# Patient Record
Sex: FEMALE | Race: WHITE | NOT HISPANIC OR LATINO
[De-identification: names, ages, dates, MRNs, and addresses within clinical notes are randomized per-mention and may not be internally consistent; named-entity substitution may affect disease eponyms.]

---

## 2016-06-27 RX ORDER — ACETAMINOPHEN 500 MG
2 TABLET ORAL
Qty: 0 | Refills: 0 | COMMUNITY
Start: 2016-06-27

## 2017-01-03 ENCOUNTER — APPOINTMENT (OUTPATIENT)
Dept: ORTHOPEDIC SURGERY | Facility: CLINIC | Age: 82
End: 2017-01-03

## 2017-02-15 ENCOUNTER — APPOINTMENT (OUTPATIENT)
Dept: ORTHOPEDIC SURGERY | Facility: CLINIC | Age: 82
End: 2017-02-15

## 2017-02-15 DIAGNOSIS — Z96.652 PRESENCE OF LEFT ARTIFICIAL KNEE JOINT: ICD-10-CM

## 2017-04-12 ENCOUNTER — APPOINTMENT (OUTPATIENT)
Dept: UROGYNECOLOGY | Facility: CLINIC | Age: 82
End: 2017-04-12

## 2017-05-17 ENCOUNTER — APPOINTMENT (OUTPATIENT)
Dept: ORTHOPEDIC SURGERY | Facility: CLINIC | Age: 82
End: 2017-05-17

## 2017-05-17 VITALS — BODY MASS INDEX: 25.49 KG/M2 | WEIGHT: 135 LBS | HEIGHT: 61 IN

## 2017-08-22 ENCOUNTER — APPOINTMENT (OUTPATIENT)
Dept: ORTHOPEDIC SURGERY | Facility: CLINIC | Age: 82
End: 2017-08-22
Payer: MEDICARE

## 2017-08-22 DIAGNOSIS — Z47.1 AFTERCARE FOLLOWING JOINT REPLACEMENT SURGERY: ICD-10-CM

## 2017-08-22 DIAGNOSIS — Z96.641 PRESENCE OF RIGHT ARTIFICIAL HIP JOINT: ICD-10-CM

## 2017-08-22 DIAGNOSIS — M16.12 UNILATERAL PRIMARY OSTEOARTHRITIS, LEFT HIP: ICD-10-CM

## 2017-08-22 PROCEDURE — 72170 X-RAY EXAM OF PELVIS: CPT

## 2017-08-22 PROCEDURE — 72100 X-RAY EXAM L-S SPINE 2/3 VWS: CPT

## 2017-08-22 PROCEDURE — 99213 OFFICE O/P EST LOW 20 MIN: CPT

## 2017-09-08 ENCOUNTER — RX RENEWAL (OUTPATIENT)
Age: 82
End: 2017-09-08

## 2017-12-11 ENCOUNTER — OUTPATIENT (OUTPATIENT)
Dept: OUTPATIENT SERVICES | Facility: HOSPITAL | Age: 82
LOS: 1 days | End: 2017-12-11
Payer: MEDICARE

## 2017-12-11 VITALS
OXYGEN SATURATION: 96 % | HEIGHT: 61 IN | TEMPERATURE: 98 F | DIASTOLIC BLOOD PRESSURE: 85 MMHG | RESPIRATION RATE: 16 BRPM | WEIGHT: 141.76 LBS | HEART RATE: 80 BPM | SYSTOLIC BLOOD PRESSURE: 143 MMHG

## 2017-12-11 DIAGNOSIS — Z96.641 PRESENCE OF RIGHT ARTIFICIAL HIP JOINT: Chronic | ICD-10-CM

## 2017-12-11 DIAGNOSIS — Z96.652 PRESENCE OF LEFT ARTIFICIAL KNEE JOINT: Chronic | ICD-10-CM

## 2017-12-11 DIAGNOSIS — Z41.9 ENCOUNTER FOR PROCEDURE FOR PURPOSES OTHER THAN REMEDYING HEALTH STATE, UNSPECIFIED: Chronic | ICD-10-CM

## 2017-12-11 DIAGNOSIS — Z98.89 OTHER SPECIFIED POSTPROCEDURAL STATES: Chronic | ICD-10-CM

## 2017-12-11 DIAGNOSIS — M16.12 UNILATERAL PRIMARY OSTEOARTHRITIS, LEFT HIP: ICD-10-CM

## 2017-12-11 DIAGNOSIS — Z01.818 ENCOUNTER FOR OTHER PREPROCEDURAL EXAMINATION: ICD-10-CM

## 2017-12-11 DIAGNOSIS — Z98.42 CATARACT EXTRACTION STATUS, LEFT EYE: Chronic | ICD-10-CM

## 2017-12-11 LAB
ANION GAP SERPL CALC-SCNC: 13 MMOL/L — SIGNIFICANT CHANGE UP (ref 5–17)
BLD GP AB SCN SERPL QL: NEGATIVE — SIGNIFICANT CHANGE UP
BUN SERPL-MCNC: 44 MG/DL — HIGH (ref 7–23)
CALCIUM SERPL-MCNC: 10.5 MG/DL — SIGNIFICANT CHANGE UP (ref 8.4–10.5)
CHLORIDE SERPL-SCNC: 102 MMOL/L — SIGNIFICANT CHANGE UP (ref 96–108)
CO2 SERPL-SCNC: 26 MMOL/L — SIGNIFICANT CHANGE UP (ref 22–31)
CREAT SERPL-MCNC: 1.02 MG/DL — SIGNIFICANT CHANGE UP (ref 0.5–1.3)
GLUCOSE SERPL-MCNC: 91 MG/DL — SIGNIFICANT CHANGE UP (ref 70–99)
HCT VFR BLD CALC: 38.9 % — SIGNIFICANT CHANGE UP (ref 34.5–45)
HGB BLD-MCNC: 12.4 G/DL — SIGNIFICANT CHANGE UP (ref 11.5–15.5)
MCHC RBC-ENTMCNC: 31.9 GM/DL — LOW (ref 32–36)
MCHC RBC-ENTMCNC: 32 PG — SIGNIFICANT CHANGE UP (ref 27–34)
MCV RBC AUTO: 100.3 FL — HIGH (ref 80–100)
MRSA PCR RESULT.: SIGNIFICANT CHANGE UP
PLATELET # BLD AUTO: 202 K/UL — SIGNIFICANT CHANGE UP (ref 150–400)
POTASSIUM SERPL-MCNC: 4.8 MMOL/L — SIGNIFICANT CHANGE UP (ref 3.5–5.3)
POTASSIUM SERPL-SCNC: 4.8 MMOL/L — SIGNIFICANT CHANGE UP (ref 3.5–5.3)
RBC # BLD: 3.88 M/UL — SIGNIFICANT CHANGE UP (ref 3.8–5.2)
RBC # FLD: 13.9 % — SIGNIFICANT CHANGE UP (ref 10.3–14.5)
RH IG SCN BLD-IMP: NEGATIVE — SIGNIFICANT CHANGE UP
S AUREUS DNA NOSE QL NAA+PROBE: DETECTED
SODIUM SERPL-SCNC: 141 MMOL/L — SIGNIFICANT CHANGE UP (ref 135–145)
WBC # BLD: 5.85 K/UL — SIGNIFICANT CHANGE UP (ref 3.8–10.5)
WBC # FLD AUTO: 5.85 K/UL — SIGNIFICANT CHANGE UP (ref 3.8–10.5)

## 2017-12-11 PROCEDURE — 86900 BLOOD TYPING SEROLOGIC ABO: CPT

## 2017-12-11 PROCEDURE — 87640 STAPH A DNA AMP PROBE: CPT

## 2017-12-11 PROCEDURE — 86901 BLOOD TYPING SEROLOGIC RH(D): CPT

## 2017-12-11 PROCEDURE — G0463: CPT

## 2017-12-11 PROCEDURE — 85027 COMPLETE CBC AUTOMATED: CPT

## 2017-12-11 PROCEDURE — 86850 RBC ANTIBODY SCREEN: CPT

## 2017-12-11 PROCEDURE — 80048 BASIC METABOLIC PNL TOTAL CA: CPT

## 2017-12-11 RX ORDER — TRAMADOL HYDROCHLORIDE 50 MG/1
50 TABLET ORAL ONCE
Qty: 0 | Refills: 0 | Status: DISCONTINUED | OUTPATIENT
Start: 2017-12-28 | End: 2017-12-28

## 2017-12-11 RX ORDER — PANTOPRAZOLE SODIUM 20 MG/1
40 TABLET, DELAYED RELEASE ORAL ONCE
Qty: 0 | Refills: 0 | Status: COMPLETED | OUTPATIENT
Start: 2017-12-28 | End: 2017-12-28

## 2017-12-11 RX ORDER — CEFAZOLIN SODIUM 1 G
2000 VIAL (EA) INJECTION ONCE
Qty: 0 | Refills: 0 | Status: DISCONTINUED | OUTPATIENT
Start: 2017-12-28 | End: 2018-01-01

## 2017-12-11 RX ORDER — ACETAMINOPHEN 500 MG
975 TABLET ORAL ONCE
Qty: 0 | Refills: 0 | Status: COMPLETED | OUTPATIENT
Start: 2017-12-28 | End: 2017-12-28

## 2017-12-11 RX ORDER — GABAPENTIN 400 MG/1
100 CAPSULE ORAL ONCE
Qty: 0 | Refills: 0 | Status: COMPLETED | OUTPATIENT
Start: 2017-12-28 | End: 2017-12-28

## 2017-12-11 NOTE — H&P PST ADULT - ASSESSMENT
84 yr old female with primary osteoarthritis of the left hip, scheduled for left total hip replacement on 12/29/17.   PST instructions provided, soap given, patient verbalized understanding. Attended ortho class in the past.   CBC, BMP, T&S , MRSA collected and send. Will continue Aspirin.

## 2017-12-11 NOTE — H&P PST ADULT - NSANTHOSAYNRD_GEN_A_CORE
No. AMENA screening performed.  STOP BANG Legend: 0-2 = LOW Risk; 3-4 = INTERMEDIATE Risk; 5-8 = HIGH Risk/13.5 inches

## 2017-12-11 NOTE — H&P PST ADULT - PMH
Anxiety    Fistula of vagina  rectovaginal fistula  Glaucoma    History of breast cancer  2015, no chemo/radiation  s/p lumpectomy  Hyperlipidemia    Hypertension    Iron deficiency anemia    Osteoarthritis    Osteopenia    Ptosis  left eye - eye lid surgery 2002  RBBB    Retinal tear    Ureterovaginal prolapse Anxiety    Fistula of vagina  rectovaginal fistula  Glaucoma    History of breast cancer  2015, no chemo/radiation  s/p lumpectomy  Hyperlipidemia    Hypertension    Iron deficiency anemia    Osteoarthritis    Osteopenia    Primary osteoarthritis of left hip    Ptosis  left eye - eye lid surgery 2002  RBBB    Retinal tear    Ureterovaginal prolapse

## 2017-12-11 NOTE — H&P PST ADULT - HISTORY OF PRESENT ILLNESS
84   year old female PMH of HTN,  HLD,  s/p right breast lumpectomy due to CA ( 2015) no chemo/radiation, had rectovaginal fistula repair 2015 , right total hip replacement  6/2016, left TKR 12/2016, presents to PST for scheduled left total hip replacement on 12/29/17. Ambulating with cane.       NO BP /IV right arm 84  year old female PMH of HTN,  HLD,  s/p right breast lumpectomy due to CA ( 2015) no chemo/radiation, had rectovaginal fistula repair 2015 , right total hip replacement  6/2016, left TKR 12/2016, reports worsening pain in the left hip and presents to PST for scheduled left total hip replacement on 12/29/17. Ambulating with cane. Denies fever, chills, no acute complaints.       NO BP /IV right arm

## 2017-12-11 NOTE — H&P PST ADULT - ACTIVITY
housework, chair pilates, walking 1-2 blocks with cane, stairs 1 flight , independent, can take care of self

## 2017-12-11 NOTE — H&P PST ADULT - PSH
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

## 2017-12-27 ENCOUNTER — FORM ENCOUNTER (OUTPATIENT)
Age: 82
End: 2017-12-27

## 2017-12-28 ENCOUNTER — RESULT REVIEW (OUTPATIENT)
Age: 82
End: 2017-12-28

## 2017-12-28 ENCOUNTER — INPATIENT (INPATIENT)
Facility: HOSPITAL | Age: 82
LOS: 3 days | Discharge: HOME | DRG: 470 | End: 2018-01-01
Attending: ORTHOPAEDIC SURGERY | Admitting: ORTHOPAEDIC SURGERY
Payer: MEDICARE

## 2017-12-28 ENCOUNTER — APPOINTMENT (OUTPATIENT)
Dept: ORTHOPEDIC SURGERY | Facility: HOSPITAL | Age: 82
End: 2017-12-28

## 2017-12-28 ENCOUNTER — TRANSCRIPTION ENCOUNTER (OUTPATIENT)
Age: 82
End: 2017-12-28

## 2017-12-28 VITALS
HEIGHT: 61 IN | OXYGEN SATURATION: 96 % | SYSTOLIC BLOOD PRESSURE: 135 MMHG | RESPIRATION RATE: 18 BRPM | TEMPERATURE: 97 F | HEART RATE: 74 BPM | WEIGHT: 141.76 LBS | DIASTOLIC BLOOD PRESSURE: 77 MMHG

## 2017-12-28 DIAGNOSIS — Z98.89 OTHER SPECIFIED POSTPROCEDURAL STATES: Chronic | ICD-10-CM

## 2017-12-28 DIAGNOSIS — M16.12 UNILATERAL PRIMARY OSTEOARTHRITIS, LEFT HIP: ICD-10-CM

## 2017-12-28 DIAGNOSIS — Z96.652 PRESENCE OF LEFT ARTIFICIAL KNEE JOINT: Chronic | ICD-10-CM

## 2017-12-28 DIAGNOSIS — Z98.42 CATARACT EXTRACTION STATUS, LEFT EYE: Chronic | ICD-10-CM

## 2017-12-28 DIAGNOSIS — Z41.9 ENCOUNTER FOR PROCEDURE FOR PURPOSES OTHER THAN REMEDYING HEALTH STATE, UNSPECIFIED: Chronic | ICD-10-CM

## 2017-12-28 DIAGNOSIS — Z96.641 PRESENCE OF RIGHT ARTIFICIAL HIP JOINT: Chronic | ICD-10-CM

## 2017-12-28 LAB
ANION GAP SERPL CALC-SCNC: 18 MMOL/L — HIGH (ref 5–17)
BUN SERPL-MCNC: 32 MG/DL — HIGH (ref 7–23)
CALCIUM SERPL-MCNC: 9.1 MG/DL — SIGNIFICANT CHANGE UP (ref 8.4–10.5)
CHLORIDE SERPL-SCNC: 102 MMOL/L — SIGNIFICANT CHANGE UP (ref 96–108)
CO2 SERPL-SCNC: 18 MMOL/L — LOW (ref 22–31)
CREAT SERPL-MCNC: 0.94 MG/DL — SIGNIFICANT CHANGE UP (ref 0.5–1.3)
GLUCOSE SERPL-MCNC: 106 MG/DL — HIGH (ref 70–99)
HCT VFR BLD CALC: 35.7 % — SIGNIFICANT CHANGE UP (ref 34.5–45)
HGB BLD-MCNC: 12.2 G/DL — SIGNIFICANT CHANGE UP (ref 11.5–15.5)
MCHC RBC-ENTMCNC: 34 GM/DL — SIGNIFICANT CHANGE UP (ref 32–36)
MCHC RBC-ENTMCNC: 34.7 PG — HIGH (ref 27–34)
MCV RBC AUTO: 102 FL — HIGH (ref 80–100)
PLATELET # BLD AUTO: 164 K/UL — SIGNIFICANT CHANGE UP (ref 150–400)
POTASSIUM SERPL-MCNC: 5 MMOL/L — SIGNIFICANT CHANGE UP (ref 3.5–5.3)
POTASSIUM SERPL-SCNC: 5 MMOL/L — SIGNIFICANT CHANGE UP (ref 3.5–5.3)
RBC # BLD: 3.5 M/UL — LOW (ref 3.8–5.2)
RBC # FLD: 12 % — SIGNIFICANT CHANGE UP (ref 10.3–14.5)
SODIUM SERPL-SCNC: 138 MMOL/L — SIGNIFICANT CHANGE UP (ref 135–145)
WBC # BLD: 9.6 K/UL — SIGNIFICANT CHANGE UP (ref 3.8–10.5)
WBC # FLD AUTO: 9.6 K/UL — SIGNIFICANT CHANGE UP (ref 3.8–10.5)

## 2017-12-28 PROCEDURE — 88311 DECALCIFY TISSUE: CPT | Mod: 26

## 2017-12-28 PROCEDURE — 27130 TOTAL HIP ARTHROPLASTY: CPT | Mod: LT

## 2017-12-28 PROCEDURE — 73502 X-RAY EXAM HIP UNI 2-3 VIEWS: CPT | Mod: 26,LT

## 2017-12-28 PROCEDURE — 88305 TISSUE EXAM BY PATHOLOGIST: CPT | Mod: 26

## 2017-12-28 RX ORDER — FAMOTIDINE 10 MG/ML
20 INJECTION INTRAVENOUS EVERY 12 HOURS
Qty: 0 | Refills: 0 | Status: DISCONTINUED | OUTPATIENT
Start: 2017-12-28 | End: 2018-01-01

## 2017-12-28 RX ORDER — FERROUS SULFATE 325(65) MG
325 TABLET ORAL
Qty: 0 | Refills: 0 | Status: DISCONTINUED | OUTPATIENT
Start: 2017-12-28 | End: 2018-01-01

## 2017-12-28 RX ORDER — DEXAMETHASONE 0.5 MG/5ML
10 ELIXIR ORAL ONCE
Qty: 0 | Refills: 0 | Status: DISCONTINUED | OUTPATIENT
Start: 2017-12-28 | End: 2018-01-01

## 2017-12-28 RX ORDER — HYDROMORPHONE HYDROCHLORIDE 2 MG/ML
0.5 INJECTION INTRAMUSCULAR; INTRAVENOUS; SUBCUTANEOUS
Qty: 0 | Refills: 0 | Status: DISCONTINUED | OUTPATIENT
Start: 2017-12-28 | End: 2018-01-01

## 2017-12-28 RX ORDER — ACETAMINOPHEN 500 MG
1000 TABLET ORAL ONCE
Qty: 0 | Refills: 0 | Status: COMPLETED | OUTPATIENT
Start: 2017-12-28 | End: 2017-12-28

## 2017-12-28 RX ORDER — FENTANYL CITRATE 50 UG/ML
25 INJECTION INTRAVENOUS
Qty: 0 | Refills: 0 | Status: DISCONTINUED | OUTPATIENT
Start: 2017-12-28 | End: 2017-12-28

## 2017-12-28 RX ORDER — DOCUSATE SODIUM 100 MG
100 CAPSULE ORAL THREE TIMES A DAY
Qty: 0 | Refills: 0 | Status: DISCONTINUED | OUTPATIENT
Start: 2017-12-28 | End: 2018-01-01

## 2017-12-28 RX ORDER — DIPHENHYDRAMINE HCL 50 MG
50 CAPSULE ORAL EVERY 4 HOURS
Qty: 0 | Refills: 0 | Status: DISCONTINUED | OUTPATIENT
Start: 2017-12-28 | End: 2018-01-01

## 2017-12-28 RX ORDER — SODIUM CHLORIDE 9 MG/ML
3 INJECTION INTRAMUSCULAR; INTRAVENOUS; SUBCUTANEOUS EVERY 8 HOURS
Qty: 0 | Refills: 0 | Status: DISCONTINUED | OUTPATIENT
Start: 2017-12-28 | End: 2017-12-28

## 2017-12-28 RX ORDER — BENZOCAINE AND MENTHOL 5; 1 G/100ML; G/100ML
1 LIQUID ORAL
Qty: 0 | Refills: 0 | Status: DISCONTINUED | OUTPATIENT
Start: 2017-12-28 | End: 2018-01-01

## 2017-12-28 RX ORDER — SENNA PLUS 8.6 MG/1
2 TABLET ORAL AT BEDTIME
Qty: 0 | Refills: 0 | Status: DISCONTINUED | OUTPATIENT
Start: 2017-12-28 | End: 2018-01-01

## 2017-12-28 RX ORDER — LIDOCAINE HCL 20 MG/ML
0.2 VIAL (ML) INJECTION ONCE
Qty: 0 | Refills: 0 | Status: DISCONTINUED | OUTPATIENT
Start: 2017-12-28 | End: 2017-12-28

## 2017-12-28 RX ORDER — SODIUM CHLORIDE 9 MG/ML
1000 INJECTION, SOLUTION INTRAVENOUS
Qty: 0 | Refills: 0 | Status: DISCONTINUED | OUTPATIENT
Start: 2017-12-28 | End: 2018-01-01

## 2017-12-28 RX ORDER — METOPROLOL TARTRATE 50 MG
25 TABLET ORAL DAILY
Qty: 0 | Refills: 0 | Status: DISCONTINUED | OUTPATIENT
Start: 2017-12-28 | End: 2018-01-01

## 2017-12-28 RX ORDER — ACETAMINOPHEN 500 MG
650 TABLET ORAL EVERY 6 HOURS
Qty: 0 | Refills: 0 | Status: DISCONTINUED | OUTPATIENT
Start: 2017-12-28 | End: 2018-01-01

## 2017-12-28 RX ORDER — ASPIRIN/CALCIUM CARB/MAGNESIUM 324 MG
325 TABLET ORAL
Qty: 0 | Refills: 0 | Status: DISCONTINUED | OUTPATIENT
Start: 2017-12-29 | End: 2018-01-01

## 2017-12-28 RX ORDER — ANASTROZOLE 1 MG/1
1 TABLET ORAL DAILY
Qty: 0 | Refills: 0 | Status: DISCONTINUED | OUTPATIENT
Start: 2017-12-28 | End: 2018-01-01

## 2017-12-28 RX ORDER — OXYCODONE HYDROCHLORIDE 5 MG/1
5 TABLET ORAL EVERY 4 HOURS
Qty: 0 | Refills: 0 | Status: DISCONTINUED | OUTPATIENT
Start: 2017-12-28 | End: 2017-12-30

## 2017-12-28 RX ORDER — ASCORBIC ACID 60 MG
500 TABLET,CHEWABLE ORAL
Qty: 0 | Refills: 0 | Status: DISCONTINUED | OUTPATIENT
Start: 2017-12-28 | End: 2018-01-01

## 2017-12-28 RX ORDER — SIMVASTATIN 20 MG/1
20 TABLET, FILM COATED ORAL AT BEDTIME
Qty: 0 | Refills: 0 | Status: DISCONTINUED | OUTPATIENT
Start: 2017-12-28 | End: 2018-01-01

## 2017-12-28 RX ORDER — SODIUM CHLORIDE 9 MG/ML
1000 INJECTION, SOLUTION INTRAVENOUS ONCE
Qty: 0 | Refills: 0 | Status: COMPLETED | OUTPATIENT
Start: 2017-12-28 | End: 2017-12-28

## 2017-12-28 RX ORDER — FOLIC ACID 0.8 MG
1 TABLET ORAL DAILY
Qty: 0 | Refills: 0 | Status: DISCONTINUED | OUTPATIENT
Start: 2017-12-28 | End: 2018-01-01

## 2017-12-28 RX ORDER — OXYCODONE HYDROCHLORIDE 5 MG/1
10 TABLET ORAL EVERY 4 HOURS
Qty: 0 | Refills: 0 | Status: DISCONTINUED | OUTPATIENT
Start: 2017-12-28 | End: 2017-12-30

## 2017-12-28 RX ORDER — LOSARTAN POTASSIUM 100 MG/1
50 TABLET, FILM COATED ORAL DAILY
Qty: 0 | Refills: 0 | Status: DISCONTINUED | OUTPATIENT
Start: 2017-12-28 | End: 2018-01-01

## 2017-12-28 RX ORDER — ONDANSETRON 8 MG/1
4 TABLET, FILM COATED ORAL EVERY 6 HOURS
Qty: 0 | Refills: 0 | Status: DISCONTINUED | OUTPATIENT
Start: 2017-12-28 | End: 2018-01-01

## 2017-12-28 RX ORDER — CEFAZOLIN SODIUM 1 G
2000 VIAL (EA) INJECTION EVERY 8 HOURS
Qty: 0 | Refills: 0 | Status: COMPLETED | OUTPATIENT
Start: 2017-12-28 | End: 2017-12-29

## 2017-12-28 RX ORDER — DIPHENHYDRAMINE HCL 50 MG
25 CAPSULE ORAL AT BEDTIME
Qty: 0 | Refills: 0 | Status: DISCONTINUED | OUTPATIENT
Start: 2017-12-28 | End: 2018-01-01

## 2017-12-28 RX ORDER — POLYETHYLENE GLYCOL 3350 17 G/17G
17 POWDER, FOR SOLUTION ORAL DAILY
Qty: 0 | Refills: 0 | Status: DISCONTINUED | OUTPATIENT
Start: 2017-12-28 | End: 2018-01-01

## 2017-12-28 RX ADMIN — OXYCODONE HYDROCHLORIDE 5 MILLIGRAM(S): 5 TABLET ORAL at 21:53

## 2017-12-28 RX ADMIN — Medication 1000 MILLIGRAM(S): at 19:49

## 2017-12-28 RX ADMIN — FENTANYL CITRATE 25 MICROGRAM(S): 50 INJECTION INTRAVENOUS at 15:00

## 2017-12-28 RX ADMIN — Medication 500 MILLIGRAM(S): at 17:13

## 2017-12-28 RX ADMIN — FENTANYL CITRATE 25 MICROGRAM(S): 50 INJECTION INTRAVENOUS at 14:49

## 2017-12-28 RX ADMIN — Medication 20 MILLIGRAM(S): at 23:33

## 2017-12-28 RX ADMIN — OXYCODONE HYDROCHLORIDE 5 MILLIGRAM(S): 5 TABLET ORAL at 22:13

## 2017-12-28 RX ADMIN — PANTOPRAZOLE SODIUM 40 MILLIGRAM(S): 20 TABLET, DELAYED RELEASE ORAL at 09:17

## 2017-12-28 RX ADMIN — FAMOTIDINE 20 MILLIGRAM(S): 10 INJECTION INTRAVENOUS at 17:13

## 2017-12-28 RX ADMIN — TRAMADOL HYDROCHLORIDE 50 MILLIGRAM(S): 50 TABLET ORAL at 09:42

## 2017-12-28 RX ADMIN — SODIUM CHLORIDE 2000 MILLILITER(S): 9 INJECTION, SOLUTION INTRAVENOUS at 14:42

## 2017-12-28 RX ADMIN — Medication 975 MILLIGRAM(S): at 09:17

## 2017-12-28 RX ADMIN — Medication 100 MILLIGRAM(S): at 21:30

## 2017-12-28 RX ADMIN — GABAPENTIN 100 MILLIGRAM(S): 400 CAPSULE ORAL at 09:17

## 2017-12-28 RX ADMIN — Medication 325 MILLIGRAM(S): at 17:15

## 2017-12-28 RX ADMIN — Medication 100 MILLIGRAM(S): at 18:25

## 2017-12-28 RX ADMIN — Medication 400 MILLIGRAM(S): at 19:16

## 2017-12-28 RX ADMIN — SIMVASTATIN 20 MILLIGRAM(S): 20 TABLET, FILM COATED ORAL at 21:30

## 2017-12-28 RX ADMIN — SODIUM CHLORIDE 75 MILLILITER(S): 9 INJECTION, SOLUTION INTRAVENOUS at 15:28

## 2017-12-28 NOTE — CHART NOTE - NSCHARTNOTEFT_GEN_A_CORE
Patient resting without complaints.  Denies chest pain, SOB, N/V.    T(C): 36 (12-28-17 @ 13:17)  T(F): 96.8 (12-28-17 @ 13:17)  HR: 89 (12-28-17 @ 15:00)  BP: 118/56 (12-28-17 @ 15:00)  RR: 17 (12-28-17 @ 15:00)  SpO2: 94% (12-28-17 @ 15:00)      Exam:  Alert and Oriented, No Acute Distress    Cardio: RRR S1,S2    Pulm: CTA B/L    L lower Extremity:  Hip Dsg CDI  Calf Soft, Non-tender  +PF/DF  Sensation grossly intact  +DP Pulse                          12.2   9.6   )-----------( 164      ( 28 Dec 2017 14:28 )             35.7        138  |  102  |  32<H>  ----------------------------<  106<H>  5.0   |  18<L>  |  0.94      Post-op Pelvis XR done.     A/P: 84y Female S/P L REBECCA; Stable    -Pain Control  -DVT PPx; IS  -Am labs  -PT/OT Eval-WBAT LLE  -Continue Current Tx.    Bruna Roberts PA-C  Orthopedic Surgery  Pagers [phone]

## 2017-12-28 NOTE — BRIEF OPERATIVE NOTE - PROCEDURE
<<-----Click on this checkbox to enter Procedure Arthroplasty, hip, total  12/28/2017  LEFT REBECCA  Active  SSTEIN2

## 2017-12-28 NOTE — PRE-OP CHECKLIST - 1.
emotional support and preop teaching provided to pt and family.
emotional support and preop teaching provided to pt and family.

## 2017-12-28 NOTE — PHYSICAL THERAPY INITIAL EVALUATION ADULT - GENERAL OBSERVATIONS, REHAB EVAL
Pt received in bed +hip abd pillow, +BLE venodynes Pt received in bed +hip abd pillow, +BLE venodynes.

## 2017-12-28 NOTE — PATIENT PROFILE ADULT. - PMH
Anxiety    Fistula of vagina  rectovaginal fistula  Glaucoma    History of breast cancer  2015, no chemo/radiation  s/p lumpectomy  Hyperlipidemia    Hypertension    Iron deficiency anemia    Osteoarthritis    Osteopenia    Primary osteoarthritis of left hip    Ptosis  left eye - eye lid surgery 2002  RBBB    Retinal tear    Ureterovaginal prolapse

## 2017-12-29 LAB
ANION GAP SERPL CALC-SCNC: 14 MMOL/L — SIGNIFICANT CHANGE UP (ref 5–17)
BUN SERPL-MCNC: 27 MG/DL — HIGH (ref 7–23)
CALCIUM SERPL-MCNC: 9.1 MG/DL — SIGNIFICANT CHANGE UP (ref 8.4–10.5)
CHLORIDE SERPL-SCNC: 102 MMOL/L — SIGNIFICANT CHANGE UP (ref 96–108)
CO2 SERPL-SCNC: 22 MMOL/L — SIGNIFICANT CHANGE UP (ref 22–31)
CREAT SERPL-MCNC: 1.1 MG/DL — SIGNIFICANT CHANGE UP (ref 0.5–1.3)
GLUCOSE SERPL-MCNC: 121 MG/DL — HIGH (ref 70–99)
HCT VFR BLD CALC: 31.6 % — LOW (ref 34.5–45)
HGB BLD-MCNC: 10.2 G/DL — LOW (ref 11.5–15.5)
MCHC RBC-ENTMCNC: 31.9 PG — SIGNIFICANT CHANGE UP (ref 27–34)
MCHC RBC-ENTMCNC: 32.3 GM/DL — SIGNIFICANT CHANGE UP (ref 32–36)
MCV RBC AUTO: 98.8 FL — SIGNIFICANT CHANGE UP (ref 80–100)
PLATELET # BLD AUTO: 176 K/UL — SIGNIFICANT CHANGE UP (ref 150–400)
POTASSIUM SERPL-MCNC: 4.7 MMOL/L — SIGNIFICANT CHANGE UP (ref 3.5–5.3)
POTASSIUM SERPL-SCNC: 4.7 MMOL/L — SIGNIFICANT CHANGE UP (ref 3.5–5.3)
RBC # BLD: 3.2 M/UL — LOW (ref 3.8–5.2)
RBC # FLD: 13.8 % — SIGNIFICANT CHANGE UP (ref 10.3–14.5)
SODIUM SERPL-SCNC: 138 MMOL/L — SIGNIFICANT CHANGE UP (ref 135–145)
WBC # BLD: 8.51 K/UL — SIGNIFICANT CHANGE UP (ref 3.8–10.5)
WBC # FLD AUTO: 8.51 K/UL — SIGNIFICANT CHANGE UP (ref 3.8–10.5)

## 2017-12-29 RX ORDER — ACETAMINOPHEN 500 MG
1000 TABLET ORAL ONCE
Qty: 0 | Refills: 0 | Status: COMPLETED | OUTPATIENT
Start: 2017-12-29 | End: 2017-12-29

## 2017-12-29 RX ORDER — SODIUM CHLORIDE 9 MG/ML
500 INJECTION INTRAMUSCULAR; INTRAVENOUS; SUBCUTANEOUS ONCE
Qty: 0 | Refills: 0 | Status: COMPLETED | OUTPATIENT
Start: 2017-12-29 | End: 2017-12-29

## 2017-12-29 RX ADMIN — Medication 325 MILLIGRAM(S): at 17:21

## 2017-12-29 RX ADMIN — Medication 1000 MILLIGRAM(S): at 09:24

## 2017-12-29 RX ADMIN — Medication 500 MILLIGRAM(S): at 05:23

## 2017-12-29 RX ADMIN — Medication 100 MILLIGRAM(S): at 05:22

## 2017-12-29 RX ADMIN — SODIUM CHLORIDE 500 MILLILITER(S): 9 INJECTION INTRAMUSCULAR; INTRAVENOUS; SUBCUTANEOUS at 11:09

## 2017-12-29 RX ADMIN — OXYCODONE HYDROCHLORIDE 5 MILLIGRAM(S): 5 TABLET ORAL at 21:36

## 2017-12-29 RX ADMIN — Medication 325 MILLIGRAM(S): at 07:53

## 2017-12-29 RX ADMIN — Medication 100 MILLIGRAM(S): at 21:36

## 2017-12-29 RX ADMIN — FAMOTIDINE 20 MILLIGRAM(S): 10 INJECTION INTRAVENOUS at 05:22

## 2017-12-29 RX ADMIN — POLYETHYLENE GLYCOL 3350 17 GRAM(S): 17 POWDER, FOR SOLUTION ORAL at 11:32

## 2017-12-29 RX ADMIN — SIMVASTATIN 20 MILLIGRAM(S): 20 TABLET, FILM COATED ORAL at 21:36

## 2017-12-29 RX ADMIN — Medication 400 MILLIGRAM(S): at 16:56

## 2017-12-29 RX ADMIN — OXYCODONE HYDROCHLORIDE 5 MILLIGRAM(S): 5 TABLET ORAL at 22:06

## 2017-12-29 RX ADMIN — Medication 400 MILLIGRAM(S): at 09:09

## 2017-12-29 RX ADMIN — Medication 20 MILLIGRAM(S): at 21:36

## 2017-12-29 RX ADMIN — Medication 325 MILLIGRAM(S): at 11:31

## 2017-12-29 RX ADMIN — Medication 1000 MILLIGRAM(S): at 17:06

## 2017-12-29 RX ADMIN — Medication 100 MILLIGRAM(S): at 03:59

## 2017-12-29 RX ADMIN — Medication 325 MILLIGRAM(S): at 05:23

## 2017-12-29 RX ADMIN — Medication 1 MILLIGRAM(S): at 11:35

## 2017-12-29 RX ADMIN — Medication 500 MILLIGRAM(S): at 17:21

## 2017-12-29 RX ADMIN — FAMOTIDINE 20 MILLIGRAM(S): 10 INJECTION INTRAVENOUS at 17:21

## 2017-12-29 RX ADMIN — ANASTROZOLE 1 MILLIGRAM(S): 1 TABLET ORAL at 11:31

## 2017-12-29 RX ADMIN — OXYCODONE HYDROCHLORIDE 5 MILLIGRAM(S): 5 TABLET ORAL at 05:23

## 2017-12-29 RX ADMIN — OXYCODONE HYDROCHLORIDE 5 MILLIGRAM(S): 5 TABLET ORAL at 16:05

## 2017-12-29 RX ADMIN — Medication 1 TABLET(S): at 11:35

## 2017-12-29 RX ADMIN — OXYCODONE HYDROCHLORIDE 5 MILLIGRAM(S): 5 TABLET ORAL at 15:32

## 2017-12-29 NOTE — PROGRESS NOTE ADULT - ATTENDING COMMENTS
Patient is a 84y old  Female who presents with a chief complaint of left total hip replacement (28 Dec 2017 08:57)        Agree with last Resident or Physician's Assistant note:    Patient comfortable  No complaints    PHYSICAL EXAM:  NAD, Alert    [ ] Hip: Dressing C/D/I; sensation grossly intact to light touch; (+) DF/PF; (+) Distal Pulses; No Calf tenderness B/L, PAS     Plan for physical therapy to get out of bed, ambulate full weight bearing as tolerated. work on knee ROM          Plan for Disposition:  Home care      Ernesto Salas MD  [facility]  [phone]

## 2017-12-29 NOTE — OCCUPATIONAL THERAPY INITIAL EVALUATION ADULT - PERTINENT HX OF CURRENT PROBLEM, REHAB EVAL
84  year old female PMH of HTN,  HLD,  s/p right breast lumpectomy due to CA ( 2015) no chemo/radiation, had rectovaginal fistula repair 2015 , right total hip replacement  6/2016, left TKR 12/2016, reports worsening pain in the left hip and presents to PST for scheduled left total hip replacement on 12/29/17. Ambulating with cane. Denies fever, chills, no acute complaints.

## 2017-12-29 NOTE — PROGRESS NOTE ADULT - ASSESSMENT
85 y/o fm s/p left total hip arthroplasty POD#1, incentive spirometer, physical therapy, low dose narcs  Tierra Duque PA-C  Orthopaedic Surgery  Team pager [phone]  [facility] [phone]  [phone]

## 2017-12-29 NOTE — PROGRESS NOTE ADULT - SUBJECTIVE AND OBJECTIVE BOX
Patient is a 84y old  Female who presents with a chief complaint of left total hip replacement (28 Dec 2017 08:57)  POST OPERATIVE DAY #:  [1 ]   Patient comfortable  No complaints    T(C): 36.4 (12-29-17 @ 05:21), Max: 37.2 (12-28-17 @ 19:28)  HR: 96 (12-29-17 @ 05:21) (74 - 103)  BP: 102/63 (12-29-17 @ 05:21) (94/55 - 135/77)  RR: 18 (12-29-17 @ 05:21) (15 - 18)  SpO2: 100% (12-29-17 @ 05:21) (87% - 100%)    PHYSICAL EXAM:  NAD, Alert  [Left ] Hip: Aquacel dressing C/D/I; sensation grossly intact to light touch; (+) DF/PF; (+) Distal Pulses; No Calf tenderness B/L, PAS     LABS:                      12.2   9.6   )-----------( 164      ( 28 Dec 2017 14:28 )             35.7   12-28  138  |  102  |  32<H>  ----------------------------<  106<H>  5.0   |  18<L>  |  0.94  Ca    9.1      28 Dec 2017 14:28

## 2017-12-30 ENCOUNTER — TRANSCRIPTION ENCOUNTER (OUTPATIENT)
Age: 82
End: 2017-12-30

## 2017-12-30 LAB
ANION GAP SERPL CALC-SCNC: 15 MMOL/L — SIGNIFICANT CHANGE UP (ref 5–17)
BUN SERPL-MCNC: 27 MG/DL — HIGH (ref 7–23)
CALCIUM SERPL-MCNC: 8.4 MG/DL — SIGNIFICANT CHANGE UP (ref 8.4–10.5)
CHLORIDE SERPL-SCNC: 106 MMOL/L — SIGNIFICANT CHANGE UP (ref 96–108)
CO2 SERPL-SCNC: 19 MMOL/L — LOW (ref 22–31)
CREAT SERPL-MCNC: 1 MG/DL — SIGNIFICANT CHANGE UP (ref 0.5–1.3)
GLUCOSE SERPL-MCNC: 120 MG/DL — HIGH (ref 70–99)
HCT VFR BLD CALC: 28.3 % — LOW (ref 34.5–45)
HGB BLD-MCNC: 8.9 G/DL — LOW (ref 11.5–15.5)
MCHC RBC-ENTMCNC: 31.4 GM/DL — LOW (ref 32–36)
MCHC RBC-ENTMCNC: 31.7 PG — SIGNIFICANT CHANGE UP (ref 27–34)
MCV RBC AUTO: 100.7 FL — HIGH (ref 80–100)
PLATELET # BLD AUTO: 144 K/UL — LOW (ref 150–400)
POTASSIUM SERPL-MCNC: 4.6 MMOL/L — SIGNIFICANT CHANGE UP (ref 3.5–5.3)
POTASSIUM SERPL-SCNC: 4.6 MMOL/L — SIGNIFICANT CHANGE UP (ref 3.5–5.3)
RBC # BLD: 2.81 M/UL — LOW (ref 3.8–5.2)
RBC # FLD: 14.5 % — SIGNIFICANT CHANGE UP (ref 10.3–14.5)
SODIUM SERPL-SCNC: 140 MMOL/L — SIGNIFICANT CHANGE UP (ref 135–145)
WBC # BLD: 6.62 K/UL — SIGNIFICANT CHANGE UP (ref 3.8–10.5)
WBC # FLD AUTO: 6.62 K/UL — SIGNIFICANT CHANGE UP (ref 3.8–10.5)

## 2017-12-30 RX ORDER — TRAMADOL HYDROCHLORIDE 50 MG/1
50 TABLET ORAL EVERY 6 HOURS
Qty: 0 | Refills: 0 | Status: DISCONTINUED | OUTPATIENT
Start: 2017-12-30 | End: 2018-01-01

## 2017-12-30 RX ORDER — OXYCODONE HYDROCHLORIDE 5 MG/1
5 TABLET ORAL EVERY 4 HOURS
Qty: 0 | Refills: 0 | Status: DISCONTINUED | OUTPATIENT
Start: 2017-12-30 | End: 2018-01-01

## 2017-12-30 RX ORDER — SODIUM CHLORIDE 9 MG/ML
1000 INJECTION INTRAMUSCULAR; INTRAVENOUS; SUBCUTANEOUS ONCE
Qty: 0 | Refills: 0 | Status: COMPLETED | OUTPATIENT
Start: 2017-12-30 | End: 2017-12-30

## 2017-12-30 RX ORDER — ACETAMINOPHEN 500 MG
1000 TABLET ORAL ONCE
Qty: 0 | Refills: 0 | Status: COMPLETED | OUTPATIENT
Start: 2017-12-30 | End: 2017-12-30

## 2017-12-30 RX ADMIN — FAMOTIDINE 20 MILLIGRAM(S): 10 INJECTION INTRAVENOUS at 05:43

## 2017-12-30 RX ADMIN — Medication 1 TABLET(S): at 11:10

## 2017-12-30 RX ADMIN — TRAMADOL HYDROCHLORIDE 50 MILLIGRAM(S): 50 TABLET ORAL at 17:03

## 2017-12-30 RX ADMIN — Medication 500 MILLIGRAM(S): at 17:03

## 2017-12-30 RX ADMIN — Medication 500 MILLIGRAM(S): at 05:43

## 2017-12-30 RX ADMIN — Medication 325 MILLIGRAM(S): at 05:43

## 2017-12-30 RX ADMIN — SODIUM CHLORIDE 2000 MILLILITER(S): 9 INJECTION INTRAMUSCULAR; INTRAVENOUS; SUBCUTANEOUS at 05:43

## 2017-12-30 RX ADMIN — OXYCODONE HYDROCHLORIDE 5 MILLIGRAM(S): 5 TABLET ORAL at 10:00

## 2017-12-30 RX ADMIN — Medication 325 MILLIGRAM(S): at 11:30

## 2017-12-30 RX ADMIN — Medication 100 MILLIGRAM(S): at 21:42

## 2017-12-30 RX ADMIN — ANASTROZOLE 1 MILLIGRAM(S): 1 TABLET ORAL at 11:09

## 2017-12-30 RX ADMIN — POLYETHYLENE GLYCOL 3350 17 GRAM(S): 17 POWDER, FOR SOLUTION ORAL at 11:10

## 2017-12-30 RX ADMIN — FAMOTIDINE 20 MILLIGRAM(S): 10 INJECTION INTRAVENOUS at 17:03

## 2017-12-30 RX ADMIN — Medication 20 MILLIGRAM(S): at 21:43

## 2017-12-30 RX ADMIN — Medication 1 MILLIGRAM(S): at 11:10

## 2017-12-30 RX ADMIN — Medication 100 MILLIGRAM(S): at 13:12

## 2017-12-30 RX ADMIN — SIMVASTATIN 20 MILLIGRAM(S): 20 TABLET, FILM COATED ORAL at 21:43

## 2017-12-30 RX ADMIN — Medication 100 MILLIGRAM(S): at 05:43

## 2017-12-30 RX ADMIN — Medication 1000 MILLIGRAM(S): at 22:52

## 2017-12-30 RX ADMIN — Medication 325 MILLIGRAM(S): at 17:05

## 2017-12-30 RX ADMIN — OXYCODONE HYDROCHLORIDE 5 MILLIGRAM(S): 5 TABLET ORAL at 09:31

## 2017-12-30 RX ADMIN — Medication 325 MILLIGRAM(S): at 17:03

## 2017-12-30 RX ADMIN — Medication 400 MILLIGRAM(S): at 22:37

## 2017-12-30 RX ADMIN — Medication 325 MILLIGRAM(S): at 08:41

## 2017-12-30 RX ADMIN — TRAMADOL HYDROCHLORIDE 50 MILLIGRAM(S): 50 TABLET ORAL at 17:30

## 2017-12-30 NOTE — DISCHARGE NOTE ADULT - MEDICATION SUMMARY - MEDICATIONS TO TAKE
I will START or STAY ON the medications listed below when I get home from the hospital:    Alma Walker  -- Dx Left hip Arthritis  NONA;99M  -- Indication: For Supply    aspirin 325 mg oral delayed release tablet  -- 1 tab(s) by mouth 2 times a day x 6 weeks for DVT prophylaxis then resume aspirin 81mg daily  -- Indication: For DVT prophylaxis    acetaminophen 325 mg oral tablet  -- 2 tab(s) by mouth every 6 hours, As needed,   Pain  -- Indication: For Pain mgt    losartan 50 mg oral tablet  -- 1 tab(s) by mouth once a day  -- Indication: For HTN    PARoxetine 20 mg oral tablet  -- 1 tab(s) by mouth once a day HS  -- Indication: For HTN    Zocor 20 mg oral tablet  -- 1 tab(s) by mouth once a day (at bedtime)  -- Indication: For HLD    Arimidex 1 mg oral tablet  -- 1 tab(s) by mouth once a day  -- Indication: For Breast ca    Toprol-XL 25 mg oral tablet, extended release  -- 1 tab(s) by mouth once a day  -- Indication: For HTN    famotidine 20 mg oral tablet  -- 1 tab(s) by mouth every 12 hours  -- Indication: For GI    docusate sodium 100 mg oral capsule  -- 1 cap(s) by mouth 3 times a day  -- Indication: For laxative    polyethylene glycol 3350 oral powder for reconstitution  -- 17 gram(s) by mouth once a day  -- Indication: For laxative    senna oral tablet  -- 2 tab(s) by mouth once a day (at bedtime), As needed, Constipation  -- Indication: For laxative I will START or STAY ON the medications listed below when I get home from the hospital:    Alma Walker  -- Dx Left hip Arthritis  NONA;99M  -- Indication: For Supply    aspirin 325 mg oral delayed release tablet  -- 1 tab(s) by mouth 2 times a day x 6 weeks for DVT prophylaxis then resume aspirin 81mg daily  -- Indication: For DVT prophylaxis    acetaminophen 325 mg oral tablet  -- 2 tab(s) by mouth every 6 hours, As needed,   Pain  -- Indication: For Pain mgt    Percocet 5/325 oral tablet  -- 1 tab(s) by mouth every 4 hours prn pain MDD:6  -- Indication: For Pain mgt    losartan 50 mg oral tablet  -- 1 tab(s) by mouth once a day  -- Indication: For HTN    PARoxetine 20 mg oral tablet  -- 1 tab(s) by mouth once a day HS  -- Indication: For HTN    Zocor 20 mg oral tablet  -- 1 tab(s) by mouth once a day (at bedtime)  -- Indication: For HLD    Arimidex 1 mg oral tablet  -- 1 tab(s) by mouth once a day  -- Indication: For Breast ca    Toprol-XL 25 mg oral tablet, extended release  -- 1 tab(s) by mouth once a day  -- Indication: For HTN    famotidine 20 mg oral tablet  -- 1 tab(s) by mouth every 12 hours  -- Indication: For GI    docusate sodium 100 mg oral capsule  -- 1 cap(s) by mouth 3 times a day  -- Indication: For laxative    polyethylene glycol 3350 oral powder for reconstitution  -- 17 gram(s) by mouth once a day  -- Indication: For laxative    senna oral tablet  -- 2 tab(s) by mouth once a day (at bedtime), As needed, Constipation  -- Indication: For laxative

## 2017-12-30 NOTE — DISCHARGE NOTE ADULT - MEDICATION SUMMARY - MEDICATIONS TO CHANGE
I will SWITCH the dose or number of times a day I take the medications listed below when I get home from the hospital:    aspirin 81 mg oral delayed release tablet  -- 1 tab(s) by mouth once a day ( will continue ) preventative.

## 2017-12-30 NOTE — DISCHARGE NOTE ADULT - MEDICATION SUMMARY - MEDICATIONS TO STOP TAKING
I will STOP taking the medications listed below when I get home from the hospital:    tumeric  -- orally once a day I will STOP taking the medications listed below when I get home from the hospital:    CoQ10 300 mg oral capsule  -- 1 cap(s) by mouth once a day    tumeric  -- orally once a day

## 2017-12-30 NOTE — DISCHARGE NOTE ADULT - NSFTFSERV1RD_GEN_ALL_CORE
rehabilitation services observation and assessment/rehabilitation services/teaching and training/other:

## 2017-12-30 NOTE — DISCHARGE NOTE ADULT - CARE PLAN
Principal Discharge DX:	Primary osteoarthritis of left hip  Goal:	surgical intervention should result in improved function  Instructions for follow-up, activity and diet:	continue weight bearing as tolerated ambulation with rolling walker, DASH diet , and follow up with Dr Salas post operative day #12-14 (1/9-1/11/18) Principal Discharge DX:	Primary osteoarthritis of left hip  Goal:	surgical intervention should result in improved function  Instructions for follow-up, activity and diet:	continue weight bearing as tolerated ambulation with rolling walker, DASH diet , and follow up with Dr Salas post operative day #12-14 (1/9-1/11/18).  Follow up appt Dr Salas will remove dressing and d/c staples if applicable.

## 2017-12-30 NOTE — DISCHARGE NOTE ADULT - FINDINGS/TREATMENT
continue weight bearing as tolerated ambulation with rolling walker and posterior total hip replacement precautions

## 2017-12-30 NOTE — DISCHARGE NOTE ADULT - ADDITIONAL INSTRUCTIONS
Keep surgical incision/ Aquacel dressing clean and dry. Continue weight bearing as tolerated ambulation with rolling walker and posterior total hip precautions. Follow up with Dr Salas 12-14 days post op (1/9-1/11/18) for wound check and surgical staple/suture removal.  Follow up with your primary care provider 1-2 weeks post op.  Continue Aspirin 325mg twice daily for 6 weeks post op.

## 2017-12-30 NOTE — PROGRESS NOTE ADULT - ASSESSMENT
83 y/o fm s/p left total hip arthroplasty POD#2, physical therapy incentive marium, d/c home once cleared by physical therapist  Tierra AGUAYOC  Orthopaedic Surgery  Team pager [phone]  [facility] [phone]  [phone]

## 2017-12-30 NOTE — PROGRESS NOTE ADULT - SUBJECTIVE AND OBJECTIVE BOX
Patient is a 84y old  Female who presents with a chief complaint of left total hip replacement (28 Dec 2017 08:57)  POST OPERATIVE DAY #:  [2 ]   Patient comfortable  No complaints    T(C): 37.4 (12-30-17 @ 05:31), Max: 37.4 (12-30-17 @ 05:31)  HR: 75 (12-30-17 @ 05:31) (75 - 98)  BP: 98/61 (12-30-17 @ 05:31) (96/58 - 115/72)  RR: 18 (12-30-17 @ 05:31) (18 - 18)  SpO2: 98% (12-30-17 @ 05:31) (95% - 98%)    PHYSICAL EXAM:  NAD, Alert  [Left ] Hip: Aquacel dressing C/D/I; sensation grossly intact to light touch; (+) DF/PF; (+) Distal Pulses; No Calf tenderness B/L, PAS     LABS:                      10.2   8.51  )-----------( 176      ( 29 Dec 2017 09:06 )             31.6   12-29  138  |  102  |  27<H>  ----------------------------<  121<H>  4.7   |  22  |  1.10  Ca    9.1      29 Dec 2017 09:06

## 2017-12-30 NOTE — DISCHARGE NOTE ADULT - PATIENT PORTAL LINK FT
“You can access the Follow[employer] Patient Portal, offered by [facility], by registering with the following website: [email or web address]”

## 2017-12-30 NOTE — DISCHARGE NOTE ADULT - NS AS ACTIVITY OBS
Do not make important decisions/Do not drive or operate machinery/Walking-Indoors allowed/No Heavy lifting/straining/Stairs allowed/continue weight bearing as tolerated ambulation with rolling walker, and posterior total hip precautions/Walking-Outdoors allowed Do not make important decisions/Do not drive or operate machinery/Showering allowed/continue weight bearing as tolerated ambulation with rolling walker, and posterior total hip precautions/Stairs allowed/Walking-Indoors allowed/No Heavy lifting/straining/Walking-Outdoors allowed

## 2017-12-30 NOTE — DISCHARGE NOTE ADULT - CARE PROVIDER_API CALL
Ernesto Salas), Orthopaedic Surgery  [address] [place] [address]  Phone: [phone]  Fax: [phone]

## 2017-12-30 NOTE — DISCHARGE NOTE ADULT - HOSPITAL COURSE
Chief Complaint/Reason for Admission	left total hip replacement	     History of Present Illness:  History of Present Illness		  84  year old female PMH of HTN,  HLD,  s/p right breast lumpectomy due to CA ( 2015) no chemo/radiation, had rectovaginal fistula repair 2015 , right total hip replacement  6/2016, left TKR 12/2016, reports worsening pain in the left hip and presents to [facility] for scheduled left total hip replacement on 12/29/17. Ambulating with cane. Denies fever, chills, no acute complaints.       NO BP /IV right arm     Allergies/Medications:   Allergies:        Allergies:  	Levaquin: Drug, Patient, Diarrhea       Intolerances:  	Dilaudid: Drug, Other, hallucination    Home Medications:   * Patient Currently Takes Medications as of 11-Dec-2017 12:30 documented in Structured Notes  · 	acetaminophen 325 mg oral tablet: Last Dose Taken:  , 2 tab(s) orally every 6 hours, As needed, Mild Pain  · 	Arimidex 1 mg oral tablet: Last Dose Taken:  , 1 tab(s) orally once a day  · 	Toprol-XL 25 mg oral tablet, extended release: Last Dose Taken:  , 1 tab(s) orally once a day  · 	losartan 50 mg oral tablet: Last Dose Taken:  , 1 tab(s) orally once a day  · 	PARoxetine 20 mg oral tablet: Last Dose Taken:  , 1 tab(s) orally once a day HS  · 	Zocor 20 mg oral tablet: Last Dose Taken:  , 1 tab(s) orally once a day (at bedtime)  · 	Calcium 600+D oral tablet: Last Dose Taken:  , 1 tab(s) orally 2 times a day  · 	Probiotic: Last Dose Taken:  , 1 tab(s) orally once a day  · 	ferrous sulfate 200 mg (65 mg elemental iron) oral tablet: Last Dose Taken:  , 1 tab(s) orally once a day  · 	CoQ10 300 mg oral capsule: Last Dose Taken:  , 1 cap(s) orally once a day  · 	tumeric: Last Dose Taken:  , orally once a day  · 	multivitamin: Last Dose Taken:  , orally once a day  · 	aspirin 81 mg oral delayed release tablet: Last Dose Taken:  , 1 tab(s) orally once a day ( will continue ) preventative.   · 	Cranberry oral capsule: orally once a day    PMH/PSH/FH/SH:    Past Medical History:  Anxiety    Fistula of vagina  rectovaginal fistula  Glaucoma    History of breast cancer  2015, no chemo/radiation  s/p lumpectomy  Hyperlipidemia    Hypertension    Iron deficiency anemia    Osteoarthritis    Osteopenia    Primary osteoarthritis of left hip    Ptosis  left eye - eye lid surgery 2002  RBBB    Retinal tear    Ureterovaginal prolapse.     Past Surgical History:  Elective surgery  reversal of colostomy July 2015  H/O eye surgery  bilateral ptosis  History of carpal tunnel release  bilateral  History of cataract surgery, left  and right 2016  History of lumpectomy of right breast  July 2015  History of tonsillectomy    History of varicose vein stripping  recent vascular evaluation - all normal  Recto-vaginal fistula  s/p repair 5/2015 with colostomy placement  S/P hip replacement, right  6/2016  - right hip  Status post left knee replacement  12/2016.    Hospital Course:  83 y/o fm underwent left total hip arthroplasty on 12/28/17 with Dr.S. Salas.  Patient tolerated procedure well.  Patient was evaluated postoperatively by physical and occupational therapists for weight bearing as tolerated ambulation with rolling walker and cleared patient for discharge home with home physical therapy.  Patient advised to keep surgical incision/dressing clean and dry, and  follow up with Dr. Salas post operative day #12-14 (1/9-1/11/18) for wound check and surgical staple/suture removal.

## 2017-12-31 LAB
ANION GAP SERPL CALC-SCNC: 15 MMOL/L — SIGNIFICANT CHANGE UP (ref 5–17)
BUN SERPL-MCNC: 20 MG/DL — SIGNIFICANT CHANGE UP (ref 7–23)
CALCIUM SERPL-MCNC: 8.8 MG/DL — SIGNIFICANT CHANGE UP (ref 8.4–10.5)
CHLORIDE SERPL-SCNC: 104 MMOL/L — SIGNIFICANT CHANGE UP (ref 96–108)
CO2 SERPL-SCNC: 23 MMOL/L — SIGNIFICANT CHANGE UP (ref 22–31)
CREAT SERPL-MCNC: 0.84 MG/DL — SIGNIFICANT CHANGE UP (ref 0.5–1.3)
GLUCOSE SERPL-MCNC: 98 MG/DL — SIGNIFICANT CHANGE UP (ref 70–99)
HCT VFR BLD CALC: 30.8 % — LOW (ref 34.5–45)
HGB BLD-MCNC: 9.6 G/DL — LOW (ref 11.5–15.5)
MCHC RBC-ENTMCNC: 31.2 GM/DL — LOW (ref 32–36)
MCHC RBC-ENTMCNC: 31.7 PG — SIGNIFICANT CHANGE UP (ref 27–34)
MCV RBC AUTO: 101.7 FL — HIGH (ref 80–100)
PLATELET # BLD AUTO: 171 K/UL — SIGNIFICANT CHANGE UP (ref 150–400)
POTASSIUM SERPL-MCNC: 4.1 MMOL/L — SIGNIFICANT CHANGE UP (ref 3.5–5.3)
POTASSIUM SERPL-SCNC: 4.1 MMOL/L — SIGNIFICANT CHANGE UP (ref 3.5–5.3)
RBC # BLD: 3.03 M/UL — LOW (ref 3.8–5.2)
RBC # FLD: 14.2 % — SIGNIFICANT CHANGE UP (ref 10.3–14.5)
SODIUM SERPL-SCNC: 142 MMOL/L — SIGNIFICANT CHANGE UP (ref 135–145)
WBC # BLD: 7.09 K/UL — SIGNIFICANT CHANGE UP (ref 3.8–10.5)
WBC # FLD AUTO: 7.09 K/UL — SIGNIFICANT CHANGE UP (ref 3.8–10.5)

## 2017-12-31 RX ORDER — ACETAMINOPHEN 500 MG
1000 TABLET ORAL ONCE
Qty: 0 | Refills: 0 | Status: COMPLETED | OUTPATIENT
Start: 2017-12-31 | End: 2017-12-31

## 2017-12-31 RX ADMIN — Medication 25 MILLIGRAM(S): at 05:56

## 2017-12-31 RX ADMIN — Medication 500 MILLIGRAM(S): at 05:56

## 2017-12-31 RX ADMIN — Medication 500 MILLIGRAM(S): at 17:32

## 2017-12-31 RX ADMIN — Medication 650 MILLIGRAM(S): at 12:09

## 2017-12-31 RX ADMIN — FAMOTIDINE 20 MILLIGRAM(S): 10 INJECTION INTRAVENOUS at 17:34

## 2017-12-31 RX ADMIN — LOSARTAN POTASSIUM 50 MILLIGRAM(S): 100 TABLET, FILM COATED ORAL at 05:56

## 2017-12-31 RX ADMIN — Medication 1 MILLIGRAM(S): at 12:09

## 2017-12-31 RX ADMIN — Medication 1 TABLET(S): at 12:09

## 2017-12-31 RX ADMIN — Medication 325 MILLIGRAM(S): at 05:56

## 2017-12-31 RX ADMIN — Medication 650 MILLIGRAM(S): at 17:47

## 2017-12-31 RX ADMIN — FAMOTIDINE 20 MILLIGRAM(S): 10 INJECTION INTRAVENOUS at 05:56

## 2017-12-31 RX ADMIN — ANASTROZOLE 1 MILLIGRAM(S): 1 TABLET ORAL at 12:09

## 2017-12-31 RX ADMIN — Medication 20 MILLIGRAM(S): at 22:59

## 2017-12-31 RX ADMIN — Medication 100 MILLIGRAM(S): at 05:56

## 2017-12-31 RX ADMIN — Medication 325 MILLIGRAM(S): at 17:32

## 2017-12-31 RX ADMIN — Medication 400 MILLIGRAM(S): at 06:37

## 2017-12-31 RX ADMIN — SIMVASTATIN 20 MILLIGRAM(S): 20 TABLET, FILM COATED ORAL at 23:00

## 2017-12-31 RX ADMIN — Medication 1000 MILLIGRAM(S): at 06:52

## 2017-12-31 NOTE — PROGRESS NOTE ADULT - SUBJECTIVE AND OBJECTIVE BOX
Patient is a 84y old  Female who presents with a chief complaint of Left hip arthritis/left total hip replacement (30 Dec 2017 12:11)  Patient s/p left total hip arthroplasty  POST OPERATIVE DAY #:  [3 ]   Patient comfortable  No complaints    T(C): 36.6 (12-31-17 @ 05:08), Max: 37.6 (12-30-17 @ 21:11)  HR: 90 (12-31-17 @ 05:08) (80 - 108)  BP: 130/79 (12-31-17 @ 05:08) (110/66 - 134/73)  RR: 18 (12-31-17 @ 05:08) (18 - 18)  SpO2: 96% (12-31-17 @ 05:08) (94% - 100%)    PHYSICAL EXAM:  NAD, Alert  [Left ] Hip: Aquacel dressing C/D/I; sensation grossly intact to light touch; (+) DF/PF; (+) Distal Pulses; No Calf tenderness B/L, PAS     LABS:                      8.9    6.62  )-----------( 144      ( 30 Dec 2017 10:45 )             28.3   12-30  140  |  106  |  27<H>  ----------------------------<  120<H>  4.6   |  19<L>  |  1.00  Ca    8.4      30 Dec 2017 10:45

## 2017-12-31 NOTE — PROGRESS NOTE ADULT - ASSESSMENT
83 y/o fm s/p left total hip arthroplasty POD#3, physical therapy, d/c home once cleared by physical therapist  Tierra Duque PA-C  Orthopaedic Surgery  Team pager [phone]  [facility] [phone]  [phone]

## 2018-01-01 VITALS — SYSTOLIC BLOOD PRESSURE: 108 MMHG | DIASTOLIC BLOOD PRESSURE: 61 MMHG

## 2018-01-01 DIAGNOSIS — M16.12 UNILATERAL PRIMARY OSTEOARTHRITIS, LEFT HIP: ICD-10-CM

## 2018-01-01 LAB
ANION GAP SERPL CALC-SCNC: 12 MMOL/L — SIGNIFICANT CHANGE UP (ref 5–17)
BUN SERPL-MCNC: 21 MG/DL — SIGNIFICANT CHANGE UP (ref 7–23)
CALCIUM SERPL-MCNC: 8.9 MG/DL — SIGNIFICANT CHANGE UP (ref 8.4–10.5)
CHLORIDE SERPL-SCNC: 103 MMOL/L — SIGNIFICANT CHANGE UP (ref 96–108)
CO2 SERPL-SCNC: 25 MMOL/L — SIGNIFICANT CHANGE UP (ref 22–31)
CREAT SERPL-MCNC: 0.83 MG/DL — SIGNIFICANT CHANGE UP (ref 0.5–1.3)
GLUCOSE SERPL-MCNC: 106 MG/DL — HIGH (ref 70–99)
HCT VFR BLD CALC: 28.9 % — LOW (ref 34.5–45)
HGB BLD-MCNC: 9.2 G/DL — LOW (ref 11.5–15.5)
MCHC RBC-ENTMCNC: 31.8 GM/DL — LOW (ref 32–36)
MCHC RBC-ENTMCNC: 31.8 PG — SIGNIFICANT CHANGE UP (ref 27–34)
MCV RBC AUTO: 100 FL — SIGNIFICANT CHANGE UP (ref 80–100)
PLATELET # BLD AUTO: 161 K/UL — SIGNIFICANT CHANGE UP (ref 150–400)
POTASSIUM SERPL-MCNC: 4.2 MMOL/L — SIGNIFICANT CHANGE UP (ref 3.5–5.3)
POTASSIUM SERPL-SCNC: 4.2 MMOL/L — SIGNIFICANT CHANGE UP (ref 3.5–5.3)
RBC # BLD: 2.89 M/UL — LOW (ref 3.8–5.2)
RBC # FLD: 14.2 % — SIGNIFICANT CHANGE UP (ref 10.3–14.5)
SODIUM SERPL-SCNC: 140 MMOL/L — SIGNIFICANT CHANGE UP (ref 135–145)
WBC # BLD: 4.71 K/UL — SIGNIFICANT CHANGE UP (ref 3.8–10.5)
WBC # FLD AUTO: 4.71 K/UL — SIGNIFICANT CHANGE UP (ref 3.8–10.5)

## 2018-01-01 PROCEDURE — 73501 X-RAY EXAM HIP UNI 1 VIEW: CPT

## 2018-01-01 PROCEDURE — 72170 X-RAY EXAM OF PELVIS: CPT

## 2018-01-01 PROCEDURE — 97110 THERAPEUTIC EXERCISES: CPT

## 2018-01-01 PROCEDURE — C1889: CPT

## 2018-01-01 PROCEDURE — 80048 BASIC METABOLIC PNL TOTAL CA: CPT

## 2018-01-01 PROCEDURE — 97165 OT EVAL LOW COMPLEX 30 MIN: CPT

## 2018-01-01 PROCEDURE — 88311 DECALCIFY TISSUE: CPT

## 2018-01-01 PROCEDURE — 88305 TISSUE EXAM BY PATHOLOGIST: CPT

## 2018-01-01 PROCEDURE — C1713: CPT

## 2018-01-01 PROCEDURE — C1776: CPT

## 2018-01-01 PROCEDURE — 97116 GAIT TRAINING THERAPY: CPT

## 2018-01-01 PROCEDURE — 97161 PT EVAL LOW COMPLEX 20 MIN: CPT

## 2018-01-01 PROCEDURE — 85027 COMPLETE CBC AUTOMATED: CPT

## 2018-01-01 PROCEDURE — 97530 THERAPEUTIC ACTIVITIES: CPT

## 2018-01-01 RX ORDER — ASPIRIN/CALCIUM CARB/MAGNESIUM 324 MG
1 TABLET ORAL
Qty: 0 | Refills: 0 | COMMUNITY

## 2018-01-01 RX ORDER — SENNA PLUS 8.6 MG/1
2 TABLET ORAL
Qty: 0 | Refills: 0 | COMMUNITY
Start: 2018-01-01

## 2018-01-01 RX ORDER — POLYETHYLENE GLYCOL 3350 17 G/17G
17 POWDER, FOR SOLUTION ORAL
Qty: 0 | Refills: 0 | COMMUNITY
Start: 2018-01-01

## 2018-01-01 RX ORDER — UBIDECARENONE 100 MG
1 CAPSULE ORAL
Qty: 0 | Refills: 0 | COMMUNITY

## 2018-01-01 RX ORDER — DOCUSATE SODIUM 100 MG
1 CAPSULE ORAL
Qty: 0 | Refills: 0 | COMMUNITY
Start: 2018-01-01

## 2018-01-01 RX ORDER — FAMOTIDINE 10 MG/ML
1 INJECTION INTRAVENOUS
Qty: 0 | Refills: 0 | COMMUNITY
Start: 2018-01-01

## 2018-01-01 RX ORDER — ASPIRIN/CALCIUM CARB/MAGNESIUM 324 MG
1 TABLET ORAL
Qty: 0 | Refills: 0 | COMMUNITY
Start: 2018-01-01

## 2018-01-01 RX ADMIN — Medication 325 MILLIGRAM(S): at 06:01

## 2018-01-01 RX ADMIN — Medication 1 MILLIGRAM(S): at 11:58

## 2018-01-01 RX ADMIN — Medication 25 MILLIGRAM(S): at 06:02

## 2018-01-01 RX ADMIN — Medication 500 MILLIGRAM(S): at 06:02

## 2018-01-01 RX ADMIN — ANASTROZOLE 1 MILLIGRAM(S): 1 TABLET ORAL at 11:58

## 2018-01-01 RX ADMIN — Medication 650 MILLIGRAM(S): at 10:10

## 2018-01-01 RX ADMIN — FAMOTIDINE 20 MILLIGRAM(S): 10 INJECTION INTRAVENOUS at 06:02

## 2018-01-01 RX ADMIN — LOSARTAN POTASSIUM 50 MILLIGRAM(S): 100 TABLET, FILM COATED ORAL at 06:02

## 2018-01-01 RX ADMIN — Medication 1 TABLET(S): at 11:58

## 2018-01-01 NOTE — PROGRESS NOTE ADULT - ASSESSMENT
S/P LT THR    Plan      oob/pt/wbat    d/c planning       Tawanna Mcfarland PA-C   Beeper    [phone]

## 2018-01-01 NOTE — PROGRESS NOTE ADULT - SUBJECTIVE AND OBJECTIVE BOX
Patient is a 84y old  Female who presents with a chief complaint of Left hip arthritis/left total hip replacement (30 Dec 2017 12:11)      POST OPERATIVE DAY #:  4  Patient comfortable  No complaints    VS:  T(C): 36.6 (01-01-18 @ 06:22), Max: 36.8 (12-31-17 @ 20:45)  T(F): 97.8 (01-01-18 @ 06:22), Max: 98.2 (12-31-17 @ 20:45)  HR: 97 (01-01-18 @ 06:22) (87 - 102)  BP: 156/86 (01-01-18 @ 06:22) (90/64 - 156/86)  RR: 18 (01-01-18 @ 06:22) (18 - 18)  SpO2: 98% (01-01-18 @ 06:22) (92% - 98%)  Wt(kg): --      PHYSICAL EXAM:  NAD, Alert  EXT:      Lt Hip Aquacel Dressing intact  No Calf Tenderness  (+) DF/PF; (+) Distal Pulses;  Sensation: No gross deficits noted      B/L, PAS     LABS:                        9.6<L>  7.09  )-----------( 171      ( 31 Dec 2017 09:08 )             30.8<L>    12-31    142  |  104  |  20  ----------------------------<  98  4.1   |  23  |  0.84

## 2018-01-02 LAB — SURGICAL PATHOLOGY STUDY: SIGNIFICANT CHANGE UP

## 2018-01-10 ENCOUNTER — APPOINTMENT (OUTPATIENT)
Dept: ORTHOPEDIC SURGERY | Facility: CLINIC | Age: 83
End: 2018-01-10
Payer: MEDICARE

## 2018-01-10 DIAGNOSIS — Z96.649 PRESENCE OF UNSPECIFIED ARTIFICIAL HIP JOINT: ICD-10-CM

## 2018-01-10 PROCEDURE — 72170 X-RAY EXAM OF PELVIS: CPT

## 2018-01-10 PROCEDURE — 99024 POSTOP FOLLOW-UP VISIT: CPT

## 2018-02-27 ENCOUNTER — APPOINTMENT (OUTPATIENT)
Dept: ORTHOPEDIC SURGERY | Facility: CLINIC | Age: 83
End: 2018-02-27
Payer: MEDICARE

## 2018-02-27 DIAGNOSIS — Z96.642 PRESENCE OF LEFT ARTIFICIAL HIP JOINT: ICD-10-CM

## 2018-02-27 PROCEDURE — 99024 POSTOP FOLLOW-UP VISIT: CPT

## 2018-02-27 PROCEDURE — 73502 X-RAY EXAM HIP UNI 2-3 VIEWS: CPT | Mod: LT

## 2018-02-27 RX ORDER — DULOXETINE HYDROCHLORIDE 30 MG/1
30 CAPSULE, DELAYED RELEASE PELLETS ORAL
Refills: 0 | Status: ACTIVE | COMMUNITY

## 2018-03-06 ENCOUNTER — TRANSCRIPTION ENCOUNTER (OUTPATIENT)
Age: 83
End: 2018-03-06

## 2018-05-29 ENCOUNTER — APPOINTMENT (OUTPATIENT)
Dept: ORTHOPEDIC SURGERY | Facility: CLINIC | Age: 83
End: 2018-05-29
Payer: MEDICARE

## 2018-05-29 DIAGNOSIS — Z96.642 PRESENCE OF LEFT ARTIFICIAL HIP JOINT: ICD-10-CM

## 2018-05-29 DIAGNOSIS — M17.11 UNILATERAL PRIMARY OSTEOARTHRITIS, RIGHT KNEE: ICD-10-CM

## 2018-05-29 DIAGNOSIS — Z96.652 PRESENCE OF LEFT ARTIFICIAL KNEE JOINT: ICD-10-CM

## 2018-05-29 DIAGNOSIS — Z96.641 PRESENCE OF RIGHT ARTIFICIAL HIP JOINT: ICD-10-CM

## 2018-05-29 PROCEDURE — 99213 OFFICE O/P EST LOW 20 MIN: CPT

## 2018-05-29 PROCEDURE — 72170 X-RAY EXAM OF PELVIS: CPT

## 2018-07-25 PROBLEM — Z96.649 HISTORY OF TOTAL HIP REPLACEMENT: Status: ACTIVE | Noted: 2018-01-10

## 2018-08-08 ENCOUNTER — INPATIENT (INPATIENT)
Facility: HOSPITAL | Age: 83
LOS: 15 days | Discharge: HOME | DRG: 167 | End: 2018-08-24
Attending: INTERNAL MEDICINE | Admitting: INTERNAL MEDICINE
Payer: MEDICARE

## 2018-08-08 VITALS
HEIGHT: 65 IN | RESPIRATION RATE: 16 BRPM | OXYGEN SATURATION: 93 % | HEART RATE: 125 BPM | WEIGHT: 125 LBS | SYSTOLIC BLOOD PRESSURE: 136 MMHG | TEMPERATURE: 98 F | DIASTOLIC BLOOD PRESSURE: 81 MMHG

## 2018-08-08 DIAGNOSIS — R13.10 DYSPHAGIA, UNSPECIFIED: ICD-10-CM

## 2018-08-08 DIAGNOSIS — Z29.9 ENCOUNTER FOR PROPHYLACTIC MEASURES, UNSPECIFIED: ICD-10-CM

## 2018-08-08 DIAGNOSIS — C34.90 MALIGNANT NEOPLASM OF UNSPECIFIED PART OF UNSPECIFIED BRONCHUS OR LUNG: ICD-10-CM

## 2018-08-08 DIAGNOSIS — Z96.652 PRESENCE OF LEFT ARTIFICIAL KNEE JOINT: Chronic | ICD-10-CM

## 2018-08-08 DIAGNOSIS — Z96.641 PRESENCE OF RIGHT ARTIFICIAL HIP JOINT: Chronic | ICD-10-CM

## 2018-08-08 DIAGNOSIS — Z98.89 OTHER SPECIFIED POSTPROCEDURAL STATES: Chronic | ICD-10-CM

## 2018-08-08 DIAGNOSIS — J90 PLEURAL EFFUSION, NOT ELSEWHERE CLASSIFIED: ICD-10-CM

## 2018-08-08 DIAGNOSIS — Z41.9 ENCOUNTER FOR PROCEDURE FOR PURPOSES OTHER THAN REMEDYING HEALTH STATE, UNSPECIFIED: Chronic | ICD-10-CM

## 2018-08-08 DIAGNOSIS — Z98.42 CATARACT EXTRACTION STATUS, LEFT EYE: Chronic | ICD-10-CM

## 2018-08-08 PROBLEM — M16.12 UNILATERAL PRIMARY OSTEOARTHRITIS, LEFT HIP: Chronic | Status: ACTIVE | Noted: 2017-12-11

## 2018-08-08 LAB
ALBUMIN SERPL ELPH-MCNC: 3.3 G/DL — SIGNIFICANT CHANGE UP (ref 3.3–5)
ALP SERPL-CCNC: 104 U/L — SIGNIFICANT CHANGE UP (ref 40–120)
ALT FLD-CCNC: 16 U/L — SIGNIFICANT CHANGE UP (ref 10–45)
ANION GAP SERPL CALC-SCNC: 20 MMOL/L — HIGH (ref 5–17)
APTT BLD: 27.9 SEC — SIGNIFICANT CHANGE UP (ref 27.5–37.4)
AST SERPL-CCNC: 21 U/L — SIGNIFICANT CHANGE UP (ref 10–40)
BASE EXCESS BLDV CALC-SCNC: -0.5 MMOL/L — SIGNIFICANT CHANGE UP (ref -2–2)
BASOPHILS # BLD AUTO: 0 K/UL — SIGNIFICANT CHANGE UP (ref 0–0.2)
BASOPHILS NFR BLD AUTO: 0.4 % — SIGNIFICANT CHANGE UP (ref 0–2)
BILIRUB SERPL-MCNC: 0.3 MG/DL — SIGNIFICANT CHANGE UP (ref 0.2–1.2)
BUN SERPL-MCNC: 30 MG/DL — HIGH (ref 7–23)
CALCIUM SERPL-MCNC: 9.5 MG/DL — SIGNIFICANT CHANGE UP (ref 8.4–10.5)
CHLORIDE SERPL-SCNC: 96 MMOL/L — SIGNIFICANT CHANGE UP (ref 96–108)
CO2 BLDV-SCNC: 25 MMOL/L — SIGNIFICANT CHANGE UP (ref 22–30)
CO2 SERPL-SCNC: 19 MMOL/L — LOW (ref 22–31)
CREAT SERPL-MCNC: 1.13 MG/DL — SIGNIFICANT CHANGE UP (ref 0.5–1.3)
EOSINOPHIL # BLD AUTO: 0.4 K/UL — SIGNIFICANT CHANGE UP (ref 0–0.5)
EOSINOPHIL NFR BLD AUTO: 4.2 % — SIGNIFICANT CHANGE UP (ref 0–6)
GLUCOSE SERPL-MCNC: 98 MG/DL — SIGNIFICANT CHANGE UP (ref 70–99)
HCO3 BLDV-SCNC: 24 MMOL/L — SIGNIFICANT CHANGE UP (ref 21–29)
HCT VFR BLD CALC: 33.1 % — LOW (ref 34.5–45)
HGB BLD-MCNC: 11.2 G/DL — LOW (ref 11.5–15.5)
INR BLD: 1.22 RATIO — HIGH (ref 0.88–1.16)
LYMPHOCYTES # BLD AUTO: 1.1 K/UL — SIGNIFICANT CHANGE UP (ref 1–3.3)
LYMPHOCYTES # BLD AUTO: 11.5 % — LOW (ref 13–44)
MCHC RBC-ENTMCNC: 30.9 PG — SIGNIFICANT CHANGE UP (ref 27–34)
MCHC RBC-ENTMCNC: 33.8 GM/DL — SIGNIFICANT CHANGE UP (ref 32–36)
MCV RBC AUTO: 91.4 FL — SIGNIFICANT CHANGE UP (ref 80–100)
MONOCYTES # BLD AUTO: 1 K/UL — HIGH (ref 0–0.9)
MONOCYTES NFR BLD AUTO: 10.3 % — SIGNIFICANT CHANGE UP (ref 2–14)
NEUTROPHILS # BLD AUTO: 7 K/UL — SIGNIFICANT CHANGE UP (ref 1.8–7.4)
NEUTROPHILS NFR BLD AUTO: 73.6 % — SIGNIFICANT CHANGE UP (ref 43–77)
NT-PROBNP SERPL-SCNC: 575 PG/ML — HIGH (ref 0–300)
PCO2 BLDV: 39 MMHG — SIGNIFICANT CHANGE UP (ref 35–50)
PH BLDV: 7.4 — SIGNIFICANT CHANGE UP (ref 7.35–7.45)
PLATELET # BLD AUTO: 324 K/UL — SIGNIFICANT CHANGE UP (ref 150–400)
PO2 BLDV: 42 MMHG — SIGNIFICANT CHANGE UP (ref 25–45)
POTASSIUM SERPL-MCNC: 4.3 MMOL/L — SIGNIFICANT CHANGE UP (ref 3.5–5.3)
POTASSIUM SERPL-SCNC: 4.3 MMOL/L — SIGNIFICANT CHANGE UP (ref 3.5–5.3)
PROT SERPL-MCNC: 7.2 G/DL — SIGNIFICANT CHANGE UP (ref 6–8.3)
PROTHROM AB SERPL-ACNC: 13.3 SEC — HIGH (ref 9.8–12.7)
RBC # BLD: 3.62 M/UL — LOW (ref 3.8–5.2)
RBC # FLD: 13.8 % — SIGNIFICANT CHANGE UP (ref 10.3–14.5)
SAO2 % BLDV: 70 % — SIGNIFICANT CHANGE UP (ref 67–88)
SODIUM SERPL-SCNC: 135 MMOL/L — SIGNIFICANT CHANGE UP (ref 135–145)
TROPONIN T, HIGH SENSITIVITY RESULT: 32 NG/L — SIGNIFICANT CHANGE UP (ref 0–51)
WBC # BLD: 9.5 K/UL — SIGNIFICANT CHANGE UP (ref 3.8–10.5)
WBC # FLD AUTO: 9.5 K/UL — SIGNIFICANT CHANGE UP (ref 3.8–10.5)

## 2018-08-08 PROCEDURE — 93010 ELECTROCARDIOGRAM REPORT: CPT

## 2018-08-08 PROCEDURE — 71275 CT ANGIOGRAPHY CHEST: CPT | Mod: 26

## 2018-08-08 PROCEDURE — 99285 EMERGENCY DEPT VISIT HI MDM: CPT | Mod: 25

## 2018-08-08 PROCEDURE — 99223 1ST HOSP IP/OBS HIGH 75: CPT

## 2018-08-08 PROCEDURE — 71045 X-RAY EXAM CHEST 1 VIEW: CPT | Mod: 26

## 2018-08-08 RX ORDER — PANTOPRAZOLE SODIUM 20 MG/1
40 TABLET, DELAYED RELEASE ORAL
Qty: 0 | Refills: 0 | Status: DISCONTINUED | OUTPATIENT
Start: 2018-08-08 | End: 2018-08-15

## 2018-08-08 RX ORDER — ONDANSETRON 8 MG/1
4 TABLET, FILM COATED ORAL EVERY 12 HOURS
Qty: 0 | Refills: 0 | Status: DISCONTINUED | OUTPATIENT
Start: 2018-08-08 | End: 2018-08-09

## 2018-08-08 RX ORDER — DULOXETINE HYDROCHLORIDE 30 MG/1
30 CAPSULE, DELAYED RELEASE ORAL DAILY
Qty: 0 | Refills: 0 | Status: DISCONTINUED | OUTPATIENT
Start: 2018-08-08 | End: 2018-08-15

## 2018-08-08 RX ORDER — FERROUS SULFATE 325(65) MG
325 TABLET ORAL DAILY
Qty: 0 | Refills: 0 | Status: DISCONTINUED | OUTPATIENT
Start: 2018-08-08 | End: 2018-08-15

## 2018-08-08 RX ORDER — ASPIRIN/CALCIUM CARB/MAGNESIUM 324 MG
81 TABLET ORAL DAILY
Qty: 0 | Refills: 0 | Status: DISCONTINUED | OUTPATIENT
Start: 2018-08-08 | End: 2018-08-15

## 2018-08-08 RX ORDER — LACTOBACILLUS ACIDOPHILUS 100MM CELL
1 CAPSULE ORAL
Qty: 0 | Refills: 0 | Status: DISCONTINUED | OUTPATIENT
Start: 2018-08-08 | End: 2018-08-15

## 2018-08-08 RX ORDER — LOSARTAN POTASSIUM 100 MG/1
50 TABLET, FILM COATED ORAL DAILY
Qty: 0 | Refills: 0 | Status: DISCONTINUED | OUTPATIENT
Start: 2018-08-08 | End: 2018-08-15

## 2018-08-08 RX ORDER — SODIUM CHLORIDE 9 MG/ML
1000 INJECTION INTRAMUSCULAR; INTRAVENOUS; SUBCUTANEOUS
Qty: 0 | Refills: 0 | Status: DISCONTINUED | OUTPATIENT
Start: 2018-08-08 | End: 2018-08-10

## 2018-08-08 RX ORDER — ACETAMINOPHEN 500 MG
650 TABLET ORAL ONCE
Qty: 0 | Refills: 0 | Status: COMPLETED | OUTPATIENT
Start: 2018-08-08 | End: 2018-08-08

## 2018-08-08 RX ORDER — ACETAMINOPHEN 500 MG
650 TABLET ORAL EVERY 6 HOURS
Qty: 0 | Refills: 0 | Status: DISCONTINUED | OUTPATIENT
Start: 2018-08-08 | End: 2018-08-15

## 2018-08-08 RX ORDER — SIMVASTATIN 20 MG/1
20 TABLET, FILM COATED ORAL AT BEDTIME
Qty: 0 | Refills: 0 | Status: DISCONTINUED | OUTPATIENT
Start: 2018-08-08 | End: 2018-08-15

## 2018-08-08 RX ORDER — ANASTROZOLE 1 MG/1
1 TABLET ORAL DAILY
Qty: 0 | Refills: 0 | Status: DISCONTINUED | OUTPATIENT
Start: 2018-08-08 | End: 2018-08-15

## 2018-08-08 RX ADMIN — DULOXETINE HYDROCHLORIDE 30 MILLIGRAM(S): 30 CAPSULE, DELAYED RELEASE ORAL at 23:20

## 2018-08-08 RX ADMIN — SODIUM CHLORIDE 75 MILLILITER(S): 9 INJECTION INTRAMUSCULAR; INTRAVENOUS; SUBCUTANEOUS at 18:30

## 2018-08-08 RX ADMIN — Medication 650 MILLIGRAM(S): at 21:23

## 2018-08-08 NOTE — CHART NOTE - NSCHARTNOTEFT_GEN_A_CORE
Pt just came to ER. Has not gotten full initial workup. Full consult to follow in AM. As per notes, inpt PET to be done.

## 2018-08-08 NOTE — H&P ADULT - ATTENDING COMMENTS
NIGHT HOSPITALIST:   Patient/ daughter/ son aware of course and agree with plan/care as above.   Prognosis is poor.   Emotional support provided to patient/ family.   Care reviewed with covering NP>   Care assumed by Dr. Lovett.    Contreras Aceves MD  [phone]

## 2018-08-08 NOTE — CONSULT NOTE ADULT - SUBJECTIVE AND OBJECTIVE BOX
Chief Complaint:  SOB    HPI:  85 yo F with h/o early stage breast cancer and recent diagnosis of lung cancer followed in the office by Dr. Sanchez. She has been on arimidex for ER/CA+ Her2- breast cancer sine 2016. Recently developed cough, SOB and hoarseness. She had CT chest by her pulmonologist that showed a large left hilar mass, RUL mass and adrenal gland metastasis. 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 She then presented with worsening pleural effusion and had thoracentesis. The molecular studies are still pending. She also had liquid biopsy in the office on peripheral blood.    She came today with worsening SOB. Denies F/C, cough, CP. Has some tachycardia in ED. She cannot speak full sentences because of the SOB.    ROS: has some dysphagia, denies abd pain, N/V/D/C, denies bleeding, Denies HAs, parasthesias, denies dysuria, hematuria      PAST MEDICAL & SURGICAL HISTORY:  Primary osteoarthritis of left hip  History of breast cancer: 2015, no chemo/radiation  s/p lumpectomy  Iron deficiency anemia  Retinal tear  Osteoarthritis  Glaucoma  Fistula of vagina: rectovaginal fistula  RBBB  Ptosis: left eye - eye lid surgery 2002  Anxiety  Hyperlipidemia  Osteopenia  Hypertension  Ureterovaginal prolapse  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  S/P hip replacement, right: 6/2016  - right hip  History of lumpectomy of right breast: July 2015  History of cataract surgery, left: and right 2016  Elective surgery: reversal of colostomy July 2015  History of tonsillectomy  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 1000 milliLiter(s) (75 mL/Hr) IV Continuous <Continuous>    MEDICATIONS  (PRN):      Allergies    Levaquin (Diarrhea)    Intolerances    Dilaudid (Other)      Vital Signs Last 24 Hrs  T(C): 37.1 (08 Aug 2018 16:43), Max: 37.1 (08 Aug 2018 16:43)  T(F): 98.8 (08 Aug 2018 16:43), Max: 98.8 (08 Aug 2018 16:43)  HR: 120 (08 Aug 2018 16:43) (120 - 125)  BP: 108/68 (08 Aug 2018 16:43) (108/68 - 136/81)  BP(mean): --  RR: 22 (08 Aug 2018 16:43) (16 - 22)  SpO2: 95% (08 Aug 2018 16:43) (93% - 95%)    PHYSICAL EXAM  General: NAD, thin, frail  HEENT: clear oropharynx, anicteric sclera, pink conjunctiva, chronic ptosis of the left eye  Neck: supple  CV: normal S1/S2 with no murmur rubs or gallops, tachycardia  Lungs: clear to auscultation, on right, decreased BS on left, +dullness to percussion  Abdomen: soft non-tender non-distended, no hepatosplenomegaly, positive bowel sounds  Ext: no clubbing cyanosis or edema  Skin: no rashes and no petechiae  Lymph Nodes: No LAD in axillae, groin, neck  Neuro: alert and oriented X 3, no focal deficits    LABS:                          11.2   9.5   )-----------( 324      ( 08 Aug 2018 16:35 )             33.1         Mean Cell Volume : 91.4 fl  Mean Cell Hemoglobin : 30.9 pg  Mean Cell Hemoglobin Concentration : 33.8 gm/dL  Auto Neutrophil # : 7.0 K/uL  Auto Lymphocyte # : 1.1 K/uL  Auto Monocyte # : 1.0 K/uL  Auto Eosinophil # : 0.4 K/uL  Auto Basophil # : 0.0 K/uL  Auto Neutrophil % : 73.6 %  Auto Lymphocyte % : 11.5 %  Auto Monocyte % : 10.3 %  Auto Eosinophil % : 4.2 %  Auto Basophil % : 0.4 %      Serial CBC's  08-08 @ 16:35  Hct-33.1 / Hgb-11.2 / Plat-324 / RBC-3.62 / WBC-9.5      08-08    135  |  96  |  30<H>  ----------------------------<  98  4.3   |  19<L>  |  1.13    Ca    9.5      08 Aug 2018 16:35    TPro  7.2  /  Alb  3.3  /  TBili  0.3  /  DBili  x   /  AST  21  /  ALT  16  /  AlkPhos  104  08-08      PT/INR - ( 08 Aug 2018 16:35 )   PT: 13.3 sec;   INR: 1.22 ratio         PTT - ( 08 Aug 2018 16:35 )  PTT:27.9 sec      < from: Xray Chest 1 View- PORTABLE-Urgent (08.08.18 @ 17:08) >  INTERPRETATION:  complete whiteout of the left lung which represent   complete left lung ateletasis secondary to a mucus plug versus large   pleural effusion  chest ct is recommended for further evaluation    < end of copied text >

## 2018-08-08 NOTE — ED PROVIDER NOTE - PROGRESS NOTE DETAILS
Pt's oncologist Dr. Sanchez called ahead for patient to be coming in for admission to Dr. Lovett for worsening shortness of breath, obtaining PET scan as impatient. D/w Dr. Walls and called Dr. Lovett for admission to medicine. -Latoya Delgado PA-C

## 2018-08-08 NOTE — ED PROVIDER NOTE - ATTENDING CONTRIBUTION TO CARE
I have seen and evaluated this patient with the advance practice clinician.   I agree with the findings  unless other wise stated. I have amended notes where needed.  After my face to face bedside evaluation, I am notin83 y/o F  with recent dx adenocarcinoma of L lung sent in by oncologist for admission for worsening shortness of breath at rest and worse with minimal exertion and PET scan as inpatient. PE remarkable for decreased to no breath sounds on Left , No JVD abdomen soft minimal pedal edema will admit for definitive care thoracentesis onc treatment--Walls

## 2018-08-08 NOTE — ED PROVIDER NOTE - OBJECTIVE STATEMENT
83 y/o F pmhx breast CA 2015 s/p lumpectomy, GERD, recent diagnosis of L hilar and perihilar mass consistent with tumor, and recent thoracentesis draining 1L fluid from L, presenting sent in by her oncologist for admission for worsening shortness of breath. Pt reports that her tolerance of ambulating has significantly decreased secondary to shortness of breath. She reports now dysphagia, early satiety and epigastric pain. Pt denies any chest pain, fevers, chills, nausea, vomiting. Has cough that is at baseline and hoarseness of voice that is at baseline for her. Denies any other concerning symptoms.

## 2018-08-08 NOTE — H&P ADULT - NSHPOUTPATIENTPROVIDERS_GEN_ALL_CORE
Jose Ca MD (oncology)  [phone]  Cam Cooper MD (pcp) [phone]  Juan Mansfield MD (Pulm)  [phone]

## 2018-08-08 NOTE — H&P ADULT - HISTORY OF PRESENT ILLNESS
NIGHT HOSPITALIST:   Patient UNKNOWN to me previously, assigned to me at this point via the ER and by Dr. 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 Ca for dyspnea.  Patient also with progressive globus and early satiety.   No chest pain/pressure.  No dyspnea at present.   No HA, no focal weakness.   No abdominal pain, no red blood per rectum or melena.   No back pain, no tearing back pain.   No dysuria.   Patient with anorexia and unspecified weight loss.   No sick contacts.  Remaining review of systems not contributory.

## 2018-08-08 NOTE — ED PROVIDER NOTE - MEDICAL DECISION MAKING DETAILS
83 y/o F dx adenocarcinoma of L lung sent in by oncologist for admission for worsening shortness of breath and PET scan as inpatient. PE remarkable for decreased to no breath sounds on L and R decreased. Not hypoxic. Labs, CXR, EKG. Will admit to medicine and continue to reassess.

## 2018-08-08 NOTE — CHART NOTE - NSCHARTNOTEFT_GEN_A_CORE
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

## 2018-08-08 NOTE — ED PROVIDER NOTE - NS ED ROS FT
Constitutional: No fever or chills  Eyes: No visual changes, eye pain or redness  HEENT: No throat pain, ear pain, nasal pain. No nose bleeding.  CV: No chest pain or lower extremity edema  Resp: +shortness of breath and cough  GI: No abd pain. No nausea or vomiting. No diarrhea. No constipation.   : No dysuria, hematuria.   MSK: No musculoskeletal pain  Skin: No rash  Neuro: No headache. No numbness or tingling. No weakness.

## 2018-08-08 NOTE — CONSULT NOTE ADULT - ASSESSMENT
85 yo F with h/o OA, osteoporosis, RBBB, hyperlipidemia, early stage breast cancer and now with advanced lung cancer    1. Lung cancer / SOB  - CTA ordered to rule out PE and assess for pleural effusion vs mucus plugging  - outside molecular studies still pending from pleural fluid cytology at [facility] and peripheral blood  - was scheduled for staging PETCT and MRI brain as outpt. Will see about getting the MRI here when stable  - pulmonary follow up appreciated, possible pigtail catheter / pleurx catheter tomorrow    2. Breast cancer - early stage ER/SD+ Her2- s/p lumpectomy on arimidex  - cont arimidex as inpatient    3. Dysphagia -  - possibly related to compression or due to the SOB  - consider thickened liquids for diet  - consider speech and swallow eval

## 2018-08-08 NOTE — H&P ADULT - PMH
Anxiety    Fistula of vagina  rectovaginal fistula  Glaucoma    History of breast cancer  2015, no chemo/radiation  s/p lumpectomy  Hyperlipidemia    Hypertension    Iron deficiency anemia    Lung mass    Osteoarthritis    Osteopenia    Primary osteoarthritis of left hip    Ptosis  left eye - eye lid surgery 2002  RBBB    Retinal tear    Ureterovaginal prolapse

## 2018-08-08 NOTE — H&P ADULT - PROBLEM SELECTOR PLAN 1
See above.   Suspected malignant effusion.   Would contact Dr. Damon's office in the AM for planned Pleurx placement.  Would also contact Dr. Ca's office of patient's admission.

## 2018-08-08 NOTE — H&P ADULT - NSHPPHYSICALEXAM_GEN_ALL_CORE
Physical exam with an elderly, emaciated, nontoxic F.  Afebrile.  HR  100-110.  RR 16.  BP  143/86  97% on NC.   HEENT< PERRL< EOMI, bitemporal wasting, neck supple, chest with consolidation on percussion of the LEFT side with decreased breath sounds LEFT side.   cor tachycardia.   Patient agreed to breast exam with daughter in attendance with patient's permission with no dominant masses, nor axillary adenopathy.   Abdomen scaphoid, soft nontender, no rebound.   Ext with trace b/L oedema.   neurologic exam AxOx3.   Speech fluent.   Cognition intact.   No need for prompting from examiner.  UE/LE 5/5.  Gait observed with cane grossly normal.

## 2018-08-08 NOTE — H&P ADULT - NSHPLABSRESULTS_GEN_ALL_CORE
WBC 9.5.   normal differential.  hgb 11.2.  Platelets of 324K.  INR 1.2.  K+ 4.3.  Random glucose of 98.  Cr 1.1.   Alb 3.3.  .  Chest radiograph reviewed with opacification of the LEFT lung field with RIGHT upper lobe mass.   CTT angiogram with NO PE with RIGHT upper lobe spiculated mass, narrowing of the LEFT upper lobar pulmonary artery suggestive of mass encasement, large LEFT pleural effusion, and indeterminate 1.5 cm LEFT adrenal nodule.--Patient/ family aware of this latter result. WBC 9.5.   normal differential.  hgb 11.2.  Platelets of 324K.  INR 1.2.  K+ 4.3.  Random glucose of 98.  Cr 1.1.   Alb 3.3.  .  Chest radiograph reviewed with opacification of the LEFT lung field with RIGHT upper lobe mass.   EKG tracing reviewed with sinus tachy at 119 with supraventricular complexes, RBBB and LEFT anterior fascicular block.  CTT angiogram with NO PE with RIGHT upper lobe spiculated mass, narrowing of the LEFT upper lobar pulmonary artery suggestive of mass encasement, large LEFT pleural effusion, and indeterminate 1.5 cm LEFT adrenal nodule.--Patient/ family aware of this latter result.

## 2018-08-08 NOTE — ED ADULT TRIAGE NOTE - CHIEF COMPLAINT QUOTE
shortness of breathe for couple of days, reports collapsed left lung, had chest X ray last week at MD's office

## 2018-08-08 NOTE — H&P ADULT - ASSESSMENT
NIGHT HOSPITALIST:  Referral for apparent planned LEFT thoracentesis with Pleurx from his oncologist's office>>patient/ daughter refuse pharmacologic DVT prophylaxis at this point until they review with Dr. Ca in the AM--patient/ family aware of the attendant DVT/ PE risk--daughter is requesting a brain MRI to exclude CNS disease for the lung CA (patient with an outpatient CTT brain with results unavailable).   Will have the DAY PROVIDER review the MRI brain in the AM with patient with past hip repairs.   Patient/ family refuse sequential DAMION.    Will assume aspiration risk for now.   S/S evaluation.    Patient/ daughter and son in attendance are not yet ready to discuss advance directives.

## 2018-08-08 NOTE — ED ADULT NURSE NOTE - OBJECTIVE STATEMENT
84 y.o female presents ambulatory with son c.o shortness of breath. history of lung cancer with mass in her left lung, recent out patient work up, took 1L off of left lung, breast cancer 5 years ago, htn and elevated pulse rate (unknown exact diagnosis). Pt has decreased sounds on her left side and shortness of breath. States that the mass on her lung has now been pressing on her vocal cords and her voice is higher pitched than normal, this has been worsening over the past 5 days. pt states she has been experiencing worsening swallowing. c.o nausea, no active vomiting. denies chest pain/fever/chills/vomiting/diarrhea. smoking history, quit at age 60. patient has redness to b/l toes pt states this is her baseline. worsening breathing on excursion this past week, today patient is sob at rest, breathing at 18-25 respirations per minute 95% on room air. pt is tachycardic to 120s, pt states she is normally 108-110.   Patient undressed and placed into gown, call bell in hand and side rails up for safety. warm blanket provided, vital signs stable, pt in no acute distress.  Identified patient has a fall risk. Fall band in place, socks in place, patient and family educated on fall prevention. Side rails up for safety and bed in the lowest position.

## 2018-08-08 NOTE — ED ADULT NURSE NOTE - NSIMPLEMENTINTERV_GEN_ALL_ED
Implemented All Universal Safety Interventions:  [place] to call system. Call bell, personal items and telephone within reach. Instruct patient to call for assistance. Room bathroom lighting operational. Non-slip footwear when patient is off stretcher. Physically safe environment: no spills, clutter or unnecessary equipment. Stretcher in lowest position, wheels locked, appropriate side rails in place.

## 2018-08-08 NOTE — H&P ADULT - PROBLEM SELECTOR PLAN 2
Apparent stage 4 disease.   See above.   Would contact Dr. Ca of patient's admission.    Would consider MRI brain in the AM.

## 2018-08-09 ENCOUNTER — TRANSCRIPTION ENCOUNTER (OUTPATIENT)
Age: 83
End: 2018-08-09

## 2018-08-09 DIAGNOSIS — I31.3 PERICARDIAL EFFUSION (NONINFLAMMATORY): ICD-10-CM

## 2018-08-09 DIAGNOSIS — C34.90 MALIGNANT NEOPLASM OF UNSPECIFIED PART OF UNSPECIFIED BRONCHUS OR LUNG: ICD-10-CM

## 2018-08-09 LAB
ANION GAP SERPL CALC-SCNC: 16 MMOL/L — SIGNIFICANT CHANGE UP (ref 5–17)
APPEARANCE UR: CLEAR — SIGNIFICANT CHANGE UP
BASOPHILS # BLD AUTO: 0.03 K/UL — SIGNIFICANT CHANGE UP (ref 0–0.2)
BASOPHILS NFR BLD AUTO: 0.3 % — SIGNIFICANT CHANGE UP (ref 0–2)
BILIRUB UR-MCNC: NEGATIVE — SIGNIFICANT CHANGE UP
BUN SERPL-MCNC: 24 MG/DL — HIGH (ref 7–23)
CALCIUM SERPL-MCNC: 9.1 MG/DL — SIGNIFICANT CHANGE UP (ref 8.4–10.5)
CHLORIDE SERPL-SCNC: 97 MMOL/L — SIGNIFICANT CHANGE UP (ref 96–108)
CK MB CFR SERPL CALC: 3 NG/ML — SIGNIFICANT CHANGE UP (ref 0–3.8)
CK SERPL-CCNC: 36 U/L — SIGNIFICANT CHANGE UP (ref 25–170)
CO2 SERPL-SCNC: 21 MMOL/L — LOW (ref 22–31)
COLOR SPEC: YELLOW — SIGNIFICANT CHANGE UP
COMMENT - URINE: SIGNIFICANT CHANGE UP
CREAT SERPL-MCNC: 0.92 MG/DL — SIGNIFICANT CHANGE UP (ref 0.5–1.3)
DIFF PNL FLD: ABNORMAL
EOSINOPHIL # BLD AUTO: 0.58 K/UL — HIGH (ref 0–0.5)
EOSINOPHIL NFR BLD AUTO: 6.3 % — HIGH (ref 0–6)
EPI CELLS # UR: SIGNIFICANT CHANGE UP /HPF
GLUCOSE SERPL-MCNC: 91 MG/DL — SIGNIFICANT CHANGE UP (ref 70–99)
GLUCOSE UR QL: NEGATIVE — SIGNIFICANT CHANGE UP
HCT VFR BLD CALC: 32.3 % — LOW (ref 34.5–45)
HGB BLD-MCNC: 10.2 G/DL — LOW (ref 11.5–15.5)
IMM GRANULOCYTES NFR BLD AUTO: 0.2 % — SIGNIFICANT CHANGE UP (ref 0–1.5)
KETONES UR-MCNC: ABNORMAL
LEUKOCYTE ESTERASE UR-ACNC: ABNORMAL
LYMPHOCYTES # BLD AUTO: 0.79 K/UL — LOW (ref 1–3.3)
LYMPHOCYTES # BLD AUTO: 8.6 % — LOW (ref 13–44)
MCHC RBC-ENTMCNC: 28.9 PG — SIGNIFICANT CHANGE UP (ref 27–34)
MCHC RBC-ENTMCNC: 31.6 GM/DL — LOW (ref 32–36)
MCV RBC AUTO: 91.5 FL — SIGNIFICANT CHANGE UP (ref 80–100)
MONOCYTES # BLD AUTO: 1.04 K/UL — HIGH (ref 0–0.9)
MONOCYTES NFR BLD AUTO: 11.3 % — SIGNIFICANT CHANGE UP (ref 2–14)
NEUTROPHILS # BLD AUTO: 6.76 K/UL — SIGNIFICANT CHANGE UP (ref 1.8–7.4)
NEUTROPHILS NFR BLD AUTO: 73.3 % — SIGNIFICANT CHANGE UP (ref 43–77)
NITRITE UR-MCNC: NEGATIVE — SIGNIFICANT CHANGE UP
PH UR: 5.5 — SIGNIFICANT CHANGE UP (ref 5–8)
PLATELET # BLD AUTO: 306 K/UL — SIGNIFICANT CHANGE UP (ref 150–400)
POTASSIUM SERPL-MCNC: 4 MMOL/L — SIGNIFICANT CHANGE UP (ref 3.5–5.3)
POTASSIUM SERPL-SCNC: 4 MMOL/L — SIGNIFICANT CHANGE UP (ref 3.5–5.3)
PROT UR-MCNC: 30 MG/DL
RBC # BLD: 3.53 M/UL — LOW (ref 3.8–5.2)
RBC # FLD: 15.8 % — HIGH (ref 10.3–14.5)
RBC CASTS # UR COMP ASSIST: SIGNIFICANT CHANGE UP /HPF (ref 0–2)
SODIUM SERPL-SCNC: 134 MMOL/L — LOW (ref 135–145)
SP GR SPEC: >1.03 — HIGH (ref 1.01–1.02)
TROPONIN T, HIGH SENSITIVITY RESULT: 38 NG/L — SIGNIFICANT CHANGE UP (ref 0–51)
UROBILINOGEN FLD QL: NEGATIVE — SIGNIFICANT CHANGE UP
WBC # BLD: 9.22 K/UL — SIGNIFICANT CHANGE UP (ref 3.8–10.5)
WBC # FLD AUTO: 9.22 K/UL — SIGNIFICANT CHANGE UP (ref 3.8–10.5)
WBC UR QL: SIGNIFICANT CHANGE UP /HPF (ref 0–5)

## 2018-08-09 PROCEDURE — 93306 TTE W/DOPPLER COMPLETE: CPT | Mod: 26

## 2018-08-09 PROCEDURE — 99223 1ST HOSP IP/OBS HIGH 75: CPT | Mod: GC

## 2018-08-09 PROCEDURE — 99231 SBSQ HOSP IP/OBS SF/LOW 25: CPT

## 2018-08-09 RX ORDER — ENOXAPARIN SODIUM 100 MG/ML
40 INJECTION SUBCUTANEOUS EVERY 24 HOURS
Qty: 0 | Refills: 0 | Status: DISCONTINUED | OUTPATIENT
Start: 2018-08-09 | End: 2018-08-15

## 2018-08-09 RX ORDER — ONDANSETRON 8 MG/1
4 TABLET, FILM COATED ORAL EVERY 6 HOURS
Qty: 0 | Refills: 0 | Status: DISCONTINUED | OUTPATIENT
Start: 2018-08-09 | End: 2018-08-15

## 2018-08-09 RX ORDER — ACETAMINOPHEN 500 MG
1000 TABLET ORAL ONCE
Qty: 0 | Refills: 0 | Status: DISCONTINUED | OUTPATIENT
Start: 2018-08-09 | End: 2018-08-09

## 2018-08-09 RX ADMIN — Medication 325 MILLIGRAM(S): at 12:28

## 2018-08-09 RX ADMIN — ANASTROZOLE 1 MILLIGRAM(S): 1 TABLET ORAL at 12:28

## 2018-08-09 RX ADMIN — Medication 1 TABLET(S): at 16:56

## 2018-08-09 RX ADMIN — Medication 650 MILLIGRAM(S): at 16:56

## 2018-08-09 RX ADMIN — Medication 650 MILLIGRAM(S): at 07:51

## 2018-08-09 RX ADMIN — ONDANSETRON 4 MILLIGRAM(S): 8 TABLET, FILM COATED ORAL at 10:50

## 2018-08-09 NOTE — CONSULT NOTE ADULT - PROBLEM SELECTOR RECOMMENDATION 2
Stage IV disease with L hilar mass, RUL mass, L malignant effusion, indeterminate adrenal nodule   -f/u molecular studies with outpt heme-onc  -MRI brain for staging

## 2018-08-09 NOTE — PROGRESS NOTE ADULT - SUBJECTIVE AND OBJECTIVE BOX
CHIEF COMPLAINT:Patient is a 84y old  Female who presents with a chief complaint of Progressive exertional dyspnea, dry cough for 2 weeks (09 Aug 2018 12:48)      Allergies:  Dilaudid (Other)  Levaquin (Diarrhea)      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  S/P hip replacement, right: 6/2016  - right hip  History of lumpectomy of right breast: July 2015  History of cataract surgery, left: and right 2016  Elective surgery: reversal of colostomy July 2015  History of tonsillectomy  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 No nausea, vomiting, diarrhea or constipation, + low appetite  GENITOURINARY: No dysuria, urinary frequency or urgency, no hematuria  NEUROLOGICAL: No headaches, memory loss, loss of strength, numbness, or tremors  SKIN: No itching, burning, rashes, or lesions   MUSCULOSKELETAL: No joint pain or swelling; No muscle, back, or extremity pain    Medications:  MEDICATIONS  (STANDING):  anastrozole 1 milliGRAM(s) Oral daily  aspirin enteric coated 81 milliGRAM(s) Oral daily  DULoxetine 30 milliGRAM(s) Oral daily  ferrous    sulfate 325 milliGRAM(s) Oral daily  lactobacillus acidophilus 1 Tablet(s) Oral two times a day with meals  losartan 50 milliGRAM(s) Oral daily  pantoprazole    Tablet 40 milliGRAM(s) Oral before breakfast  simvastatin 20 milliGRAM(s) Oral at bedtime  sodium chloride 0.9%. 1000 milliLiter(s) (75 mL/Hr) IV Continuous <Continuous>    MEDICATIONS  (PRN):  acetaminophen   Tablet. 650 milliGRAM(s) Oral every 6 hours PRN Mild Pain (1 - 3)  ondansetron Injectable 4 milliGRAM(s) IV Push every 12 hours PRN Nausea    	    PHYSICAL EXAM:  T(C): 36.7 (08-09-18 @ 13:09), Max: 37.1 (08-08-18 @ 16:43)  HR: 118 (08-09-18 @ 13:09) (110 - 120)  BP: 110/69 (08-09-18 @ 13:09) (108/68 - 143/86)  RR: 22 (08-09-18 @ 13:09) (20 - 24)  SpO2: 95% (08-09-18 @ 13:09) (95% - 100%)  Wt(kg): --  I&O's Summary      Appearance: Normal	  HEENT:   NCAT, PERRL, EOMI	  Lymphatic: No lymphadenopathy  Cardiovascular: Normal S1 S2, RRR  Respiratory: dec BS L side  Psychiatry: A & O x 3, Mood & affect appropriate  Gastrointestinal:  Soft, Non-tender, + BS  Skin: No rashes, No ecchymoses, No cyanosis	  Neurologic: Non-focal  Extremities: Normal range of motion, No clubbing, cyanosis or edema    	  LABS:	 	    CARDIAC MARKERS:                                10.2   9.22  )-----------( 306      ( 09 Aug 2018 08:29 )             32.3     08-09    134<L>  |  97  |  24<H>  ----------------------------<  91  4.0   |  21<L>  |  0.92    Ca    9.1      09 Aug 2018 06:50    TPro  7.2  /  Alb  3.3  /  TBili  0.3  /  DBili  x   /  AST  21  /  ALT  16  /  AlkPhos  104  08-08    proBNP: Serum Pro-Brain Natriuretic Peptide: 575 pg/mL (08-08 @ 16:35)    Lipid Profile:   HgA1c:   TSH:

## 2018-08-09 NOTE — PROGRESS NOTE ADULT - ASSESSMENT
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 Found to have large L pleural effusion causing complete atelectasis of L lung. She had 1L thoracentesis 1 week ago as output. CT scan revealed mod pericardial effusion and large pleural effusion pt remained persistently tachycardic and tachypneic 2d echo revealed increased pericardial effusion read as large with  tamponade/RV  collapse. Thoracic surgery consulted.

## 2018-08-09 NOTE — DISCHARGE NOTE ADULT - DURABLE MEDICAL EQUIPMENT AGENCY
oxygen from [place] [phone] Oxygen delivered by [facility]/[place] [phone]

## 2018-08-09 NOTE — CONSULT NOTE ADULT - PROBLEM SELECTOR RECOMMENDATION 2
-L left malignant pleural effusion   -Plan for thoracentesis/ pleurex in am   -Pulmonology following -L left malignant pleural effusion   -stable dyspnea; patient states that she is comfortable at rest  -Plan for thoracentesis/ pleurex in am   -Pulmonology following

## 2018-08-09 NOTE — DISCHARGE NOTE ADULT - MEDICATION SUMMARY - MEDICATIONS TO STOP TAKING
I will STOP taking the medications listed below when I get home from the hospital:    losartan 50 mg oral tablet  -- 1 tab(s) by mouth once a day    Tylenol 500 mg oral tablet  -- 1 tab(s) by mouth , As Needed

## 2018-08-09 NOTE — CONSULT NOTE ADULT - ASSESSMENT
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 Found to have large L pleural effusion causing complete atelectasis of L lung. She had 1L thoracentesis 1 week ago as outpt.

## 2018-08-09 NOTE — PROGRESS NOTE ADULT - PROBLEM SELECTOR PLAN 1
to be drained by IR   cardiology aware  needs to be upgraded ti higher level of care preferable MICU consult in the setting of present  malignancy

## 2018-08-09 NOTE — CONSULT NOTE ADULT - SUBJECTIVE AND OBJECTIVE BOX
Mrs. Kumari is an 84 year old female who was admitted for progressive dyspnea 2/2 to a large malignant left pleural effusion and pericardial effusion. IR was consulted earlier this evening by the primary team for possible pericardial drain given that the most recent findings on echo demonstrated tamponade physiology. The patient was assessed and a discussion was held with the patient and her daughter. The risks and benefits were discussed at the bedside.     During our discussion, the patient was able to speak in full sentences and was resting comfortably. She did not report any shortness of breath while at rest. She has been tachycardic since yesterday, with pulses ranging from 110-120 bpm. Her blood pressure was within normal limits. She was on supplemental O2 and her SPO2 was within normal limits.     After our discussion, the patient stated that she would like the procedure to be performed but would like to be "asleep" during the procedure. The on-call anesthesiologist was reached but the anesthesiology team was involved in a difficult and emergent case at that time and was uncertain when the case would be completed. Given her stable condition, I explained to the patient and her daughter that the procedure can be scheduled for the morning and they were both agreeable with the plan. This was also communicated with MARITO Harris and the patient's nurse. Since the patient is on a cardiac monitor, she will be transferred tonight to the MICU.    Please keep NPO for pericardial drain placement tomorrow  If the patient acutely decompensates and becomes hemodynamically unstable, please page IR on call for an emergent pericardial drain placement

## 2018-08-09 NOTE — PROGRESS NOTE ADULT - SUBJECTIVE AND OBJECTIVE BOX
Chief Complaint:  fu - seen earlier today on rounds    History of Present Illness:  seen in bed on regular floor. Had CT angio last night. No events overnight. No chest pain, some SOB but feels a little better. No cough, +hoarseness, No N/V/D/C, no bleeding    Initial History: 83 yo F with h/o early stage breast cancer and recent diagnosis of lung cancer followed in the office by Dr. Sanchez. She has been on arimidex for ER/AL+ Her2- breast cancer sine 2016. Recently developed cough, SOB and hoarseness. She had CT chest by her pulmonologist that showed a large left hilar mass, RUL mass and adrenal gland metastasis. 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 She then presented with worsening pleural effusion and had thoracentesis. The molecular studies are still pending. She also had liquid biopsy in the office on peripheral blood. She came today with worsening SOB. Denies F/C, cough, CP. Has some tachycardia in ED. She cannot speak full sentences because of the SOB.    MEDICATIONS  (STANDING):  anastrozole 1 milliGRAM(s) Oral daily  aspirin enteric coated 81 milliGRAM(s) Oral daily  DULoxetine 30 milliGRAM(s) Oral daily  ferrous    sulfate 325 milliGRAM(s) Oral daily  lactobacillus acidophilus 1 Tablet(s) Oral two times a day with meals  losartan 50 milliGRAM(s) Oral daily  pantoprazole    Tablet 40 milliGRAM(s) Oral before breakfast  simvastatin 20 milliGRAM(s) Oral at bedtime  sodium chloride 0.9%. 1000 milliLiter(s) (75 mL/Hr) IV Continuous <Continuous>    MEDICATIONS  (PRN):  acetaminophen   Tablet. 650 milliGRAM(s) Oral every 6 hours PRN Mild Pain (1 - 3)  ondansetron Injectable 4 milliGRAM(s) IV Push every 12 hours PRN Nausea      Allergies    Levaquin (Diarrhea)    Intolerances    Dilaudid (Other)      Vital Signs Last 24 Hrs  T(C): 36.7 (09 Aug 2018 13:09), Max: 37.1 (08 Aug 2018 16:43)  T(F): 98 (09 Aug 2018 13:09), Max: 98.8 (08 Aug 2018 16:43)  HR: 118 (09 Aug 2018 13:09) (110 - 125)  BP: 110/69 (09 Aug 2018 13:09) (108/68 - 143/86)  BP(mean): --  RR: 22 (09 Aug 2018 13:09) (16 - 24)  SpO2: 95% (09 Aug 2018 13:09) (93% - 100%)    PHYSICAL EXAM  General: NAD, thin, frail  HEENT: clear oropharynx, anicteric sclera, pink conjunctiva, chronic ptosis of the left eye  Neck: supple  CV: normal S1/S2 with no murmur rubs or gallops, tachycardia  Lungs: clear to auscultation, on right, decreased BS on left, +dullness to percussion  Abdomen: soft non-tender non-distended, no hepatosplenomegaly, positive bowel sounds  Ext: no clubbing cyanosis or edema  Skin: no rashes and no petechiae  Lymph Nodes: No LAD in axillae, groin, neck  Neuro: alert and oriented X 3, no focal deficits    LABS:                          10.2   9.22  )-----------( 306      ( 09 Aug 2018 08:29 )             32.3         Mean Cell Volume : 91.5 fl  Mean Cell Hemoglobin : 28.9 pg  Mean Cell Hemoglobin Concentration : 31.6 gm/dL  Auto Neutrophil # : 6.76 K/uL  Auto Lymphocyte # : 0.79 K/uL  Auto Monocyte # : 1.04 K/uL  Auto Eosinophil # : 0.58 K/uL  Auto Basophil # : 0.03 K/uL  Auto Neutrophil % : 73.3 %  Auto Lymphocyte % : 8.6 %  Auto Monocyte % : 11.3 %  Auto Eosinophil % : 6.3 %  Auto Basophil % : 0.3 %      Serial CBC's  08-09 @ 08:29  Hct-32.3 / Hgb-10.2 / Plat-306 / RBC-3.53 / WBC-9.22  Serial CBC's  08-08 @ 16:35  Hct-33.1 / Hgb-11.2 / Plat-324 / RBC-3.62 / WBC-9.5      08-09    134<L>  |  97  |  24<H>  ----------------------------<  91  4.0   |  21<L>  |  0.92    Ca    9.1      09 Aug 2018 06:50    TPro  7.2  /  Alb  3.3  /  TBili  0.3  /  DBili  x   /  AST  21  /  ALT  16  /  AlkPhos  104  08-08      PT/INR - ( 08 Aug 2018 16:35 )   PT: 13.3 sec;   INR: 1.22 ratio         PTT - ( 08 Aug 2018 16:35 )  PTT:27.9 sec        < from: CT Angio Chest w/ IV Cont (08.08.18 @ 18:25) >  No evidence of pulmonary embolism.    There is a 2.2 x 2.6 x 3.5 cm spiculated mass in the right upper lobe,   concerning for malignancy.     There is narrowing of the left upper lobar pulmonary artery with   associated hypoperfusion of the left upper lobe, suggestive of mass   encasing the left upper lobe pulmonary artery. The exact size is   difficult to determine on this examination. Soft tissue  surrounds the   inferior trachea and bilateral main bronchi, which may represent   conglomerate lymphadenopathy. Right hilar and paratracheal   lymphadenopathy.    Moderate pericardial effusion. Large left pleural effusion.    Indeterminate 1.5 cm left adrenal nodule.    < end of copied text >

## 2018-08-09 NOTE — CONSULT NOTE ADULT - PROBLEM SELECTOR RECOMMENDATION 3
-Recent diagnosis via bronch a few weeks ago  -Molecular studies pending   -Heme/onc following
Moderate pericardial effusion on CTA chest  -Check cardiac enzymes  -Check TTE

## 2018-08-09 NOTE — DISCHARGE NOTE ADULT - ADDITIONAL INSTRUCTIONS
please f/u with PCP in  4 to 7 days  -F/U w/ Dr. Avila in 10 days post discharge [phone]  [phone] [facility]

## 2018-08-09 NOTE — DISCHARGE NOTE ADULT - PLAN OF CARE
stable s/p vats with pleurex placed Atrial fibrillation is the most common heart rhythm problem.  The condition puts you at risk for has stroke and heart attack  It helps if you control your blood pressure, not drink more than 1-2 alcohol drinks per day, cut down on caffeine, getting treatment for over active thyroid gland, and get regular exercise  Call your doctor if you feel your heart racing or beating unusually, chest tightness or pain, lightheaded, faint, shortness of breath especially with exercise  It is important to take your heart medication as prescribed  You may be on anticoagulation which is very important to take as directed - you may need blood work to monitor drug levels continue follow up with oncology doctor c/w meds as prescribed Follow up with your medical doctor to establish long term blood pressure treatment goals. s/p vats with pleurex placed   Pleurex cath capped today 8/18. To be drained every Mon/wed/Fri up to 1 L.

## 2018-08-09 NOTE — CONSULT NOTE ADULT - PROBLEM SELECTOR RECOMMENDATION 9
-Patient with worsening dyspnea, tachycardia; TTE with RV enlargement consistent with tamponade physiology  -Currently hemodynamically stable; saturating well on 3 L NC  -Pt has already discussed pericardiocentesis with IR; plan for procedure to take place in early am  -transferred to tele floor for closer monitoring  -Patient is not a MICU candidate at this time; please contact MICU again if patient develops hemodynamic instability overnight -Patient with worsening dyspnea, tachycardia; TTE with RV enlargement consistent with tamponade physiology  -Currently hemodynamically stable; saturating well on 3 L NC  -Pt has already discussed pericardiocentesis with IR; plan for procedure to take place in early am  -transferred to tele floor for closer monitoring  -If patient develops hemodynamic instability please contact cards for unit admission and IR emergently for urgent pericardial drainage; MICU cannot urgently drain this patient's effusion

## 2018-08-09 NOTE — DISCHARGE NOTE ADULT - CARE PLAN
Principal Discharge DX:	Pleural effusion  Goal:	stable  Assessment and plan of treatment:	s/p vats with pleurex placed  Secondary Diagnosis:	Afib  Goal:	stable  Assessment and plan of treatment:	Atrial fibrillation is the most common heart rhythm problem.  The condition puts you at risk for has stroke and heart attack  It helps if you control your blood pressure, not drink more than 1-2 alcohol drinks per day, cut down on caffeine, getting treatment for over active thyroid gland, and get regular exercise  Call your doctor if you feel your heart racing or beating unusually, chest tightness or pain, lightheaded, faint, shortness of breath especially with exercise  It is important to take your heart medication as prescribed  You may be on anticoagulation which is very important to take as directed - you may need blood work to monitor drug levels  Secondary Diagnosis:	Adenocarcinoma of lung  Goal:	stable  Assessment and plan of treatment:	continue follow up with oncology doctor  Secondary Diagnosis:	Hyperlipidemia  Goal:	stable  Assessment and plan of treatment:	c/w meds as prescribed  Secondary Diagnosis:	Hypertension  Goal:	stable  Assessment and plan of treatment:	Follow up with your medical doctor to establish long term blood pressure treatment goals. Principal Discharge DX:	Pleural effusion  Goal:	stable  Assessment and plan of treatment:	s/p vats with pleurex placed   Pleurex cath capped today 8/18. To be drained every Mon/wed/Fri up to 1 L.  Secondary Diagnosis:	Afib  Goal:	stable  Assessment and plan of treatment:	Atrial fibrillation is the most common heart rhythm problem.  The condition puts you at risk for has stroke and heart attack  It helps if you control your blood pressure, not drink more than 1-2 alcohol drinks per day, cut down on caffeine, getting treatment for over active thyroid gland, and get regular exercise  Call your doctor if you feel your heart racing or beating unusually, chest tightness or pain, lightheaded, faint, shortness of breath especially with exercise  It is important to take your heart medication as prescribed  You may be on anticoagulation which is very important to take as directed - you may need blood work to monitor drug levels  Secondary Diagnosis:	Adenocarcinoma of lung  Goal:	stable  Assessment and plan of treatment:	continue follow up with oncology doctor  Secondary Diagnosis:	Hyperlipidemia  Goal:	stable  Assessment and plan of treatment:	c/w meds as prescribed  Secondary Diagnosis:	Hypertension  Goal:	stable  Assessment and plan of treatment:	Follow up with your medical doctor to establish long term blood pressure treatment goals.

## 2018-08-09 NOTE — CONSULT NOTE ADULT - ATTENDING COMMENTS
pleurx unable to be done until monday  will give relief with thoracentesis tomorrow  f/u cards re: tamponade physiology on TTE  ? drainage

## 2018-08-09 NOTE — CONSULT NOTE ADULT - ATTENDING COMMENTS
Patient seen and examined.  Agree with resident note as above.  Patient with history as noted and found to have large L pleural effusion and pericardial effusion with tamponade physiology.  Respiratory status is stable and patient awaits pleural drainage in AM by Dr. Carrasquillo.  Discovered this evening to have tamponade physiology and cards/IR have elected to schedule procedure for the coming AM.  Please contact cards and IR if the patient worsens from a hemodynamic standpoint.  Discussed with primary team, patient, and patient's daughter at the bedside.

## 2018-08-09 NOTE — CONSULT NOTE ADULT - PROBLEM SELECTOR RECOMMENDATION 9
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 Sanchez's office for results from [place]  -Plan for IR guided pigtail placement today  -Thoracic consult for ?pleurex   -CXR in AM Malignant pleural effusion on thoracentesis last week per family, will f/u with Dr. Sanchez's office for results from [place]  -Plan for thoracentesis tomorrow  -Thoracic consult for ?pleurex   -CXR in AM

## 2018-08-09 NOTE — PROGRESS NOTE ADULT - ASSESSMENT
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 Found to have large L pleural effusion causing complete atelectasis of L lung. She had 1L thoracentesis 1 week ago as outpt.      Problem/Plan - 1:  ·  Problem: Pleural effusion.  Plan: IR guided pigtail placement today, CTS eval, Pulm appreciated     Problem/Plan - 2:  ·  Problem: Adenocarcinoma of lung, unspecified laterality.  Plan: Apparent stage 4 disease. Drainage of Pleural effusion, Onc appreciated  MRI brain pending     Problem/Plan - 3:  ·  Problem: Dysphagia, unspecified type.  Plan: See above.  Will assume aspiration risk.   S/S.   Nutrition evaluation.      Problem/Plan - 4:  ·  Problem: Need for prophylactic measure.  Plan: Patient/ daughter/ son refuse pharmacologic DVT prophylaxis nor DAMION at present.

## 2018-08-09 NOTE — CONSULT NOTE ADULT - SUBJECTIVE AND OBJECTIVE BOX
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 Found to have large L pleural effusion causing complete atelectasis of L lung. She had 1L thoracentesis 1 week ago as outpt. c/o mild shortness of breath, globus sensation, 97% on 3L NC.       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  S/P hip replacement, right: 6/2016  - right hip  History of lumpectomy of right breast: July 2015  History of cataract surgery, left: and right 2016  Elective surgery: reversal of colostomy July 2015  History of tonsillectomy  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  H/O eye surgery: bilateral ptosis  History of varicose vein stripping: recent vascular evaluation - all normal  History of carpal tunnel release: bilateral    Allergies    Levaquin (Diarrhea)    Intolerances    Dilaudid (Other)    FAMILY HISTORY:  Diabetes mellitus (Father)    Social history: Former Smoker, quit 30 years ago    Review of Systems:  CONSTITUTIONAL: No fever, chills, or fatigue  EYES: No eye pain, visual disturbances, or discharge  ENMT:  No difficulty hearing, tinnitus, vertigo; No sinus or throat pain  NECK: No pain or stiffness  RESPIRATORY: Per above  CARDIOVASCULAR: No chest pain, palpitations, dizziness, or leg swelling  GASTROINTESTINAL: No abdominal or epigastric pain. No nausea, vomiting, or hematemesis; No diarrhea or constipation. No melena or hematochezia.  GENITOURINARY: No dysuria, frequency, hematuria, or incontinence  NEUROLOGICAL: No headaches, memory loss, loss of strength, numbness, or tremors  SKIN: No itching, burning, rashes, or lesions   MUSCULOSKELETAL: No joint pain or swelling; No muscle, back, or extremity pain  PSYCHIATRIC: No depression, anxiety, mood swings, or difficulty sleeping      Medications:  MEDICATIONS  (STANDING):  anastrozole 1 milliGRAM(s) Oral daily  aspirin enteric coated 81 milliGRAM(s) Oral daily  DULoxetine 30 milliGRAM(s) Oral daily  ferrous    sulfate 325 milliGRAM(s) Oral daily  lactobacillus acidophilus 1 Tablet(s) Oral two times a day with meals  losartan 50 milliGRAM(s) Oral daily  pantoprazole    Tablet 40 milliGRAM(s) Oral before breakfast  simvastatin 20 milliGRAM(s) Oral at bedtime  sodium chloride 0.9%. 1000 milliLiter(s) (75 mL/Hr) IV Continuous <Continuous>    MEDICATIONS  (PRN):  acetaminophen   Tablet. 650 milliGRAM(s) Oral every 6 hours PRN Mild Pain (1 - 3)  ondansetron Injectable 4 milliGRAM(s) IV Push every 12 hours PRN Nausea            Vital Signs Last 24 Hrs  T(C): 36.6 (09 Aug 2018 04:21), Max: 37.1 (08 Aug 2018 16:43)  T(F): 97.9 (09 Aug 2018 04:21), Max: 98.8 (08 Aug 2018 16:43)  HR: 110 (09 Aug 2018 04:21) (110 - 125)  BP: 111/76 (09 Aug 2018 04:21) (108/68 - 143/86)  BP(mean): --  RR: 20 (09 Aug 2018 04:21) (16 - 24)  SpO2: 97% (09 Aug 2018 04:21) (93% - 100%) on 3L NC      VBG pH 7.40 08-08 @ 18:13  VBG pCO2 39 08-08 @ 18:13  VBG O2 sat 70 08-08 @ 18:13  VBG lactate -- 08-08 @ 18:13            LABS:                        10.2   9.22  )-----------( 306      ( 09 Aug 2018 08:29 )             32.3     08-09    134<L>  |  97  |  24<H>  ----------------------------<  91  4.0   |  21<L>  |  0.92    Ca    9.1      09 Aug 2018 06:50    TPro  7.2  /  Alb  3.3  /  TBili  0.3  /  DBili  x   /  AST  21  /  ALT  16  /  AlkPhos  104  08-08          CAPILLARY BLOOD GLUCOSE        PT/INR - ( 08 Aug 2018 16:35 )   PT: 13.3 sec;   INR: 1.22 ratio         PTT - ( 08 Aug 2018 16:35 )  PTT:27.9 sec  Urinalysis Basic - ( 09 Aug 2018 08:26 )    Color: Yellow / Appearance: Clear / SG: >1.030 / pH: x  Gluc: x / Ketone: Trace  / Bili: Negative / Urobili: Negative   Blood: x / Protein: 30 mg/dL / Nitrite: Negative   Leuk Esterase: Large / RBC: x / WBC x   Sq Epi: x / Non Sq Epi: x / Bacteria: x        Serum Pro-Brain Natriuretic Peptide: 575 pg/mL (08-08-18 @ 16:35)              CULTURES: (if applicable)        Physical Examination:    General: No acute distress.      HEENT: Pupils equal, reactive to light.  Symmetric.    PULM: Bronchial BS L     CVS: S1, S2    ABD: Soft, nondistended, nontender, normoactive bowel sounds, no masses    EXT: No edema, nontender    SKIN: Warm and well perfused, no rashes noted.    NEURO: Al< from: CT Angio Chest w/ IV Cont (08.08.18 @ 18:25) >  CHEST:     PULMONARY ARTERY: No evidence of pulmonary embolism. There is narrowing   of the left upper lobar pulmonary artery with associated hypoperfusion of   the left upper lobe, suggestive of encasing malignancy.    LUNGS AND LARGE AIRWAYS: Patent central airways. There is a 2.2 x 2.6 x   3.5 cm spiculated mass in the right upper lobe, concerning for   malignancy. Narrowing of the left upper and lower lobar bronchi, which   may be secondary to encasing malignancy. Hypoperfusion of the left upper   lobe.    PLEURA: Large left pleural effusion.    VESSELS: Left-sided bovine aortic arch and descending aorta. Moderate   atherosclerotic changes.    HEART: Heart size is normal. Moderate pericardial effusion.    MEDIASTINUM AND SUZI: Right hilar and paratracheal lymphadenopathy. Soft   tissue density surrounds the inferior trachea and bilateral main bronchi,   which may represent extending malignancy.    CHEST WALL AND LOWER NECK: Small hypodense nodule in the right thyroid   lobe.    VISUALIZED UPPER ABDOMEN: There is a 1.5 cm indeterminate left adrenal   nodule.    BONES: Old left-sided rib fractures.    IMPRESSION:  No evidence of pulmonary embolism.    There is a 2.2 x 2.6 x 3.5 cm spiculated mass in the right upper lobe,   concerning for malignancy.     There is narrowing of the left upper lobar pulmonary artery with   associated hypoperfusion of the left upper lobe, suggestive of mass   encasing the left upper lobe pulmonary artery. The exact size is   difficult to determine on this examination. Soft tissue  surrounds the   inferior trachea and bilateral main bronchi, which may represent   conglomerate lymphadenopathy. Right hilar and paratracheal   lymphadenopathy.    Moderate pericardial effusion. Large left pleural effusion.    Indeterminate 1.5 cm left adrenal nodule.    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 Found to have large L pleural effusion causing complete atelectasis of L lung. She had 1L thoracentesis 1 week ago as outpt. c/o mild shortness of breath, globus sensation, 97% on 3L NC.       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  S/P hip replacement, right: 6/2016  - right hip  History of lumpectomy of right breast: July 2015  History of cataract surgery, left: and right 2016  Elective surgery: reversal of colostomy July 2015  History of tonsillectomy  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  H/O eye surgery: bilateral ptosis  History of varicose vein stripping: recent vascular evaluation - all normal  History of carpal tunnel release: bilateral    Allergies    Levaquin (Diarrhea)    Intolerances    Dilaudid (Other)    FAMILY HISTORY:  Diabetes mellitus (Father)    Social history: Former Smoker, quit 30 years ago    Review of Systems:  CONSTITUTIONAL: No fever, chills, or fatigue  EYES: No eye pain, visual disturbances, or discharge  ENMT:  No difficulty hearing, tinnitus, vertigo; No sinus or throat pain  NECK: No pain or stiffness  RESPIRATORY: Per above  CARDIOVASCULAR: No chest pain, palpitations, dizziness, or leg swelling  GASTROINTESTINAL: No abdominal or epigastric pain. No nausea, vomiting, or hematemesis; No diarrhea or constipation. No melena or hematochezia.  GENITOURINARY: No dysuria, frequency, hematuria, or incontinence  NEUROLOGICAL: No headaches, memory loss, loss of strength, numbness, or tremors  SKIN: No itching, burning, rashes, or lesions   MUSCULOSKELETAL: No joint pain or swelling; No muscle, back, or extremity pain  PSYCHIATRIC: No depression, anxiety, mood swings, or difficulty sleeping      Medications:  MEDICATIONS  (STANDING):  anastrozole 1 milliGRAM(s) Oral daily  aspirin enteric coated 81 milliGRAM(s) Oral daily  DULoxetine 30 milliGRAM(s) Oral daily  ferrous    sulfate 325 milliGRAM(s) Oral daily  lactobacillus acidophilus 1 Tablet(s) Oral two times a day with meals  losartan 50 milliGRAM(s) Oral daily  pantoprazole    Tablet 40 milliGRAM(s) Oral before breakfast  simvastatin 20 milliGRAM(s) Oral at bedtime  sodium chloride 0.9%. 1000 milliLiter(s) (75 mL/Hr) IV Continuous <Continuous>    MEDICATIONS  (PRN):  acetaminophen   Tablet. 650 milliGRAM(s) Oral every 6 hours PRN Mild Pain (1 - 3)  ondansetron Injectable 4 milliGRAM(s) IV Push every 12 hours PRN Nausea    Vital Signs Last 24 Hrs  T(C): 36.6 (09 Aug 2018 04:21), Max: 37.1 (08 Aug 2018 16:43)  T(F): 97.9 (09 Aug 2018 04:21), Max: 98.8 (08 Aug 2018 16:43)  HR: 110 (09 Aug 2018 04:21) (110 - 125)  BP: 111/76 (09 Aug 2018 04:21) (108/68 - 143/86)  BP(mean): --  RR: 20 (09 Aug 2018 04:21) (16 - 24)  SpO2: 97% (09 Aug 2018 04:21) (93% - 100%) on 3L NC      VBG pH 7.40 08-08 @ 18:13  VBG pCO2 39 08-08 @ 18:13  VBG O2 sat 70 08-08 @ 18:13  VBG lactate -- 08-08 @ 18:13    LABS:                        10.2   9.22  )-----------( 306      ( 09 Aug 2018 08:29 )             32.3     08-09    134<L>  |  97  |  24<H>  ----------------------------<  91  4.0   |  21<L>  |  0.92    Ca    9.1      09 Aug 2018 06:50    TPro  7.2  /  Alb  3.3  /  TBili  0.3  /  DBili  x   /  AST  21  /  ALT  16  /  AlkPhos  104  08-08    CAPILLARY BLOOD GLUCOSE    PT/INR - ( 08 Aug 2018 16:35 )   PT: 13.3 sec;   INR: 1.22 ratio         PTT - ( 08 Aug 2018 16:35 )  PTT:27.9 sec  Urinalysis Basic - ( 09 Aug 2018 08:26 )    Color: Yellow / Appearance: Clear / SG: >1.030 / pH: x  Gluc: x / Ketone: Trace  / Bili: Negative / Urobili: Negative   Blood: x / Protein: 30 mg/dL / Nitrite: Negative   Leuk Esterase: Large / RBC: x / WBC x   Sq Epi: x / Non Sq Epi: x / Bacteria: x        Serum Pro-Brain Natriuretic Peptide: 575 pg/mL (08-08-18 @ 16:35)    CULTURES: (if applicable)    Physical Examination:    General: No acute distress.      HEENT: Pupils equal, reactive to light.  Symmetric.    PULM: Bronchial BS L     CVS: S1, S2    ABD: Soft, nondistended, nontender, normoactive bowel sounds, no masses    EXT: No edema, nontender    SKIN: Warm and well perfused, no rashes noted.    NEURO: Al< from: CT Angio Chest w/ IV Cont (08.08.18 @ 18:25) >  CHEST:     PULMONARY ARTERY: No evidence of pulmonary embolism. There is narrowing   of the left upper lobar pulmonary artery with associated hypoperfusion of   the left upper lobe, suggestive of encasing malignancy.    LUNGS AND LARGE AIRWAYS: Patent central airways. There is a 2.2 x 2.6 x   3.5 cm spiculated mass in the right upper lobe, concerning for   malignancy. Narrowing of the left upper and lower lobar bronchi, which   may be secondary to encasing malignancy. Hypoperfusion of the left upper   lobe.    PLEURA: Large left pleural effusion.    VESSELS: Left-sided bovine aortic arch and descending aorta. Moderate   atherosclerotic changes.    HEART: Heart size is normal. Moderate pericardial effusion.    MEDIASTINUM AND SUZI: Right hilar and paratracheal lymphadenopathy. Soft   tissue density surrounds the inferior trachea and bilateral main bronchi,   which may represent extending malignancy.    CHEST WALL AND LOWER NECK: Small hypodense nodule in the right thyroid   lobe.    VISUALIZED UPPER ABDOMEN: There is a 1.5 cm indeterminate left adrenal   nodule.    BONES: Old left-sided rib fractures.    IMPRESSION:  No evidence of pulmonary embolism.    There is a 2.2 x 2.6 x 3.5 cm spiculated mass in the right upper lobe,   concerning for malignancy.     There is narrowing of the left upper lobar pulmonary artery with   associated hypoperfusion of the left upper lobe, suggestive of mass   encasing the left upper lobe pulmonary artery. The exact size is   difficult to determine on this examination. Soft tissue  surrounds the   inferior trachea and bilateral main bronchi, which may represent   conglomerate lymphadenopathy. Right hilar and paratracheal   lymphadenopathy.    Moderate pericardial effusion. Large left pleural effusion.    Indeterminate 1.5 cm left adrenal nodule.    < end of copied text >  ert, oriented, interactive, nonfocal    RADIOLOGY REVIEWED  CTA chest:

## 2018-08-09 NOTE — CONSULT NOTE ADULT - ASSESSMENT
84 y.o. F with PMH of HTN, R breast CA (diagnosed in 2015) s/p lumpectomy, currently on treatment with Arimidex and recent diagnosis of lung adenocarcinoma who presented to ED with worsening dyspnea and SOB in the setting of large L pleural effusion with atelectasis of L lung. She was also found to have pericardial effusion with TTE consistent with tamponade physiology. MICU was consulted for worsening dyspnea.

## 2018-08-09 NOTE — CONSULT NOTE ADULT - SUBJECTIVE AND OBJECTIVE BOX
CHIEF COMPLAINT:    HPI:  84 y.o. 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  S/P hip replacement, right: 6/2016  - right hip  History of lumpectomy of right breast: July 2015  History of cataract surgery, left: and right 2016  Elective surgery: reversal of colostomy July 2015  History of tonsillectomy  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  H/O eye surgery: bilateral ptosis  History of varicose vein stripping: recent vascular evaluation - all normal  History of carpal tunnel release: bilateral      FAMILY HISTORY:  Diabetes mellitus (Father)      SOCIAL HISTORY:  Smoking: __ packs x ___ years  EtOH Use:  Marital Status:  Occupation:  Recent Travel:  Country of Birth:  Advance Directives:    Allergies    Levaquin (Diarrhea)    Intolerances    Dilaudid (Other)      HOME MEDICATIONS:    REVIEW OF SYSTEMS:  Constitutional:   Eyes:  ENT:  CV:  Resp:  GI:  :  MSK:  Integumentary:  Neurological:  Psychiatric:  Endocrine:  Hematologic/Lymphatic:  Allergic/Immunologic:  [ ] All other systems negative  [ ] Unable to assess ROS because ________    OBJECTIVE:  ICU Vital Signs Last 24 Hrs  T(C): 36.1 (09 Aug 2018 21:30), Max: 36.9 (08 Aug 2018 21:58)  T(F): 97 (09 Aug 2018 21:30), Max: 98.5 (08 Aug 2018 21:58)  HR: 120 (09 Aug 2018 21:30) (110 - 120)  BP: 115/73 (09 Aug 2018 21:30) (110/69 - 121/79)  BP(mean): --  ABP: --  ABP(mean): --  RR: 20 (09 Aug 2018 21:30) (20 - 22)  SpO2: 95% (09 Aug 2018 21:30) (95% - 98%)        CAPILLARY BLOOD GLUCOSE          PHYSICAL EXAM:  General:   HEENT:   Lymph Nodes:  Neck:   Respiratory:   Cardiovascular:   Abdomen:   Extremities:   Skin:   Neurological:  Psychiatry:    HOSPITAL MEDICATIONS:  MEDICATIONS  (STANDING):  anastrozole 1 milliGRAM(s) Oral daily  aspirin enteric coated 81 milliGRAM(s) Oral daily  DULoxetine 30 milliGRAM(s) Oral daily  enoxaparin Injectable 40 milliGRAM(s) SubCutaneous every 24 hours  ferrous    sulfate 325 milliGRAM(s) Oral daily  lactobacillus acidophilus 1 Tablet(s) Oral two times a day with meals  losartan 50 milliGRAM(s) Oral daily  pantoprazole    Tablet 40 milliGRAM(s) Oral before breakfast  simvastatin 20 milliGRAM(s) Oral at bedtime  sodium chloride 0.9%. 1000 milliLiter(s) (75 mL/Hr) IV Continuous <Continuous>    MEDICATIONS  (PRN):  acetaminophen   Tablet. 650 milliGRAM(s) Oral every 6 hours PRN Mild Pain (1 - 3)  ondansetron Injectable 4 milliGRAM(s) IV Push every 6 hours PRN Nausea      LABS:                        10.2   9.22  )-----------( 306      ( 09 Aug 2018 08:29 )             32.3     08-09    134<L>  |  97  |  24<H>  ----------------------------<  91  4.0   |  21<L>  |  0.92    Ca    9.1      09 Aug 2018 06:50    TPro  7.2  /  Alb  3.3  /  TBili  0.3  /  DBili  x   /  AST  21  /  ALT  16  /  AlkPhos  104  08-08    PT/INR - ( 08 Aug 2018 16:35 )   PT: 13.3 sec;   INR: 1.22 ratio         PTT - ( 08 Aug 2018 16:35 )  PTT:27.9 sec  Urinalysis Basic - ( 09 Aug 2018 17:36 )    Color: x / Appearance: x / SG: x / pH: x  Gluc: x / Ketone: x  / Bili: x / Urobili: x   Blood: x / Protein: x / Nitrite: x   Leuk Esterase: x / RBC: 0-2 /HPF / WBC 11-25 /HPF   Sq Epi: x / Non Sq Epi: OCC /HPF / Bacteria: x        Venous Blood Gas:  08-08 @ 18:13  7.40/39/42/24/70  VBG Lactate: --      MICROBIOLOGY:     RADIOLOGY:  [ ] Reviewed and interpreted by me    EKG: CHIEF COMPLAINT:    HPI:  84 y.o. F with PMH of HTN, R breast CA (diagnosed in 2015) s/p lumpectomy, currently on treatment with Arimidex who presented to ED with worsening dyspnea and SOB.     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  S/P hip replacement, right: 6/2016  - right hip  History of lumpectomy of right breast: July 2015  History of cataract surgery, left: and right 2016  Elective surgery: reversal of colostomy July 2015  History of tonsillectomy  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  H/O eye surgery: bilateral ptosis  History of varicose vein stripping: recent vascular evaluation - all normal  History of carpal tunnel release: bilateral      FAMILY HISTORY:  Diabetes mellitus (Father)      SOCIAL HISTORY:  Smoking: __ packs x ___ years  EtOH Use:  Marital Status:  Occupation:  Recent Travel:  Country of Birth:  Advance Directives:    Allergies    Levaquin (Diarrhea)    Intolerances    Dilaudid (Other)      HOME MEDICATIONS:    REVIEW OF SYSTEMS:  Constitutional:   Eyes:  ENT:  CV:  Resp:  GI:  :  MSK:  Integumentary:  Neurological:  Psychiatric:  Endocrine:  Hematologic/Lymphatic:  Allergic/Immunologic:  [ ] All other systems negative  [ ] Unable to assess ROS because ________    OBJECTIVE:  ICU Vital Signs Last 24 Hrs  T(C): 36.1 (09 Aug 2018 21:30), Max: 36.9 (08 Aug 2018 21:58)  T(F): 97 (09 Aug 2018 21:30), Max: 98.5 (08 Aug 2018 21:58)  HR: 120 (09 Aug 2018 21:30) (110 - 120)  BP: 115/73 (09 Aug 2018 21:30) (110/69 - 121/79)  BP(mean): --  ABP: --  ABP(mean): --  RR: 20 (09 Aug 2018 21:30) (20 - 22)  SpO2: 95% (09 Aug 2018 21:30) (95% - 98%)        CAPILLARY BLOOD GLUCOSE          PHYSICAL EXAM:  General:   HEENT:   Lymph Nodes:  Neck:   Respiratory:   Cardiovascular:   Abdomen:   Extremities:   Skin:   Neurological:  Psychiatry:    HOSPITAL MEDICATIONS:  MEDICATIONS  (STANDING):  anastrozole 1 milliGRAM(s) Oral daily  aspirin enteric coated 81 milliGRAM(s) Oral daily  DULoxetine 30 milliGRAM(s) Oral daily  enoxaparin Injectable 40 milliGRAM(s) SubCutaneous every 24 hours  ferrous    sulfate 325 milliGRAM(s) Oral daily  lactobacillus acidophilus 1 Tablet(s) Oral two times a day with meals  losartan 50 milliGRAM(s) Oral daily  pantoprazole    Tablet 40 milliGRAM(s) Oral before breakfast  simvastatin 20 milliGRAM(s) Oral at bedtime  sodium chloride 0.9%. 1000 milliLiter(s) (75 mL/Hr) IV Continuous <Continuous>    MEDICATIONS  (PRN):  acetaminophen   Tablet. 650 milliGRAM(s) Oral every 6 hours PRN Mild Pain (1 - 3)  ondansetron Injectable 4 milliGRAM(s) IV Push every 6 hours PRN Nausea      LABS:                        10.2   9.22  )-----------( 306      ( 09 Aug 2018 08:29 )             32.3     08-09    134<L>  |  97  |  24<H>  ----------------------------<  91  4.0   |  21<L>  |  0.92    Ca    9.1      09 Aug 2018 06:50    TPro  7.2  /  Alb  3.3  /  TBili  0.3  /  DBili  x   /  AST  21  /  ALT  16  /  AlkPhos  104  08-08    PT/INR - ( 08 Aug 2018 16:35 )   PT: 13.3 sec;   INR: 1.22 ratio         PTT - ( 08 Aug 2018 16:35 )  PTT:27.9 sec  Urinalysis Basic - ( 09 Aug 2018 17:36 )    Color: x / Appearance: x / SG: x / pH: x  Gluc: x / Ketone: x  / Bili: x / Urobili: x   Blood: x / Protein: x / Nitrite: x   Leuk Esterase: x / RBC: 0-2 /HPF / WBC 11-25 /HPF   Sq Epi: x / Non Sq Epi: OCC /HPF / Bacteria: x        Venous Blood Gas:  08-08 @ 18:13  7.40/39/42/24/70  VBG Lactate: --      MICROBIOLOGY:     RADIOLOGY:  [ ] Reviewed and interpreted by me    EKG: CHIEF COMPLAINT:    HPI:  84 y.o. F with PMH of HTN, R breast CA (diagnosed in 2015) s/p lumpectomy, currently on treatment with Arimidex and recent diagnosis of lung adenocarcinoma who presented to ED with worsening dyspnea and SOB. Patient recently developed cough, SOB and hoarseness. She had CT chest by her pulmonologist that showed a large left hilar mass, RUL mass and adrenal gland metastasis. She underwent bronch that confirmed adenocarcinoma of the lung. She then presented with worsening L pleural effusion with complete atelectasis of L lung and had thoracentesis that was consistent with malignant effusion. Pt presented to hospital due to worsening dyspnea  as well as generalized weakness and poor PO intake. Oncology and pulmonology have been following; pt was scheduled to undergo thoracentesis and possible pleurex tomorrow.     This afternoon, patient was noted to be dyspneic with even minimal exertion. 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  S/P hip replacement, right: 6/2016  - right hip  History of lumpectomy of right breast: July 2015  History of cataract surgery, left: and right 2016  Elective surgery: reversal of colostomy July 2015  History of tonsillectomy  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  H/O eye surgery: bilateral ptosis  History of varicose vein stripping: recent vascular evaluation - all normal  History of carpal tunnel release: bilateral      FAMILY HISTORY:  Diabetes mellitus (Father)      SOCIAL HISTORY:  Smoking: __ packs x ___ years  EtOH Use:  Marital Status:  Occupation:  Recent Travel:  Country of Birth:  Advance Directives:    Allergies    Levaquin (Diarrhea)    Intolerances    Dilaudid (Other)      HOME MEDICATIONS:    REVIEW OF SYSTEMS:  Constitutional:   Eyes:  ENT:  CV:  Resp:  GI:  :  MSK:  Integumentary:  Neurological:  Psychiatric:  Endocrine:  Hematologic/Lymphatic:  Allergic/Immunologic:  [ ] All other systems negative  [ ] Unable to assess ROS because ________    OBJECTIVE:  ICU Vital Signs Last 24 Hrs  T(C): 36.1 (09 Aug 2018 21:30), Max: 36.9 (08 Aug 2018 21:58)  T(F): 97 (09 Aug 2018 21:30), Max: 98.5 (08 Aug 2018 21:58)  HR: 120 (09 Aug 2018 21:30) (110 - 120)  BP: 115/73 (09 Aug 2018 21:30) (110/69 - 121/79)  BP(mean): --  ABP: --  ABP(mean): --  RR: 20 (09 Aug 2018 21:30) (20 - 22)  SpO2: 95% (09 Aug 2018 21:30) (95% - 98%)        CAPILLARY BLOOD GLUCOSE          PHYSICAL EXAM:  General:   HEENT:   Lymph Nodes:  Neck:   Respiratory:   Cardiovascular:   Abdomen:   Extremities:   Skin:   Neurological:  Psychiatry:    HOSPITAL MEDICATIONS:  MEDICATIONS  (STANDING):  anastrozole 1 milliGRAM(s) Oral daily  aspirin enteric coated 81 milliGRAM(s) Oral daily  DULoxetine 30 milliGRAM(s) Oral daily  enoxaparin Injectable 40 milliGRAM(s) SubCutaneous every 24 hours  ferrous    sulfate 325 milliGRAM(s) Oral daily  lactobacillus acidophilus 1 Tablet(s) Oral two times a day with meals  losartan 50 milliGRAM(s) Oral daily  pantoprazole    Tablet 40 milliGRAM(s) Oral before breakfast  simvastatin 20 milliGRAM(s) Oral at bedtime  sodium chloride 0.9%. 1000 milliLiter(s) (75 mL/Hr) IV Continuous <Continuous>    MEDICATIONS  (PRN):  acetaminophen   Tablet. 650 milliGRAM(s) Oral every 6 hours PRN Mild Pain (1 - 3)  ondansetron Injectable 4 milliGRAM(s) IV Push every 6 hours PRN Nausea      LABS:                        10.2   9.22  )-----------( 306      ( 09 Aug 2018 08:29 )             32.3     08-09    134<L>  |  97  |  24<H>  ----------------------------<  91  4.0   |  21<L>  |  0.92    Ca    9.1      09 Aug 2018 06:50    TPro  7.2  /  Alb  3.3  /  TBili  0.3  /  DBili  x   /  AST  21  /  ALT  16  /  AlkPhos  104  08-08    PT/INR - ( 08 Aug 2018 16:35 )   PT: 13.3 sec;   INR: 1.22 ratio         PTT - ( 08 Aug 2018 16:35 )  PTT:27.9 sec  Urinalysis Basic - ( 09 Aug 2018 17:36 )    Color: x / Appearance: x / SG: x / pH: x  Gluc: x / Ketone: x  / Bili: x / Urobili: x   Blood: x / Protein: x / Nitrite: x   Leuk Esterase: x / RBC: 0-2 /HPF / WBC 11-25 /HPF   Sq Epi: x / Non Sq Epi: OCC /HPF / Bacteria: x        Venous Blood Gas:  08-08 @ 18:13  7.40/39/42/24/70  VBG Lactate: --      MICROBIOLOGY:     RADIOLOGY:  [ ] Reviewed and interpreted by me    EKG: CHIEF COMPLAINT:    HPI:  84 y.o. F with PMH of HTN, R breast CA (diagnosed in 2015) s/p lumpectomy, currently on treatment with Arimidex and recent diagnosis of lung adenocarcinoma who presented to ED with worsening dyspnea and SOB. Patient recently developed cough, SOB and hoarseness. She had CT chest by her pulmonologist that showed a large left hilar mass, RUL mass and adrenal gland metastasis. She underwent bronch that confirmed adenocarcinoma of the lung. She then presented with worsening L pleural effusion with complete atelectasis of L lung and had thoracentesis that was consistent with malignant effusion. Pt presented to hospital due to worsening dyspnea  as well as generalized weakness and poor PO intake. Oncology and pulmonology have been following; pt was scheduled to undergo thoracentesis and possible pleurex tomorrow.     This afternoon, patient was noted to be dyspneic with even minimal exertion. TTE was performed earlier this afternoon; PA received a call from cardiology to notify her that the study showed RV collapse consistent with tamponade physiology. IR had already been consulted for pericardiocentesis. MICU was called for TTE consistent with tamponade.     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 She smoked 1PPD since age 17.     Allergies    Levaquin (Diarrhea)    Intolerances    Dilaudid (Other)      HOME MEDICATIONS:    REVIEW OF SYSTEMS:  REVIEW OF SYSTEMS:    CONSTITUTIONAL: Positive for generalized weakness   EYES/ENT: No visual changes;  No vertigo or throat pain   NECK: No pain or stiffness  RESPIRATORY: No cough, wheezing, hemoptysis; Positive for shortness of breath  CARDIOVASCULAR: No chest pain or palpitations  GASTROINTESTINAL: No abdominal or epigastric pain. No nausea, vomiting, or hematemesis; No diarrhea or constipation. No melena or hematochezia.  GENITOURINARY: No dysuria, frequency or hematuria  NEUROLOGICAL: No numbness or weakness  SKIN: No itching, rashes      OBJECTIVE:  ICU Vital Signs Last 24 Hrs  T(C): 36.1 (09 Aug 2018 21:30), Max: 36.9 (08 Aug 2018 21:58)  T(F): 97 (09 Aug 2018 21:30), Max: 98.5 (08 Aug 2018 21:58)  HR: 120 (09 Aug 2018 21:30) (110 - 120)  BP: 115/73 (09 Aug 2018 21:30) (110/69 - 121/79)  BP(mean): --  ABP: --  ABP(mean): --  RR: 20 (09 Aug 2018 21:30) (20 - 22)  SpO2: 95% (09 Aug 2018 21:30) (95% - 98%)        CAPILLARY BLOOD GLUCOSE          PHYSICAL EXAM:  General: WN/WD NAD  Neurology: A&Ox3, nonfocal, MARTINEZ x 4  Eyes: PERRLA/ EOMI, Gross vision intact  ENT/Neck: Neck supple, trachea midline, No JVD, Gross hearing intact  Respiratory: Diminished breath sounds on L lung  CV: rapid rate, regular rhythm, no m/r/g  Abdominal: Soft, NT, ND +BS,   Extremities: No edema, + peripheral pulses  Skin: No Rashes, Hematoma, Ecchymosis      HOSPITAL MEDICATIONS:  MEDICATIONS  (STANDING):  anastrozole 1 milliGRAM(s) Oral daily  aspirin enteric coated 81 milliGRAM(s) Oral daily  DULoxetine 30 milliGRAM(s) Oral daily  enoxaparin Injectable 40 milliGRAM(s) SubCutaneous every 24 hours  ferrous    sulfate 325 milliGRAM(s) Oral daily  lactobacillus acidophilus 1 Tablet(s) Oral two times a day with meals  losartan 50 milliGRAM(s) Oral daily  pantoprazole    Tablet 40 milliGRAM(s) Oral before breakfast  simvastatin 20 milliGRAM(s) Oral at bedtime  sodium chloride 0.9%. 1000 milliLiter(s) (75 mL/Hr) IV Continuous <Continuous>    MEDICATIONS  (PRN):  acetaminophen   Tablet. 650 milliGRAM(s) Oral every 6 hours PRN Mild Pain (1 - 3)  ondansetron Injectable 4 milliGRAM(s) IV Push every 6 hours PRN Nausea      LABS:                        10.2   9.22  )-----------( 306      ( 09 Aug 2018 08:29 )             32.3     08-09    134<L>  |  97  |  24<H>  ----------------------------<  91  4.0   |  21<L>  |  0.92    Ca    9.1      09 Aug 2018 06:50    TPro  7.2  /  Alb  3.3  /  TBili  0.3  /  DBili  x   /  AST  21  /  ALT  16  /  AlkPhos  104  08-08    PT/INR - ( 08 Aug 2018 16:35 )   PT: 13.3 sec;   INR: 1.22 ratio         PTT - ( 08 Aug 2018 16:35 )  PTT:27.9 sec  Urinalysis Basic - ( 09 Aug 2018 17:36 )    Color: x / Appearance: x / SG: x / pH: x  Gluc: x / Ketone: x  / Bili: x / Urobili: x   Blood: x / Protein: x / Nitrite: x   Leuk Esterase: x / RBC: 0-2 /HPF / WBC 11-25 /HPF   Sq Epi: x / Non Sq Epi: OCC /HPF / Bacteria: x        Venous Blood Gas:  08-08 @ 18:13  7.40/39/42/24/70  VBG Lactate: --      MICROBIOLOGY:     RADIOLOGY:  [ ] Reviewed and interpreted by me    EKG: CHIEF COMPLAINT:    HPI:  84 y.o. F with PMH of HTN, R breast CA (diagnosed in 2015) s/p lumpectomy, currently on treatment with Arimidex and recent diagnosis of lung adenocarcinoma who presented to ED with worsening dyspnea and SOB. Patient recently developed cough, SOB and hoarseness. She had CT chest by her pulmonologist that showed a large left hilar mass, RUL mass and adrenal gland metastasis. She underwent bronch that confirmed adenocarcinoma of the lung. She then presented with worsening L pleural effusion with complete atelectasis of L lung and had thoracentesis that was consistent with malignant effusion. Pt presented to hospital due to worsening dyspnea  as well as generalized weakness and poor PO intake. Oncology and pulmonology have been following; pt was scheduled to undergo thoracentesis and possible pleurex tomorrow.     This afternoon, patient was noted to be dyspneic with even minimal exertion. TTE was performed earlier this afternoon; PA received a call from cardiology to notify her that the study showed RV collapse consistent with tamponade physiology. IR had already been consulted for pericardiocentesis. MICU was called for TTE consistent with tamponade.     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 She smoked 1PPD since age 17.     Allergies  Levaquin (Diarrhea)    Intolerances  Dilaudid (Other)      REVIEW OF SYSTEMS:  CONSTITUTIONAL: Positive for generalized weakness   EYES/ENT: No visual changes;  No vertigo or throat pain   NECK: No pain or stiffness  RESPIRATORY: No cough, wheezing, hemoptysis; Positive for shortness of breath  CARDIOVASCULAR: No chest pain or palpitations  GASTROINTESTINAL: No abdominal or epigastric pain. No nausea, vomiting, or hematemesis; No diarrhea or constipation. No melena or hematochezia.  GENITOURINARY: No dysuria, frequency or hematuria  NEUROLOGICAL: No numbness or weakness  SKIN: No itching, rashes      OBJECTIVE:  ICU Vital Signs Last 24 Hrs  T(C): 36.1 (09 Aug 2018 21:30), Max: 36.9 (08 Aug 2018 21:58)  T(F): 97 (09 Aug 2018 21:30), Max: 98.5 (08 Aug 2018 21:58)  HR: 120 (09 Aug 2018 21:30) (110 - 120)  BP: 115/73 (09 Aug 2018 21:30) (110/69 - 121/79)  RR: 20 (09 Aug 2018 21:30) (20 - 22)  SpO2: 95% (09 Aug 2018 21:30) (95% - 98%)      PHYSICAL EXAM:  General: WN/WD NAD  Neurology: A&Ox3, nonfocal, MARTINEZ x 4  Eyes: PERRLA/ EOMI, Gross vision intact  ENT/Neck: Neck supple, trachea midline, No JVD, Gross hearing intact  Respiratory: Diminished breath sounds on L lung  CV: rapid rate, regular rhythm, no m/r/g  Abdominal: Soft, NT, ND +BS,   Extremities: No edema, + peripheral pulses  Skin: No Rashes, Hematoma, Ecchymosis      HOSPITAL MEDICATIONS:  MEDICATIONS  (STANDING):  anastrozole 1 milliGRAM(s) Oral daily  aspirin enteric coated 81 milliGRAM(s) Oral daily  DULoxetine 30 milliGRAM(s) Oral daily  enoxaparin Injectable 40 milliGRAM(s) SubCutaneous every 24 hours  ferrous    sulfate 325 milliGRAM(s) Oral daily  lactobacillus acidophilus 1 Tablet(s) Oral two times a day with meals  losartan 50 milliGRAM(s) Oral daily  pantoprazole    Tablet 40 milliGRAM(s) Oral before breakfast  simvastatin 20 milliGRAM(s) Oral at bedtime  sodium chloride 0.9%. 1000 milliLiter(s) (75 mL/Hr) IV Continuous <Continuous>    MEDICATIONS  (PRN):  acetaminophen   Tablet. 650 milliGRAM(s) Oral every 6 hours PRN Mild Pain (1 - 3)  ondansetron Injectable 4 milliGRAM(s) IV Push every 6 hours PRN Nausea      LABS:                        10.2   9.22  )-----------( 306      ( 09 Aug 2018 08:29 )             32.3     08-09    134<L>  |  97  |  24<H>  ----------------------------<  91  4.0   |  21<L>  |  0.92    Ca    9.1      09 Aug 2018 06:50    TPro  7.2  /  Alb  3.3  /  TBili  0.3  /  DBili  x   /  AST  21  /  ALT  16  /  AlkPhos  104  08-08    PT/INR - ( 08 Aug 2018 16:35 )   PT: 13.3 sec;   INR: 1.22 ratio         PTT - ( 08 Aug 2018 16:35 )  PTT:27.9 sec  Urinalysis Basic - ( 09 Aug 2018 17:36 )    Color: x / Appearance: x / SG: x / pH: x  Gluc: x / Ketone: x  / Bili: x / Urobili: x   Blood: x / Protein: x / Nitrite: x   Leuk Esterase: x / RBC: 0-2 /HPF / WBC 11-25 /HPF   Sq Epi: x / Non Sq Epi: OCC /HPF / Bacteria: x        Venous Blood Gas:  08-08 @ 18:13  7.40/39/42/24/70  VBG Lactate: --      RADIOLOGY:  [x ] Reviewed and interpreted by me  < from: CT Angio Chest w/ IV Cont (08.08.18 @ 18:25) >  IMPRESSION:  No evidence of pulmonary embolism.    There is a 2.2 x 2.6 x 3.5 cm spiculated mass in the right upper lobe,   concerning for malignancy.     There is narrowing of the left upper lobar pulmonary artery with   associated hypoperfusion of the left upper lobe, suggestive of mass   encasing the left upper lobe pulmonary artery. The exact size is   difficult to determine on this examination. Soft tissue  surrounds the   inferior trachea and bilateral main bronchi, which may represent   conglomerate lymphadenopathy. Right hilar and paratracheal   lymphadenopathy.    Moderate pericardial effusion. Large left pleural effusion.    Indeterminate 1.5 cm left adrenal nodule.    < end of copied text >

## 2018-08-09 NOTE — DISCHARGE NOTE ADULT - PATIENT PORTAL LINK FT
You can access the [facility] Portal, offered by [facility], by registering with the following website: [email or web address]

## 2018-08-09 NOTE — DISCHARGE NOTE ADULT - CARE PROVIDERS DIRECT ADDRESSES
[email or web address]

## 2018-08-09 NOTE — PROGRESS NOTE ADULT - SUBJECTIVE AND OBJECTIVE BOX
Seen and examined.  Received notification from primary team and cardiology about large pericardial effusion with tamponade   VITAL SIGNS      Vital Signs Last 24 Hrs  T(C): 36.1 (08-09-18 @ 21:30), Max: 36.9 (08-08-18 @ 21:58)  T(F): 97 (08-09-18 @ 21:30), Max: 98.5 (08-08-18 @ 21:58)  HR: 120 (08-09-18 @ 21:30) (110 - 120)  BP: 115/73 (08-09-18 @ 21:30) (110/69 - 121/79)  RR: 20 (08-09-18 @ 21:30) (20 - 22)  SpO2: 95% (08-09-18 @ 21:30) (95% - 98%)             Daily     Daily   Admit Wt: Drug Dosing Weight  Height (cm): 165.1 (08 Aug 2018 15:40)  Weight (kg): 56.7 (08 Aug 2018 15:40)  BMI (kg/m2): 20.8 (08 Aug 2018 15:40)  BSA (m2): 1.62 (08 Aug 2018 15:40)      CAPILLARY BLOOD GLUCOSE              MEDICATIONS  acetaminophen   Tablet. 650 milliGRAM(s) Oral every 6 hours PRN  anastrozole 1 milliGRAM(s) Oral daily  aspirin enteric coated 81 milliGRAM(s) Oral daily  DULoxetine 30 milliGRAM(s) Oral daily  enoxaparin Injectable 40 milliGRAM(s) SubCutaneous every 24 hours  ferrous    sulfate 325 milliGRAM(s) Oral daily  lactobacillus acidophilus 1 Tablet(s) Oral two times a day with meals  losartan 50 milliGRAM(s) Oral daily  ondansetron Injectable 4 milliGRAM(s) IV Push every 6 hours PRN  pantoprazole    Tablet 40 milliGRAM(s) Oral before breakfast  simvastatin 20 milliGRAM(s) Oral at bedtime  sodium chloride 0.9%. 1000 milliLiter(s) IV Continuous <Continuous>      PHYSICAL EXAM    Subjective: c/o SOB  Neurology: alert and oriented x 3, nonfocal, no gross deficits  CV : s1s1 reg no MRGS  Lungs:   labored breathing   Drains: none   Abdomen: soft, nontender, nondistended, positive bowel sounds,  :    voids  Extremities:    trace  blle edema,  +distal pulses present  b/l , no calf tenderness b/l , warm and perfused b/l        LABS  08-09    134<L>  |  97  |  24<H>  ----------------------------<  91  4.0   |  21<L>  |  0.92    Ca    9.1      09 Aug 2018 06:50    TPro  7.2  /  Alb  3.3  /  TBili  0.3  /  DBili  x   /  AST  21  /  ALT  16  /  AlkPhos  104  08-08                                 10.2   9.22  )-----------( 306      ( 09 Aug 2018 08:29 )             32.3          PT/INR - ( 08 Aug 2018 16:35 )   PT: 13.3 sec;   INR: 1.22 ratio         PTT - ( 08 Aug 2018 16:35 )  PTT:27.9 sec         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  S/P hip replacement, right: 6/2016  - right hip  History of lumpectomy of right breast: July 2015  History of cataract surgery, left: and right 2016  Elective surgery: reversal of colostomy July 2015  History of tonsillectomy  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    There is a 2.2 x 2.6 x 3.5 cm spiculated mass in the right upper lobe,   concerning for malignancy.     There is narrowing of the left upper lobar pulmonary artery with   associated hypoperfusion of the left upper lobe, suggestive of mass   encasing the left upper lobe pulmonary artery. The exact size is   difficult to determine on this examination. Soft tissue  surrounds the   inferior trachea and bilateral main bronchi, which may represent   conglomerate lymphadenopathy. Right hilar and paratracheal   lymphadenopathy.    Moderate pericardial effusion. Large left pleural effusion.    Indeterminate 1.5 cm left adrenal nodule.    < end of copied text >      < from: Transthoracic Echocardiogram (08.09.18 @ 17:44) >  Conclusions:  1. Calcified aortic valve with decreased opening.  2. Normal left ventricular systolic function. No segmental  wall motion abnormalities.  3. Normal right atrium. There is diastolic right atrial  collapse.  4. Normal right ventricular size and function. There is  diastolic RV collapse.  5. Moderate to large circumferential pericardial effusion.  There is RA and RV diastolic collapse with IVC dialation.  There is clear tamponade physiology.  6. Left pleural effusion.  ***Called and discussedresults with nurse Reynolds and MARITO Cruz regarding critical results of large pericardial  effusiopn with tamponade.    < end of copied text >

## 2018-08-09 NOTE — CHART NOTE - NSCHARTNOTEFT_GEN_A_CORE
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 Found to have large L pleural effusion causing complete atelectasis of L lung. She had 1L thoracentesis 1 week ago as outpt. Plan for thoracentesis tomorrow an Pleurx cath on Monday      Moderate pericardial effusion seen on CTA chest. Check cardiac enzymes x2  TTE pending (Called ECHO to expedite), I suggested tele monitoring d/w Dr. Crook and she will call cardiology for recommendations. Continue to closely monitor. Pt stable at this time and denies CP, SOB, palpitations or dizziness      T(C): 36.7 (09 Aug 2018 13:09), Max: 36.9 (08 Aug 2018 21:58)  T(F): 98 (09 Aug 2018 13:09), Max: 98.5 (08 Aug 2018 21:58)  HR: 118 (09 Aug 2018 13:09) (110 - 119)  BP: 110/69 (09 Aug 2018 13:09) (110/69 - 143/86)  RR: 22 (09 Aug 2018 13:09) (20 - 22)  SpO2: 95% (09 Aug 2018 13:09) (95% - 98%)      Estella Chaney NP  [id number]

## 2018-08-09 NOTE — DISCHARGE NOTE ADULT - HOSPITAL COURSE
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 Found to have large L pleural effusion causing complete atelectasis of L lung. She had 1L thoracentesis 1 week ago as output. CT scan revealed mod pericardial effusion and large pleural effusion pt remained persistently tachycardic and tachypneic 2d echo revealed increased pericardial effusion read as large with  tamponade/RV  collapse. Thoracic surgery consulted. S/P pericardial drain with resolution of effusion which was removed. s/p left Vats decortication and pleurex cath placement 8/15.Pleurex cath capped today 8/18. To be drained every Mon/wed/Fri up to 1 L. You are cleared for discharge with close follow up with oncologist and PCP.

## 2018-08-09 NOTE — PROGRESS NOTE ADULT - ASSESSMENT
85 yo F with h/o OA, osteoporosis, RBBB, hyperlipidemia, early stage breast cancer and now with advanced lung cancer    1. Lung cancer / SOB  - CTA negative for PE, shows combination of pleural effusion and large left hilar mass caasing compression of left upper lobe  - outside molecular studies still pending from pleural fluid cytology at [facility] and peripheral blood  - was scheduled for staging PETCT and MRI brain as outpt. Will order MRI here when stable  - pulmonary follow up appreciated, possible pigtail catheter / pleurx catheter today, but after review of imaging with radiology it does not appear to be the primary issue but rather the extrinsic compression. CT surgery to be consulted  - will d/w Dr. Sanchez, but would consider radiation oncology consult    2. Breast cancer - early stage ER/AK+ Her2- s/p lumpectomy on arimidex  - cont arimidex    3. Dysphagia -  - possibly related to compression or due to the SOB - better today  - consider thickened liquids for diet  - consider speech and swallow eval

## 2018-08-09 NOTE — DISCHARGE NOTE ADULT - CARE PROVIDER_API CALL
Brandon Avila), Thoracic Surgery  [id number] [place] [address]  [place] [address]  Phone: [phone]  Fax: [phone]    Jose Sanchez), Internal Medicine; Medical Oncology  [address]  [place] [address]  Phone: [phone]  Fax: [phone]

## 2018-08-09 NOTE — CHART NOTE - NSCHARTNOTEFT_GEN_A_CORE
Notified by Dr. Mccann about Echo results. Calcified aortic valve with decreased opening.  2. Normal left ventricular systolic function. No segmental  wall motion abnormalities.  3. Normal right atrium. There is diastolic right atrial  collapse.  4. Normal right ventricular size and function. There is  diastolic RV collapse.  5. Moderate to large circumferential pericardial effusion.  There is RA and RV diastolic collapse with IVC dialation.  There is clear tamponade physiology.  6. Left pleural effusion    D/w Dr. Aldridge; recommended cardiac surgeon and house cardiology to be called. Cardiac NP and house cardiology consulted. Awaiting recommendations.     Cyndee LOZANO  #[id number] Notified by Dr. Mccann about Echo results. Calcified aortic valve with decreased opening.  2. Normal left ventricular systolic function. No segmental  wall motion abnormalities.  3. Normal right atrium. There is diastolic right atrial  collapse.  4. Normal right ventricular size and function. There is  diastolic RV collapse.  5. Moderate to large circumferential pericardial effusion.  There is RA and RV diastolic collapse with IVC dialation.  There is clear tamponade physiology.  6. Left pleural effusion    D/w Dr. Aldridge; recommended cardiac surgeon and house cardiology to be called. Cardiac NP and house cardiology consulted. Awaiting recommendations. Pt, family and RN aware of POC.     Cyndee LOZANO  #[id number] Notified by Dr. Mccann about Echo results. Calcified aortic valve with decreased opening.  2. Normal left ventricular systolic function. No segmental  wall motion abnormalities.  3. Normal right atrium. There is diastolic right atrial  collapse.  4. Normal right ventricular size and function. There is  diastolic RV collapse.  5. Moderate to large circumferential pericardial effusion.  There is RA and RV diastolic collapse with IVC dialation.  There is clear tamponade physiology.  6. Left pleural effusion    D/w Dr. Aldridge; recommended cardiac surgeon and house cardiology to be called. Cardiac NP and house cardiology consulted. Awaiting recommendations. Pt, family and RN aware of POC.     Cyndee LOZANO  #[id number]      ADDENDUM  Pt seen by cardiac NP and cardiology fellow. IR procedure to be done; IR aware. MICU consulted as per cardiology recommendations; not a MICU candidate at this time. Pt to be transferred to telemetry. Will continue to monitor pt.     Cyndee LOZANO  #[id number] Notified by Dr. Mccann about Echo results. Calcified aortic valve with decreased opening.  2. Normal left ventricular systolic function. No segmental  wall motion abnormalities.  3. Normal right atrium. There is diastolic right atrial  collapse.  4. Normal right ventricular size and function. There is  diastolic RV collapse.  5. Moderate to large circumferential pericardial effusion.  There is RA and RV diastolic collapse with IVC dialation.  There is clear tamponade physiology.  6. Left pleural effusion    D/w Dr. Aldridge; recommended cardiac surgeon and house cardiology to be called. Cardiac NP and house cardiology consulted. Awaiting recommendations. Pt, family and RN aware of POC.     Cyndee LOZANO  #[id number]      ADDENDUM  Pt seen by cardiac NP and cardiology fellow. IR procedure to be done in AM; IR aware. MICU consulted as per cardiology recommendations; not a MICU candidate at this time. Pt is hemodynamically stable and is to be transferred to telemetry. Will continue to monitor pt. D/w Dr. Aldridge, RN, pt and family aware of the POC.    Cyndee LOZANO  #[id number]

## 2018-08-09 NOTE — DISCHARGE NOTE ADULT - HOME CARE AGENCY
visiting nurse [facility]  they will call to arrange visit Visiting Nurse [facility]   A visiting nurse will call to schedule an appointment to visit you in your home once insurance is verified.  Physical therapy and home health aide services are also requested.

## 2018-08-09 NOTE — DISCHARGE NOTE ADULT - MEDICATION SUMMARY - MEDICATIONS TO TAKE
I will START or STAY ON the medications listed below when I get home from the hospital:    aspirin 81 mg oral delayed release tablet  -- 1 tab(s) by mouth once a day  -- Indication: For Prophylaxis     acetaminophen 325 mg oral tablet  -- 2 tab(s) by mouth every 6 hours, As needed, Mild Pain (1 - 3)  -- Indication: For Pain     apixaban 2.5 mg oral tablet  -- 1 tab(s) by mouth every 12 hours  -- Indication: For Afib    DULoxetine 30 mg oral delayed release capsule  -- 1 cap(s) by mouth once a day  -- Indication: For Depression     simvastatin 20 mg oral tablet  -- 1 tab(s) by mouth once a day (at bedtime)  -- Indication: For Hyperlipidemia     anastrozole 1 mg oral tablet  -- 1 tab(s) by mouth once a day  -- Indication: For Lung cancer    metoprolol tartrate 25 mg oral tablet  -- 1 tab(s) by mouth 2 times a day  -- Indication: For Afib    ferrous sulfate 325 mg (65 mg elemental iron) oral tablet  -- 1 tab(s) by mouth 3 times a day  -- Indication: For iron     lactobacillus acidophilus oral capsule  -- 1 cap(s) by mouth once a day  -- Indication: For Prbiotic     omeprazole 40 mg oral delayed release capsule  -- 1 cap(s) by mouth once a day  -- Indication: For Gerd I will START or STAY ON the medications listed below when I get home from the hospital:    aspirin 81 mg oral delayed release tablet  -- 1 tab(s) by mouth once a day  -- Indication: For Prophylaxis     acetaminophen 325 mg oral tablet  -- 2 tab(s) by mouth every 6 hours, As needed, Mild Pain (1 - 3)  -- Indication: For Pain     apixaban 2.5 mg oral tablet  -- 1 tab(s) by mouth every 12 hours  -- Indication: For Afib    DULoxetine 30 mg oral delayed release capsule  -- 1 cap(s) by mouth once a day  -- Indication: For Depression     simvastatin 20 mg oral tablet  -- 1 tab(s) by mouth once a day (at bedtime)  -- Indication: For Hyperlipidemia     anastrozole 1 mg oral tablet  -- 1 tab(s) by mouth once a day  -- Indication: For Lung cancer    metoprolol tartrate 25 mg oral tablet  -- 1 tab(s) by mouth 2 times a day  -- Indication: For Afib    ferrous sulfate 325 mg (65 mg elemental iron) oral tablet  -- 1 tab(s) by mouth once a day  -- Indication: For Anemia     lactobacillus acidophilus oral capsule  -- 1 cap(s) by mouth once a day  -- Indication: For Prbiotic     omeprazole 40 mg oral delayed release capsule  -- 1 cap(s) by mouth once a day  -- Indication: For Gerd

## 2018-08-10 ENCOUNTER — RESULT REVIEW (OUTPATIENT)
Age: 83
End: 2018-08-10

## 2018-08-10 DIAGNOSIS — I31.4 CARDIAC TAMPONADE: ICD-10-CM

## 2018-08-10 LAB
ALBUMIN FLD-MCNC: <0.3 G/DL — SIGNIFICANT CHANGE UP
ANION GAP SERPL CALC-SCNC: 17 MMOL/L — SIGNIFICANT CHANGE UP (ref 5–17)
B PERT IGG+IGM PNL SER: ABNORMAL
BASOPHILS # BLD AUTO: 0.03 K/UL — SIGNIFICANT CHANGE UP (ref 0–0.2)
BASOPHILS NFR BLD AUTO: 0.3 % — SIGNIFICANT CHANGE UP (ref 0–2)
BUN SERPL-MCNC: 26 MG/DL — HIGH (ref 7–23)
CALCIUM SERPL-MCNC: 9.5 MG/DL — SIGNIFICANT CHANGE UP (ref 8.4–10.5)
CHLORIDE SERPL-SCNC: 101 MMOL/L — SIGNIFICANT CHANGE UP (ref 96–108)
CO2 SERPL-SCNC: 19 MMOL/L — LOW (ref 22–31)
COLOR FLD: SIGNIFICANT CHANGE UP
CREAT SERPL-MCNC: 1.04 MG/DL — SIGNIFICANT CHANGE UP (ref 0.5–1.3)
EOSINOPHIL # BLD AUTO: 0.46 K/UL — SIGNIFICANT CHANGE UP (ref 0–0.5)
EOSINOPHIL NFR BLD AUTO: 4.4 % — SIGNIFICANT CHANGE UP (ref 0–6)
FLUID INTAKE SUBSTANCE CLASS: SIGNIFICANT CHANGE UP
FLUID SEGMENTED GRANULOCYTES: 13 % — SIGNIFICANT CHANGE UP
GLUCOSE FLD-MCNC: <6 MG/DL — SIGNIFICANT CHANGE UP
GLUCOSE SERPL-MCNC: 105 MG/DL — HIGH (ref 70–99)
GRAM STN FLD: SIGNIFICANT CHANGE UP
HCT VFR BLD CALC: 35.3 % — SIGNIFICANT CHANGE UP (ref 34.5–45)
HGB BLD-MCNC: 11.4 G/DL — LOW (ref 11.5–15.5)
IMM GRANULOCYTES NFR BLD AUTO: 0.4 % — SIGNIFICANT CHANGE UP (ref 0–1.5)
LDH SERPL L TO P-CCNC: 893 U/L — SIGNIFICANT CHANGE UP
LYMPHOCYTES # BLD AUTO: 0.67 K/UL — LOW (ref 1–3.3)
LYMPHOCYTES # BLD AUTO: 6.4 % — LOW (ref 13–44)
LYMPHOCYTES # FLD: 10 % — SIGNIFICANT CHANGE UP
MCHC RBC-ENTMCNC: 29.8 PG — SIGNIFICANT CHANGE UP (ref 27–34)
MCHC RBC-ENTMCNC: 32.3 GM/DL — SIGNIFICANT CHANGE UP (ref 32–36)
MCV RBC AUTO: 92.4 FL — SIGNIFICANT CHANGE UP (ref 80–100)
MONOCYTES # BLD AUTO: 1.09 K/UL — HIGH (ref 0–0.9)
MONOCYTES NFR BLD AUTO: 10.5 % — SIGNIFICANT CHANGE UP (ref 2–14)
MONOS+MACROS # FLD: 6 % — SIGNIFICANT CHANGE UP
NEUTROPHILS # BLD AUTO: 8.13 K/UL — HIGH (ref 1.8–7.4)
NEUTROPHILS NFR BLD AUTO: 78 % — HIGH (ref 43–77)
NIGHT BLUE STAIN TISS: SIGNIFICANT CHANGE UP
OTHER CELLS FLD MANUAL: 71 % — SIGNIFICANT CHANGE UP
PH FLD: 7.34 — SIGNIFICANT CHANGE UP
PLATELET # BLD AUTO: 305 K/UL — SIGNIFICANT CHANGE UP (ref 150–400)
POTASSIUM SERPL-MCNC: 4.9 MMOL/L — SIGNIFICANT CHANGE UP (ref 3.5–5.3)
POTASSIUM SERPL-SCNC: 4.9 MMOL/L — SIGNIFICANT CHANGE UP (ref 3.5–5.3)
PROT FLD-MCNC: <0.6 G/DL — SIGNIFICANT CHANGE UP
RBC # BLD: 3.82 M/UL — SIGNIFICANT CHANGE UP (ref 3.8–5.2)
RBC # FLD: 16.1 % — HIGH (ref 10.3–14.5)
RCV VOL RI: HIGH /UL (ref 0–5)
SODIUM SERPL-SCNC: 137 MMOL/L — SIGNIFICANT CHANGE UP (ref 135–145)
SPECIMEN SOURCE: SIGNIFICANT CHANGE UP
SPECIMEN SOURCE: SIGNIFICANT CHANGE UP
TOTAL NUCLEATED CELL COUNT, BODY FLUID: 2000 /UL — HIGH (ref 0–5)
TUBE TYPE: SIGNIFICANT CHANGE UP
WBC # BLD: 10.42 K/UL — SIGNIFICANT CHANGE UP (ref 3.8–10.5)
WBC # FLD AUTO: 10.42 K/UL — SIGNIFICANT CHANGE UP (ref 3.8–10.5)

## 2018-08-10 PROCEDURE — 88341 IMHCHEM/IMCYTCHM EA ADD ANTB: CPT | Mod: 26

## 2018-08-10 PROCEDURE — 33015: CPT

## 2018-08-10 PROCEDURE — 88112 CYTOPATH CELL ENHANCE TECH: CPT | Mod: 26

## 2018-08-10 PROCEDURE — 71045 X-RAY EXAM CHEST 1 VIEW: CPT | Mod: 26,77

## 2018-08-10 PROCEDURE — 88305 TISSUE EXAM BY PATHOLOGIST: CPT | Mod: 26

## 2018-08-10 PROCEDURE — 75989 ABSCESS DRAINAGE UNDER X-RAY: CPT | Mod: 26

## 2018-08-10 PROCEDURE — 88342 IMHCHEM/IMCYTCHM 1ST ANTB: CPT | Mod: 26

## 2018-08-10 PROCEDURE — 71045 X-RAY EXAM CHEST 1 VIEW: CPT | Mod: 26

## 2018-08-10 PROCEDURE — 99223 1ST HOSP IP/OBS HIGH 75: CPT | Mod: GC

## 2018-08-10 RX ORDER — OXYCODONE AND ACETAMINOPHEN 5; 325 MG/1; MG/1
1 TABLET ORAL EVERY 4 HOURS
Qty: 0 | Refills: 0 | Status: DISCONTINUED | OUTPATIENT
Start: 2018-08-10 | End: 2018-08-15

## 2018-08-10 RX ADMIN — OXYCODONE AND ACETAMINOPHEN 1 TABLET(S): 5; 325 TABLET ORAL at 13:26

## 2018-08-10 RX ADMIN — Medication 325 MILLIGRAM(S): at 11:28

## 2018-08-10 RX ADMIN — Medication 650 MILLIGRAM(S): at 06:38

## 2018-08-10 RX ADMIN — Medication 81 MILLIGRAM(S): at 11:28

## 2018-08-10 RX ADMIN — Medication 650 MILLIGRAM(S): at 11:28

## 2018-08-10 RX ADMIN — PANTOPRAZOLE SODIUM 40 MILLIGRAM(S): 20 TABLET, DELAYED RELEASE ORAL at 06:08

## 2018-08-10 RX ADMIN — SIMVASTATIN 20 MILLIGRAM(S): 20 TABLET, FILM COATED ORAL at 21:10

## 2018-08-10 RX ADMIN — LOSARTAN POTASSIUM 50 MILLIGRAM(S): 100 TABLET, FILM COATED ORAL at 06:08

## 2018-08-10 RX ADMIN — DULOXETINE HYDROCHLORIDE 30 MILLIGRAM(S): 30 CAPSULE, DELAYED RELEASE ORAL at 11:28

## 2018-08-10 RX ADMIN — Medication 650 MILLIGRAM(S): at 11:58

## 2018-08-10 RX ADMIN — OXYCODONE AND ACETAMINOPHEN 1 TABLET(S): 5; 325 TABLET ORAL at 12:56

## 2018-08-10 RX ADMIN — ANASTROZOLE 1 MILLIGRAM(S): 1 TABLET ORAL at 11:29

## 2018-08-10 RX ADMIN — OXYCODONE AND ACETAMINOPHEN 1 TABLET(S): 5; 325 TABLET ORAL at 21:12

## 2018-08-10 RX ADMIN — OXYCODONE AND ACETAMINOPHEN 1 TABLET(S): 5; 325 TABLET ORAL at 21:42

## 2018-08-10 RX ADMIN — Medication 1 TABLET(S): at 17:38

## 2018-08-10 RX ADMIN — Medication 650 MILLIGRAM(S): at 06:08

## 2018-08-10 NOTE — SWALLOW BEDSIDE ASSESSMENT ADULT - SWALLOW EVAL: DIAGNOSIS
Consult order received and chart reviewed.  Pt is currently NPO for a procedure.  In addition, MD note reports that Pt accepts aspiration risk. Consult order received and chart reviewed.  Per MD note, Pt assumes aspiration risk.  Therefore per SLP d/w Pt and son at b/s, the Pt does not wish to pursue a swallowing evaluation at this time.  Pt and son were counselled re: aspiration s/s, risks and precautions.  Pt states she would not want to have thickened liquids or food cut up/pureed as she would prefer to choose her own foods and will cut them into small pieces as needed.  Pt also reports that she keeps her food moist and if it is too dry she will expel bolus from mouth as she prefers not to swallow it.  Pt denies s/s of aspiration when eating/drinking.  Of note, Pt was taking sips of tea during conversation with no s/s of aspiration.  Pt also states that pills are occasionally difficult to swallow therefore suggest pills are crushed as medically feasible.  Hypophonic speech noted; consider ENT consult if Pt consents. Consult order received and chart reviewed.  Per MD note, Pt assumes aspiration risk.  Therefore per SLP d/w Pt and son at b/s, the Pt does not wish to pursue a swallowing evaluation at this time.  Pt and son were counselled re: aspiration s/s, risks and precautions.  Pt states she would not want to have thickened liquids or food cut up/pureed as she would prefer to choose her own foods and will cut them into small pieces as needed.  Pt also reports that she keeps her food moist and if it is too dry she will expel bolus from mouth as she prefers not to swallow it.  Pt denies s/s of aspiration when eating/drinking.  Of note, Pt was taking sips of tea during conversation with no s/s of aspiration.  Pt also states that pills are occasionally difficult to swallow therefore suggest pills are crushed as medically feasible.  Hypophonic speech noted; consider ENT consult if Pt consents.  Request MD to reconsult this department should a change in status occur. room air

## 2018-08-10 NOTE — CONSULT NOTE ADULT - ATTENDING COMMENTS
Patient seen following pericardiocentesis by interventional radiology.  85 yo F with recently diagnosed adenocarcinoma admitted with progressive shortness of breath status-post left chest tube placement with tamponade status-post urgent pericardiocentesis by interventional radiology. Fluid to be sent for cytology. Pericardial drain in place. Follow pericardial fluid output with plan to discontinue drain when pericardial fluid removed. Will follow. Patient seen following pericardiocentesis by interventional radiology. 660 cc of bloody fluid drained by IR.  85 yo F with recently diagnosed adenocarcinoma admitted with progressive shortness of breath status-post left chest tube placement with tamponade status-post urgent pericardiocentesis by interventional radiology. Fluid to be sent for cytology and chemical analysis. Pericardial drain in place. Follow pericardial fluid output with plan to discontinue drain when pericardial fluid removed. Follow-up TTE. Will follow.

## 2018-08-10 NOTE — DIETITIAN INITIAL EVALUATION ADULT. - ORAL INTAKE PTA
fair/Pt admits to fair po intake 2/2 anorexia as recent. But is aware to try to optimize her intake so has been eating the following while home: Breakfast: hard boiled egg, raisin toast with cream cheese and 1/2 cup coffee. Lunch: Tuna or egg salad sandwich on a roxana with cheese. Dinner: Chicken with vegetables and pasta. Pt tries to snack on yogurt drinks during the day to optimize protein intake daily. Pt drinks juices and water. Was taking MVI supplement PTA but had to stop 2/2 difficulty swallowing pills. Denies food allergies or intolerance.

## 2018-08-10 NOTE — SWALLOW BEDSIDE ASSESSMENT ADULT - SWALLOW EVAL: RECOMMENDED DIET
Dietary decision deferred to MD; suggest MD f/u to further establish GOC (re: nutrition/hydration) in this Pt.

## 2018-08-10 NOTE — PROGRESS NOTE ADULT - SUBJECTIVE AND OBJECTIVE BOX
Interventional Radiology Brief- Operative Note    Procedure: CT guided pericardial drainage    Operators: Elmira Sofia    Anesthesia (type): MAC    Contrast: none    EBL: none    Findings/Follow up Plan of Care: Limited unenhanced CT of the thorax demonstrated left lung consolidation, small left pneumothorax s/p chest tube, and moderate pericardial effusion. CT guided pericardia drainage performed with insertion of an 8 Fr drain. Appx 660cc dark red fluid aspirated. Drain placed to Pleur Evac. Pt tolerated procedure without difficulty. Full report to follow.    Specimens Removed: sample of fluid sent for microbiology, cyto, chem    Implants: drain    Complications: none    Condition/Disposition: stable / pacu    Please call Interventional Radiology x [phone] with any questions, concerns, or issues.

## 2018-08-10 NOTE — PROGRESS NOTE ADULT - ASSESSMENT
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 Found to have large L pleural effusion causing complete atelectasis of L lung. She had 1L thoracentesis 1 week ago as outpt.      Problem/Plan - 1:  ·  Problem: Pleural effusion.  Plan: s/p L pigtail by CTS, monitor output     Problem/Plan - 2:  ·  Problem: Adenocarcinoma of lung, unspecified laterality.  Plan: Apparent stage 4 disease. Drainage of Pleural effusion, Onc appreciated  MRI brain pending  RadOnc appreciated, plan for palliative radiation     Problem/Plan - 3:  ·  Problem: Dysphagia, unspecified type.  Plan: See above.  Will assume aspiration risk.   S/S.   Nutrition evaluation.      Problem/Plan - 4:  ·  Problem: DVT prophylaxis    Pericardial effusion w/tamponade - likely malignant, Cardio, CTS, MICU, IR appreciated, now s/p drainage

## 2018-08-10 NOTE — PROGRESS NOTE ADULT - SUBJECTIVE AND OBJECTIVE BOX
Chief Complaint: "I am weak".    History of Present Illness:    The patient overall is weak.  She has no chest pain.  No pleuritic chest pain.  She does have SOB with minimal exertion.  No fevers.  No diarrhea.  No constipation.  She has some nausea.  No vomiting.  No leg swelling.  No calf pain.  No obvious active bleeding.  Remainder ROS is stable.     MEDICATIONS  (STANDING):  anastrozole 1 milliGRAM(s) Oral daily  aspirin enteric coated 81 milliGRAM(s) Oral daily  DULoxetine 30 milliGRAM(s) Oral daily  enoxaparin Injectable 40 milliGRAM(s) SubCutaneous every 24 hours  ferrous    sulfate 325 milliGRAM(s) Oral daily  lactobacillus acidophilus 1 Tablet(s) Oral two times a day with meals  losartan 50 milliGRAM(s) Oral daily  pantoprazole    Tablet 40 milliGRAM(s) Oral before breakfast  simvastatin 20 milliGRAM(s) Oral at bedtime  sodium chloride 0.9%. 1000 milliLiter(s) (75 mL/Hr) IV Continuous <Continuous>    MEDICATIONS  (PRN):  acetaminophen   Tablet. 650 milliGRAM(s) Oral every 6 hours PRN Mild Pain (1 - 3)  ondansetron Injectable 4 milliGRAM(s) IV Push every 6 hours PRN Nausea      Allergies    Levaquin (Diarrhea)    Intolerances    Dilaudid (Other)      Vital Signs Last 24 Hrs  T(C): 36.4 (10 Aug 2018 04:44), Max: 36.7 (09 Aug 2018 13:09)  T(F): 97.6 (10 Aug 2018 04:44), Max: 98.1 (09 Aug 2018 19:52)  HR: 118 (10 Aug 2018 06:15) (103 - 120)  BP: 122/83 (10 Aug 2018 06:15) (105/73 - 122/83)  BP(mean): --  RR: 20 (10 Aug 2018 05:55) (20 - 22)  SpO2: 97% (10 Aug 2018 05:55) (95% - 98%)    PHYSICAL EXAM  General: weak  HEENT: clear oropharynx, anicteric sclera, pink conjunctiva  Neck: supple  CV: normal S1/S2   Lungs: decreases left base  Abdomen: soft non-tender non-distended, positive bowel sounds  Ext: no edema  Skin: no rashes and no petechiae  Lymph Nodes: No LAD in neck  Neuro: alert and oriented X 3, no focal deficits    LABS:                          10.2   9.22  )-----------( 306      ( 09 Aug 2018 08:29 )             32.3         Mean Cell Volume : 91.5 fl  Mean Cell Hemoglobin : 28.9 pg  Mean Cell Hemoglobin Concentration : 31.6 gm/dL  Auto Neutrophil # : 6.76 K/uL  Auto Lymphocyte # : 0.79 K/uL  Auto Monocyte # : 1.04 K/uL  Auto Eosinophil # : 0.58 K/uL  Auto Basophil # : 0.03 K/uL  Auto Neutrophil % : 73.3 %  Auto Lymphocyte % : 8.6 %  Auto Monocyte % : 11.3 %  Auto Eosinophil % : 6.3 %  Auto Basophil % : 0.3 %      Serial CBC's  08-09 @ 08:29  Hct-32.3 / Hgb-10.2 / Plat-306 / RBC-3.53 / WBC-9.22  Serial CBC's  08-08 @ 16:35  Hct-33.1 / Hgb-11.2 / Plat-324 / RBC-3.62 / WBC-9.5      08-10    137  |  101  |  26<H>  ----------------------------<  105<H>  4.9   |  19<L>  |  1.04    Ca    9.5      10 Aug 2018 06:16    TPro  7.2  /  Alb  3.3  /  TBili  0.3  /  DBili  x   /  AST  21  /  ALT  16  /  AlkPhos  104  08-08      PT/INR - ( 08 Aug 2018 16:35 )   PT: 13.3 sec;   INR: 1.22 ratio         PTT - ( 08 Aug 2018 16:35 )  PTT:27.9 sec

## 2018-08-10 NOTE — SWALLOW BEDSIDE ASSESSMENT ADULT - COMMENTS
8/10/18 per MD: Adenocarcinoma of lung; Drainage of Pleural effusion, MRI brain pending; RadOnc appreciated, plan for palliative radiation. 8/8/10 CT Chest: IMPRESSION:  No evidence of pulmonary embolism.  There is a 2.2 x 2.6 x 3.5 cm spiculated mass in the right upper lobe,   concerning for malignancy.  There is narrowing of the left upper lobar pulmonary artery with associated hypoperfusion of the left upper lobe, suggestive of mass encasing the left upper lobe pulmonary artery. The exact size is difficult to determine on this examination. Soft tissue  surrounds the inferior trachea and bilateral main bronchi, which may represent conglomerate lymphadenopathy. Right hilar and paratracheal lymphadenopathy.  Moderate pericardial effusion. Large left pleural effusion.  Indeterminate 1.5 cm left adrenal nodule.  8/10/18  IR note:  Limited unenhanced CT of the thorax demonstrated left lung consolidation, small left pneumothorax s/p chest tube, and moderate pericardial effusion. CT guided pericardia drainage performed with insertion of an 8 Fr drain. Appx 660cc dark red fluid aspirated. Drain placed to Pleur Evac. Pt tolerated procedure without difficulty. Full report to follow.  8/10/18 Per MD: Adenocarcinoma of lung; Drainage of Pleural effusion, MRI brain pending; RadOnc appreciated, plan for palliative radiation.

## 2018-08-10 NOTE — PHYSICAL THERAPY INITIAL EVALUATION ADULT - RANGE OF MOTION EXAMINATION, REHAB EVAL
bilateral upper extremity ROM was WFL (within functional limits)/bilateral lower extremity ROM was WFL (within functional limits) "family, Jewish"

## 2018-08-10 NOTE — DIETITIAN INITIAL EVALUATION ADULT. - OTHER INFO
Pt seen for consult: Pt seen for consult: nutrition assessment. Pt currently reports "no appetite" but fair po intake 2/2 attempting to eat as much as she can to optimize her nutrition status. Admits to difficulty swallowing both solids and liquids, states she needs to moisten her foods and sip liquids very slowly in order not to cough. Otherwise denies N+V, other GI distress at this time. Pt admtis also to weight loss ~15 pounds over the past year from decreased appetite and decreased ability to cook for herself. 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 Pt states she was consuming protein-rich foods at each meals (likes hard boiled eggs at breakfast/tuna or egg salad for lunch, chicken with dinner) and has been having a yogurt drink between meals to help further optimize intake. Pt does not use Ensure 2/2 high sugar content, but is amenable to try Prosource 2 x daily while in-house.

## 2018-08-10 NOTE — PHYSICAL THERAPY INITIAL EVALUATION ADULT - PERTINENT HX OF CURRENT PROBLEM, REHAB EVAL
84 y/oF admitted 8/8 with progressive exertional dyspnea, dry cough x2 wks. PMH R breast ca, GERD, iron deficiency anaemia, past THE, essential HTN with the patient S/P bronchoscopy and thoracentesis 2 weeks and one week ago respectively with apparent LEFT malignant effusion. As per heme-onc note 8/10, pt with advanced lung cancer- as per rad/onc note with RUL mass, L hilar mass, L malignant pleural effusion. Hosp course- tamponade overnight 8/9. (cont below)

## 2018-08-10 NOTE — PHYSICAL THERAPY INITIAL EVALUATION ADULT - ADDITIONAL COMMENTS
On 8/10 had L pigtail and pericardial drain placement in IR On 8/10 had L pigtail and pericardial drain placement in IR. Home situation/prior level of function- Pt lives alone in private house, 2 steps to enter, (+)rail. 1 flight to bedroom, (+)stair lift. Pt was independent ambulator with straight cane pta, drives. Has rolling walker at home

## 2018-08-10 NOTE — DIETITIAN INITIAL EVALUATION ADULT. - PHYSICAL APPEARANCE
Nutrition-focused physical exam conducted. Reveals moderate temporal muscle wasting, moderate-severe orbital fat pad loss.

## 2018-08-10 NOTE — PROGRESS NOTE ADULT - ASSESSMENT
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 Found to have large L pleural effusion causing complete atelectasis of L lung. She had 1L thoracentesis 1 week ago as output. CT scan revealed mod pericardial effusion and large pleural effusion pt remained persistently tachycardic and tachypneic 2d echo revealed increased pericardial effusion read as large with  tamponade/RV  collapse. Thoracic surgery consulted.   8/10 left pigtail by surgical resident 1L fluid out. Small PTX on CXR, pt asymptomatic, will continue to monitor. Percardiocentesis in  cc of dark red fluid removed.

## 2018-08-10 NOTE — CONSULT NOTE ADULT - SUBJECTIVE AND OBJECTIVE BOX
Patient seen and evaluated at bedside    Chief Complaint:    HPI:  Ms Kumari is an 85 yo F with a PMH significant for recent diagnosis of lung adenocarcinoma, R breast CA s/p lumpectomy, and HTN who presented to the ED on 8/8 with progressive SOB and was found to have left lung white out. There were plans made for thoracentesis today vs tomorrow. She felt slightly better with respect to her SOB today however remained tachycardic to 110s. TTE was ordered and revealed RA/RV diastolic collapse and IVC dilation consistent with tamponade physiology.    Upon my evaluation, patient was resting in bed with slight tachypnea. She could otherwise answer questions appropriately. Vitals with /79, P 118, O2 sat 96 on 4L nc. Family at bedside.    PMH:   Lung mass  Primary osteoarthritis of left hip  Glaucoma  History of breast cancer  Iron deficiency anemia  Retinal tear  Osteoarthritis  Glaucoma  Fistula of vagina  RBBB  Ptosis  Anxiety  Hyperlipidemia  Osteopenia  Hypertension  Ureterovaginal prolapse      PSH:   Status post left knee replacement  S/P hip replacement, right  History of lumpectomy of right breast  History of cataract surgery, left  Elective surgery  History of tonsillectomy  Recto-vaginal fistula  H/O eye surgery  History of varicose vein stripping  History of carpal tunnel release      Medications:   acetaminophen   Tablet. 650 milliGRAM(s) Oral every 6 hours PRN  anastrozole 1 milliGRAM(s) Oral daily  aspirin enteric coated 81 milliGRAM(s) Oral daily  DULoxetine 30 milliGRAM(s) Oral daily  enoxaparin Injectable 40 milliGRAM(s) SubCutaneous every 24 hours  ferrous    sulfate 325 milliGRAM(s) Oral daily  lactobacillus acidophilus 1 Tablet(s) Oral two times a day with meals  losartan 50 milliGRAM(s) Oral daily  ondansetron Injectable 4 milliGRAM(s) IV Push every 6 hours PRN  pantoprazole    Tablet 40 milliGRAM(s) Oral before breakfast  simvastatin 20 milliGRAM(s) Oral at bedtime  sodium chloride 0.9%. 1000 milliLiter(s) IV Continuous <Continuous>      Allergies:  Dilaudid (Other)  Levaquin (Diarrhea)      FAMILY HISTORY:  Diabetes mellitus (Father)      Social History:  Smoking History: ppd ~25 years, quit 30 years ago  Alcohol Use: denies  Drug Use: denies    Review of Systems:  CONSTITUTIONAL: No weakness, fevers or chills  EYES/ENT: No visual changes;  No dysphagia  NECK: No pain or stiffness  RESPIRATORY: + SOB, No cough, wheezing, hemoptysis  CARDIOVASCULAR: No chest pain or palpitations; No lower extremity edema  GASTROINTESTINAL: No abdominal or epigastric pain. No nausea, vomiting, or hematemesis; No diarrhea or constipation. No melena or hematochezia.  BACK: No back pain  GENITOURINARY: No dysuria, frequency or hematuria  NEUROLOGICAL: No numbness or weakness  SKIN: No itching, burning, rashes, or lesions   All other review of systems is negative unless indicated above.    Physical Exam:  T(F): 97.9 (08-10), Max: 98.1 (08-09)  HR: 110 (08-10) (110 - 120)  BP: 120/81 (08-10) (110/69 - 121/79)  RR: 20 (08-10)  SpO2: 98% (08-10)  GENERAL: mild resp distress, nasal cannula in place  HEAD:  Atraumatic, Normocephalic  ENT: EOMI, PERRLA, conjunctiva and sclera clear, Neck supple, No JVD, no HJR  CHEST/LUNG: decreased left sided breath sounds  HEART: tachycardic, regular, No murmurs, rubs, or gallops  ABDOMEN: Soft, Nontender, Nondistended; Bowel sounds present  EXTREMITIES:  No clubbing, cyanosis, or edema  PSYCH: Nl behavior, nl affect  NEUROLOGY: AAOx3, non-focal, cranial nerves intact  SKIN: Normal color, No rashes or lesions    Cardiovascular Diagnostic Testing:      Echo:  Conclusions:  1. Calcified aortic valve with decreased opening.  2. Normal left ventricular systolic function. No segmental  wall motion abnormalities.  3. Normal right atrium. There is diastolic right atrial  collapse.  4. Normal right ventricular size and function. There is  diastolic RV collapse.  5. Moderate to large circumferential pericardial effusion.  There is RA and RV diastolic collapse with IVC dilation.  There is clear tamponade physiology.  6. Left pleural effusion.    Imaging: CXR: left lung white out    Labs: Personally reviewed                        10.2   9.22  )-----------( 306      ( 09 Aug 2018 08:29 )             32.3     08-09    134<L>  |  97  |  24<H>  ----------------------------<  91  4.0   |  21<L>  |  0.92    Ca    9.1      09 Aug 2018 06:50    TPro  7.2  /  Alb  3.3  /  TBili  0.3  /  DBili  x   /  AST  21  /  ALT  16  /  AlkPhos  104  08-08    PT/INR - ( 08 Aug 2018 16:35 )   PT: 13.3 sec;   INR: 1.22 ratio         PTT - ( 08 Aug 2018 16:35 )  PTT:27.9 sec  CARDIAC MARKERS ( 09 Aug 2018 18:26 )  x     / x     / 36 U/L / x     / 3.0 ng/mL      Serum Pro-Brain Natriuretic Peptide: 575 pg/mL (08-08 @ 16:35)

## 2018-08-10 NOTE — SWALLOW BEDSIDE ASSESSMENT ADULT - SLP PERTINENT HISTORY OF CURRENT PROBLEM
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 Found to have large L pleural effusion causing complete atelectasis of L lung. She had 1L thoracentesis 1 week ago as outpt.

## 2018-08-10 NOTE — PROGRESS NOTE ADULT - SUBJECTIVE AND OBJECTIVE BOX
Follow-up Pulm Progress Note    Found to have TTE with cardiac tamponade. Had chest tube in pleural space and then pericardial drain placed.     Medications:  MEDICATIONS  (STANDING):  anastrozole 1 milliGRAM(s) Oral daily  aspirin enteric coated 81 milliGRAM(s) Oral daily  DULoxetine 30 milliGRAM(s) Oral daily  enoxaparin Injectable 40 milliGRAM(s) SubCutaneous every 24 hours  ferrous    sulfate 325 milliGRAM(s) Oral daily  lactobacillus acidophilus 1 Tablet(s) Oral two times a day with meals  losartan 50 milliGRAM(s) Oral daily  pantoprazole    Tablet 40 milliGRAM(s) Oral before breakfast  simvastatin 20 milliGRAM(s) Oral at bedtime    MEDICATIONS  (PRN):  acetaminophen   Tablet. 650 milliGRAM(s) Oral every 6 hours PRN Mild Pain (1 - 3)  ondansetron Injectable 4 milliGRAM(s) IV Push every 6 hours PRN Nausea  oxyCODONE    5 mG/acetaminophen 325 mG 1 Tablet(s) Oral every 4 hours PRN Moderate Pain (4 - 6)    Vital Signs Last 24 Hrs  T(C): 36.5 (10 Aug 2018 12:30), Max: 36.8 (10 Aug 2018 10:15)  T(F): 97.7 (10 Aug 2018 12:30), Max: 98.2 (10 Aug 2018 10:15)  HR: 112 (10 Aug 2018 12:30) (99 - 120)  BP: 130/67 (10 Aug 2018 12:30) (105/73 - 151/80)  BP(mean): --  RR: 16 (10 Aug 2018 12:30) (16 - 22)  SpO2: 96% (10 Aug 2018 12:30) (95% - 98%)      VBG pH 7.40 08-08 @ 18:13    VBG pCO2 39 08-08 @ 18:13    VBG O2 sat 70 08-08 @ 18:13    VBG lactate -- 08-08 @ 18:13      08-09 @ 07:01  -  08-10 @ 07:00  --------------------------------------------------------  IN: 0 mL / OUT: 1230 mL / NET: -1230 mL    LABS:                        11.4   10.42 )-----------( 305      ( 10 Aug 2018 07:12 )             35.3     08-10    137  |  101  |  26<H>  ----------------------------<  105<H>  4.9   |  19<L>  |  1.04    Ca    9.5      10 Aug 2018 06:16    TPro  7.2  /  Alb  3.3  /  TBili  0.3  /  DBili  x   /  AST  21  /  ALT  16  /  AlkPhos  104  08-08      CARDIAC MARKERS ( 09 Aug 2018 18:26 )  x     / x     / 36 U/L / x     / 3.0 ng/mL    CAPILLARY BLOOD GLUCOSE    PT/INR - ( 08 Aug 2018 16:35 )   PT: 13.3 sec;   INR: 1.22 ratio         PTT - ( 08 Aug 2018 16:35 )  PTT:27.9 sec  Urinalysis Basic - ( 09 Aug 2018 17:36 )    Color: x / Appearance: x / SG: x / pH: x  Gluc: x / Ketone: x  / Bili: x / Urobili: x   Blood: x / Protein: x / Nitrite: x   Leuk Esterase: x / RBC: 0-2 /HPF / WBC 11-25 /HPF   Sq Epi: x / Non Sq Epi: OCC /HPF / Bacteria: x    Serum Pro-Brain Natriuretic Peptide: 575 pg/mL (08-08-18 @ 16:35)    Fluid characteristics  -- 08-10 @ 10:01  pH 7.34  LDH --  tprot --    Cell count  Appearance Bloody  Fluid type --  BF lymph 10  color Red  eosinophil --  PMN 13  Mesothelial --  Monocyte 6  Other body cells 71      CULTURES: (if applicable)    Physical Examination:  PULM: decreased BS on left  CVS: S1, S2 heard    RADIOLOGY REVIEWED  CXR:     CT chest:    TTE:

## 2018-08-10 NOTE — PROGRESS NOTE ADULT - PROBLEM SELECTOR PLAN 2
s/p left pigtail by surgical resident this AM  Maintain to low wall suction  Strict I & Os, check daily CXR  Follow up pleural fluid cultures and pathocytology  OOB to chair, ambulate as tolerated  Continue management per primary team, will follow

## 2018-08-10 NOTE — DIETITIAN INITIAL EVALUATION ADULT. - NS AS NUTRI INTERV MEALS SNACK
Pt being seen by SLP at this time for swallow eval; defer diet and liquid consistency recommendations to SLP. Otherwise can keep diet liberalized to promote optimal po intake. Commended pt's efforts to eat despite low appetite, encouraged continued intake of small frequent meals as tolerated, nutrient and energy dense snacks, adequate protein. Food preferences obtained and honored on menu.

## 2018-08-10 NOTE — PROGRESS NOTE ADULT - SUBJECTIVE AND OBJECTIVE BOX
Subjective: Pt states "I feel ok" denies any CP, SOB, N/V and palpitations. s/p left pigtail this AM and now pericardial drain in IR    Vital Signs Last 24 Hrs  T(C): 36.8 (08-10-18 @ 10:15), Max: 36.8 (08-10-18 @ 10:15)  T(F): 98.2 (08-10-18 @ 10:15), Max: 98.2 (08-10-18 @ 10:15)  HR: 99 (08-10-18 @ 11:00) (99 - 120)  BP: 151/80 (08-10-18 @ 11:00) (105/73 - 151/80)  RR: 16 (08-10-18 @ 11:00) (16 - 22)  SpO2: 96% (08-10-18 @ 11:00) (95% - 98%)            08-10-18 @ 07:01  -  08-10-18 @ 11:55  --------------------------------------------------------  IN: 0 mL / OUT: 370 mL / NET: -370 mL      Daily Weight in k.6 (10 Aug 2018 10:15)                      11.4   10.42 )-----------( 305      ( 10 Aug 2018 07:12 )             35.3     137  |  101  |  26<H>  ----------------------------<  105<H>  4.9   |  19<L>  |  1.04          PHYSICAL EXAM  Neurology: A&Ox3, NAD, no gross deficits  CV : RRR+S1S2, +pericardial drain to water seal with bloody drainage  Lungs: Respirations non-labored, B/L BS clear, diminished at bases  + left pigtail to LWS with serosanguinous drainage, no air leak  Abdomen: Soft, NT/ND, +BSx4Q, -N/V/D  : Voiding without difficulty  Extremities: B/L LE no edema, negative calf tenderness, +PP           MEDICATIONS  acetaminophen   Tablet. 650 milliGRAM(s) Oral every 6 hours PRN  anastrozole 1 milliGRAM(s) Oral daily  aspirin enteric coated 81 milliGRAM(s) Oral daily  DULoxetine 30 milliGRAM(s) Oral daily  enoxaparin Injectable 40 milliGRAM(s) SubCutaneous every 24 hours  ferrous    sulfate 325 milliGRAM(s) Oral daily  lactobacillus acidophilus 1 Tablet(s) Oral two times a day with meals  losartan 50 milliGRAM(s) Oral daily  ondansetron Injectable 4 milliGRAM(s) IV Push every 6 hours PRN  pantoprazole    Tablet 40 milliGRAM(s) Oral before breakfast  simvastatin 20 milliGRAM(s) Oral at bedtime    Discussed with thoracic surgery attending, Dr. Denis.

## 2018-08-10 NOTE — PROGRESS NOTE ADULT - PROBLEM SELECTOR PLAN 1
s/p pericardiocentesis and pericardial drain placement in IR  Maintain to water seal. Strict I & Os  Follow up pericardial fluid cultures and pathocytology  cardiology following

## 2018-08-10 NOTE — PROGRESS NOTE ADULT - SUBJECTIVE AND OBJECTIVE BOX
Interventional Radiology  Pre-Procedure Note    This is a 84y  Female  with Hx of right breast cancer and lung ca with recurrent malignant effusion. The patient had a CT scan that showed a pleural effusion and a pericardial effusion. The following echo showed tamponade physiology.      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  S/P hip replacement, right: 6/2016  - right hip  History of lumpectomy of right breast: July 2015  History of cataract surgery, left: and right 2016  Elective surgery: reversal of colostomy July 2015  History of tonsillectomy  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 650 milliGRAM(s) Oral every 6 hours PRN  anastrozole 1 milliGRAM(s) Oral daily  aspirin enteric coated 81 milliGRAM(s) Oral daily  DULoxetine 30 milliGRAM(s) Oral daily  enoxaparin Injectable 40 milliGRAM(s) SubCutaneous every 24 hours  ferrous    sulfate 325 milliGRAM(s) Oral daily  lactobacillus acidophilus 1 Tablet(s) Oral two times a day with meals  losartan 50 milliGRAM(s) Oral daily  ondansetron Injectable 4 milliGRAM(s) IV Push every 6 hours PRN  pantoprazole    Tablet 40 milliGRAM(s) Oral before breakfast  simvastatin 20 milliGRAM(s) Oral at bedtime  sodium chloride 0.9%. 1000 milliLiter(s) IV Continuous <Continuous>      Labs:                         10.2   9.22  )-----------( 306      ( 09 Aug 2018 08:29 )             32.3       08-10    137  |  101  |  26<H>  ----------------------------<  105<H>  4.9   |  19<L>  |  1.04    Ca    9.5      10 Aug 2018 06:16    TPro  7.2  /  Alb  3.3  /  TBili  0.3  /  DBili  x   /  AST  21  /  ALT  16  /  AlkPhos  104  08-08      Assessment/Plan:   This is a 85yo Female  presents with pericardial effusion.  Patient referred to IR for pericardial drainage.    Procedure/ risks/ benefits/ goals/ alternatives were explained. All questions answered. Informed content obtained from patient. Consent placed in chart.

## 2018-08-10 NOTE — PHYSICAL THERAPY INITIAL EVALUATION ADULT - GENERAL OBSERVATIONS, REHAB EVAL
Received pt bedside. +pigtail and Received pt bedside. + L pigtail and pericardial drain. O2 NC 2L. /66, , O2 sats 95%.

## 2018-08-10 NOTE — PHYSICAL THERAPY INITIAL EVALUATION ADULT - PLANNED THERAPY INTERVENTIONS, PT EVAL
bed mobility training/gait training/stair training- GOAL:- 2 stairs with and rail independent, 2 weeks/transfer training

## 2018-08-10 NOTE — CONSULT NOTE ADULT - ASSESSMENT
ASSESSMENT: Patient is a 84F with a recurrent, malignant left pleural effusion.    PLAN:   - Pigtail catheter successfully placed in left chest  - Catheter set to water seal  - IR consulted for pericardiocentesis    Discussed with Dr. Margarita Flores, PGY-2   Thoracic Surgery

## 2018-08-10 NOTE — CONSULT NOTE ADULT - SUBJECTIVE AND OBJECTIVE BOX
THORACIC SURGERY CONSULT NOTE  --------------------------------------------------------------------------------------------    HPI:  84 y.o. F with PMH of HTN, R breast CA (diagnosed in 2015) s/p lumpectomy, currently on treatment with Arimidex and recent diagnosis of lung adenocarcinoma who presented to ED with worsening dyspnea and SOB. Patient recently developed cough, SOB and hoarseness. She had CT chest by her pulmonologist that showed a large left hilar mass, RUL mass and adrenal gland metastasis. She underwent bronch that confirmed adenocarcinoma of the lung. She then presented with worsening L pleural effusion with complete atelectasis of L lung and had thoracentesis that was consistent with malignant effusion. Pt presented to hospital due to worsening dyspnea  as well as generalized weakness and poor PO intake.     IR was originally consulted to place pigtail catheter, but unable. Thoracic surgery was then called for placement. Soon after seeing the patient, a TTE was performed; PA received a call from cardiology to notify her that the study showed RV collapse consistent with tamponade physiology. IR was consulted for urgent pericardiocentesis.     ROS: 10-system review is otherwise negative except HPI above.        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  S/P hip replacement, right: 6/2016  - right hip  History of lumpectomy of right breast: July 2015  History of cataract surgery, left: and right 2016  Elective surgery: reversal of colostomy July 2015  History of tonsillectomy  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  H/O eye surgery: bilateral ptosis  History of varicose vein stripping: recent vascular evaluation - all normal  History of carpal tunnel release: bilateral    FAMILY HISTORY:  Diabetes mellitus (Father)  [] Family history not pertinent as reviewed with the patient and family      ALLERGIES: Dilaudid (Other)  Levaquin (Diarrhea)      CURRENT MEDICATIONS  MEDICATIONS (STANDING): anastrozole 1 milliGRAM(s) Oral daily  aspirin enteric coated 81 milliGRAM(s) Oral daily  DULoxetine 30 milliGRAM(s) Oral daily  enoxaparin Injectable 40 milliGRAM(s) SubCutaneous every 24 hours  ferrous    sulfate 325 milliGRAM(s) Oral daily  lactobacillus acidophilus 1 Tablet(s) Oral two times a day with meals  losartan 50 milliGRAM(s) Oral daily  pantoprazole    Tablet 40 milliGRAM(s) Oral before breakfast  simvastatin 20 milliGRAM(s) Oral at bedtime  sodium chloride 0.9%. 1000 milliLiter(s) IV Continuous <Continuous>    MEDICATIONS (PRN):acetaminophen   Tablet. 650 milliGRAM(s) Oral every 6 hours PRN Mild Pain (1 - 3)  ondansetron Injectable 4 milliGRAM(s) IV Push every 6 hours PRN Nausea    --------------------------------------------------------------------------------------------    Vitals:   T(C): 36.4 (08-10-18 @ 04:44), Max: 36.7 (08-09-18 @ 13:09)  HR: 118 (08-10-18 @ 06:15) (103 - 120)  BP: 122/83 (08-10-18 @ 06:15) (105/73 - 122/83)  RR: 20 (08-10-18 @ 05:55) (20 - 22)  SpO2: 97% (08-10-18 @ 05:55) (95% - 98%)  CAPILLARY BLOOD GLUCOSE      08-09 @ 07:01  -  08-10 @ 06:30  --------------------------------------------------------  IN:  Total IN: 0 mL    OUT:    Voided: 300 mL  Total OUT: 300 mL    Total NET: -300 mL        Height (cm): 165.1 (08-08 @ 15:40)  Weight (kg): 52.2 (08-09 @ 22:40)  BMI (kg/m2): 19.2 (08-09 @ 22:40)  BSA (m2): 1.56 (08-09 @ 22:40)    PHYSICAL EXAM:   General: Lying in bed, able to speak 2-3 words between breaths, no use of accessory muscles  Neuro: A+Ox3  HEENT: NC/AT, EOMI  Cardio: Tachycardic, nml S1/S2  Resp: Good effort, Distant breath sounds on the left side  GI/Abd: Soft, NT/ND, no rebound/guarding, no masses palpated  Vascular: All 4 extremities warm.  Musculoskeletal: All 4 extremities moving spontaneously, no limitations  --------------------------------------------------------------------------------------------    LABS  CBC (08-09 @ 08:29)                              10.2<L>                         9.22    )----------------(  306        73.3  % Neutrophils, 8.6<L>% Lymphocytes, ANC: 6.76                                32.3<L>    BMP (08-09 @ 06:50)             134<L>  |  97      |  24<H> 		Ca++ --      Ca 9.1                ---------------------------------( 91    		Mg --                 4.0     |  21<L>   |  0.92  			Ph --            Cardiac Markers (08-09 @ 18:26)     HSTrop: -- / CKMB: -- / CK: 36        --------------------------------------------------------------------------------------------    MICROBIOLOGY  Urinalysis (08-09 @ 17:36):     Color:  / Appearance:  / SG:  / pH:  / Gluc:  / Ketones:  / Bili:  / Urobili:  / Protein : / Nitrites:  / Leuk.Est:  / RBC: 0-2 / WBC: 11-25 / Sq Epi:  / Non Sq Epi: OCC / Bacteria    Few Mucus Strands      --------------------------------------------------------------------------------------------    IMAGING  < from: CT Angio Chest w/ IV Cont (08.08.18 @ 18:25) >  IMPRESSION:  No evidence of pulmonary embolism.    There is a 2.2 x 2.6 x 3.5 cm spiculated mass in the right upper lobe,   concerning for malignancy.     There is narrowing of the left upper lobar pulmonary artery with   associated hypoperfusion of the left upper lobe, suggestive of mass   encasing the left upper lobe pulmonary artery. The exact size is   difficult to determine on this examination. Soft tissue  surrounds the   inferior trachea and bilateral main bronchi, which may represent   conglomerate lymphadenopathy. Right hilar and paratracheal   lymphadenopathy.    Moderate pericardial effusion. Large left pleural effusion.    Indeterminate 1.5 cm left adrenal nodule.    < end of copied text >

## 2018-08-10 NOTE — DIETITIAN INITIAL EVALUATION ADULT. - ENERGY NEEDS
ht: 65 inches. wt: 115 pounds (current as of 8/9, bedscale, no edema noted). BMI: 19.2 kG/m2. UBW:  IBW: 125 pounds +/- 10%. %IBW: 92%  Other pertinent objective information: 84 year old female pt with PMH breast CA 2015 s/p lumpectomy, GERD, recent diagnosis of L hilar and perihilar mass consistent with tumor, and recent thoracentesis draining 1L fluid from L, presenting sent in by her oncologist for admission for worsening shortness of breath. 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 Per radiation oncology, plan for palliative radiation. No pressure ulcers noted. ht: 65 inches. wt: 115 pounds (current as of 8/9, bedscale, no edema noted). BMI: 19.2 kG/m2. UBW: 130 pounds x 1 year ago per pt. IBW: 125 pounds +/- 10%. %IBW: 92%  Other pertinent objective information: 84 year old female pt with PMH breast CA 2015 s/p lumpectomy, GERD, recent diagnosis of L hilar and perihilar mass consistent with tumor, and recent thoracentesis draining 1L fluid from L, presenting sent in by her oncologist for admission for worsening shortness of breath. 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 Per radiation oncology, plan for palliative radiation. No pressure ulcers noted.

## 2018-08-10 NOTE — CONSULT NOTE ADULT - SUBJECTIVE AND OBJECTIVE BOX
HPI:  NIGHT HOSPITALIST:   Patient UNKNOWN to me previously, assigned to me at this point via the ER and by Dr. 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 Ca for dyspnea.  Patient also with progressive globus and early satiety.   No chest pain/pressure.  No dyspnea at present.   No HA, no focal weakness.   No abdominal pain, no red blood per rectum or melena.   No back pain, no tearing back pain.   No dysuria.   Patient with anorexia and unspecified weight loss.   No sick contacts.  Remaining review of systems not contributory. (08 Aug 2018 22:01)      Allergies    Levaquin (Diarrhea)    Intolerances    Dilaudid (Other)      ROS: [  ] Fever  [  ] Chills  [  ]Chest Pain [  ] SOB  [  ]Cough [  ] N/V  [  ] Diarrhea [  ]Constipation [  ]Other ROS:  [  ] ROS otherwise negative    PAST MEDICAL & SURGICAL HISTORY:  Lung mass  Primary osteoarthritis of left hip  Glaucoma  History of breast cancer: 2015, no chemo/radiation  s/p lumpectomy  Iron deficiency anemia  Retinal tear  Osteoarthritis  Glaucoma  Fistula of vagina: rectovaginal fistula  RBBB  Ptosis: left eye - eye lid surgery 2002  Anxiety  Hyperlipidemia  Osteopenia  Hypertension  Ureterovaginal prolapse  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  S/P hip replacement, right: 6/2016  - right hip  History of lumpectomy of right breast: July 2015  History of cataract surgery, left: and right 2016  Elective surgery: reversal of colostomy July 2015  History of tonsillectomy  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  H/O eye surgery: bilateral ptosis  History of varicose vein stripping: recent vascular evaluation - all normal  History of carpal tunnel release: bilateral      FAMILY HISTORY:  Diabetes mellitus (Father)      MEDICATIONS  (STANDING):  anastrozole 1 milliGRAM(s) Oral daily  aspirin enteric coated 81 milliGRAM(s) Oral daily  DULoxetine 30 milliGRAM(s) Oral daily  enoxaparin Injectable 40 milliGRAM(s) SubCutaneous every 24 hours  ferrous    sulfate 325 milliGRAM(s) Oral daily  lactobacillus acidophilus 1 Tablet(s) Oral two times a day with meals  losartan 50 milliGRAM(s) Oral daily  pantoprazole    Tablet 40 milliGRAM(s) Oral before breakfast  simvastatin 20 milliGRAM(s) Oral at bedtime  sodium chloride 0.9%. 1000 milliLiter(s) (75 mL/Hr) IV Continuous <Continuous>    MEDICATIONS  (PRN):  acetaminophen   Tablet. 650 milliGRAM(s) Oral every 6 hours PRN Mild Pain (1 - 3)  ondansetron Injectable 4 milliGRAM(s) IV Push every 6 hours PRN Nausea      PHYSICAL EXAM  Vital Signs Last 24 Hrs  T(C): 36.4 (10 Aug 2018 04:44), Max: 36.7 (09 Aug 2018 13:09)  T(F): 97.6 (10 Aug 2018 04:44), Max: 98.1 (09 Aug 2018 19:52)  HR: 118 (10 Aug 2018 06:15) (103 - 120)  BP: 122/83 (10 Aug 2018 06:15) (105/73 - 122/83)  BP(mean): --  RR: 20 (10 Aug 2018 05:55) (20 - 22)  SpO2: 97% (10 Aug 2018 05:55) (95% - 98%)    General: Well nourished, well developed, no acute distress  HEENT: NC/AT; EOMI, PERRL, sclera nonicteric; external ears normal; no rhinorrhea or epistaxis; mucous membranes moist; oropharynx clear and without erythema  CV: NR, RR; no appreciable r/m/g  Lungs: CTAB, no increased work of breathing  Abdomen: Bowel sounds present; soft, NTND  MSK: Vertebral spine non-tender to palpation  Neuro: AAOx3; cranial nerves II-XII intact; strength 5/5 in upper and lower extremities; sensation to light touch in tact bilaterally.  Psych: Full affect; mood congruent  Skin: no visible rashes on limited examination    IMAGING/LABS/PATHOLOGY: I have personally reviewed the relevant labs, pathology, and imaging as noted in the HPI.  In addition,                          11.4   10.42 )-----------( 305      ( 10 Aug 2018 07:12 )             35.3     08-10    137  |  101  |  26<H>  ----------------------------<  105<H>  4.9   |  19<L>  |  1.04    Ca    9.5      10 Aug 2018 06:16    TPro  7.2  /  Alb  3.3  /  TBili  0.3  /  DBili  x   /  AST  21  /  ALT  16  /  AlkPhos  104  08-08    PT/INR - ( 08 Aug 2018 16:35 )   PT: 13.3 sec;   INR: 1.22 ratio         PTT - ( 08 Aug 2018 16:35 )  PTT:27.9 sec HPI:    85yo female with history of recently diagnosed NSCLC lung caner with RUL mass, L hilar mass, and L malignant pleural effusion. She is also on arimidex for her receptor positive HER2- breast cancer DCIS since 2016. She recently developed shortness of breath, dry cough, and hoarseness. She was evaluated by her pulmonologist and had CT scan which showed a large left hilar mass, RUL mass, and adrenal gland mets (imaging unavailable). She underwent bronch which showed adenocarcinoma histology, with molecular study pending. She was scheduled for staging with PET and MRI. One week ago she presented with worsening pleural effusion and had 1L of fluid removed via thoracentesis. She presented to the ED for worsening shortness of breath and weakness. In the ED she also had tachycardia and difficulty speaking in full sentences, and required 4L NC.  CXR in the ER showed complete white out of the left lung with L pleural effusion and atelectasis. CTA was obtained to r/o PE and showed 2.2 x 2.6 x3.5 RUL spiculated mass, L pleural effusion, soft tissue surrounding inferior trachea and bilateral main bronchi, R hilar and paratracheal lymphadenopathy as well as moderate pericardial effusion  No PE was seen.    During the workup for her tachycardia, she was found to have RV collapse on TTE consistent with cardiac tamponade. She underwent emergent pericardiocentesis 8/10 AM and had L chest tube placed  Post procedure her oxygen requirement decreased to 2L    Allergies    Levaquin (Diarrhea)    Intolerances    Dilaudid (Other)      ROS: [  ] Fever  [  ] Chills  [  ]Chest Pain [  ] SOB  [  ]Cough [  ] N/V  [  ] Diarrhea [  ]Constipation [  ]Other ROS:  [  ] ROS otherwise negative    PAST MEDICAL & SURGICAL HISTORY:  Lung mass  Primary osteoarthritis of left hip  Glaucoma  History of breast cancer: 2015, no chemo/radiation  s/p lumpectomy  Iron deficiency anemia  Retinal tear  Osteoarthritis  Glaucoma  Fistula of vagina: rectovaginal fistula  RBBB  Ptosis: left eye - eye lid surgery 2002  Anxiety  Hyperlipidemia  Osteopenia  Hypertension  Ureterovaginal prolapse  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  S/P hip replacement, right: 6/2016  - right hip  History of lumpectomy of right breast: July 2015  History of cataract surgery, left: and right 2016  Elective surgery: reversal of colostomy July 2015  History of tonsillectomy  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  H/O eye surgery: bilateral ptosis  History of varicose vein stripping: recent vascular evaluation - all normal  History of carpal tunnel release: bilateral      FAMILY HISTORY:  Diabetes mellitus (Father)      MEDICATIONS  (STANDING):  anastrozole 1 milliGRAM(s) Oral daily  aspirin enteric coated 81 milliGRAM(s) Oral daily  DULoxetine 30 milliGRAM(s) Oral daily  enoxaparin Injectable 40 milliGRAM(s) SubCutaneous every 24 hours  ferrous    sulfate 325 milliGRAM(s) Oral daily  lactobacillus acidophilus 1 Tablet(s) Oral two times a day with meals  losartan 50 milliGRAM(s) Oral daily  pantoprazole    Tablet 40 milliGRAM(s) Oral before breakfast  simvastatin 20 milliGRAM(s) Oral at bedtime  sodium chloride 0.9%. 1000 milliLiter(s) (75 mL/Hr) IV Continuous <Continuous>    MEDICATIONS  (PRN):  acetaminophen   Tablet. 650 milliGRAM(s) Oral every 6 hours PRN Mild Pain (1 - 3)  ondansetron Injectable 4 milliGRAM(s) IV Push every 6 hours PRN Nausea      PHYSICAL EXAM  Vital Signs Last 24 Hrs  T(C): 36.4 (10 Aug 2018 04:44), Max: 36.7 (09 Aug 2018 13:09)  T(F): 97.6 (10 Aug 2018 04:44), Max: 98.1 (09 Aug 2018 19:52)  HR: 118 (10 Aug 2018 06:15) (103 - 120)  BP: 122/83 (10 Aug 2018 06:15) (105/73 - 122/83)  BP(mean): --  RR: 20 (10 Aug 2018 05:55) (20 - 22)  SpO2: 97% (10 Aug 2018 05:55) (95% - 98%)    General: Well nourished, well developed, no acute distress  HEENT: NC/AT; EOMI, PERRL, sclera nonicteric; external ears normal; no rhinorrhea or epistaxis; mucous membranes moist; oropharynx clear and without erythema  CV: NR, RR; no appreciable r/m/g  Lungs: CTAB, no increased work of breathing  Abdomen: Bowel sounds present; soft, NTND  MSK: Vertebral spine non-tender to palpation  Neuro: AAOx3; cranial nerves II-XII intact; strength 5/5 in upper and lower extremities; sensation to light touch in tact bilaterally.  Psych: Full affect; mood congruent  Skin: no visible rashes on limited examination    IMAGING/LABS/PATHOLOGY: I have personally reviewed the relevant labs, pathology, and imaging as noted in the HPI.  In addition,    < from: CT Angio Chest w/ IV Cont (08.08.18 @ 18:25) >  IMPRESSION:  No evidence of pulmonary embolism.    There is a 2.2 x 2.6 x 3.5 cm spiculated mass in the right upper lobe,   concerning for malignancy.     There is narrowing of the left upper lobar pulmonary artery with   associated hypoperfusion of the left upper lobe, suggestive of mass   encasing the left upper lobe pulmonary artery. The exact size is   difficult to determine on this examination. Soft tissue  surrounds the   inferior trachea and bilateral main bronchi, which may represent   conglomerate lymphadenopathy. Right hilar and paratracheal   lymphadenopathy.    Moderate pericardial effusion. Large left pleural effusion.    Indeterminate 1.5 cm left adrenal nodule.    INTERPRETATION:  No evidence of pulmonary embolism. There is a 2.2 x 2.6   x 3.5 cm spiculated mass in the right upper lobe, concerning for   malignancy. Moderate pericardial effusion. Large left pleural effusion.      < end of copied text >                            11.4   10.42 )-----------( 305      ( 10 Aug 2018 07:12 )             35.3     08-10    137  |  101  |  26<H>  ----------------------------<  105<H>  4.9   |  19<L>  |  1.04    Ca    9.5      10 Aug 2018 06:16    TPro  7.2  /  Alb  3.3  /  TBili  0.3  /  DBili  x   /  AST  21  /  ALT  16  /  AlkPhos  104  08-08    PT/INR - ( 08 Aug 2018 16:35 )   PT: 13.3 sec;   INR: 1.22 ratio         PTT - ( 08 Aug 2018 16:35 )  PTT:27.9 sec HPI:  85yo female with history of recently diagnosed NSCLC lung caner with RUL mass, L hilar mass, and L malignant pleural effusion. She is also on arimidex for her receptor positive HER2- breast cancer DCIS since 2016. She recently developed shortness of breath, dry cough, and hoarseness. She was evaluated by her pulmonologist and had CT scan which showed a large left hilar mass, RUL mass, and adrenal gland mets (imaging unavailable). She underwent bronchoscopy which showed lung adenocarcinoma, but unfortunately there weren't enough sample taken for molecular testing. Subsequently she got liquid molecular studies with results pending. Approximately one week ago she had worsening pleural effusion and had 1L of fluid removed via thoracentesis at her pulmonologist On 8/8 she presented to the ED for worsening shortness of breath and weakness. In the ED she was found to have tachycardia, with mild respiratory distress and difficulty speaking in full sentences. CXR in the ER showed complete white out of the left lung with L pleural effusion and atelectasis. PE was ruled out with CTA but showed showed 2.2 x 2.6 x3.5 RUL spiculated mass, L pleural effusion, soft tissue surrounding inferior trachea and bilateral main bronchi, R hilar and paratracheal lymphadenopathy as well as moderate pericardial effusion. Heme onc was consulted and awaiting molecular studies. Radiation medicine was consulted for possible palliative RT for her shortness of breath.    During workup for her tachycardia, she was found to have RV collapse on TTE consistent with cardiac tamponade. She underwent emergent pericardiocentesis this AM and had pericardial drain placed. CT surgery removed 1L of fluid and placed a pig tail in her left lung. Pleural fluid studies are pending.     Patient was seen at bedside this evening.  She reports that she is feeling much better and she no longer has chest pressure/pain when she breaths. She is able to speak in full sentences and is breathing comfortably. Her weakness is also much improved. She was able to wean oxygen down to 1L after the drains were placed. She is scheduled to receive MRI of the brain tonight to stage her cancer.    Allergies    Levaquin (Diarrhea)    Intolerances    Dilaudid (Other)      ROS: [  ] Fever  [  ] Chills  [  ]Chest Pain [  ] SOB  [  ]Cough [  ] N/V  [  ] Diarrhea [  ]Constipation [  ]Other ROS:  [ x ] ROS otherwise negative    PAST MEDICAL & SURGICAL HISTORY:  Lung mass  Primary osteoarthritis of left hip  Glaucoma  History of breast cancer: 2015, no chemo/radiation  s/p lumpectomy  Iron deficiency anemia  Retinal tear  Osteoarthritis  Glaucoma  Fistula of vagina: rectovaginal fistula  RBBB  Ptosis: left eye - eye lid surgery 2002  Anxiety  Hyperlipidemia  Osteopenia  Hypertension  Ureterovaginal prolapse  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  S/P hip replacement, right: 6/2016  - right hip  History of lumpectomy of right breast: July 2015  History of cataract surgery, left: and right 2016  Elective surgery: reversal of colostomy July 2015  History of tonsillectomy  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  H/O eye surgery: bilateral ptosis  History of varicose vein stripping: recent vascular evaluation - all normal  History of carpal tunnel release: bilateral      FAMILY HISTORY:  Diabetes mellitus (Father)      MEDICATIONS  (STANDING):  anastrozole 1 milliGRAM(s) Oral daily  aspirin enteric coated 81 milliGRAM(s) Oral daily  DULoxetine 30 milliGRAM(s) Oral daily  enoxaparin Injectable 40 milliGRAM(s) SubCutaneous every 24 hours  ferrous    sulfate 325 milliGRAM(s) Oral daily  lactobacillus acidophilus 1 Tablet(s) Oral two times a day with meals  losartan 50 milliGRAM(s) Oral daily  pantoprazole    Tablet 40 milliGRAM(s) Oral before breakfast  simvastatin 20 milliGRAM(s) Oral at bedtime  sodium chloride 0.9%. 1000 milliLiter(s) (75 mL/Hr) IV Continuous <Continuous>    MEDICATIONS  (PRN):  acetaminophen   Tablet. 650 milliGRAM(s) Oral every 6 hours PRN Mild Pain (1 - 3)  ondansetron Injectable 4 milliGRAM(s) IV Push every 6 hours PRN Nausea      PHYSICAL EXAM  Vital Signs Last 24 Hrs  T(C): 36.4 (10 Aug 2018 04:44), Max: 36.7 (09 Aug 2018 13:09)  T(F): 97.6 (10 Aug 2018 04:44), Max: 98.1 (09 Aug 2018 19:52)  HR: 118 (10 Aug 2018 06:15) (103 - 120)  BP: 122/83 (10 Aug 2018 06:15) (105/73 - 122/83)  BP(mean): --  RR: 20 (10 Aug 2018 05:55) (20 - 22)  SpO2: 97% (10 Aug 2018 05:55) (95% - 98%)    General: Thin female with bitemporal wasting, in no acute distress  HEENT: NC/AT; EOMI, PERRL, sclera nonicteric; external ears normal; no rhinorrhea or epistaxis  CV: NR, RR; no appreciable r/m/g  Lungs: On 1L NC; non labored breathing; speaks in full sentences. R lung CTABL Decreased breath sounds with wheezes on the left lung, left pigtail in place   Abdomen:; soft, NTND  Neuro: AAOx3; moving all 4 extremities  Psych: Full affect; mood congruent    IMAGING/LABS/PATHOLOGY: I have personally reviewed the relevant labs, pathology, and imaging as noted in the HPI.  In addition,    < from: CT Angio Chest w/ IV Cont (08.08.18 @ 18:25) >  IMPRESSION:  No evidence of pulmonary embolism.    There is a 2.2 x 2.6 x 3.5 cm spiculated mass in the right upper lobe,   concerning for malignancy.     There is narrowing of the left upper lobar pulmonary artery with   associated hypoperfusion of the left upper lobe, suggestive of mass   encasing the left upper lobe pulmonary artery. The exact size is   difficult to determine on this examination. Soft tissue  surrounds the   inferior trachea and bilateral main bronchi, which may represent   conglomerate lymphadenopathy. Right hilar and paratracheal   lymphadenopathy.    Moderate pericardial effusion. Large left pleural effusion.    Indeterminate 1.5 cm left adrenal nodule.    INTERPRETATION:  No evidence of pulmonary embolism. There is a 2.2 x 2.6   x 3.5 cm spiculated mass in the right upper lobe, concerning for   malignancy. Moderate pericardial effusion. Large left pleural effusion.      < end of copied text >                            11.4   10.42 )-----------( 305      ( 10 Aug 2018 07:12 )             35.3     08-10    137  |  101  |  26<H>  ----------------------------<  105<H>  4.9   |  19<L>  |  1.04    Ca    9.5      10 Aug 2018 06:16    TPro  7.2  /  Alb  3.3  /  TBili  0.3  /  DBili  x   /  AST  21  /  ALT  16  /  AlkPhos  104  08-08    PT/INR - ( 08 Aug 2018 16:35 )   PT: 13.3 sec;   INR: 1.22 ratio         PTT - ( 08 Aug 2018 16:35 )  PTT:27.9 sec HPI:  83yo female with history of recently diagnosed NSCLC lung caner with RUL mass, L hilar mass, and L malignant pleural effusion. She is also on arimidex for her receptor positive HER2- breast cancer since 2016 treated with surgery and hormone therapy. She recently developed shortness of breath, dry cough, and hoarseness. She was evaluated by her pulmonologist and had CT scan which showed a large left hilar mass, RUL mass, and adrenal gland mets (imaging unavailable). She underwent bronchoscopy which showed lung adenocarcinoma, but unfortunately there weren't enough sample taken for molecular testing. Subsequently she got liquid molecular studies with results pending. Approximately one week ago she had worsening pleural effusion and had 1L of fluid removed via thoracentesis at her pulmonologist On 8/8 she presented to the ED for worsening shortness of breath and weakness. In the ED she was found to have tachycardia, with mild respiratory distress and difficulty speaking in full sentences. CXR in the ER showed complete white out of the left lung with L pleural effusion and atelectasis. PE was ruled out with CTA but showed showed 2.2 x 2.6 x3.5 RUL spiculated mass, L pleural effusion, soft tissue surrounding inferior trachea and bilateral main bronchi, R hilar and paratracheal lymphadenopathy as well as moderate pericardial effusion. Heme onc was consulted and awaiting molecular studies. Radiation medicine was consulted for possible palliative RT for her shortness of breath.    During workup for her tachycardia, she was found to have RV collapse on TTE consistent with cardiac tamponade. She underwent emergent pericardiocentesis this AM and had pericardial drain placed. CT surgery removed 1L of fluid and placed a pig tail in her left lung. Pleural fluid studies are pending.     Patient was seen at bedside this evening.  She reports that she is feeling much better and she no longer has chest pressure/pain when she breaths. She is able to speak in full sentences and is breathing comfortably. Her weakness is also much improved. She was able to wean oxygen down to 1L after the drains were placed. She is scheduled to receive MRI of the brain tonight to stage her cancer.    Allergies    Levaquin (Diarrhea)    Intolerances    Dilaudid (Other)      ROS: [  ] Fever  [  ] Chills  [  ]Chest Pain [ X ] SOB  [  ]Cough [  ] N/V  [  ] Diarrhea [  ]Constipation [  ]Other ROS:  [ x ] ROS otherwise negative    PAST MEDICAL & SURGICAL HISTORY:  Lung mass  Primary osteoarthritis of left hip  Glaucoma  History of breast cancer: 2015, no chemo/radiation  s/p lumpectomy  Iron deficiency anemia  Retinal tear  Osteoarthritis  Glaucoma  Fistula of vagina: rectovaginal fistula  RBBB  Ptosis: left eye - eye lid surgery 2002  Anxiety  Hyperlipidemia  Osteopenia  Hypertension  Ureterovaginal prolapse  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  S/P hip replacement, right: 6/2016  - right hip  History of lumpectomy of right breast: July 2015  History of cataract surgery, left: and right 2016  Elective surgery: reversal of colostomy July 2015  History of tonsillectomy  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  H/O eye surgery: bilateral ptosis  History of varicose vein stripping: recent vascular evaluation - all normal  History of carpal tunnel release: bilateral      FAMILY HISTORY:  Diabetes mellitus (Father)      MEDICATIONS  (STANDING):  anastrozole 1 milliGRAM(s) Oral daily  aspirin enteric coated 81 milliGRAM(s) Oral daily  DULoxetine 30 milliGRAM(s) Oral daily  enoxaparin Injectable 40 milliGRAM(s) SubCutaneous every 24 hours  ferrous    sulfate 325 milliGRAM(s) Oral daily  lactobacillus acidophilus 1 Tablet(s) Oral two times a day with meals  losartan 50 milliGRAM(s) Oral daily  pantoprazole    Tablet 40 milliGRAM(s) Oral before breakfast  simvastatin 20 milliGRAM(s) Oral at bedtime  sodium chloride 0.9%. 1000 milliLiter(s) (75 mL/Hr) IV Continuous <Continuous>    MEDICATIONS  (PRN):  acetaminophen   Tablet. 650 milliGRAM(s) Oral every 6 hours PRN Mild Pain (1 - 3)  ondansetron Injectable 4 milliGRAM(s) IV Push every 6 hours PRN Nausea      PHYSICAL EXAM  Vital Signs Last 24 Hrs  T(C): 36.4 (10 Aug 2018 04:44), Max: 36.7 (09 Aug 2018 13:09)  T(F): 97.6 (10 Aug 2018 04:44), Max: 98.1 (09 Aug 2018 19:52)  HR: 118 (10 Aug 2018 06:15) (103 - 120)  BP: 122/83 (10 Aug 2018 06:15) (105/73 - 122/83)  BP(mean): --  RR: 20 (10 Aug 2018 05:55) (20 - 22)  SpO2: 97% (10 Aug 2018 05:55) (95% - 98%)    General: Thin female with bitemporal wasting, in no acute distress  HEENT: NC/AT; EOMI, PERRL, sclera nonicteric; external ears normal; no rhinorrhea or epistaxis  CV: NR, RR; no appreciable r/m/g  Lungs: On 1L NC; non labored breathing; speaks in full sentences. R lung CTABL; decreased breath sounds with wheezes on the left lung, left pigtail in place   Abdomen: soft, NTND  Neuro: AAOx3; moving all 4 extremities  Psych: Full affect; mood congruent  Skin: No visible rashes    IMAGING/LABS/PATHOLOGY: I have personally reviewed the relevant labs, pathology, and imaging as noted in the HPI.  In addition,    < from: CT Angio Chest w/ IV Cont (08.08.18 @ 18:25) >  IMPRESSION:  No evidence of pulmonary embolism.    There is a 2.2 x 2.6 x 3.5 cm spiculated mass in the right upper lobe,   concerning for malignancy.     There is narrowing of the left upper lobar pulmonary artery with   associated hypoperfusion of the left upper lobe, suggestive of mass   encasing the left upper lobe pulmonary artery. The exact size is   difficult to determine on this examination. Soft tissue  surrounds the   inferior trachea and bilateral main bronchi, which may represent   conglomerate lymphadenopathy. Right hilar and paratracheal   lymphadenopathy.    Moderate pericardial effusion. Large left pleural effusion.    Indeterminate 1.5 cm left adrenal nodule.    INTERPRETATION:  No evidence of pulmonary embolism. There is a 2.2 x 2.6   x 3.5 cm spiculated mass in the right upper lobe, concerning for   malignancy. Moderate pericardial effusion. Large left pleural effusion.      < end of copied text >                            11.4   10.42 )-----------( 305      ( 10 Aug 2018 07:12 )             35.3     08-10    137  |  101  |  26<H>  ----------------------------<  105<H>  4.9   |  19<L>  |  1.04    Ca    9.5      10 Aug 2018 06:16    TPro  7.2  /  Alb  3.3  /  TBili  0.3  /  DBili  x   /  AST  21  /  ALT  16  /  AlkPhos  104  08-08    PT/INR - ( 08 Aug 2018 16:35 )   PT: 13.3 sec;   INR: 1.22 ratio         PTT - ( 08 Aug 2018 16:35 )  PTT:27.9 sec

## 2018-08-10 NOTE — DIETITIAN INITIAL EVALUATION ADULT. - NS AS NUTRI INTERV MEDICAL AND FOOD SUPPLEMENTS
If cleared for po diet, recommend Prosource x 2 daily for additional 120 kcal, 30 grams protein, and Danactive x 2 daily.

## 2018-08-10 NOTE — CHART NOTE - NSCHARTNOTEFT_GEN_A_CORE
Upon Nutritional Assessment by the Registered Dietitian your patient was determined to meet criteria / has evidence of the following diagnosis/diagnoses:          [ ]  Mild Protein Calorie Malnutrition        [x]  Moderate Protein Calorie Malnutrition        [ ] Severe Protein Calorie Malnutrition        [ ] Unspecified Protein Calorie Malnutrition        [ ] Underweight / BMI <19        [ ] Morbid Obesity / BMI > 40      Findings as based on:  [x] Comprehensive nutrition assessment   [ ] Nutrition Focused Physical Exam  [ ] Other:       Nutrition Plan/Recommendations:      Please refer to RD initial assessment for recommendations and interventions    PROVIDER Section:     By signing this assessment you are acknowledging and agree with the diagnosis/diagnoses assigned by the Registered Dietitian    Comments:

## 2018-08-10 NOTE — PROGRESS NOTE ADULT - ASSESSMENT
85 y/o female with a PMH of OA, osteoporosis, RBBB, hyperlipidemia, early stage breast cancer and now with advanced lung cancer admitted with SOB secondary to pleural/pericardial effusion.     1. Adenocarcinoma Lung  / SOB  - CTA negative for PE, shows combination of pleural effusion and large left hilar mass causing compression of left upper lobe  - outside molecular studies still pending from pleural fluid cytology at [facility] and peripheral blood  - was scheduled for staging PETCT and MRI brain as outpt. MRI head pending.   - S/P left pigtail placement on 8-9-18  - IR to drain moderate pericardial effusion for today on 8-10-18  - will d/w Dr. Sanchez wants Rad Onc evaluation and possible chemo initiated when stable    2. Breast cancer - early stage ER/VA+ Her2- s/p lumpectomy on arimidex  - continue arimidex    3. Dysphagia -  - possibly related to compression or due to the SOB - better  - consider speech and swallow eval    4.  Percardial Effusion / Left Pleural Effusion  -   S/P pigtail on 8-9-18 follow up cytology  -   To have pericardial effusion drain today on 8-10-18 via IR

## 2018-08-10 NOTE — CONSULT NOTE ADULT - ASSESSMENT
Ms Kumari is an 85 yo F with a PMH significant for recent diagnosis of lung adenocarcinoma, R breast CA s/p lumpectomy, and HTN who presented to the ED on 8/8 with progressive SOB and was found to have left lung white out, cardiology consulted for new echo finding of cardiac tamponade. Patient tachycardic but otherwise comfortable and relatively stable.    - MICU eval for transfer to more monitored bed given co-morbidities/malignancy  - CT surgery has already evaluated patient and contacted IR for possible drainage  - IR to drain in am under general anesthesia

## 2018-08-10 NOTE — PROGRESS NOTE ADULT - SUBJECTIVE AND OBJECTIVE BOX
CHIEF COMPLAINT:Patient is a 84y old  Female who presents with a chief complaint of Progressive exertional dyspnea, dry cough for 2 weeks (09 Aug 2018 12:48)  found to have pericardial effusion with tamponade last PM, currently s/p IR drainage    Allergies:  Dilaudid (Other)  Levaquin (Diarrhea)      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  S/P hip replacement, right: 6/2016  - right hip  History of lumpectomy of right breast: July 2015  History of cataract surgery, left: and right 2016  Elective surgery: reversal of colostomy July 2015  History of tonsillectomy  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 No nausea, vomiting, diarrhea or constipation, + low appetite  GENITOURINARY: No dysuria, urinary frequency or urgency, no hematuria  NEUROLOGICAL: No headaches, memory loss, loss of strength, numbness, or tremors  SKIN: No itching, burning, rashes, or lesions   MUSCULOSKELETAL: No joint pain or swelling; No muscle, back, or extremity pain      Medications:  MEDICATIONS  (STANDING):  anastrozole 1 milliGRAM(s) Oral daily  aspirin enteric coated 81 milliGRAM(s) Oral daily  DULoxetine 30 milliGRAM(s) Oral daily  enoxaparin Injectable 40 milliGRAM(s) SubCutaneous every 24 hours  ferrous    sulfate 325 milliGRAM(s) Oral daily  lactobacillus acidophilus 1 Tablet(s) Oral two times a day with meals  losartan 50 milliGRAM(s) Oral daily  pantoprazole    Tablet 40 milliGRAM(s) Oral before breakfast  simvastatin 20 milliGRAM(s) Oral at bedtime  sodium chloride 0.9%. 1000 milliLiter(s) (75 mL/Hr) IV Continuous <Continuous>    MEDICATIONS  (PRN):  acetaminophen   Tablet. 650 milliGRAM(s) Oral every 6 hours PRN Mild Pain (1 - 3)  ondansetron Injectable 4 milliGRAM(s) IV Push every 6 hours PRN Nausea    	    PHYSICAL EXAM:  T(C): 36.4 (08-10-18 @ 04:44), Max: 36.7 (08-09-18 @ 13:09)  HR: 118 (08-10-18 @ 06:15) (103 - 120)  BP: 122/83 (08-10-18 @ 06:15) (105/73 - 122/83)  RR: 20 (08-10-18 @ 05:55) (20 - 22)  SpO2: 97% (08-10-18 @ 05:55) (95% - 98%)  Wt(kg): --  I&O's Summary    09 Aug 2018 07:01  -  10 Aug 2018 07:00  --------------------------------------------------------  IN: 0 mL / OUT: 1230 mL / NET: -1230 mL    10 Aug 2018 07:01  -  10 Aug 2018 09:45  --------------------------------------------------------  IN: 0 mL / OUT: 100 mL / NET: -100 mL      Appearance: Normal	  HEENT:   NCAT, PERRL, EOMI	  Lymphatic: No lymphadenopathy  Cardiovascular: Normal S1 S2, RRR  Respiratory: dec BS L side  Psychiatry: A & O x 3, Mood & affect appropriate  Gastrointestinal:  Soft, Non-tender, + BS  Skin: No rashes, No ecchymoses, No cyanosis	  Neurologic: Non-focal  Extremities: Normal range of motion, No clubbing, cyanosis or edema    LABS:	 	    CARDIAC MARKERS:  CARDIAC MARKERS ( 09 Aug 2018 18:26 )  x     / x     / 36 U/L / x     / 3.0 ng/mL                                11.4   10.42 )-----------( 305      ( 10 Aug 2018 07:12 )             35.3     08-10    137  |  101  |  26<H>  ----------------------------<  105<H>  4.9   |  19<L>  |  1.04    Ca    9.5      10 Aug 2018 06:16    TPro  7.2  /  Alb  3.3  /  TBili  0.3  /  DBili  x   /  AST  21  /  ALT  16  /  AlkPhos  104  08-08    proBNP:   Lipid Profile:   HgA1c:   TSH:

## 2018-08-10 NOTE — CHART NOTE - NSCHARTNOTEFT_GEN_A_CORE
Pt transferred to telemetry floor for noted tamponade on routine Echo. Pt seen and examined at beside, alert and appropriate, noted with slight dyspnea with conversation. Pt denies any lightheadedness, CP, palpitations, SOB at rest. Pt informs she's exhausted, especially walking a few feet drains her out.   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 Found to have large L pleural effusion causing complete atelectasis of L lung. She had 1L thoracentesis 1 week ago as output. CT scan revealed mod pericardial effusion and large pleural effusion pt remained persistently tachycardic and tachypneic. Today 2d echo revealed increased pericardial effusion read as large with  tamponade/RV  collapse.     Vital Signs Last 24 Hrs  T(C): 36.6 (10 Aug 2018 00:18), Max: 36.7 (09 Aug 2018 13:09)  T(F): 97.9 (10 Aug 2018 00:18), Max: 98.1 (09 Aug 2018 19:52)  HR: 110 (10 Aug 2018 00:18) (110 - 120)  BP: 120/81 (10 Aug 2018 00:18) (110/69 - 121/79)  BP(mean): --  RR: 20 (10 Aug 2018 00:18) (20 - 22)  SpO2: 98% (10 Aug 2018 00:18) (95% - 98%)    Thoracic surgery consulted, MICU consulted pt not accepted pt MICU  House Cardiology following pt.   now planned for pericardiocentesis with IR early this AM, unless patient decompensates  Will continue to closely monitor patient.    Simeon Fields, GIN  [id number]

## 2018-08-10 NOTE — PROVIDER CONTACT NOTE (OTHER) - SITUATION
Pt with L pigtail placed at bedside placed to suction by surgery team   drained about 700 cc in less than 1 hour  pt now c/o midsternal chest pain upon inspiration

## 2018-08-11 LAB
ANION GAP SERPL CALC-SCNC: 10 MMOL/L — SIGNIFICANT CHANGE UP (ref 5–17)
BUN SERPL-MCNC: 22 MG/DL — SIGNIFICANT CHANGE UP (ref 7–23)
CALCIUM SERPL-MCNC: 9.3 MG/DL — SIGNIFICANT CHANGE UP (ref 8.4–10.5)
CHLORIDE SERPL-SCNC: 101 MMOL/L — SIGNIFICANT CHANGE UP (ref 96–108)
CO2 SERPL-SCNC: 27 MMOL/L — SIGNIFICANT CHANGE UP (ref 22–31)
CREAT SERPL-MCNC: 0.91 MG/DL — SIGNIFICANT CHANGE UP (ref 0.5–1.3)
GLUCOSE SERPL-MCNC: 104 MG/DL — HIGH (ref 70–99)
HCT VFR BLD CALC: 40.4 % — SIGNIFICANT CHANGE UP (ref 34.5–45)
HGB BLD-MCNC: 12.2 G/DL — SIGNIFICANT CHANGE UP (ref 11.5–15.5)
MCHC RBC-ENTMCNC: 28.4 PG — SIGNIFICANT CHANGE UP (ref 27–34)
MCHC RBC-ENTMCNC: 30.2 GM/DL — LOW (ref 32–36)
MCV RBC AUTO: 94.2 FL — SIGNIFICANT CHANGE UP (ref 80–100)
PLATELET # BLD AUTO: 313 K/UL — SIGNIFICANT CHANGE UP (ref 150–400)
POTASSIUM SERPL-MCNC: 4.6 MMOL/L — SIGNIFICANT CHANGE UP (ref 3.5–5.3)
POTASSIUM SERPL-SCNC: 4.6 MMOL/L — SIGNIFICANT CHANGE UP (ref 3.5–5.3)
RBC # BLD: 4.29 M/UL — SIGNIFICANT CHANGE UP (ref 3.8–5.2)
RBC # FLD: 15.8 % — HIGH (ref 10.3–14.5)
SODIUM SERPL-SCNC: 138 MMOL/L — SIGNIFICANT CHANGE UP (ref 135–145)
WBC # BLD: 12.15 K/UL — HIGH (ref 3.8–10.5)
WBC # FLD AUTO: 12.15 K/UL — HIGH (ref 3.8–10.5)

## 2018-08-11 PROCEDURE — 71250 CT THORAX DX C-: CPT | Mod: 26

## 2018-08-11 PROCEDURE — 70553 MRI BRAIN STEM W/O & W/DYE: CPT | Mod: 26

## 2018-08-11 PROCEDURE — 99233 SBSQ HOSP IP/OBS HIGH 50: CPT

## 2018-08-11 PROCEDURE — 71045 X-RAY EXAM CHEST 1 VIEW: CPT | Mod: 26

## 2018-08-11 PROCEDURE — 93010 ELECTROCARDIOGRAM REPORT: CPT

## 2018-08-11 RX ORDER — METOPROLOL TARTRATE 50 MG
12.5 TABLET ORAL
Qty: 0 | Refills: 0 | Status: DISCONTINUED | OUTPATIENT
Start: 2018-08-11 | End: 2018-08-11

## 2018-08-11 RX ORDER — SODIUM CHLORIDE 9 MG/ML
1000 INJECTION INTRAMUSCULAR; INTRAVENOUS; SUBCUTANEOUS
Qty: 0 | Refills: 0 | Status: DISCONTINUED | OUTPATIENT
Start: 2018-08-11 | End: 2018-08-13

## 2018-08-11 RX ORDER — SENNA PLUS 8.6 MG/1
2 TABLET ORAL AT BEDTIME
Qty: 0 | Refills: 0 | Status: DISCONTINUED | OUTPATIENT
Start: 2018-08-11 | End: 2018-08-15

## 2018-08-11 RX ORDER — DOCUSATE SODIUM 100 MG
100 CAPSULE ORAL THREE TIMES A DAY
Qty: 0 | Refills: 0 | Status: DISCONTINUED | OUTPATIENT
Start: 2018-08-11 | End: 2018-08-15

## 2018-08-11 RX ORDER — METOPROLOL TARTRATE 50 MG
12.5 TABLET ORAL
Qty: 0 | Refills: 0 | Status: DISCONTINUED | OUTPATIENT
Start: 2018-08-11 | End: 2018-08-15

## 2018-08-11 RX ADMIN — Medication 1 TABLET(S): at 17:53

## 2018-08-11 RX ADMIN — OXYCODONE AND ACETAMINOPHEN 1 TABLET(S): 5; 325 TABLET ORAL at 06:11

## 2018-08-11 RX ADMIN — ENOXAPARIN SODIUM 40 MILLIGRAM(S): 100 INJECTION SUBCUTANEOUS at 17:53

## 2018-08-11 RX ADMIN — ANASTROZOLE 1 MILLIGRAM(S): 1 TABLET ORAL at 11:33

## 2018-08-11 RX ADMIN — Medication 12.5 MILLIGRAM(S): at 18:46

## 2018-08-11 RX ADMIN — OXYCODONE AND ACETAMINOPHEN 1 TABLET(S): 5; 325 TABLET ORAL at 12:03

## 2018-08-11 RX ADMIN — Medication 81 MILLIGRAM(S): at 11:33

## 2018-08-11 RX ADMIN — OXYCODONE AND ACETAMINOPHEN 1 TABLET(S): 5; 325 TABLET ORAL at 06:41

## 2018-08-11 RX ADMIN — OXYCODONE AND ACETAMINOPHEN 1 TABLET(S): 5; 325 TABLET ORAL at 11:31

## 2018-08-11 RX ADMIN — Medication 325 MILLIGRAM(S): at 11:34

## 2018-08-11 RX ADMIN — LOSARTAN POTASSIUM 50 MILLIGRAM(S): 100 TABLET, FILM COATED ORAL at 06:11

## 2018-08-11 RX ADMIN — DULOXETINE HYDROCHLORIDE 30 MILLIGRAM(S): 30 CAPSULE, DELAYED RELEASE ORAL at 11:33

## 2018-08-11 RX ADMIN — Medication 1 TABLET(S): at 07:59

## 2018-08-11 RX ADMIN — SODIUM CHLORIDE 75 MILLILITER(S): 9 INJECTION INTRAMUSCULAR; INTRAVENOUS; SUBCUTANEOUS at 18:01

## 2018-08-11 RX ADMIN — PANTOPRAZOLE SODIUM 40 MILLIGRAM(S): 20 TABLET, DELAYED RELEASE ORAL at 06:11

## 2018-08-11 RX ADMIN — OXYCODONE AND ACETAMINOPHEN 1 TABLET(S): 5; 325 TABLET ORAL at 20:30

## 2018-08-11 RX ADMIN — OXYCODONE AND ACETAMINOPHEN 1 TABLET(S): 5; 325 TABLET ORAL at 19:51

## 2018-08-11 NOTE — PROGRESS NOTE ADULT - ASSESSMENT
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 Found to have large L pleural effusion causing complete atelectasis of L lung. She had 1L thoracentesis 1 week ago as outpt. Found to have cardiac tamponade s/p pericardial drain placement 8/10, s/p L pleural drain placed in IR 8/10.

## 2018-08-11 NOTE — PROGRESS NOTE ADULT - SUBJECTIVE AND OBJECTIVE BOX
24H hour events:   No acute events overnight. Pericardial drain continues to put out sanguinous fluid. No chest pain, shortness of breath,     MEDICATIONS:  aspirin enteric coated 81 milliGRAM(s) Oral daily  enoxaparin Injectable 40 milliGRAM(s) SubCutaneous every 24 hours  losartan 50 milliGRAM(s) Oral daily  acetaminophen   Tablet. 650 milliGRAM(s) Oral every 6 hours PRN  DULoxetine 30 milliGRAM(s) Oral daily  ondansetron Injectable 4 milliGRAM(s) IV Push every 6 hours PRN  oxyCODONE    5 mG/acetaminophen 325 mG 1 Tablet(s) Oral every 4 hours PRN  pantoprazole    Tablet 40 milliGRAM(s) Oral before breakfast  simvastatin 20 milliGRAM(s) Oral at bedtime  anastrozole 1 milliGRAM(s) Oral daily  ferrous    sulfate 325 milliGRAM(s) Oral daily        PHYSICAL EXAM:  T(C): 36.3 (08-11-18 @ 05:07), Max: 36.8 (08-10-18 @ 10:15)  HR: 110 (08-11-18 @ 05:07) (99 - 120)  BP: 102/66 (08-11-18 @ 05:07) (93/59 - 151/80)  RR: 16 (08-11-18 @ 05:07) (16 - 18)  SpO2: 93% (08-11-18 @ 05:07) (92% - 98%)  Wt(kg): --    I&O's Summary    09 Aug 2018 07:01  -  10 Aug 2018 07:00  --------------------------------------------------------  IN: 0 mL / OUT: 1230 mL / NET: -1230 mL    10 Aug 2018 07:01  -  11 Aug 2018 06:38  --------------------------------------------------------  IN: 180 mL / OUT: 1346 mL / NET: -1166 mL            Appearance: Normal	  HEENT:   Normal oral mucosa  Lymphatic: No lymphadenopathy  Cardiovascular: Normal S1 S2, No JVD, No murmurs, No edema  Respiratory: Lungs clear to auscultation	  Psychiatry: A & O x 3, Mood & affect appropriate  Gastrointestinal:  Soft, Non-tender, + BS		  Neurologic: Non-focal  Extremities: Warm and well perfused. 2+ pulses bilaterally    Pericardial drain in place, drained 25cc overnight. 150cc yesterday  Chest tube in place-drained 30cc overnight, 670cc yesterday        LABS:	 	    CBC Full  -  ( 10 Aug 2018 07:12 )  WBC Count : 10.42 K/uL  Hemoglobin : 11.4 g/dL  Hematocrit : 35.3 %  Platelet Count - Automated : 305 K/uL  Mean Cell Volume : 92.4 fl  Mean Cell Hemoglobin : 29.8 pg  Mean Cell Hemoglobin Concentration : 32.3 gm/dL  Auto Neutrophil # : 8.13 K/uL  Auto Lymphocyte # : 0.67 K/uL  Auto Monocyte # : 1.09 K/uL  Auto Eosinophil # : 0.46 K/uL  Auto Basophil # : 0.03 K/uL  Auto Neutrophil % : 78.0 %  Auto Lymphocyte % : 6.4 %  Auto Monocyte % : 10.5 %  Auto Eosinophil % : 4.4 %  Auto Basophil % : 0.3 %    08-10    137  |  101  |  26<H>  ----------------------------<  105<H>  4.9   |  19<L>  |  1.04  08-09    134<L>  |  97  |  24<H>  ----------------------------<  91  4.0   |  21<L>  |  0.92    Ca    9.5      10 Aug 2018 06:16  Ca    9.1      09 Aug 2018 06:50        proBNP: Serum Pro-Brain Natriuretic Peptide: 575 pg/mL (08-08 @ 16:35)    TELEMETRY: 	sinus tachycardia 100-110        PREVIOUS DIAGNOSTIC TESTING:    [ ] Echocardiogram:  < from: Transthoracic Echocardiogram (08.09.18 @ 17:44) >  Conclusions:  1. Calcified aortic valve with decreased opening.  2. Normal left ventricular systolic function. No segmental  wall motion abnormalities.  3. Normal right atrium. There is diastolic right atrial  collapse.  4. Normal right ventricular size and function. There is  diastolic RV collapse.  5. Moderate to large circumferential pericardial effusion.  There is RA and RV diastolic collapse with IVC dialation.  There is clear tamponade physiology.  6. Left pleural effusion.  ***Called and discussedresults with nurse Reynolds and MARITO Cruz regarding critical results of large pericardial  effusiopn with tamponade.    < end of copied text >

## 2018-08-11 NOTE — CHART NOTE - NSCHARTNOTEFT_GEN_A_CORE
On Call Cardiology Fellow Event Note    Called by 2 DSU Floor NP because the patient's HR was elevated to the 160s    I asked the NP to obtain a 12 lead ECG and saw the patient at bedside. The ECG obtained revealed sinus tachy @ 116. I reviewed Tele - AFl to the 160s. On exam the pt was breathing comfortably on NC, HR was regular in the 100s by palpation, heart sounds not distant, decreased breath sounds on LLL. 176cc of output from pericardial drain today. Would rec starting low dose Lopressor 12.5 bid - monitor BP closely, d/w floor NP, not a candidate for systemic a/c at this time. Updated family at bedside. d/w Dr. Chavez.    ABDOULAYE Nguyễn  Cardiology Fellow  (pager): [phone]

## 2018-08-11 NOTE — CHART NOTE - NSCHARTNOTEFT_GEN_A_CORE
Called by RN for patient with narrow complex tachycardia on telemetry, sustaining rates 160's to 170's.  Patient seen and examined.  Asymptomatic.  /68, 106's, RR 18, pulse ox 96%, and 98.3.      Patient subsequently converted back to  to 120 after approx. 10 minutes, but reverted back to rapid narrow-complex tachycardia - likely aflutter for brief periods.  Assistance requested from Cardiology fellow.  Will start metoprolol 12.5mG BID as recommended by Cardiology.    Separately, rec'd call from Radiology that patient has new Lt sided pneumothorax (preliminary).  Patient had placement of PleurX catheter placed on 8/9.  This was discussed with Pulmonologist, Dr. Mansfield - advises that Lt pneumothorax is not-new, seen on prior xray - finding represents non-expansion of Lt. Lung s/p fluid removal.  Will await official read.    Hailey Anderson NP  [phone] Called by RN for patient with narrow complex tachycardia on telemetry, sustaining rates 160's to 170's.  Patient seen and examined.  Asymptomatic.  /68, 106's, RR 18, pulse ox 96%, and 98.3.      Patient subsequently converted back to  to 120 after approx. 10 minutes, but reverted back to rapid narrow-complex tachycardia - (likely aflutter) for brief periods.  Assistance requested from Cardiology fellow.  Will start metoprolol 12.5mG PO BID as advised.    Separately, rec'd call from Radiology that patient has new Lt sided pneumothorax (preliminary).  Patient had placement of PleurX catheter placed on 8/9.  This was discussed with Pulmonologist, Dr. Mansfield - advises that Lt pneumothorax is not-new, seen on prior xray - finding represents non-expansion of Lt. Lung s/p fluid removal.  Will await official read.    Hailey Anderson NP  [phone] Called by RN for patient with narrow complex tachycardia on telemetry, sustaining rates 160's to 170's.  Patient seen and examined.  Asymptomatic.  /68, 106's, RR 18, pulse ox 96%, and 98.3.      Patient subsequently converted back to  to 120 after approx. 10 minutes, but reverted back to rapid narrow-complex tachycardia - (likely aflutter) for brief periods.  Assistance requested from Cardiology fellow.  Will start metoprolol 12.5mG PO BID as advised.  Patient is not a candidate for AC 2/2 risk of bleeding.    Separately, rec'd call from Radiology that patient has new Lt sided pneumothorax (preliminary).  Patient had placement of PleurX catheter placed on 8/9.  This was discussed with Pulmonologist, Dr. Mansfield - advises that Lt pneumothorax is not-new, seen on prior xray - finding represents non-expansion of Lt. Lung s/p fluid removal.  Will await official read.    Preceding d/w Medical attending Dr. Aldridge.    Hailey Anderson NP  [phone]

## 2018-08-11 NOTE — PROGRESS NOTE ADULT - SUBJECTIVE AND OBJECTIVE BOX
CHIEF COMPLAINT:Patient is a 84y old  Female who presents with a chief complaint of Progressive exertional dyspnea, dry cough for 2 weeks (09 Aug 2018 12:48)  no acute events    Allergies:  Dilaudid (Other)  Levaquin (Diarrhea)      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  S/P hip replacement, right: 6/2016  - right hip  History of lumpectomy of right breast: July 2015  History of cataract surgery, left: and right 2016  Elective surgery: reversal of colostomy July 2015  History of tonsillectomy  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 No nausea, vomiting, diarrhea or constipation, + low appetite  GENITOURINARY: No dysuria, urinary frequency or urgency, no hematuria  NEUROLOGICAL: No headaches, memory loss, loss of strength, numbness, or tremors  SKIN: No itching, burning, rashes, or lesions   MUSCULOSKELETAL: No joint pain or swelling; No muscle, back, or extremity pain      Medications:  MEDICATIONS  (STANDING):  anastrozole 1 milliGRAM(s) Oral daily  aspirin enteric coated 81 milliGRAM(s) Oral daily  DULoxetine 30 milliGRAM(s) Oral daily  enoxaparin Injectable 40 milliGRAM(s) SubCutaneous every 24 hours  ferrous    sulfate 325 milliGRAM(s) Oral daily  lactobacillus acidophilus 1 Tablet(s) Oral two times a day with meals  losartan 50 milliGRAM(s) Oral daily  pantoprazole    Tablet 40 milliGRAM(s) Oral before breakfast  simvastatin 20 milliGRAM(s) Oral at bedtime    MEDICATIONS  (PRN):  acetaminophen   Tablet. 650 milliGRAM(s) Oral every 6 hours PRN Mild Pain (1 - 3)  ondansetron Injectable 4 milliGRAM(s) IV Push every 6 hours PRN Nausea  oxyCODONE    5 mG/acetaminophen 325 mG 1 Tablet(s) Oral every 4 hours PRN Moderate Pain (4 - 6)    	    PHYSICAL EXAM:  T(C): 36.3 (08-11-18 @ 05:07), Max: 36.8 (08-10-18 @ 10:15)  HR: 110 (08-11-18 @ 05:07) (99 - 120)  BP: 102/66 (08-11-18 @ 05:07) (93/59 - 151/80)  RR: 16 (08-11-18 @ 05:07) (16 - 18)  SpO2: 93% (08-11-18 @ 05:07) (92% - 98%)  Wt(kg): --  I&O's Summary    10 Aug 2018 07:01  -  11 Aug 2018 07:00  --------------------------------------------------------  IN: 180 mL / OUT: 1346 mL / NET: -1166 mL      Appearance: Normal	  HEENT:   NCAT, PERRL, EOMI	  Lymphatic: No lymphadenopathy  Cardiovascular: Normal S1 S2, RRR  Respiratory: dec BS L side  Psychiatry: A & O x 3, Mood & affect appropriate  Gastrointestinal:  Soft, Non-tender, + BS  Skin: No rashes, No ecchymoses, No cyanosis	  Neurologic: Non-focal  Extremities: Normal range of motion, No clubbing, cyanosis or edema    LABS:	 	    CARDIAC MARKERS:  CARDIAC MARKERS ( 09 Aug 2018 18:26 )  x     / x     / 36 U/L / x     / 3.0 ng/mL                                11.4   10.42 )-----------( 305      ( 10 Aug 2018 07:12 )             35.3     08-11    138  |  101  |  22  ----------------------------<  104<H>  4.6   |  27  |  0.91    Ca    9.3      11 Aug 2018 06:52      proBNP:   Lipid Profile:   HgA1c:   TSH:

## 2018-08-11 NOTE — PROGRESS NOTE ADULT - ASSESSMENT
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 Found to have large L pleural effusion causing complete atelectasis of L lung. She had 1L thoracentesis 1 week ago as outpt.      Problem/Plan - 1:  ·  Problem: Pleural effusion.  Plan: s/p L pigtail by CTS, monitor output, plan for pleurex on Monday     Problem/Plan - 2:  ·  Problem: Adenocarcinoma of lung, unspecified laterality.  Plan: Apparent stage 4 disease. Drainage of Pleural effusion, Onc appreciated  MRI brain pending  RadOnc appreciated, plan for palliative radiation     Problem/Plan - 3:  ·  Problem: Dysphagia, unspecified type.  Plan: See above.  Will assume aspiration risk.   S/S.   Nutrition evaluation.      Problem/Plan - 4:  ·  Problem: DVT prophylaxis    Pericardial effusion w/tamponade - likely malignant, Cardio, CTS, MICU, IR appreciated, now s/p drainage, repeat TTE Monday

## 2018-08-11 NOTE — PROGRESS NOTE ADULT - ASSESSMENT
85 yo F with a PMH significant for recent diagnosis of lung adenocarcinoma, R breast CA s/p lumpectomy, and HTN who presented to the ED on 8/8 with progressive SOB and was found to have left lung white out, found to have large pericardial effusion complicated by pericardial tamponade now s/p pericardial drain.     1. Tamponade  - likely malignant given active malignancy and bloody effusion. Pericardial drain in place and continues to drain significantly. Hemodynamically stable but continues to be tachycardic. Would recommend IV hydration. LV function appeared normal on echocardiogram.  - Will continue to monitor output from pericardial drain. When there is no more output, will D/C. Should repeat echo following drain removal to evaluate for reaccumulation and status of the effusion    Call [phone] with any questions

## 2018-08-11 NOTE — PROGRESS NOTE ADULT - PROBLEM SELECTOR PLAN 2
s/p L pigtail without improvement in aeration of L lung  -Will d/w thoracic if she would benefit from Pleurex. L lung atelectasis likely mostly 2nd proximal hilar mass causing extrinsic compression on proximal airway

## 2018-08-11 NOTE — PROGRESS NOTE ADULT - ASSESSMENT
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 Found to have large L pleural effusion causing complete atelectasis of L lung. She had 1L thoracentesis 1 week ago as output. CT scan revealed mod pericardial effusion and large pleural effusion pt remained persistently tachycardic and tachypneic 2d echo revealed increased pericardial effusion read as large with  tamponade/RV  collapse. Thoracic surgery consulted.   8/10 left pigtail by surgical resident 1L fluid out. Small PTX on CXR, pt asymptomatic, will continue to monitor. Percardiocentesis in  cc of dark red fluid removed.   8/11: Drains functioning well. CT scan Noted, No pleurix needed. D/w Dr. Mancini

## 2018-08-11 NOTE — PROGRESS NOTE ADULT - SUBJECTIVE AND OBJECTIVE BOX
Follow-up Pulm Progress Note    Resting comfortably, states dyspnea improved   95% on 2L NC  -175cc serosang drainage from pericardial drain  -670cc from L pleural drain, serosang     Medications:  MEDICATIONS  (STANDING):  anastrozole 1 milliGRAM(s) Oral daily  aspirin enteric coated 81 milliGRAM(s) Oral daily  DULoxetine 30 milliGRAM(s) Oral daily  enoxaparin Injectable 40 milliGRAM(s) SubCutaneous every 24 hours  ferrous    sulfate 325 milliGRAM(s) Oral daily  lactobacillus acidophilus 1 Tablet(s) Oral two times a day with meals  losartan 50 milliGRAM(s) Oral daily  pantoprazole    Tablet 40 milliGRAM(s) Oral before breakfast  simvastatin 20 milliGRAM(s) Oral at bedtime    MEDICATIONS  (PRN):  acetaminophen   Tablet. 650 milliGRAM(s) Oral every 6 hours PRN Mild Pain (1 - 3)  ondansetron Injectable 4 milliGRAM(s) IV Push every 6 hours PRN Nausea  oxyCODONE    5 mG/acetaminophen 325 mG 1 Tablet(s) Oral every 4 hours PRN Moderate Pain (4 - 6)          Vital Signs Last 24 Hrs  T(C): 36.3 (11 Aug 2018 05:07), Max: 36.8 (11 Aug 2018 00:44)  T(F): 97.4 (11 Aug 2018 05:07), Max: 98.2 (11 Aug 2018 00:44)  HR: 110 (11 Aug 2018 05:07) (102 - 120)  BP: 102/66 (11 Aug 2018 05:07) (93/59 - 114/72)  BP(mean): --  RR: 16 (11 Aug 2018 05:07) (16 - 16)  SpO2: 93% (11 Aug 2018 05:07) (92% - 98%) on 2L NC          08-10 @ 07:01  -  08-11 @ 07:00  --------------------------------------------------------  IN: 180 mL / OUT: 1346 mL / NET: -1166 mL          LABS:                        12.2   12.15 )-----------( 313      ( 11 Aug 2018 08:42 )             40.4     08-11    138  |  101  |  22  ----------------------------<  104<H>  4.6   |  27  |  0.91    Ca    9.3      11 Aug 2018 06:52        CARDIAC MARKERS ( 09 Aug 2018 18:26 )  x     / x     / 36 U/L / x     / 3.0 ng/mL      CAPILLARY BLOOD GLUCOSE          Urinalysis Basic - ( 09 Aug 2018 17:36 )    Color: x / Appearance: x / SG: x / pH: x  Gluc: x / Ketone: x  / Bili: x / Urobili: x   Blood: x / Protein: x / Nitrite: x   Leuk Esterase: x / RBC: 0-2 /HPF / WBC 11-25 /HPF   Sq Epi: x / Non Sq Epi: OCC /HPF / Bacteria: x        Serum Pro-Brain Natriuretic Peptide: 575 pg/mL (08-08-18 @ 16:35)            Fluid characteristics  -- 08-10 @ 10:01  pH 7.34    tprot <0.6    Cell count  Appearance Bloody  Fluid type --  BF lymph 10  color Red  eosinophil --  PMN 13  Mesothelial --  Monocyte 6  Other body cells 71      CULTURES: (if applicable)  Culture Results:   No growth (08-10 @ 12:16)    Most recent blood culture -- 08-10 @ 12:16   -- -- .Body Fluid Pericardial Fluid 08-10 @ 12:16    Blood culture 08-10 @ 12:16  --    Few polymorphonuclear leukocytes seen per low power field  No organisms seen per oil power field  --  --  --    Urine culture    -->      Physical Examination:  PULM: Decreased BS L  CVS: S1, S2 heard    RADIOLOGY REVIEWED  CXR: Complete atelectasis of L lung with L apical PTX  Pericardial drain and L pleural drain in place

## 2018-08-11 NOTE — PROGRESS NOTE ADULT - PROBLEM SELECTOR PLAN 1
Exudative bloody pericardial effusion  -Likely malignant   -c/w pericardial drain  -Repeat TTE on Monday

## 2018-08-11 NOTE — PROGRESS NOTE ADULT - PROBLEM SELECTOR PLAN 2
s/p left pigtail by surgical resident   Maintain to low wall suction  Strict I & Os, check daily CXR  Follow up pleural fluid cultures and pathocytology  OOB to chair, ambulate as tolerated  Continue management per primary team, will follow  CT scan repeated No indication for pleurix at this time.

## 2018-08-12 LAB
ANION GAP SERPL CALC-SCNC: 10 MMOL/L — SIGNIFICANT CHANGE UP (ref 5–17)
ANION GAP SERPL CALC-SCNC: 11 MMOL/L — SIGNIFICANT CHANGE UP (ref 5–17)
BUN SERPL-MCNC: 23 MG/DL — SIGNIFICANT CHANGE UP (ref 7–23)
BUN SERPL-MCNC: 23 MG/DL — SIGNIFICANT CHANGE UP (ref 7–23)
CALCIUM SERPL-MCNC: 8.7 MG/DL — SIGNIFICANT CHANGE UP (ref 8.4–10.5)
CALCIUM SERPL-MCNC: 8.9 MG/DL — SIGNIFICANT CHANGE UP (ref 8.4–10.5)
CHLORIDE SERPL-SCNC: 102 MMOL/L — SIGNIFICANT CHANGE UP (ref 96–108)
CHLORIDE SERPL-SCNC: 103 MMOL/L — SIGNIFICANT CHANGE UP (ref 96–108)
CO2 SERPL-SCNC: 23 MMOL/L — SIGNIFICANT CHANGE UP (ref 22–31)
CO2 SERPL-SCNC: 25 MMOL/L — SIGNIFICANT CHANGE UP (ref 22–31)
CREAT SERPL-MCNC: 0.95 MG/DL — SIGNIFICANT CHANGE UP (ref 0.5–1.3)
CREAT SERPL-MCNC: 0.98 MG/DL — SIGNIFICANT CHANGE UP (ref 0.5–1.3)
GLUCOSE SERPL-MCNC: 111 MG/DL — HIGH (ref 70–99)
GLUCOSE SERPL-MCNC: 112 MG/DL — HIGH (ref 70–99)
HCT VFR BLD CALC: 37.9 % — SIGNIFICANT CHANGE UP (ref 34.5–45)
HGB BLD-MCNC: 11.8 G/DL — SIGNIFICANT CHANGE UP (ref 11.5–15.5)
MAGNESIUM SERPL-MCNC: 1.7 MG/DL — SIGNIFICANT CHANGE UP (ref 1.6–2.6)
MCHC RBC-ENTMCNC: 29.1 PG — SIGNIFICANT CHANGE UP (ref 27–34)
MCHC RBC-ENTMCNC: 31.1 GM/DL — LOW (ref 32–36)
MCV RBC AUTO: 93.3 FL — SIGNIFICANT CHANGE UP (ref 80–100)
PHOSPHATE SERPL-MCNC: 2.9 MG/DL — SIGNIFICANT CHANGE UP (ref 2.5–4.5)
PLATELET # BLD AUTO: 265 K/UL — SIGNIFICANT CHANGE UP (ref 150–400)
POTASSIUM SERPL-MCNC: 4.1 MMOL/L — SIGNIFICANT CHANGE UP (ref 3.5–5.3)
POTASSIUM SERPL-MCNC: 4.2 MMOL/L — SIGNIFICANT CHANGE UP (ref 3.5–5.3)
POTASSIUM SERPL-SCNC: 4.1 MMOL/L — SIGNIFICANT CHANGE UP (ref 3.5–5.3)
POTASSIUM SERPL-SCNC: 4.2 MMOL/L — SIGNIFICANT CHANGE UP (ref 3.5–5.3)
RBC # BLD: 4.06 M/UL — SIGNIFICANT CHANGE UP (ref 3.8–5.2)
RBC # FLD: 15.8 % — HIGH (ref 10.3–14.5)
SODIUM SERPL-SCNC: 137 MMOL/L — SIGNIFICANT CHANGE UP (ref 135–145)
SODIUM SERPL-SCNC: 137 MMOL/L — SIGNIFICANT CHANGE UP (ref 135–145)
WBC # BLD: 9.21 K/UL — SIGNIFICANT CHANGE UP (ref 3.8–10.5)
WBC # FLD AUTO: 9.21 K/UL — SIGNIFICANT CHANGE UP (ref 3.8–10.5)

## 2018-08-12 PROCEDURE — 99233 SBSQ HOSP IP/OBS HIGH 50: CPT

## 2018-08-12 PROCEDURE — 93010 ELECTROCARDIOGRAM REPORT: CPT

## 2018-08-12 RX ADMIN — Medication 1 TABLET(S): at 08:42

## 2018-08-12 RX ADMIN — Medication 650 MILLIGRAM(S): at 17:51

## 2018-08-12 RX ADMIN — Medication 325 MILLIGRAM(S): at 11:33

## 2018-08-12 RX ADMIN — SENNA PLUS 2 TABLET(S): 8.6 TABLET ORAL at 00:06

## 2018-08-12 RX ADMIN — OXYCODONE AND ACETAMINOPHEN 1 TABLET(S): 5; 325 TABLET ORAL at 00:42

## 2018-08-12 RX ADMIN — DULOXETINE HYDROCHLORIDE 30 MILLIGRAM(S): 30 CAPSULE, DELAYED RELEASE ORAL at 11:33

## 2018-08-12 RX ADMIN — ENOXAPARIN SODIUM 40 MILLIGRAM(S): 100 INJECTION SUBCUTANEOUS at 17:11

## 2018-08-12 RX ADMIN — ANASTROZOLE 1 MILLIGRAM(S): 1 TABLET ORAL at 11:33

## 2018-08-12 RX ADMIN — SIMVASTATIN 20 MILLIGRAM(S): 20 TABLET, FILM COATED ORAL at 21:27

## 2018-08-12 RX ADMIN — Medication 100 MILLIGRAM(S): at 05:45

## 2018-08-12 RX ADMIN — SIMVASTATIN 20 MILLIGRAM(S): 20 TABLET, FILM COATED ORAL at 00:06

## 2018-08-12 RX ADMIN — Medication 100 MILLIGRAM(S): at 17:11

## 2018-08-12 RX ADMIN — Medication 12.5 MILLIGRAM(S): at 17:11

## 2018-08-12 RX ADMIN — Medication 100 MILLIGRAM(S): at 00:06

## 2018-08-12 RX ADMIN — Medication 81 MILLIGRAM(S): at 11:33

## 2018-08-12 RX ADMIN — SODIUM CHLORIDE 75 MILLILITER(S): 9 INJECTION INTRAMUSCULAR; INTRAVENOUS; SUBCUTANEOUS at 00:06

## 2018-08-12 RX ADMIN — Medication 100 MILLIGRAM(S): at 21:27

## 2018-08-12 RX ADMIN — Medication 12.5 MILLIGRAM(S): at 05:45

## 2018-08-12 RX ADMIN — PANTOPRAZOLE SODIUM 40 MILLIGRAM(S): 20 TABLET, DELAYED RELEASE ORAL at 05:45

## 2018-08-12 RX ADMIN — Medication 650 MILLIGRAM(S): at 17:11

## 2018-08-12 RX ADMIN — SENNA PLUS 2 TABLET(S): 8.6 TABLET ORAL at 21:27

## 2018-08-12 RX ADMIN — Medication 1 TABLET(S): at 17:12

## 2018-08-12 RX ADMIN — OXYCODONE AND ACETAMINOPHEN 1 TABLET(S): 5; 325 TABLET ORAL at 00:16

## 2018-08-12 NOTE — PROGRESS NOTE ADULT - PROBLEM SELECTOR PLAN 2
s/p L pigtail with slight aeration of LUANN   -Will d/w thoracic if she would benefit from Pleurex. L lung atelectasis likely mostly 2nd proximal hilar mass causing extrinsic compression on proximal airway

## 2018-08-12 NOTE — PROGRESS NOTE ADULT - SUBJECTIVE AND OBJECTIVE BOX
Chief Complaint:  fu    History of Present Illness:  The patient overall is weak, but reports breathing better.  She has no chest pain.  No pleuritic chest pain.  pain from tubes relieved with percocet prn.  No fevers.  No diarrhea.  No constipation, but had not had recent BM.  She has some nausea.  No vomiting.  No leg swelling.  No calf pain.  No obvious active bleeding.  Remainder ROS is stable.       MEDICATIONS  (STANDING):  anastrozole 1 milliGRAM(s) Oral daily  aspirin enteric coated 81 milliGRAM(s) Oral daily  docusate sodium 100 milliGRAM(s) Oral three times a day  DULoxetine 30 milliGRAM(s) Oral daily  enoxaparin Injectable 40 milliGRAM(s) SubCutaneous every 24 hours  ferrous    sulfate 325 milliGRAM(s) Oral daily  lactobacillus acidophilus 1 Tablet(s) Oral two times a day with meals  losartan 50 milliGRAM(s) Oral daily  metoprolol tartrate 12.5 milliGRAM(s) Oral two times a day  pantoprazole    Tablet 40 milliGRAM(s) Oral before breakfast  senna 2 Tablet(s) Oral at bedtime  simvastatin 20 milliGRAM(s) Oral at bedtime  sodium chloride 0.9%. 1000 milliLiter(s) (75 mL/Hr) IV Continuous <Continuous>    MEDICATIONS  (PRN):  acetaminophen   Tablet. 650 milliGRAM(s) Oral every 6 hours PRN Mild Pain (1 - 3)  ondansetron Injectable 4 milliGRAM(s) IV Push every 6 hours PRN Nausea  oxyCODONE    5 mG/acetaminophen 325 mG 1 Tablet(s) Oral every 4 hours PRN Moderate Pain (4 - 6)      Allergies    Levaquin (Diarrhea)    Intolerances    Dilaudid (Other)      Vital Signs Last 24 Hrs  T(C): 36.3 (12 Aug 2018 08:33), Max: 37.1 (12 Aug 2018 06:36)  T(F): 97.3 (12 Aug 2018 08:33), Max: 98.7 (12 Aug 2018 06:36)  HR: 85 (12 Aug 2018 08:33) (72 - 150)  BP: 93/57 (12 Aug 2018 08:33) (93/57 - 107/68)  BP(mean): --  RR: 16 (12 Aug 2018 08:33) (16 - 18)  SpO2: 97% (12 Aug 2018 08:33) (96% - 98%)    PHYSICAL EXAM  General: weak, NAD, sitting up in chair  HEENT: clear oropharynx, anicteric sclera, pink conjunctiva  Neck: supple  CV: normal S1/S2   Lungs: decreases left base, but improved  Abdomen: soft non-tender non-distended, positive bowel sounds  Ext: no edema  Skin: no rashes and no petechiae  Lymph Nodes: No LAD in neck  Neuro: alert and oriented X 3, no focal deficits    LABS:                          11.8   9.21  )-----------( 265      ( 12 Aug 2018 08:11 )             37.9         Mean Cell Volume : 93.3 fl  Mean Cell Hemoglobin : 29.1 pg  Mean Cell Hemoglobin Concentration : 31.1 gm/dL  Auto Neutrophil # : x  Auto Lymphocyte # : x  Auto Monocyte # : x  Auto Eosinophil # : x  Auto Basophil # : x  Auto Neutrophil % : x  Auto Lymphocyte % : x  Auto Monocyte % : x  Auto Eosinophil % : x  Auto Basophil % : x      Serial CBC's  08-12 @ 08:11  Hct-37.9 / Hgb-11.8 / Plat-265 / RBC-4.06 / WBC-9.21  Serial CBC's  08-11 @ 08:42  Hct-40.4 / Hgb-12.2 / Plat-313 / RBC-4.29 / WBC-12.15  Serial CBC's  08-10 @ 07:12  Hct-35.3 / Hgb-11.4 / Plat-305 / RBC-3.82 / WBC-10.42  Serial CBC's  08-09 @ 08:29  Hct-32.3 / Hgb-10.2 / Plat-306 / RBC-3.53 / WBC-9.22  Serial CBC's  08-08 @ 16:35  Hct-33.1 / Hgb-11.2 / Plat-324 / RBC-3.62 / WBC-9.5      08-12    137  |  102  |  23  ----------------------------<  111<H>  4.2   |  25  |  0.98    Ca    8.9      12 Aug 2018 06:27  Phos  2.9     08-12  Mg     1.7     08-12

## 2018-08-12 NOTE — PROGRESS NOTE ADULT - ASSESSMENT
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 Found to have large L pleural effusion causing complete atelectasis of L lung. She had 1L thoracentesis 1 week ago as outpt.      Problem/Plan - 1:  ·  Problem: Pleural effusion.  Plan: s/p L pigtail by CTS, monitor output, plan for pleurex on Monday     Problem/Plan - 2:  ·  Problem: Adenocarcinoma of lung, unspecified laterality.  Plan: Apparent stage 4 disease. Drainage of Pleural effusion, Onc appreciated  MRI brain pending  RadOnc appreciated, plan for palliative radiation     Problem/Plan - 3:  ·  Problem: Dysphagia, unspecified type.  Plan: See above.  Will assume aspiration risk.   S/S.   Nutrition evaluation.      Problem/Plan - 4:  ·  Problem: DVT prophylaxis    Pericardial effusion w/tamponade - likely malignant, Cardio, CTS, MICU, IR appreciated, now s/p drainage, repeat TTE Monday    Tachycardia - sinus tach, Cardio appreciated, improved w/hydration and BB    Small L pneumothorax - likely post-procedure, CTS and Pulm to comment

## 2018-08-12 NOTE — CHART NOTE - NSCHARTNOTEFT_GEN_A_CORE
MEDICINE PA     Event Summary: Notified by RN that patient with tachycardia up to 160s. Pt examined at bedside by me. Sleeping comfortably in bed. States that she feels much better. Denies CP, palpitations, dizziness, SOB, pleuritic pain, hemoptysis, back pain, pain at drain/pigtail catheter site, chills, rigors.    Vital Signs Last 24 Hrs  T(F): 97.6  HR: Palpated at bedside 100-108, irregular  BP: 101/63  RR: 18   SpO2: 97%     Physical Assessment:  General: Awake, No acute distress.   Neurology: non focal  CV: +S1S2 +tachycardia Manual HR at bedside 100-108  Respiratory: Even, unlabored.      A/P:  HPI:                Norma Coffman PA-C   [facility]     Event Summary: Notified by RN that patient with tachycardia up to 160s. Pt examined at bedside by me. Sleeping comfortably in bed. States that she feels much better. Denies CP, palpitations, dizziness, SOB, pleuritic pain, hemoptysis, back pain, pain at drain/pigtail catheter site, chills, rigors.    Vital Signs  T(F): 97.6  HR: Palpated at bedside 100-108, irregular  BP: 101/63  RR: 18   SpO2: 97%     Physical Assessment:  General: Awake, No acute distress.   Neurology: non focal  CV: +S1S2 +tachycardia Manual HR at bedside 100-108  Respiratory: Even, unlabored.  No tachypnea    A/P:  HPI: 83 y/o F with PMH of recently diagnosed advanced Lung CA with L hilar mass, L malignant pleural effusion (per family) still awaiting molecular stains and not yet started on therapy, RIGHT breast CA, HTN presents with worsened shortness of breath and weakness x 1 day. Found to have large L pleural effusion causing complete atelectasis of L lung. She had 1L thoracentesis 1 week ago as output. CT scan revealed mod pericardial effusion and large pleural effusion pt remained persistently tachycardic and tachypneic 2d echo revealed increased pericardial effusion read as large with  tamponade/RV  collapse.   S/p  left pigtail 8/10 1L fluid out.  Pericardiocentesis in  cc of dark red fluid removed. Small PTX on CXR, pt asymptomatic, being monitored. Acutely presenting with recurrent tachycardia     1. Tachycardia   - Tele reading at 130s with occasional increase to 160s (unsustained)   - EKG showing sinus tachycardia with premature atrial complexes   - BMP, Mag, phosph stat ordered   - CXR performed, please follow up official read   - No output from pericardial drain overnight, with 50 cc output during the day.  - HD stable, +strong pulses in b/l UE, heart sounds not distant. Continue to monitor for signs/sx reaccumulation of pericardial fluid   - Consider increase in beta blocker if recurrent episodes of a. flutter   - Discussed with CCU fellow Delma overnight, discussed with thoracic surgery NP Radha Grier   - Continue close monitoring of clinical status, vital signs. Endorsed to primary team in AM. Attending to follow.     Norma Coffman PA-C    Department of Medicine   [facility] [phone]

## 2018-08-12 NOTE — PROGRESS NOTE ADULT - SUBJECTIVE AND OBJECTIVE BOX
Cardiology Progress Note    Chief Complaint:  pericardial effusion with tamponade s/p pericardial drain; asymptomatic atrial tachyarrhythmia    Interval Events:  Episodes of PAT/ST with PAC's overnight. Asymptomatic. BP's stable with tachyarrhythmia.  chest tube and pericardial drain still with output.  No dizziness with metoprolol.     MEDICATIONS:  aspirin enteric coated 81 milliGRAM(s) Oral daily  enoxaparin Injectable 40 milliGRAM(s) SubCutaneous every 24 hours  losartan 50 milliGRAM(s) Oral daily  metoprolol tartrate 12.5 milliGRAM(s) Oral two times a day  acetaminophen   Tablet. 650 milliGRAM(s) Oral every 6 hours PRN  DULoxetine 30 milliGRAM(s) Oral daily  ondansetron Injectable 4 milliGRAM(s) IV Push every 6 hours PRN  oxyCODONE    5 mG/acetaminophen 325 mG 1 Tablet(s) Oral every 4 hours PRN  docusate sodium 100 milliGRAM(s) Oral three times a day  pantoprazole    Tablet 40 milliGRAM(s) Oral before breakfast  senna 2 Tablet(s) Oral at bedtime  simvastatin 20 milliGRAM(s) Oral at bedtime  anastrozole 1 milliGRAM(s) Oral daily  ferrous    sulfate 325 milliGRAM(s) Oral daily  sodium chloride 0.9%. 1000 milliLiter(s) IV Continuous <Continuous>    PHYSICAL EXAM:  T(C): 36.3 (08-12-18 @ 08:33), Max: 37.1 (08-12-18 @ 06:36)  HR: 85 (08-12-18 @ 08:33) (72 - 150)  BP: 93/57 (08-12-18 @ 08:33) (93/57 - 107/68)  RR: 16 (08-12-18 @ 08:33) (16 - 18)  SpO2: 97% (08-12-18 @ 08:33) (96% - 98%)  Wt(kg): --  I&O's Summary    11 Aug 2018 07:01  -  12 Aug 2018 07:00  --------------------------------------------------------  IN: 1305 mL / OUT: 897 mL / NET: 408 mL    Daily     Daily     Appearance: No distress  HEENT:  mmm  Cardiovascular: Normal S1 S2, rrr. pericardial drain present  Respiratory: Lungs decreased at the bases. chest tube present.  Psychiatry: A & O x 3, Mood & affect appropriate  Gastrointestinal:  soft nt  Skin: No rashes  Neurologic: grossly nonfocal  Extremities: No cyanosis    LABS:	 	  CBC Full  -  ( 12 Aug 2018 08:11 )  WBC Count : 9.21 K/uL  Hemoglobin : 11.8 g/dL  Hematocrit : 37.9 %  Platelet Count - Automated : 265 K/uL    08-12  137  |  102  |  23  ----------------------------<  111<H>  4.2   |  25  |  0.98    08-11  138  |  101  |  22  ----------------------------<  104<H>  4.6   |  27  |  0.91    Ca    8.9      12 Aug 2018 06:27  Ca    9.3      11 Aug 2018 06:52  Phos  2.9     08-12  Mg     1.7     08-12    TELEMETRY: 	  ST, episodes of PAT, ST with PAC's

## 2018-08-12 NOTE — PROGRESS NOTE ADULT - ASSESSMENT
85 y/o female with a PMH of OA, osteoporosis, RBBB, hyperlipidemia, early stage breast cancer and now with advanced lung cancer admitted with SOB secondary to pleural/pericardial effusion.     1. Adenocarcinoma Lung  / SOB  - CTA negative for PE, shows combination of pleural effusion and large left hilar mass causing compression of left upper lobe  - outside molecular studies still pending from pleural fluid cytology at [facility] and peripheral blood - will hopefully be back tomorrow  - was scheduled for staging PETCT and MRI brain as outpt. MRI head pending.   - S/P left pigtail placement on 8-9-18 as well as pericardiocentesis for moderate pericardial effusion on 8-10-18  - Rad Onc evaluation appreciated - will hold off on palliative radiation for now as she feels better after drainage of pleural and pericardial fluid   - will d/w Dr. Sanchez and decide on systemic treatment plan once molecualr studies come back and when patient more stable    2. Breast cancer - early stage ER/HI+ Her2- s/p lumpectomy on arimidex  - continue arimidex    3. Dysphagia -  - possibly related to compression or due to the SOB - better  - consider speech and swallow eval    4. pain / constipation   - cont percocet for pain   - add stool softeners if constipation persists

## 2018-08-12 NOTE — PROGRESS NOTE ADULT - PROBLEM SELECTOR PLAN 1
Exudative bloody pericardial effusion  -Likely malignant   -f/u cyto  -c/w pericardial drain  -Repeat TTE on Monday

## 2018-08-12 NOTE — PROGRESS NOTE ADULT - ASSESSMENT
83 yo F with a PMH significant for recent diagnosis of lung adenocarcinoma, R breast CA s/p lumpectomy, and HTN who presented to the ED on 8/8 with progressive SOB and was found to have left lung white out, found to have large pericardial effusion complicated by pericardial tamponade now s/p pericardial drain.     1. Tamponade  - likely malignant given active malignancy and bloody effusion. Pericardial drain in place and continues to drain significantly. Hemodynamically stable but continues to be tachycardic. Would recommend IV hydration. LV function appeared normal on echocardiogram.  - Will continue to monitor output from pericardial drain. When there is no more output, will D/C. Should repeat echo following drain removal to evaluate for reaccumulation and status of the effusion    2. Atrial tachyarrhythmia - likely irritation from pericardial drain/underlying pathology of pericardial effusion  -cont metoprolol 12.5mg BID for now. if continues to have significant tachycardia, consider discontinuing losartan in favor of uptitration of betablocker  -not candidate for systemic AC at this time; may need to consider when invasive procedures are no longer planned.

## 2018-08-12 NOTE — PROGRESS NOTE ADULT - ASSESSMENT
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 Found to have large L pleural effusion causing complete atelectasis of L lung. She had 1L thoracentesis 1 week ago as outpt. Found to have cardiac tamponade s/p pericardial drain placement 8/10, s/p L pleural drain placed in IR 8/10.

## 2018-08-12 NOTE — PROGRESS NOTE ADULT - SUBJECTIVE AND OBJECTIVE BOX
CHIEF COMPLAINT:Patient is a 84y old  Female who presents with a chief complaint of Progressive exertional dyspnea, dry cough for 2 weeks (09 Aug 2018 12:48)  tachy overnight, new small L pneumothorax on CT    Allergies:  Dilaudid (Other)  Levaquin (Diarrhea)      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  S/P hip replacement, right: 6/2016  - right hip  History of lumpectomy of right breast: July 2015  History of cataract surgery, left: and right 2016  Elective surgery: reversal of colostomy July 2015  History of tonsillectomy  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 No nausea, vomiting, diarrhea or constipation, + low appetite  GENITOURINARY: No dysuria, urinary frequency or urgency, no hematuria  NEUROLOGICAL: No headaches, memory loss, loss of strength, numbness, or tremors  SKIN: No itching, burning, rashes, or lesions   MUSCULOSKELETAL: No joint pain or swelling; No muscle, back, or extremity pain      Medications:  MEDICATIONS  (STANDING):  anastrozole 1 milliGRAM(s) Oral daily  aspirin enteric coated 81 milliGRAM(s) Oral daily  docusate sodium 100 milliGRAM(s) Oral three times a day  DULoxetine 30 milliGRAM(s) Oral daily  enoxaparin Injectable 40 milliGRAM(s) SubCutaneous every 24 hours  ferrous    sulfate 325 milliGRAM(s) Oral daily  lactobacillus acidophilus 1 Tablet(s) Oral two times a day with meals  losartan 50 milliGRAM(s) Oral daily  metoprolol tartrate 12.5 milliGRAM(s) Oral two times a day  pantoprazole    Tablet 40 milliGRAM(s) Oral before breakfast  senna 2 Tablet(s) Oral at bedtime  simvastatin 20 milliGRAM(s) Oral at bedtime  sodium chloride 0.9%. 1000 milliLiter(s) (75 mL/Hr) IV Continuous <Continuous>    MEDICATIONS  (PRN):  acetaminophen   Tablet. 650 milliGRAM(s) Oral every 6 hours PRN Mild Pain (1 - 3)  ondansetron Injectable 4 milliGRAM(s) IV Push every 6 hours PRN Nausea  oxyCODONE    5 mG/acetaminophen 325 mG 1 Tablet(s) Oral every 4 hours PRN Moderate Pain (4 - 6)    	    PHYSICAL EXAM:  T(C): 36.3 (08-12-18 @ 08:33), Max: 37.1 (08-12-18 @ 06:36)  HR: 85 (08-12-18 @ 08:33) (72 - 150)  BP: 93/57 (08-12-18 @ 08:33) (93/57 - 107/68)  RR: 16 (08-12-18 @ 08:33) (16 - 18)  SpO2: 97% (08-12-18 @ 08:33) (96% - 98%)  Wt(kg): --  I&O's Summary    11 Aug 2018 07:01  -  12 Aug 2018 07:00  --------------------------------------------------------  IN: 1305 mL / OUT: 897 mL / NET: 408 mL      Appearance: Normal	  HEENT:   NCAT, PERRL, EOMI	  Lymphatic: No lymphadenopathy  Cardiovascular: Normal S1 S2, RRR  Respiratory: dec BS L side  Psychiatry: A & O x 3, Mood & affect appropriate  Gastrointestinal:  Soft, Non-tender, + BS  Skin: No rashes, No ecchymoses, No cyanosis	  Neurologic: Non-focal  Extremities: Normal range of motion, No clubbing, cyanosis or edema    LABS:	 	    CARDIAC MARKERS:                                11.8   9.21  )-----------( 265      ( 12 Aug 2018 08:11 )             37.9     08-12    137  |  102  |  23  ----------------------------<  111<H>  4.2   |  25  |  0.98    Ca    8.9      12 Aug 2018 06:27  Phos  2.9     08-12  Mg     1.7     08-12      proBNP:   Lipid Profile:   HgA1c:   TSH:

## 2018-08-12 NOTE — PROGRESS NOTE ADULT - SUBJECTIVE AND OBJECTIVE BOX
Subjective: Pt states" " Denies any CP, SOB, palpitations. No acute events overnight.    Vital Signs:  Vital Signs Last 24 Hrs  T(C): 36.6 (08-12-18 @ 13:17), Max: 37.1 (08-12-18 @ 06:36)  T(F): 97.8 (08-12-18 @ 13:17), Max: 98.7 (08-12-18 @ 06:36)  HR: 107 (08-12-18 @ 13:17) (72 - 150)  BP: 93/60 (08-12-18 @ 13:17) (93/57 - 102/96)  RR: 18 (08-12-18 @ 13:17) (16 - 18)  SpO2: 97% (08-12-18 @ 13:17) (97% - 98%) on (O2)        Relevant labs, radiology and Medications reviewed                        11.8   9.21  )-----------( 265      ( 12 Aug 2018 08:11 )             37.9     08-12    137  |  102  |  23  ----------------------------<  111<H>  4.2   |  25  |  0.98    Ca    8.9      12 Aug 2018 06:27  Phos  2.9     08-12  Mg     1.7     08-12        MEDICATIONS  (STANDING):  anastrozole 1 milliGRAM(s) Oral daily  aspirin enteric coated 81 milliGRAM(s) Oral daily  docusate sodium 100 milliGRAM(s) Oral three times a day  DULoxetine 30 milliGRAM(s) Oral daily  enoxaparin Injectable 40 milliGRAM(s) SubCutaneous every 24 hours  ferrous    sulfate 325 milliGRAM(s) Oral daily  lactobacillus acidophilus 1 Tablet(s) Oral two times a day with meals  losartan 50 milliGRAM(s) Oral daily  metoprolol tartrate 12.5 milliGRAM(s) Oral two times a day  pantoprazole    Tablet 40 milliGRAM(s) Oral before breakfast  senna 2 Tablet(s) Oral at bedtime  simvastatin 20 milliGRAM(s) Oral at bedtime  sodium chloride 0.9%. 1000 milliLiter(s) (75 mL/Hr) IV Continuous <Continuous>    MEDICATIONS  (PRN):  acetaminophen   Tablet. 650 milliGRAM(s) Oral every 6 hours PRN Mild Pain (1 - 3)  ondansetron Injectable 4 milliGRAM(s) IV Push every 6 hours PRN Nausea  oxyCODONE    5 mG/acetaminophen 325 mG 1 Tablet(s) Oral every 4 hours PRN Moderate Pain (4 - 6)      I&O's Summary    11 Aug 2018 07:01  -  12 Aug 2018 07:00  --------------------------------------------------------  IN: 1305 mL / OUT: 897 mL / NET: 408 mL    12 Aug 2018 07:01  -  12 Aug 2018 13:41  --------------------------------------------------------  IN: 600 mL / OUT: 0 mL / NET: 600 mL        IMAGING    CXR:    CT Chest:    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  S/P hip replacement, right: 6/2016  - right hip  History of lumpectomy of right breast: July 2015  History of cataract surgery, left: and right 2016  Elective surgery: reversal of colostomy July 2015  History of tonsillectomy  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

## 2018-08-12 NOTE — PROGRESS NOTE ADULT - SUBJECTIVE AND OBJECTIVE BOX
Follow-up Pulm Progress Note    No new respiratory events overnight.  Denies SOB/CP.   98% on 3L NC    Medications:  MEDICATIONS  (STANDING):  anastrozole 1 milliGRAM(s) Oral daily  aspirin enteric coated 81 milliGRAM(s) Oral daily  docusate sodium 100 milliGRAM(s) Oral three times a day  DULoxetine 30 milliGRAM(s) Oral daily  enoxaparin Injectable 40 milliGRAM(s) SubCutaneous every 24 hours  ferrous    sulfate 325 milliGRAM(s) Oral daily  lactobacillus acidophilus 1 Tablet(s) Oral two times a day with meals  losartan 50 milliGRAM(s) Oral daily  metoprolol tartrate 12.5 milliGRAM(s) Oral two times a day  pantoprazole    Tablet 40 milliGRAM(s) Oral before breakfast  senna 2 Tablet(s) Oral at bedtime  simvastatin 20 milliGRAM(s) Oral at bedtime  sodium chloride 0.9%. 1000 milliLiter(s) (75 mL/Hr) IV Continuous <Continuous>    MEDICATIONS  (PRN):  acetaminophen   Tablet. 650 milliGRAM(s) Oral every 6 hours PRN Mild Pain (1 - 3)  ondansetron Injectable 4 milliGRAM(s) IV Push every 6 hours PRN Nausea  oxyCODONE    5 mG/acetaminophen 325 mG 1 Tablet(s) Oral every 4 hours PRN Moderate Pain (4 - 6)          Vital Signs Last 24 Hrs  T(C): 36.3 (12 Aug 2018 08:33), Max: 37.1 (12 Aug 2018 06:36)  T(F): 97.3 (12 Aug 2018 08:33), Max: 98.7 (12 Aug 2018 06:36)  HR: 85 (12 Aug 2018 08:33) (72 - 150)  BP: 93/57 (12 Aug 2018 08:33) (93/57 - 107/68)  BP(mean): --  RR: 16 (12 Aug 2018 08:33) (16 - 18)  SpO2: 97% (12 Aug 2018 08:33) (96% - 98%) on 3L NC          08-11 @ 07:01  -  08-12 @ 07:00  --------------------------------------------------------  IN: 1305 mL / OUT: 897 mL / NET: 408 mL          LABS:                        11.8   9.21  )-----------( 265      ( 12 Aug 2018 08:11 )             37.9     08-12    137  |  102  |  23  ----------------------------<  111<H>  4.2   |  25  |  0.98    Ca    8.9      12 Aug 2018 06:27  Phos  2.9     08-12  Mg     1.7     08-12            CAPILLARY BLOOD GLUCOSE                        Fluid characteristics  -- 08-10 @ 10:01  pH 7.34    tprot <0.6    Cell count  Appearance Bloody  Fluid type --  BF lymph 10  color Red  eosinophil --  PMN 13  Mesothelial --  Monocyte 6  Other body cells 71      CULTURES: (if applicable)  Culture Results:   No growth (08-10 @ 12:16)    Most recent blood culture -- 08-10 @ 12:16   -- -- .Body Fluid Pericardial Fluid 08-10 @ 12:16    Blood culture 08-10 @ 12:16  --    Few polymorphonuclear leukocytes seen per low power field  No organisms seen per oil power field  --  --  --    Urine culture    -->      Physical Examination:  PULM: Bronchial BS L  CVS: S1, S2 heard    RADIOLOGY REVIEWED  CT chest: < from: CT Chest No Cont (08.11.18 @ 16:05) >  CHEST:     LUNGS AND LARGE AIRWAYS: Occlusion of the left upper lobe and left lower   lobe bronchi. Suspicious for left upper lobe mass. Mild improved aeration   in the left upper lobe and left lower lobe. Spiculated lesion in the   right upper lobe is unchanged.  PLEURA: Small pleural effusions. Small left pneumothorax.  VESSELS: Suspect encasement of left upper lobe pulmonary artery.  HEART: Heart size is normal. Pericardial drain. No residual pericardial   effusion.  MEDIASTINUM AND SUZI: Mediastinal lymphadenopathy again seen.  CHEST WALL AND LOWER NECK: Within normal limits.  VISUALIZED UPPER ABDOMEN: Within normal limits.  BONES: Within normal limits.    IMPRESSION:     Small left pneumothorax. Discussed with GIN Anderson at 6:04PM by Dr. Fitzgerald    Occlusion of left upper lobe and left lower lobe bronchi.    Suspicion of left upper lobe mass.    Spiculated lesion in the right upper lobe unchanged.    Mildly improved aeration in the left upper and left lower lobes.    Interval placement of pericardial drain with resolution of pericardial   effusion.    < end of copied text >

## 2018-08-13 LAB
ANION GAP SERPL CALC-SCNC: 10 MMOL/L — SIGNIFICANT CHANGE UP (ref 5–17)
BUN SERPL-MCNC: 22 MG/DL — SIGNIFICANT CHANGE UP (ref 7–23)
CALCIUM SERPL-MCNC: 8.8 MG/DL — SIGNIFICANT CHANGE UP (ref 8.4–10.5)
CHLORIDE SERPL-SCNC: 101 MMOL/L — SIGNIFICANT CHANGE UP (ref 96–108)
CO2 SERPL-SCNC: 25 MMOL/L — SIGNIFICANT CHANGE UP (ref 22–31)
CREAT SERPL-MCNC: 0.9 MG/DL — SIGNIFICANT CHANGE UP (ref 0.5–1.3)
GLUCOSE SERPL-MCNC: 121 MG/DL — HIGH (ref 70–99)
HCT VFR BLD CALC: 37.1 % — SIGNIFICANT CHANGE UP (ref 34.5–45)
HGB BLD-MCNC: 11.8 G/DL — SIGNIFICANT CHANGE UP (ref 11.5–15.5)
MAGNESIUM SERPL-MCNC: 1.6 MG/DL — SIGNIFICANT CHANGE UP (ref 1.6–2.6)
MCHC RBC-ENTMCNC: 30.1 PG — SIGNIFICANT CHANGE UP (ref 27–34)
MCHC RBC-ENTMCNC: 31.9 GM/DL — LOW (ref 32–36)
MCV RBC AUTO: 94.3 FL — SIGNIFICANT CHANGE UP (ref 80–100)
PHOSPHATE SERPL-MCNC: 2.8 MG/DL — SIGNIFICANT CHANGE UP (ref 2.5–4.5)
PLATELET # BLD AUTO: 296 K/UL — SIGNIFICANT CHANGE UP (ref 150–400)
POTASSIUM SERPL-MCNC: 4 MMOL/L — SIGNIFICANT CHANGE UP (ref 3.5–5.3)
POTASSIUM SERPL-SCNC: 4 MMOL/L — SIGNIFICANT CHANGE UP (ref 3.5–5.3)
RBC # BLD: 3.93 M/UL — SIGNIFICANT CHANGE UP (ref 3.8–5.2)
RBC # FLD: 14.4 % — SIGNIFICANT CHANGE UP (ref 10.3–14.5)
SODIUM SERPL-SCNC: 136 MMOL/L — SIGNIFICANT CHANGE UP (ref 135–145)
WBC # BLD: 8.3 K/UL — SIGNIFICANT CHANGE UP (ref 3.8–10.5)
WBC # FLD AUTO: 8.3 K/UL — SIGNIFICANT CHANGE UP (ref 3.8–10.5)

## 2018-08-13 PROCEDURE — 99233 SBSQ HOSP IP/OBS HIGH 50: CPT | Mod: GC

## 2018-08-13 PROCEDURE — 71045 X-RAY EXAM CHEST 1 VIEW: CPT | Mod: 26

## 2018-08-13 RX ORDER — ACETAMINOPHEN 500 MG
650 TABLET ORAL ONCE
Qty: 0 | Refills: 0 | Status: COMPLETED | OUTPATIENT
Start: 2018-08-13 | End: 2018-08-13

## 2018-08-13 RX ADMIN — DULOXETINE HYDROCHLORIDE 30 MILLIGRAM(S): 30 CAPSULE, DELAYED RELEASE ORAL at 11:55

## 2018-08-13 RX ADMIN — ANASTROZOLE 1 MILLIGRAM(S): 1 TABLET ORAL at 11:55

## 2018-08-13 RX ADMIN — ENOXAPARIN SODIUM 40 MILLIGRAM(S): 100 INJECTION SUBCUTANEOUS at 17:38

## 2018-08-13 RX ADMIN — Medication 325 MILLIGRAM(S): at 11:54

## 2018-08-13 RX ADMIN — Medication 81 MILLIGRAM(S): at 11:54

## 2018-08-13 RX ADMIN — LOSARTAN POTASSIUM 50 MILLIGRAM(S): 100 TABLET, FILM COATED ORAL at 05:56

## 2018-08-13 RX ADMIN — Medication 100 MILLIGRAM(S): at 05:56

## 2018-08-13 RX ADMIN — SENNA PLUS 2 TABLET(S): 8.6 TABLET ORAL at 21:08

## 2018-08-13 RX ADMIN — Medication 650 MILLIGRAM(S): at 16:31

## 2018-08-13 RX ADMIN — Medication 1 TABLET(S): at 17:38

## 2018-08-13 RX ADMIN — Medication 12.5 MILLIGRAM(S): at 05:56

## 2018-08-13 RX ADMIN — PANTOPRAZOLE SODIUM 40 MILLIGRAM(S): 20 TABLET, DELAYED RELEASE ORAL at 05:56

## 2018-08-13 RX ADMIN — Medication 650 MILLIGRAM(S): at 05:58

## 2018-08-13 RX ADMIN — Medication 1 TABLET(S): at 07:52

## 2018-08-13 RX ADMIN — Medication 30 MILLILITER(S): at 19:53

## 2018-08-13 RX ADMIN — Medication 650 MILLIGRAM(S): at 06:28

## 2018-08-13 RX ADMIN — SIMVASTATIN 20 MILLIGRAM(S): 20 TABLET, FILM COATED ORAL at 21:08

## 2018-08-13 RX ADMIN — Medication 650 MILLIGRAM(S): at 17:05

## 2018-08-13 RX ADMIN — Medication 650 MILLIGRAM(S): at 12:32

## 2018-08-13 RX ADMIN — Medication 650 MILLIGRAM(S): at 11:58

## 2018-08-13 RX ADMIN — Medication 100 MILLIGRAM(S): at 21:08

## 2018-08-13 RX ADMIN — Medication 100 MILLIGRAM(S): at 17:38

## 2018-08-13 RX ADMIN — Medication 12.5 MILLIGRAM(S): at 17:38

## 2018-08-13 NOTE — PROGRESS NOTE ADULT - PROBLEM SELECTOR PLAN 1
s/p pericardiocentesis and pericardial drain placement in IR  Maintain to water seal. Strict I & Os  cardiology following  No growth in pericardial fluid cultures  Repeat TTE, plan to d/c pericardial drain in IR tomorrow if drainage remains minimal.

## 2018-08-13 NOTE — PROGRESS NOTE ADULT - SUBJECTIVE AND OBJECTIVE BOX
Follow-up Pulm Progress Note    No new respiratory events overnight.  Denies SOB/CP. o2 sat 96% on 3lnc  pericardal- 24 cc/L ct drain 194 cc    Medications:  MEDICATIONS  (STANDING):  anastrozole 1 milliGRAM(s) Oral daily  aspirin enteric coated 81 milliGRAM(s) Oral daily  docusate sodium 100 milliGRAM(s) Oral three times a day  DULoxetine 30 milliGRAM(s) Oral daily  enoxaparin Injectable 40 milliGRAM(s) SubCutaneous every 24 hours  ferrous    sulfate 325 milliGRAM(s) Oral daily  lactobacillus acidophilus 1 Tablet(s) Oral two times a day with meals  losartan 50 milliGRAM(s) Oral daily  metoprolol tartrate 12.5 milliGRAM(s) Oral two times a day  pantoprazole    Tablet 40 milliGRAM(s) Oral before breakfast  senna 2 Tablet(s) Oral at bedtime  simvastatin 20 milliGRAM(s) Oral at bedtime  sodium chloride 0.9%. 1000 milliLiter(s) (75 mL/Hr) IV Continuous <Continuous>    MEDICATIONS  (PRN):  acetaminophen   Tablet. 650 milliGRAM(s) Oral every 6 hours PRN Mild Pain (1 - 3)  ondansetron Injectable 4 milliGRAM(s) IV Push every 6 hours PRN Nausea  oxyCODONE    5 mG/acetaminophen 325 mG 1 Tablet(s) Oral every 4 hours PRN Moderate Pain (4 - 6)          Vital Signs Last 24 Hrs  T(C): 36.5 (13 Aug 2018 12:49), Max: 36.7 (12 Aug 2018 20:49)  T(F): 97.7 (13 Aug 2018 12:49), Max: 98 (12 Aug 2018 20:49)  HR: 100 (13 Aug 2018 12:49) (93 - 109)  BP: 98/62 (13 Aug 2018 12:49) (94/62 - 127/84)  BP(mean): --  RR: 19 (13 Aug 2018 12:49) (16 - 19)  SpO2: 96% (13 Aug 2018 12:49) (96% - 99%)          08-12 @ 07:01  -  08-13 @ 07:00  --------------------------------------------------------  IN: 1074 mL / OUT: 817 mL / NET: 257 mL          LABS:                        11.8   8.3   )-----------( 296      ( 13 Aug 2018 10:06 )             37.1     08-13    136  |  101  |  22  ----------------------------<  121<H>  4.0   |  25  |  0.90    Ca    8.8      13 Aug 2018 09:58  Phos  2.8     08-13  Mg     1.6     08-13            CAPILLARY BLOOD GLUCOSE      cyto- pending                  Fluid characteristics  -- 08-10 @ 10:01  pH 7.34    tprot <0.6    Cell count  Appearance Bloody  Fluid type --  BF lymph 10  color Red  eosinophil --  PMN 13  Mesothelial --  Monocyte 6  Other body cells 71      CULTURES: (if applicable)  Culture - Fungal, Body Fluid (08.10.18 @ 12:16)    Specimen Source: .Body Fluid Pericardial Fluid    Culture Results:   Testing in progress    Culture - Body Fluid with Gram Stain (08.10.18 @ 12:16)    Gram Stain:   Few polymorphonuclear leukocytes seen per low power field  No organisms seen per oil power field    Specimen Source: .Body Fluid Pericardial Fluid    Culture Results:   No growth    Culture - Acid Fast - Body Fluid w/Smear (08.10.18 @ 12:16)    Specimen Source: .Body Fluid Pericardial Fluid    Acid Fast Bacilli Smear:   No acid fast bacilli seen by fluorochrome stain        -->      Physical Examination:  PULM: decreased bs bilaterally, no significant sputum production  CVS: S1, S2 heard    RADIOLOGY REVIEWED  CXR: < from: CT Chest No Cont (08.11.18 @ 16:05) >  CHEST:     LUNGS AND LARGE AIRWAYS: Occlusion of the left upper lobe and left lower   lobe bronchi. Suspicious for left upper lobe mass. Mild improved aeration   in the left upper lobe and left lower lobe. Spiculated lesion in the   right upper lobe is unchanged.  PLEURA: Small pleural effusions. Small left pneumothorax.  VESSELS: Suspect encasement of left upper lobe pulmonary artery.  HEART: Heart size is normal. Pericardial drain. No residual pericardial   effusion.  MEDIASTINUM AND SUZI: Mediastinal lymphadenopathy again seen.  CHEST WALL AND LOWER NECK: Within normal limits.  VISUALIZED UPPER ABDOMEN: Within normal limits.  BONES: Within normal limits.    IMPRESSION:     Small left pneumothorax. Discussed with NP Justin at 6:04PM by Dr. Fitzgerald    Occlusion of left upper lobe and left lower lobe bronchi.    Suspicion of left upper lobe mass.    Spiculated lesion in the right upper lobe unchanged.    Mildly improved aeration in the left upper and left lower lobes.    Interval placement of pericardial drain with resolution of pericardial   effusion.    < end of copied text >          TTE:< from: MR Head w/wo IV Cont (08.11.18 @ 23:54) >      IMPRESSION:  No abnormal intracranial enhancement to suggest intracranial metastatic   disease.  Mild microvascular ischemic change.    < end of copied text >

## 2018-08-13 NOTE — PROGRESS NOTE ADULT - ASSESSMENT
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 Found to have large L pleural effusion causing complete atelectasis of L lung. She had 1L thoracentesis 1 week ago as output. CT scan revealed mod pericardial effusion and large pleural effusion pt remained persistently tachycardic and tachypneic 2d echo revealed increased pericardial effusion read as large with  tamponade/RV  collapse. Thoracic surgery consulted.   8/10 left pigtail by surgical resident 1L fluid out. Small PTX on CXR, pt asymptomatic, will continue to monitor. Percardiocentesis in  cc of dark red fluid removed.   8/11: Drains functioning well. CT scan Noted, No pleurix needed. D/w Dr. Mancini  8/12: Continue pleural and pericardial drain for now  8/13 Pericardial drain with minimal drainage 24ml/24h, continue water seal today, discussed with IR to d/c drain tomorrow, will repeat TTE. Continue left pigtail to LWS 193ml/24h.

## 2018-08-13 NOTE — PROGRESS NOTE ADULT - SUBJECTIVE AND OBJECTIVE BOX
CHIEF COMPLAINT:Patient is a 84y old  Female who presents with a chief complaint of Progressive exertional dyspnea, dry cough for 2 weeks (09 Aug 2018 12:48)  no acute evnts    Allergies:  Dilaudid (Other)  Levaquin (Diarrhea)      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  S/P hip replacement, right: 6/2016  - right hip  History of lumpectomy of right breast: July 2015  History of cataract surgery, left: and right 2016  Elective surgery: reversal of colostomy July 2015  History of tonsillectomy  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 No nausea, vomiting, diarrhea or constipation, + low appetite  GENITOURINARY: No dysuria, urinary frequency or urgency, no hematuria  NEUROLOGICAL: No headaches, memory loss, loss of strength, numbness, or tremors  SKIN: No itching, burning, rashes, or lesions   MUSCULOSKELETAL: No joint pain or swelling; No muscle, back, or extremity pain      Medications:  MEDICATIONS  (STANDING):  anastrozole 1 milliGRAM(s) Oral daily  aspirin enteric coated 81 milliGRAM(s) Oral daily  docusate sodium 100 milliGRAM(s) Oral three times a day  DULoxetine 30 milliGRAM(s) Oral daily  enoxaparin Injectable 40 milliGRAM(s) SubCutaneous every 24 hours  ferrous    sulfate 325 milliGRAM(s) Oral daily  lactobacillus acidophilus 1 Tablet(s) Oral two times a day with meals  losartan 50 milliGRAM(s) Oral daily  metoprolol tartrate 12.5 milliGRAM(s) Oral two times a day  pantoprazole    Tablet 40 milliGRAM(s) Oral before breakfast  senna 2 Tablet(s) Oral at bedtime  simvastatin 20 milliGRAM(s) Oral at bedtime  sodium chloride 0.9%. 1000 milliLiter(s) (75 mL/Hr) IV Continuous <Continuous>    MEDICATIONS  (PRN):  acetaminophen   Tablet. 650 milliGRAM(s) Oral every 6 hours PRN Mild Pain (1 - 3)  ondansetron Injectable 4 milliGRAM(s) IV Push every 6 hours PRN Nausea  oxyCODONE    5 mG/acetaminophen 325 mG 1 Tablet(s) Oral every 4 hours PRN Moderate Pain (4 - 6)    	    PHYSICAL EXAM:  T(C): 36.4 (08-13-18 @ 05:15), Max: 36.7 (08-12-18 @ 20:49)  HR: 100 (08-13-18 @ 05:15) (93 - 109)  BP: 127/84 (08-13-18 @ 05:15) (93/60 - 127/84)  RR: 18 (08-13-18 @ 05:15) (16 - 18)  SpO2: 98% (08-13-18 @ 05:15) (97% - 99%)  Wt(kg): --  I&O's Summary    12 Aug 2018 07:01  -  13 Aug 2018 07:00  --------------------------------------------------------  IN: 1074 mL / OUT: 817 mL / NET: 257 mL      Appearance: Normal	  HEENT:   NCAT, PERRL, EOMI	  Lymphatic: No lymphadenopathy  Cardiovascular: Normal S1 S2, RRR  Respiratory: dec BS L side  Psychiatry: A & O x 3, Mood & affect appropriate  Gastrointestinal:  Soft, Non-tender, + BS  Skin: No rashes, No ecchymoses, No cyanosis	  Neurologic: Non-focal  Extremities: Normal range of motion, No clubbing, cyanosis or edema    LABS:	 	    CARDIAC MARKERS:                                11.8   8.3   )-----------( 296      ( 13 Aug 2018 10:06 )             37.1     08-13    136  |  101  |  22  ----------------------------<  121<H>  4.0   |  25  |  0.90    Ca    8.8      13 Aug 2018 09:58  Phos  2.8     08-13  Mg     1.6     08-13      proBNP:   Lipid Profile:   HgA1c:   TSH:

## 2018-08-13 NOTE — PROGRESS NOTE ADULT - PROBLEM SELECTOR PLAN 1
Exudative bloody pericardial effusion  -Likely malignant   -f/u cyto  -c/w pericardial drain  -await repeat TTE

## 2018-08-13 NOTE — PROGRESS NOTE ADULT - ASSESSMENT
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 Found to have large L pleural effusion causing complete atelectasis of L lung. She had 1L thoracentesis 1 week ago as outpt.      Problem/Plan - 1:  ·  Problem: Pleural effusion.  Plan: s/p L pigtail by CTS, monitor output, no plan for Pleurex by CTS at this time, monitor     Problem/Plan - 2:  ·  Problem: Adenocarcinoma of lung, unspecified laterality.  Plan: Apparent stage 4 disease. Drainage of Pleural effusion, Onc appreciated  MRI brain pending  RadOnc appreciated, hold off palliative radiation as improved w/o     Problem/Plan - 3:  ·  Problem: Dysphagia, unspecified type.  Plan: See above.  Will assume aspiration risk.   S/S.   Nutrition evaluation.      Problem/Plan - 4:  ·  Problem: DVT prophylaxis    Pericardial effusion w/tamponade - likely malignant, Cardio, CTS, MICU, IR appreciated, now s/p drainage, repeat TTE, possibly remove drain tomorrow    Tachycardia - sinus tach, Cardio appreciated, improved w/hydration and BB

## 2018-08-13 NOTE — CHART NOTE - NSCHARTNOTEFT_GEN_A_CORE
84 year old female pt with PMH breast CA 2015 s/p lumpectomy, GERD, recent diagnosis of L hilar and perihilar mass consistent with tumor, and recent thoracentesis draining 1L fluid from L, presenting sent in by her oncologist for admission for worsening shortness of breath. 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 Per CTS NP plan for repeat TTE, plan to d/c pericardial drain in IR tomorrow if drainage remains minimal.     Pt seen for malnutrition follow-up.     Source: Patient [x]    Family [ ]     other [x] electronic medical records    Diet : Regular + Supplement: Prosource x 2 daily (120 kcal, 30 grams protein daily)    Patient reports [ ] nausea  [ ] vomiting [ ] diarrhea [ ] constipation  [ ]chewing problems [ ] swallowing issues  [x] other: Denies GI distress at this time, had BM today. Endorses ongoing difficulty with swallowing food/liquids at times, but declined SLP swallow eval and rather a regular diet and she will choose foods as tolerated.    PO intake:  < 50% [x] 50-75% [x]   % [ ]  other :    Source for PO intake [ ] Patient [ ] family [ ] chart [ ] staff [ ] other    Enteral /Parenteral Nutrition: n/a    Current Weight: 121.6 pounds (current, standing, no edema noted). Admit wt 115 pounds.    Pertinent Medications: MEDICATIONS  (STANDING):  anastrozole 1 milliGRAM(s) Oral daily  aspirin enteric coated 81 milliGRAM(s) Oral daily  docusate sodium 100 milliGRAM(s) Oral three times a day  DULoxetine 30 milliGRAM(s) Oral daily  enoxaparin Injectable 40 milliGRAM(s) SubCutaneous every 24 hours  ferrous    sulfate 325 milliGRAM(s) Oral daily  lactobacillus acidophilus 1 Tablet(s) Oral two times a day with meals  losartan 50 milliGRAM(s) Oral daily  metoprolol tartrate 12.5 milliGRAM(s) Oral two times a day  pantoprazole    Tablet 40 milliGRAM(s) Oral before breakfast  senna 2 Tablet(s) Oral at bedtime  simvastatin 20 milliGRAM(s) Oral at bedtime  sodium chloride 0.9%. 1000 milliLiter(s) (75 mL/Hr) IV Continuous <Continuous>    MEDICATIONS  (PRN):  acetaminophen   Tablet. 650 milliGRAM(s) Oral every 6 hours PRN Mild Pain (1 - 3)  ondansetron Injectable 4 milliGRAM(s) IV Push every 6 hours PRN Nausea  oxyCODONE    5 mG/acetaminophen 325 mG 1 Tablet(s) Oral every 4 hours PRN Moderate Pain (4 - 6)    Pertinent Labs:  08-13 Na136 mmol/L Glu 121 mg/dL<H> K+ 4.0 mmol/L Cr  0.90 mg/dL BUN 22 mg/dL 08-13 Phos 2.8 mg/dL 08-08 Alb 3.3 g/dL      Skin: No pressure ulcers noted      Estimated Needs:   [x] no change since previous assessment  [ ] recalculated:       Previous Nutrition Diagnosis: Moderate Malnutrition         Nutrition Diagnosis is [ ] ongoing  [ ] resolved [ ] not applicable          New Nutrition Diagnosis: [ ] not applicable         Interventions:     1) Recommend continue regular diet order to promote optimal po intake. Pt denies new diet preferences.   2) Continue Prosource x 2 daily to optimize protein intake. Pt does not want Ensure supplements.       Monitoring and Evaluation:     [x] PO intake [x] Tolerance to diet prescription [x] weights [x] follow up per protocol    [x] other: RD remains available: Elizabet Hewitt MS, RDN, CDN, CDE. #[phone] 84 year old female pt with PMH breast CA 2015 s/p lumpectomy, GERD, recent diagnosis of L hilar and perihilar mass consistent with tumor, and recent thoracentesis draining 1L fluid from L, presenting sent in by her oncologist for admission for worsening shortness of breath. 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 Per CTS NP plan for repeat TTE, plan to d/c pericardial drain in IR tomorrow if drainage remains minimal.     Pt seen for malnutrition follow-up. Pt resting, reports fair appetite. Has tried the Prosource and Danactive with variable intake.    Source: Patient [x]    Family [ ]     other [x] electronic medical records    Diet : Regular + Supplement: Prosource x 2 daily (120 kcal, 30 grams protein daily)    Patient reports [ ] nausea  [ ] vomiting [ ] diarrhea [ ] constipation  [ ]chewing problems [ ] swallowing issues  [x] other: Denies GI distress at this time, had BM today. Endorses ongoing difficulty with swallowing food/liquids at times, but declined SLP swallow eval and rather a regular diet and she will choose foods as tolerated.    PO intake (meals):  < 50% [x] 50-75% [x]   % [ ]  other :  PO intake (supplements): variable ~50% of prosource daily    Source for PO intake [x] Patient [ ] family [ ] chart [ ] staff [ ] other    Enteral /Parenteral Nutrition: n/a    Current Weight: 121.6 pounds (current, standing, no edema noted). Admit wt 115 pounds.    Pertinent Medications: MEDICATIONS  (STANDING):  anastrozole 1 milliGRAM(s) Oral daily  aspirin enteric coated 81 milliGRAM(s) Oral daily  docusate sodium 100 milliGRAM(s) Oral three times a day  DULoxetine 30 milliGRAM(s) Oral daily  enoxaparin Injectable 40 milliGRAM(s) SubCutaneous every 24 hours  ferrous    sulfate 325 milliGRAM(s) Oral daily  lactobacillus acidophilus 1 Tablet(s) Oral two times a day with meals  losartan 50 milliGRAM(s) Oral daily  metoprolol tartrate 12.5 milliGRAM(s) Oral two times a day  pantoprazole    Tablet 40 milliGRAM(s) Oral before breakfast  senna 2 Tablet(s) Oral at bedtime  simvastatin 20 milliGRAM(s) Oral at bedtime  sodium chloride 0.9%. 1000 milliLiter(s) (75 mL/Hr) IV Continuous <Continuous>    MEDICATIONS  (PRN):  acetaminophen   Tablet. 650 milliGRAM(s) Oral every 6 hours PRN Mild Pain (1 - 3)  ondansetron Injectable 4 milliGRAM(s) IV Push every 6 hours PRN Nausea  oxyCODONE    5 mG/acetaminophen 325 mG 1 Tablet(s) Oral every 4 hours PRN Moderate Pain (4 - 6)    Pertinent Labs:  08-13 Na136 mmol/L Glu 121 mg/dL<H> K+ 4.0 mmol/L Cr  0.90 mg/dL BUN 22 mg/dL 08-13 Phos 2.8 mg/dL 08-08 Alb 3.3 g/dL      Skin: No pressure ulcers noted      Estimated Needs:   [x] no change since previous assessment  [ ] recalculated:       Previous Nutrition Diagnosis: Moderate Malnutrition         Nutrition Diagnosis is [x] ongoing, addressed with liberalized diet and supplements  [ ] resolved [ ] not applicable          New Nutrition Diagnosis: [x] not applicable         Interventions:     1) Recommend continue regular diet order to promote optimal po intake. Pt denies new diet preferences.   2) Continue Prosource x 2 daily to optimize protein intake. Pt does not want Ensure supplements. Enjoys the Danactive.       Monitoring and Evaluation:     [x] PO intake [x] Tolerance to diet prescription [x] weights [x] follow up per protocol    [x] other: RD remains available: Elizabet Hewitt MS, RDN, CDN, CDE. #[phone]

## 2018-08-13 NOTE — PROGRESS NOTE ADULT - SUBJECTIVE AND OBJECTIVE BOX
Cardiology Progress Note    Interval events: No acute events overnight. Pericardial drain with 22 cc output. Mild discomfort at drain site.    Tele: SR 80-90s    Medications:  acetaminophen   Tablet. 650 milliGRAM(s) Oral every 6 hours PRN  anastrozole 1 milliGRAM(s) Oral daily  aspirin enteric coated 81 milliGRAM(s) Oral daily  docusate sodium 100 milliGRAM(s) Oral three times a day  DULoxetine 30 milliGRAM(s) Oral daily  enoxaparin Injectable 40 milliGRAM(s) SubCutaneous every 24 hours  ferrous    sulfate 325 milliGRAM(s) Oral daily  lactobacillus acidophilus 1 Tablet(s) Oral two times a day with meals  losartan 50 milliGRAM(s) Oral daily  metoprolol tartrate 12.5 milliGRAM(s) Oral two times a day  ondansetron Injectable 4 milliGRAM(s) IV Push every 6 hours PRN  oxyCODONE    5 mG/acetaminophen 325 mG 1 Tablet(s) Oral every 4 hours PRN  pantoprazole    Tablet 40 milliGRAM(s) Oral before breakfast  senna 2 Tablet(s) Oral at bedtime  simvastatin 20 milliGRAM(s) Oral at bedtime  sodium chloride 0.9%. 1000 milliLiter(s) IV Continuous <Continuous>      Review of Systems:  Constitutional: [ ] Fever [ ] Chills [ ] Fatigue [ ] Weight change   HEENT: [ ] Blurred vision [ ] Eye Pain [ ] Headache [ ] Runny nose [ ] Sore Throat   Respiratory: [ ] Cough [ ] Wheezing [ ] Shortness of breath  Cardiovascular: [ ] Chest Pain [ ] Palpitations [ ] LUI [ ] PND [ ] Orthopnea  Gastrointestinal: [ ] Abdominal Pain [ ] Diarrhea [ ] Constipation [ ] Hemorrhoids [ ] Nausea [ ] Vomiting  Genitourinary: [ ] Nocturia [ ] Dysuria [ ] Incontinence  Extremities: [ ] Swelling [ ] Joint Pain  Neurologic: [ ] Focal deficit [ ] Paresthesias [ ] Syncope  Lymphatic: [ ] Swelling [ ] Lymphadenopathy   Skin: [ ] Rash [ ] Ecchymoses [ ] Wounds [ ] Lesions  Psychiatry: [ ] Depression [ ] Suicidal/Homicidal Ideation [ ] Anxiety [ ] Sleep Disturbances  [x] 10 point review of systems is otherwise negative except as mentioned above            [ ]Unable to obtain    Vitals:  T(C): 36.4 (18 @ 05:15), Max: 37.1 (18 @ 06:36)  HR: 100 (18 @ 05:15) (85 - 109)  BP: 127/84 (18 @ 05:15) (93/57 - 127/84)  BP(mean): --  RR: 18 (18 @ 05:15) (16 - 18)  SpO2: 98% (18 @ 05:15) (97% - 99%)  Wt(kg): --  Daily     Daily Weight in k.1 (12 Aug 2018 09:00)  I&O's Summary    11 Aug 2018 07:01  -  12 Aug 2018 07:00  --------------------------------------------------------  IN: 1305 mL / OUT: 897 mL / NET: 408 mL    12 Aug 2018 07:01  -  13 Aug 2018 05:30  --------------------------------------------------------  IN: 1074 mL / OUT: 395 mL / NET: 679 mL        Physical Exam:  NAD  PERRL, EOMI  Normal oral [place] NC/AT  S1, physiologic split S2, RRR, No m/r/g appreciated, No edema, no elevation in JVP  Clear to auscultation bilaterally  Soft, Non-tender, Non-distended, BS+  No clubbing, No joint deformity   Non-focal  No lymphadenopathy  AAOx3, Mood & affect appropriate  No rashes, No ecchymoses, No cyanosis  Procedural Access Site: pericardial drain site cdi    Labs:                        11.8   9.21  )-----------( 265      ( 12 Aug 2018 08:11 )             37.9     08-12    137  |  102  |  23  ----------------------------<  111<H>  4.2   |  25  |  0.98    Ca    8.9      12 Aug 2018 06:27  Phos  2.9     08-12  Mg     1.7     08-12            Serum Pro-Brain Natriuretic Peptide: 575 pg/mL ( @ 16:35)          New results/imaging: Cardiology Progress Note    Interval events: No acute events overnight. Pericardial drain with 22 cc output. Mild discomfort at drain site.        Medications:  acetaminophen   Tablet. 650 milliGRAM(s) Oral every 6 hours PRN  anastrozole 1 milliGRAM(s) Oral daily  aspirin enteric coated 81 milliGRAM(s) Oral daily  docusate sodium 100 milliGRAM(s) Oral three times a day  DULoxetine 30 milliGRAM(s) Oral daily  enoxaparin Injectable 40 milliGRAM(s) SubCutaneous every 24 hours  ferrous    sulfate 325 milliGRAM(s) Oral daily  lactobacillus acidophilus 1 Tablet(s) Oral two times a day with meals  losartan 50 milliGRAM(s) Oral daily  metoprolol tartrate 12.5 milliGRAM(s) Oral two times a day  ondansetron Injectable 4 milliGRAM(s) IV Push every 6 hours PRN  oxyCODONE    5 mG/acetaminophen 325 mG 1 Tablet(s) Oral every 4 hours PRN  pantoprazole    Tablet 40 milliGRAM(s) Oral before breakfast  senna 2 Tablet(s) Oral at bedtime  simvastatin 20 milliGRAM(s) Oral at bedtime  sodium chloride 0.9%. 1000 milliLiter(s) IV Continuous <Continuous>      Review of Systems:  Constitutional: [ ] Fever [ ] Chills [ ] Fatigue [ ] Weight change   HEENT: [ ] Blurred vision [ ] Eye Pain [ ] Headache [ ] Runny nose [ ] Sore Throat   Respiratory: [ ] Cough [ ] Wheezing [ ] Shortness of breath  Cardiovascular: [ ] Chest Pain [ ] Palpitations [ ] LUI [ ] PND [ ] Orthopnea  Gastrointestinal: [ ] Abdominal Pain [ ] Diarrhea [ ] Constipation [ ] Hemorrhoids [ ] Nausea [ ] Vomiting  Genitourinary: [ ] Nocturia [ ] Dysuria [ ] Incontinence  Extremities: [ ] Swelling [ ] Joint Pain  Neurologic: [ ] Focal deficit [ ] Paresthesias [ ] Syncope  Lymphatic: [ ] Swelling [ ] Lymphadenopathy   Skin: [ ] Rash [ ] Ecchymoses [ ] Wounds [ ] Lesions  Psychiatry: [ ] Depression [ ] Suicidal/Homicidal Ideation [ ] Anxiety [ ] Sleep Disturbances  [x] 10 point review of systems is otherwise negative except as mentioned above                Vitals:  T(C): 36.4 (18 @ 05:15), Max: 37.1 (18 @ 06:36)  HR: 100 (18 @ 05:15) (85 - 109)  BP: 127/84 (18 @ 05:15) (93/57 - 127/84)  RR: 18 (18 @ 05:15) (16 - 18)  SpO2: 98% (18 @ 05:15) (97% - 99%)  Daily Weight in k.1 (12 Aug 2018 09:00)      Physical Exam:  GEN:  NAD  HEENT:  PERRL, EOMI.  Normal oral mucosa NC/AT  HEART:  Normal S1, physiologic split S2, RRR, No m/r/g appreciated, No edema, no elevation in JVP  CHEST:  Clear to auscultation bilaterally  ABD:  Soft, Non-tender, Non-distended, BS+  EXT:  No clubbing, No joint deformity   NEURO:  Non-focal  No lymphadenopathy  PSYCH:  AAOx3, Mood & affect appropriate  SKIN:  No rashes, No ecchymoses, No cyanosis  Procedural Access Site: pericardial drain site cdi      Tele:  80-90s      I&O's Summary    11 Aug 2018 07:01  -  12 Aug 2018 07:00  --------------------------------------------------------  IN: 1305 mL / OUT: 897 mL / NET: 408 mL    12 Aug 2018 07:01  -  13 Aug 2018 05:30  --------------------------------------------------------  IN: 1074 mL / OUT: 395 mL / NET: 679 mL        Labs:                        11.8   9.21  )-----------( 265      ( 12 Aug 2018 08:11 )             37.9     08-12    137  |  102  |  23  ----------------------------<  111<H>  4.2   |  25  |  0.98    Ca    8.9      12 Aug 2018 06:27  Phos  2.9     08-12  Mg     1.7     08-12      Serum Pro-Brain Natriuretic Peptide: 575 pg/mL ( @ 16:35)

## 2018-08-13 NOTE — PROGRESS NOTE ADULT - ASSESSMENT
83 y/o female with a PMH of OA, osteoporosis, RBBB, hyperlipidemia, early stage breast cancer and now with advanced lung cancer admitted with SOB secondary to pleural/pericardial effusion.     1. Adenocarcinoma Lung  / SOB  - CTA negative for PE, shows combination of pleural effusion and large left hilar mass causing compression of left upper lobe  - outside molecular studies still pending from pleural fluid cytology at [facility] and peripheral blood - will likely be back today on 8-13-18  - was scheduled for staging PETCT as outpatient.  MRI head on 8-11-18 was negative for mets.   - S/P left pigtail placement on 8-9-18 as well as pericardiocentesis for moderate pericardial effusion on 8-10-18.  Follow up cytology.  - Rad Onc evaluation appreciated - will hold off on palliative radiation for now as she feels better after drainage of pleural and pericardial fluid   - will d/w Dr. Sanchez and decide on systemic treatment plan once molecular studies come back and when patient more stable    2. Breast cancer - early stage ER/OH+ Her2- s/p lumpectomy on arimidex  - continue arimidex    3. Dysphagia -  - possibly related to compression or due to the SOB - better    4. pain / constipation   - cont percocet for pain   - colace and senna    Follow up cytology from pleural effusion and pericardial effusion  molecular studies in progress.  will d/w Dr Sanchez

## 2018-08-13 NOTE — PROGRESS NOTE ADULT - PROBLEM SELECTOR PLAN 2
Pulmonary following  s/p left pigtail - Maintain to low wall suction  Strict I & Os, check daily CXR  OOB to chair, ambulate as tolerated  Continue management per primary team, will follow  Repeat CT Chest 8/11 reviewed by Dr. Avila - No indication for pleurx catheter at this time.

## 2018-08-13 NOTE — PROGRESS NOTE ADULT - SUBJECTIVE AND OBJECTIVE BOX
Subjective: Pt states "I'm alright" denies any CP, SOB, N/V and palpitations. No acute events overnight.     Vital Signs Last 24 Hrs  T(C): 36.5 (18 @ 12:49), Max: 36.7 (18 @ 20:49)  T(F): 97.7 (18 @ 12:49), Max: 98 (18 @ 20:49)  HR: 100 (18 @ 12:49) (93 - 109)  BP: 98/62 (18 @ 12:49) (94/62 - 127/84)  RR: 19 (18 @ 12:49) (16 - 19)  SpO2: 96% (18 @ 12:49) (96% - 99%)         18 @ 07:01  -  18 @ 15:55  --------------------------------------------------------  IN: 120 mL / OUT: 0 mL / NET: 120 mL      Daily Weight in k.2 (13 Aug 2018 08:19)                 11.8   8.3   )-----------( 296      ( 13 Aug 2018 10:06 )             37.1     136  |  101  |  22  ----------------------------<  121<H>  4.0   |  25  |  0.90      PHYSICAL EXAM  Neurology: A&Ox3, NAD, no gross deficits  CV : RRR+S1S2, +pericardial drain to water seal with serosanguinouse drainage  Lungs: Respirations non-labored, B/L BS clear, diminished at bases  + left pigtail to LWS with serosanguinous drainage, no air leak  Abdomen: Soft, NT/ND, +BSx4Q, -N/V/D  : Voiding without difficulty  Extremities: B/L LE no edema, negative calf tenderness, +PP              MEDICATIONS  acetaminophen   Tablet. 650 milliGRAM(s) Oral every 6 hours PRN  anastrozole 1 milliGRAM(s) Oral daily  aspirin enteric coated 81 milliGRAM(s) Oral daily  docusate sodium 100 milliGRAM(s) Oral three times a day  DULoxetine 30 milliGRAM(s) Oral daily  enoxaparin Injectable 40 milliGRAM(s) SubCutaneous every 24 hours  ferrous    sulfate 325 milliGRAM(s) Oral daily  lactobacillus acidophilus 1 Tablet(s) Oral two times a day with meals  losartan 50 milliGRAM(s) Oral daily  metoprolol tartrate 12.5 milliGRAM(s) Oral two times a day  ondansetron Injectable 4 milliGRAM(s) IV Push every 6 hours PRN  oxyCODONE    5 mG/acetaminophen 325 mG 1 Tablet(s) Oral every 4 hours PRN  pantoprazole    Tablet 40 milliGRAM(s) Oral before breakfast  senna 2 Tablet(s) Oral at bedtime  simvastatin 20 milliGRAM(s) Oral at bedtime  sodium chloride 0.9%. 1000 milliLiter(s) IV Continuous <Continuous>    Discussed with thoracic surgery attending, Dr. Avila.

## 2018-08-13 NOTE — PROGRESS NOTE ADULT - ASSESSMENT
85 yo F with a PMH significant for recent diagnosis of lung adenocarcinoma, R breast CA s/p lumpectomy, and HTN who presented to the ED on 8/8 with progressive SOB and was found to have left lung white out, found to have large pericardial effusion complicated by pericardial tamponade now s/p pericardial drain.     #Pericardial effusions  - likely malignant given active malignancy and bloody effusion. Pericardial drain in place and continues to drain significantly. Hemodynamically stable but continues to be tachycardic. Would recommend IV hydration. LV function appeared normal on echocardiogram.  - Will continue to monitor output from pericardial drain. When there is no more output, will D/C. Should repeat echo following drain removal to evaluate for reaccumulation and status of the effusion    #Atrial tachyarrhythmia - likely irritation from pericardial drain/underlying pathology of pericardial effusion  -cont metoprolol 12.5mg BID for now. if continues to have significant tachycardia, consider discontinuing losartan in favor of uptitration of betablocker  -not candidate for systemic AC at this time; may need to consider when invasive procedures are no longer planned.    Fabricio Stauffer MD  [id number] 83 yo F with a PMH significant for recent diagnosis of lung adenocarcinoma, R breast CA s/p lumpectomy, and HTN.  Presented to the ED on 8/8 with progressive SOB and was found to have left lung white out.  Also found to have large pericardial effusion complicated by pericardial tamponade, now s/p pericardial drain.     #Pericardial effusion  - likely malignant given hx. of active malignancy and bloody effusion. Pericardial drain in place and continues to drain significantly. Hemodynamically stable but continues to be tachycardic. Would recommend IV hydration. LV function appeared normal on echocardiogram.  - Will continue to monitor output from pericardial drain. When there is no more output, will D/C. Should repeat echo following drain removal, to evaluate for reaccumulation and status of the effusion    #Atrial tachyarrhythmia - likely irritation from pericardial drain/underlying pathology of pericardial effusion  -cont metoprolol 12.5mg BID for now. if continues to have significant tachycardia, consider discontinuing losartan in favor of uptitration of betablocker  -not candidate for systemic AC at this time; may need to consider when invasive procedures are no longer planned.    #HTN  -BP controlled at present      Fabricio Stauffer MD  [id number]    Antwan Campos M.D.  Cardiology Consult Service  [phone] or [phone]

## 2018-08-13 NOTE — PROGRESS NOTE ADULT - SUBJECTIVE AND OBJECTIVE BOX
Chief Complaint: "I am constipated".     History of Present Illness:    The patient has some constipation.  No nausea.  No vomiting.  No fevers.  She overall is weak.  She has SOB with exertion.  The drains are uncomfortable for her.  She has mild leg swelling.  No calf pain.  No abdominal pain.  Remainder ROS is stable.       MEDICATIONS  (STANDING):  anastrozole 1 milliGRAM(s) Oral daily  aspirin enteric coated 81 milliGRAM(s) Oral daily  docusate sodium 100 milliGRAM(s) Oral three times a day  DULoxetine 30 milliGRAM(s) Oral daily  enoxaparin Injectable 40 milliGRAM(s) SubCutaneous every 24 hours  ferrous    sulfate 325 milliGRAM(s) Oral daily  lactobacillus acidophilus 1 Tablet(s) Oral two times a day with meals  losartan 50 milliGRAM(s) Oral daily  metoprolol tartrate 12.5 milliGRAM(s) Oral two times a day  pantoprazole    Tablet 40 milliGRAM(s) Oral before breakfast  senna 2 Tablet(s) Oral at bedtime  simvastatin 20 milliGRAM(s) Oral at bedtime  sodium chloride 0.9%. 1000 milliLiter(s) (75 mL/Hr) IV Continuous <Continuous>    MEDICATIONS  (PRN):  acetaminophen   Tablet. 650 milliGRAM(s) Oral every 6 hours PRN Mild Pain (1 - 3)  ondansetron Injectable 4 milliGRAM(s) IV Push every 6 hours PRN Nausea  oxyCODONE    5 mG/acetaminophen 325 mG 1 Tablet(s) Oral every 4 hours PRN Moderate Pain (4 - 6)      Allergies    Levaquin (Diarrhea)    Intolerances    Dilaudid (Other)      Vital Signs Last 24 Hrs  T(C): 36.4 (13 Aug 2018 05:15), Max: 36.7 (12 Aug 2018 20:49)  T(F): 97.5 (13 Aug 2018 05:15), Max: 98 (12 Aug 2018 20:49)  HR: 100 (13 Aug 2018 05:15) (93 - 109)  BP: 127/84 (13 Aug 2018 05:15) (93/60 - 127/84)  BP(mean): --  RR: 18 (13 Aug 2018 05:15) (16 - 18)  SpO2: 98% (13 Aug 2018 05:15) (97% - 99%)    PHYSICAL EXAM  General: weak  HEENT: clear oropharynx, anicteric sclera, pink conjunctiva  Neck: supple  CV: normal S1/S2, positive pericardial drain  Lungs: decreased at bases left greater then right, left lung drain  Abdomen: soft non-tender non-distended,  positive bowel sounds  Ext: trace LE edema  Skin: no rashes   Lymph Nodes: No LAD in neck  Neuro: alert and oriented X 3    LABS:                          11.8   9.21  )-----------( 265      ( 12 Aug 2018 08:11 )             37.9         Mean Cell Volume : 93.3 fl  Mean Cell Hemoglobin : 29.1 pg  Mean Cell Hemoglobin Concentration : 31.1 gm/dL  Auto Neutrophil # : x  Auto Lymphocyte # : x  Auto Monocyte # : x  Auto Eosinophil # : x  Auto Basophil # : x  Auto Neutrophil % : x  Auto Lymphocyte % : x  Auto Monocyte % : x  Auto Eosinophil % : x  Auto Basophil % : x      Serial CBC's  08-12 @ 08:11  Hct-37.9 / Hgb-11.8 / Plat-265 / RBC-4.06 / WBC-9.21  Serial CBC's  08-11 @ 08:42  Hct-40.4 / Hgb-12.2 / Plat-313 / RBC-4.29 / WBC-12.15  Serial CBC's  08-10 @ 07:12  Hct-35.3 / Hgb-11.4 / Plat-305 / RBC-3.82 / WBC-10.42      08-12    137  |  102  |  23  ----------------------------<  111<H>  4.2   |  25  |  0.98    Ca    8.9      12 Aug 2018 06:27  Phos  2.9     08-12  Mg     1.7     08-12

## 2018-08-13 NOTE — PROGRESS NOTE ADULT - SUBJECTIVE AND OBJECTIVE BOX
84y Female s/p pericardial drain on 8/10/2018 in Interventional Radiology with Dr. Sofia.     Patient seen and examined @ bedside. Site is clean and dry. The catheter is to water seal.     T(F): 97.5 (08-13-18 @ 05:15), Max: 98 (08-12-18 @ 20:49)  HR: 100 (08-13-18 @ 05:15) (93 - 109)  BP: 127/84 (08-13-18 @ 05:15) (93/60 - 127/84)  RR: 18 (08-13-18 @ 05:15) (16 - 18)  SpO2: 98% (08-13-18 @ 05:15) (97% - 99%)    LABS:                        11.8   8.3   )-----------( 296      ( 13 Aug 2018 10:06 )             37.1     08-13    136  |  101  |  22  ----------------------------<  121<H>  4.0   |  25  |  0.90    Ca    8.8      13 Aug 2018 09:58  Phos  2.8     08-13  Mg     1.6     08-13        I&O's Detail    Pericardial drain: 24cc over 24 hours.       PHYSICAL EXAM:  General: Nontoxic, in NAD  Thorax: Drain puncture site is clean and dry. Tube appears in unchanged position.       Assessment:  84y Female with Hx of Breast CA and advanced stage lung CA found to have a left pleural effusion s/p CT surg drain, and pericardial effusion s/p IR drain placement on 8/10/2018.    Plan:  - Monitor drain output, keep to water seal  - Will re-evaluate tomorrow for possible drain removal.      Please call IR at extension [phone] with any questions, concerns, or issues regarding above.

## 2018-08-13 NOTE — PROGRESS NOTE ADULT - PROBLEM SELECTOR PLAN 2
s/p L pigtail with slight aeration of LUANN   - L lung atelectasis likely mostly 2nd proximal hilar mass causing extrinsic compression on proximal airway  - pleurax cath as per thoracic  -L ct as per thoracic s/p 8/10 L pigtail cath   - L lung atelectasis likely mostly 2nd proximal hilar mass causing extrinsic compression on proximal airway  - pleurax cath as per thoracic  -L ct as per thoracic

## 2018-08-14 ENCOUNTER — TRANSCRIPTION ENCOUNTER (OUTPATIENT)
Age: 83
End: 2018-08-14

## 2018-08-14 LAB
ANION GAP SERPL CALC-SCNC: 11 MMOL/L — SIGNIFICANT CHANGE UP (ref 5–17)
BLD GP AB SCN SERPL QL: NEGATIVE — SIGNIFICANT CHANGE UP
BUN SERPL-MCNC: 21 MG/DL — SIGNIFICANT CHANGE UP (ref 7–23)
CALCIUM SERPL-MCNC: 8.9 MG/DL — SIGNIFICANT CHANGE UP (ref 8.4–10.5)
CHLORIDE SERPL-SCNC: 101 MMOL/L — SIGNIFICANT CHANGE UP (ref 96–108)
CO2 SERPL-SCNC: 26 MMOL/L — SIGNIFICANT CHANGE UP (ref 22–31)
CREAT SERPL-MCNC: 0.87 MG/DL — SIGNIFICANT CHANGE UP (ref 0.5–1.3)
GLUCOSE SERPL-MCNC: 124 MG/DL — HIGH (ref 70–99)
HCT VFR BLD CALC: 39.5 % — SIGNIFICANT CHANGE UP (ref 34.5–45)
HGB BLD-MCNC: 12.6 G/DL — SIGNIFICANT CHANGE UP (ref 11.5–15.5)
MAGNESIUM SERPL-MCNC: 1.7 MG/DL — SIGNIFICANT CHANGE UP (ref 1.6–2.6)
MCHC RBC-ENTMCNC: 30.1 PG — SIGNIFICANT CHANGE UP (ref 27–34)
MCHC RBC-ENTMCNC: 32 GM/DL — SIGNIFICANT CHANGE UP (ref 32–36)
MCV RBC AUTO: 94.1 FL — SIGNIFICANT CHANGE UP (ref 80–100)
NON-GYNECOLOGICAL CYTOLOGY STUDY: SIGNIFICANT CHANGE UP
PLATELET # BLD AUTO: 344 K/UL — SIGNIFICANT CHANGE UP (ref 150–400)
POTASSIUM SERPL-MCNC: 4.5 MMOL/L — SIGNIFICANT CHANGE UP (ref 3.5–5.3)
POTASSIUM SERPL-SCNC: 4.5 MMOL/L — SIGNIFICANT CHANGE UP (ref 3.5–5.3)
RBC # BLD: 4.2 M/UL — SIGNIFICANT CHANGE UP (ref 3.8–5.2)
RBC # FLD: 14.7 % — HIGH (ref 10.3–14.5)
RH IG SCN BLD-IMP: NEGATIVE — SIGNIFICANT CHANGE UP
SODIUM SERPL-SCNC: 138 MMOL/L — SIGNIFICANT CHANGE UP (ref 135–145)
WBC # BLD: 8.9 K/UL — SIGNIFICANT CHANGE UP (ref 3.8–10.5)
WBC # FLD AUTO: 8.9 K/UL — SIGNIFICANT CHANGE UP (ref 3.8–10.5)

## 2018-08-14 PROCEDURE — 99231 SBSQ HOSP IP/OBS SF/LOW 25: CPT

## 2018-08-14 PROCEDURE — 99232 SBSQ HOSP IP/OBS MODERATE 35: CPT | Mod: GC

## 2018-08-14 PROCEDURE — 71045 X-RAY EXAM CHEST 1 VIEW: CPT | Mod: 26

## 2018-08-14 PROCEDURE — 93321 DOPPLER ECHO F-UP/LMTD STD: CPT | Mod: 26

## 2018-08-14 PROCEDURE — 93308 TTE F-UP OR LMTD: CPT | Mod: 26

## 2018-08-14 RX ORDER — MAGNESIUM SULFATE 500 MG/ML
1 VIAL (ML) INJECTION ONCE
Qty: 0 | Refills: 0 | Status: COMPLETED | OUTPATIENT
Start: 2018-08-14 | End: 2018-08-14

## 2018-08-14 RX ADMIN — Medication 81 MILLIGRAM(S): at 12:49

## 2018-08-14 RX ADMIN — Medication 100 MILLIGRAM(S): at 21:27

## 2018-08-14 RX ADMIN — ANASTROZOLE 1 MILLIGRAM(S): 1 TABLET ORAL at 12:50

## 2018-08-14 RX ADMIN — Medication 12.5 MILLIGRAM(S): at 05:40

## 2018-08-14 RX ADMIN — ENOXAPARIN SODIUM 40 MILLIGRAM(S): 100 INJECTION SUBCUTANEOUS at 18:24

## 2018-08-14 RX ADMIN — SIMVASTATIN 20 MILLIGRAM(S): 20 TABLET, FILM COATED ORAL at 21:27

## 2018-08-14 RX ADMIN — Medication 100 MILLIGRAM(S): at 12:52

## 2018-08-14 RX ADMIN — SENNA PLUS 2 TABLET(S): 8.6 TABLET ORAL at 21:27

## 2018-08-14 RX ADMIN — Medication 1 TABLET(S): at 18:24

## 2018-08-14 RX ADMIN — Medication 325 MILLIGRAM(S): at 12:50

## 2018-08-14 RX ADMIN — Medication 12.5 MILLIGRAM(S): at 18:24

## 2018-08-14 RX ADMIN — Medication 100 GRAM(S): at 12:50

## 2018-08-14 RX ADMIN — Medication 100 MILLIGRAM(S): at 05:40

## 2018-08-14 RX ADMIN — Medication 650 MILLIGRAM(S): at 06:50

## 2018-08-14 RX ADMIN — Medication 650 MILLIGRAM(S): at 18:34

## 2018-08-14 RX ADMIN — Medication 650 MILLIGRAM(S): at 23:47

## 2018-08-14 RX ADMIN — PANTOPRAZOLE SODIUM 40 MILLIGRAM(S): 20 TABLET, DELAYED RELEASE ORAL at 05:40

## 2018-08-14 RX ADMIN — LOSARTAN POTASSIUM 50 MILLIGRAM(S): 100 TABLET, FILM COATED ORAL at 05:40

## 2018-08-14 RX ADMIN — Medication 30 MILLILITER(S): at 22:41

## 2018-08-14 RX ADMIN — DULOXETINE HYDROCHLORIDE 30 MILLIGRAM(S): 30 CAPSULE, DELAYED RELEASE ORAL at 12:50

## 2018-08-14 RX ADMIN — Medication 1 TABLET(S): at 08:59

## 2018-08-14 NOTE — PROGRESS NOTE ADULT - SUBJECTIVE AND OBJECTIVE BOX
Follow-up Pulm Progress Note    No new respiratory events overnight.  Denies SOB/CP.   100% on 3L NC    Medications:  MEDICATIONS  (STANDING):  anastrozole 1 milliGRAM(s) Oral daily  aspirin enteric coated 81 milliGRAM(s) Oral daily  docusate sodium 100 milliGRAM(s) Oral three times a day  DULoxetine 30 milliGRAM(s) Oral daily  enoxaparin Injectable 40 milliGRAM(s) SubCutaneous every 24 hours  ferrous    sulfate 325 milliGRAM(s) Oral daily  lactobacillus acidophilus 1 Tablet(s) Oral two times a day with meals  losartan 50 milliGRAM(s) Oral daily  metoprolol tartrate 12.5 milliGRAM(s) Oral two times a day  pantoprazole    Tablet 40 milliGRAM(s) Oral before breakfast  senna 2 Tablet(s) Oral at bedtime  simvastatin 20 milliGRAM(s) Oral at bedtime    MEDICATIONS  (PRN):  acetaminophen   Tablet. 650 milliGRAM(s) Oral every 6 hours PRN Mild Pain (1 - 3)  ondansetron Injectable 4 milliGRAM(s) IV Push every 6 hours PRN Nausea  oxyCODONE    5 mG/acetaminophen 325 mG 1 Tablet(s) Oral every 4 hours PRN Moderate Pain (4 - 6)          Vital Signs Last 24 Hrs  T(C): 36.4 (14 Aug 2018 15:03), Max: 36.7 (14 Aug 2018 00:29)  T(F): 97.5 (14 Aug 2018 15:03), Max: 98 (14 Aug 2018 00:29)  HR: 108 (14 Aug 2018 15:03) (83 - 111)  BP: 98/50 (14 Aug 2018 15:13) (80/51 - 126/82)  BP(mean): --  RR: 17 (14 Aug 2018 15:03) (17 - 18)  SpO2: 98% (14 Aug 2018 15:03) (92% - 100%) on 3L NC          08-13 @ 07:01  -  08-14 @ 07:00  --------------------------------------------------------  IN: 300 mL / OUT: 449 mL / NET: -149 mL          LABS:                        12.6   8.9   )-----------( 344      ( 14 Aug 2018 10:17 )             39.5     08-14    138  |  101  |  21  ----------------------------<  124<H>  4.5   |  26  |  0.87    Ca    8.9      14 Aug 2018 10:13  Phos  2.8     08-13  Mg     1.7     08-14            CAPILLARY BLOOD GLUCOSE                        Fluid characteristics  -- 08-10 @ 10:01  pH 7.34    tprot <0.6    Cell count  Appearance Bloody  Fluid type --  BF lymph 10  color Red  eosinophil --  PMN 13  Mesothelial --  Monocyte 6  Other body cells 71      CULTURES: (if applicable)  Culture Results:   No growth (08-10 @ 12:16)  Culture Results:   Testing in progress (08-10 @ 12:16)    Most recent blood culture -- 08-10 @ 12:16   -- -- .Body Fluid Pericardial Fluid 08-10 @ 12:16    Blood culture 08-10 @ 12:16  --    Few polymorphonuclear leukocytes seen per low power field  No organisms seen per oil power field  --  --  --    Urine culture    -->      Physical Examination:  PULM: Bronchial BS L  CVS: S1, S2 heard    RADIOLOGY REVIEWED  CXR: L pigtail in place    TTE: < from: Limited Transthoracic Echo (08.14.18 @ 09:56) >  CONCLUSIONS:  1. Small left ventricle.  2. Normal left ventricular systolic function. No segmental  wall motion abnormalities.  3.Trace pericardial effusion.    < end of copied text >

## 2018-08-14 NOTE — PROGRESS NOTE ADULT - SUBJECTIVE AND OBJECTIVE BOX
Cardiology Progress Note    Interval events: No acute events overnight. Pericardia drain with 6 cc output. Minimal discomfort at drain site.    Tele: SR , PVCs    Medications:  acetaminophen   Tablet. 650 milliGRAM(s) Oral every 6 hours PRN  anastrozole 1 milliGRAM(s) Oral daily  aspirin enteric coated 81 milliGRAM(s) Oral daily  docusate sodium 100 milliGRAM(s) Oral three times a day  DULoxetine 30 milliGRAM(s) Oral daily  enoxaparin Injectable 40 milliGRAM(s) SubCutaneous every 24 hours  ferrous    sulfate 325 milliGRAM(s) Oral daily  lactobacillus acidophilus 1 Tablet(s) Oral two times a day with meals  losartan 50 milliGRAM(s) Oral daily  metoprolol tartrate 12.5 milliGRAM(s) Oral two times a day  ondansetron Injectable 4 milliGRAM(s) IV Push every 6 hours PRN  oxyCODONE    5 mG/acetaminophen 325 mG 1 Tablet(s) Oral every 4 hours PRN  pantoprazole    Tablet 40 milliGRAM(s) Oral before breakfast  senna 2 Tablet(s) Oral at bedtime  simvastatin 20 milliGRAM(s) Oral at bedtime      Review of Systems:  Constitutional: [ ] Fever [ ] Chills [ ] Fatigue [ ] Weight change   HEENT: [ ] Blurred vision [ ] Eye Pain [ ] Headache [ ] Runny nose [ ] Sore Throat   Respiratory: [ ] Cough [ ] Wheezing [ ] Shortness of breath  Cardiovascular: [ ] Chest Pain [ ] Palpitations [ ] LUI [ ] PND [ ] Orthopnea  Gastrointestinal: [ ] Abdominal Pain [ ] Diarrhea [ ] Constipation [ ] Hemorrhoids [ ] Nausea [ ] Vomiting  Genitourinary: [ ] Nocturia [ ] Dysuria [ ] Incontinence  Extremities: [ ] Swelling [ ] Joint Pain  Neurologic: [ ] Focal deficit [ ] Paresthesias [ ] Syncope  Lymphatic: [ ] Swelling [ ] Lymphadenopathy   Skin: [ ] Rash [ ] Ecchymoses [ ] Wounds [ ] Lesions  Psychiatry: [ ] Depression [ ] Suicidal/Homicidal Ideation [ ] Anxiety [ ] Sleep Disturbances  [x] 10 point review of systems is otherwise negative except as mentioned above            [ ]Unable to obtain    Vitals:  T(C): 36.3 (18 @ 04:53), Max: 36.7 (18 @ 00:29)  HR: 96 (18 @ 04:53) (83 - 111)  BP: 126/82 (18 @ 04:53) (98/62 - 126/82)  BP(mean): --  RR: 18 (18 @ 04:53) (18 - 19)  SpO2: 100% (18 @ 04:53) (96% - 100%)  Wt(kg): --  Daily     Daily Weight in k.2 (13 Aug 2018 08:19)  I&O's Summary    12 Aug 2018 07:01  -  13 Aug 2018 07:00  --------------------------------------------------------  IN: 1074 mL / OUT: 817 mL / NET: 257 mL    13 Aug 2018 07:01  -  14 Aug 2018 05:20  --------------------------------------------------------  IN: 300 mL / OUT: 440 mL / NET: -140 mL        Physical Exam:  GEN:  NAD  HEENT:  PERRL, EOMI.  Normal oral mucosa NC/AT  HEART:  Normal S1, physiologic split S2, RRR, No m/r/g appreciated, No edema, no elevation in JVP  CHEST:  Clear to auscultation bilaterally  ABD:  Soft, Non-tender, Non-distended, BS+  EXT:  No clubbing, No joint deformity   NEURO:  Non-focal  No lymphadenopathy  PSYCH:  AAOx3, Mood & affect appropriate  SKIN:  No rashes, No ecchymoses, No cyanosis  Procedural Access Site: pericardial drain site cdi    Labs:                        11.8   8.3   )-----------( 296      ( 13 Aug 2018 10:06 )             37.1         136  |  101  |  22  ----------------------------<  121<H>  4.0   |  25  |  0.90    Ca    8.8      13 Aug 2018 09:58  Phos  2.8     08-13  Mg     1.6     08-13    Serum Pro-Brain Natriuretic Peptide: 575 pg/mL ( @ 16:35)    New results/imaging: Cardiology Progress Note    Interval events: No acute events overnight. Pericardial drain with 6 cc output. Minimal discomfort at drain site.    Tele: SR , PVCs    Medications:  acetaminophen   Tablet. 650 milliGRAM(s) Oral every 6 hours PRN  anastrozole 1 milliGRAM(s) Oral daily  aspirin enteric coated 81 milliGRAM(s) Oral daily  docusate sodium 100 milliGRAM(s) Oral three times a day  DULoxetine 30 milliGRAM(s) Oral daily  enoxaparin Injectable 40 milliGRAM(s) SubCutaneous every 24 hours  ferrous    sulfate 325 milliGRAM(s) Oral daily  lactobacillus acidophilus 1 Tablet(s) Oral two times a day with meals  losartan 50 milliGRAM(s) Oral daily  metoprolol tartrate 12.5 milliGRAM(s) Oral two times a day  ondansetron Injectable 4 milliGRAM(s) IV Push every 6 hours PRN  oxyCODONE    5 mG/acetaminophen 325 mG 1 Tablet(s) Oral every 4 hours PRN  pantoprazole    Tablet 40 milliGRAM(s) Oral before breakfast  senna 2 Tablet(s) Oral at bedtime  simvastatin 20 milliGRAM(s) Oral at bedtime      Review of Systems:  Constitutional: [ ] Fever [ ] Chills [ ] Fatigue [ ] Weight change   HEENT: [ ] Blurred vision [ ] Eye Pain [ ] Headache [ ] Runny nose [ ] Sore Throat   Respiratory: [ ] Cough [ ] Wheezing [ ] Shortness of breath  Cardiovascular: [ ] Chest Pain [ ] Palpitations [ ] LUI [ ] PND [ ] Orthopnea  Gastrointestinal: [ ] Abdominal Pain [ ] Diarrhea [ ] Constipation [ ] Hemorrhoids [ ] Nausea [ ] Vomiting  Genitourinary: [ ] Nocturia [ ] Dysuria [ ] Incontinence  Extremities: [ ] Swelling [ ] Joint Pain  Neurologic: [ ] Focal deficit [ ] Paresthesias [ ] Syncope  Lymphatic: [ ] Swelling [ ] Lymphadenopathy   Skin: [ ] Rash [ ] Ecchymoses [ ] Wounds [ ] Lesions  Psychiatry: [ ] Depression [ ] Suicidal/Homicidal Ideation [ ] Anxiety [ ] Sleep Disturbances  [x] 10 point review of systems is otherwise negative except as mentioned above            Vitals:  T(C): 36.3 (18 @ 04:53), Max: 36.7 (18 @ 00:29)  HR: 96 (18 @ 04:53) (83 - 111)  BP: 126/82 (18 @ 04:53) (98/62 - 126/82)  RR: 18 (18 @ 04:53) (18 - 19)  SpO2: 100% (18 @ 04:53) (96% - 100%)  Daily Weight in k.2 (13 Aug 2018 08:19)      I&O's Summary    12 Aug 2018 07:  -  13 Aug 2018 07:00  --------------------------------------------------------  IN: 1074 mL / OUT: 817 mL / NET: 257 mL    13 Aug 2018 07:01  -  14 Aug 2018 05:20  --------------------------------------------------------  IN: 300 mL / OUT: 440 mL / NET: -140 mL        Physical Exam:  GEN:  NAD  HEENT:  PERRL, EOMI.  Normal oral mucosa NC/AT  HEART:  Normal S1, physiologic split S2, RRR, No m/r/g appreciated, No edema, no elevation in JVP  CHEST:  Clear to auscultation bilaterally  ABD:  Soft, Non-tender, Non-distended, BS+  EXT:  No clubbing, No joint deformity   NEURO:  Non-focal  No lymphadenopathy  PSYCH:  AAOx3, Mood & affect appropriate  SKIN:  No rashes, No ecchymoses, No cyanosis  Procedural Access Site: pericardial drain site cdi      Tele: SR , PVCs      Labs:                        11.8   8.3   )-----------( 296      ( 13 Aug 2018 10:06 )             37.1     08-13    136  |  101  |  22  ----------------------------<  121<H>  4.0   |  25  |  0.90    Ca    8.8      13 Aug 2018 09:58  Phos  2.8     08-13  Mg     1.6     08-13    Serum Pro-Brain Natriuretic Peptide: 575 pg/mL ( @ 16:35)

## 2018-08-14 NOTE — PROGRESS NOTE ADULT - ASSESSMENT
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 Found to have large L pleural effusion causing complete atelectasis of L lung. She had 1L thoracentesis 1 week ago as output. CT scan revealed mod pericardial effusion and large pleural effusion pt remained persistently tachycardic and tachypneic 2d echo revealed increased pericardial effusion read as large with  tamponade/RV  collapse. Thoracic surgery consulted.   8/10 left pigtail by surgical resident 1L fluid out. Small PTX on CXR, pt asymptomatic, will continue to monitor. Percardiocentesis in  cc of dark red fluid removed.   8/11: Drains functioning well. CT scan Noted, No pleurix needed. D/w Dr. Mancini  8/12: Continue pleural and pericardial drain for now  8/13 Pericardial drain with minimal drainage 24ml/24h, continue water seal today, discussed with IR to d/c drain tomorrow, will repeat TTE. Continue left pigtail to LWS 193ml/24h.   8/14 echo revealed no effusion

## 2018-08-14 NOTE — PROGRESS NOTE ADULT - ASSESSMENT
83 y/o female with a PMH of OA, osteoporosis, RBBB, hyperlipidemia, early stage breast cancer and now with advanced lung cancer admitted with SOB secondary to pleural/pericardial effusion.     1. Adenocarcinoma Lung  / SOB  - CTA negative for PE, shows combination of pleural effusion and large left hilar mass causing compression of left upper lobe  - outside molecular studies still pending from pleural fluid cytology at [facility] and peripheral blood - will likely be back today on 8-13-18  - was scheduled for staging PETCT as outpatient.  MRI head on 8-11-18 was negative for mets.   - S/P left pigtail placement on 8-9-18 as well as pericardiocentesis for moderate pericardial effusion on 8-10-18.  Follow up cytology is pending.  - Rad Onc evaluation appreciated - will hold off on palliative radiation for now as she feels better after drainage of pleural and pericardial fluid   - will d/w Dr. Sanchez and decide on systemic treatment plan once molecular studies come back and when patient more stable.    2. Breast cancer - early stage ER/NE+ Her2- s/p lumpectomy on arimidex  - continue arimidex    3. Dysphagia -  - possibly related to compression or due to the SOB - better    4. pain / constipation   - cont percocet for pain   - colace and senna    Follow up cytology from pleural effusion and pericardial effusion  molecular studies in progress.  will d/w Dr Sanhcez about further plans.  Emotional support offered this morning.

## 2018-08-14 NOTE — PROGRESS NOTE ADULT - PROBLEM SELECTOR PLAN 2
s/p 8/10 L pigtail cath   - L lung atelectasis likely mostly 2nd proximal hilar mass causing extrinsic compression on proximal airway with effusion  -Plan for Pleurex with thoracic tomorrow   -L pigtail clamped

## 2018-08-14 NOTE — PROGRESS NOTE ADULT - PROBLEM SELECTOR PLAN 1
Exudative bloody pericardial effusion  -Likely malignant   -Repeat TTE today with minimal pericardial effusion  -Plan to pull drain in OR tomorrow during Pleurex

## 2018-08-14 NOTE — PROGRESS NOTE ADULT - ASSESSMENT
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 Found to have large L pleural effusion causing complete atelectasis of L lung. She had 1L thoracentesis 1 week ago as outpt.      Problem/Plan - 1:  ·  Problem: Pleural effusion.  Plan: s/p L pigtail by CTS, monitor output, plan for VATS tomorrow by CTS     Problem/Plan - 2:  ·  Problem: Adenocarcinoma of lung, unspecified laterality.  Plan: Apparent stage 4 disease. Drainage of Pleural effusion, Onc appreciated  MRI brain pending  RadOnc appreciated, hold off palliative radiation as improved w/o     Problem/Plan - 3:  ·  Problem: Dysphagia, unspecified type.  Plan: See above.  Will assume aspiration risk.   S/S.   Nutrition evaluation.      Problem/Plan - 4:  ·  Problem: DVT prophylaxis    Pericardial effusion w/tamponade - likely malignant, repeat TTE done, await IR to review and decide on drain removal    Tachycardia - sinus tach, Cardio appreciated, improved w/hydration and BB

## 2018-08-14 NOTE — PROGRESS NOTE ADULT - SUBJECTIVE AND OBJECTIVE BOX
CHIEF COMPLAINT:Patient is a 84y old  Female who presents with a chief complaint of Progressive exertional dyspnea, dry cough for 2 weeks (09 Aug 2018 12:48)  no acute evnts    Allergies:  Dilaudid (Other)  Levaquin (Diarrhea)      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  S/P hip replacement, right: 6/2016  - right hip  History of lumpectomy of right breast: July 2015  History of cataract surgery, left: and right 2016  Elective surgery: reversal of colostomy July 2015  History of tonsillectomy  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 No nausea, vomiting, diarrhea or constipation, + low appetite  GENITOURINARY: No dysuria, urinary frequency or urgency, no hematuria  NEUROLOGICAL: No headaches, memory loss, loss of strength, numbness, or tremors  SKIN: No itching, burning, rashes, or lesions   MUSCULOSKELETAL: No joint pain or swelling; No muscle, back, or extremity pain      Medications:  MEDICATIONS  (STANDING):  anastrozole 1 milliGRAM(s) Oral daily  aspirin enteric coated 81 milliGRAM(s) Oral daily  docusate sodium 100 milliGRAM(s) Oral three times a day  DULoxetine 30 milliGRAM(s) Oral daily  enoxaparin Injectable 40 milliGRAM(s) SubCutaneous every 24 hours  ferrous    sulfate 325 milliGRAM(s) Oral daily  lactobacillus acidophilus 1 Tablet(s) Oral two times a day with meals  losartan 50 milliGRAM(s) Oral daily  metoprolol tartrate 12.5 milliGRAM(s) Oral two times a day  pantoprazole    Tablet 40 milliGRAM(s) Oral before breakfast  senna 2 Tablet(s) Oral at bedtime  simvastatin 20 milliGRAM(s) Oral at bedtime    MEDICATIONS  (PRN):  acetaminophen   Tablet. 650 milliGRAM(s) Oral every 6 hours PRN Mild Pain (1 - 3)  ondansetron Injectable 4 milliGRAM(s) IV Push every 6 hours PRN Nausea  oxyCODONE    5 mG/acetaminophen 325 mG 1 Tablet(s) Oral every 4 hours PRN Moderate Pain (4 - 6)    	    PHYSICAL EXAM:  T(C): 36.3 (08-14-18 @ 04:53), Max: 36.7 (08-14-18 @ 00:29)  HR: 96 (08-14-18 @ 04:53) (83 - 111)  BP: 126/82 (08-14-18 @ 04:53) (100/68 - 126/82)  RR: 18 (08-14-18 @ 04:53) (18 - 18)  SpO2: 100% (08-14-18 @ 04:53) (96% - 100%)  Wt(kg): --  I&O's Summary    13 Aug 2018 07:01  -  14 Aug 2018 07:00  --------------------------------------------------------  IN: 300 mL / OUT: 449 mL / NET: -149 mL    14 Aug 2018 07:01  -  14 Aug 2018 13:03  --------------------------------------------------------  IN: 240 mL / OUT: 0 mL / NET: 240 mL      Appearance: Normal	  HEENT:   NCAT, PERRL, EOMI	  Lymphatic: No lymphadenopathy  Cardiovascular: Normal S1 S2, RRR  Respiratory: dec BS L side  Psychiatry: A & O x 3, Mood & affect appropriate  Gastrointestinal:  Soft, Non-tender, + BS  Skin: No rashes, No ecchymoses, No cyanosis	  Neurologic: Non-focal  Extremities: Normal range of motion, No clubbing, cyanosis or edema    LABS:	 	    CARDIAC MARKERS:                                12.6   8.9   )-----------( 344      ( 14 Aug 2018 10:17 )             39.5     08-14    138  |  101  |  21  ----------------------------<  124<H>  4.5   |  26  |  0.87    Ca    8.9      14 Aug 2018 10:13  Phos  2.8     08-13  Mg     1.7     08-14      proBNP:   Lipid Profile:   HgA1c:   TSH:

## 2018-08-14 NOTE — PROGRESS NOTE ADULT - SUBJECTIVE AND OBJECTIVE BOX
84y Female s/p pericardial drain on 8/10/2018 in Interventional Radiology with Dr. Sofia.     Patient seen and examined @ bedside. Site is clean and dry. The catheter is to water seal.     T(F): 97.4 (08-14-18 @ 04:53), Max: 98 (08-14-18 @ 00:29)  HR: 96 (08-14-18 @ 04:53) (83 - 111)  BP: 126/82 (08-14-18 @ 04:53) (98/62 - 126/82)  RR: 18 (08-14-18 @ 04:53) (18 - 19)  SpO2: 100% (08-14-18 @ 04:53) (96% - 100%)    LABS:                        11.8   8.3   )-----------( 296      ( 13 Aug 2018 10:06 )             37.1     08-13    136  |  101  |  22  ----------------------------<  121<H>  4.0   |  25  |  0.90    Ca    8.8      13 Aug 2018 09:58  Phos  2.8     08-13  Mg     1.6     08-13    Pericardial drain: 15ml over 24hours    PHYSICAL EXAM:  General: Nontoxic, in NAD  Neuro:  Alert & oriented x 3  Thorax: Dressing is clean and dry.     Assessment:  84y Female with Hx of Breast CA and advanced stage lung CA found to have a left pleural effusion s/p CT surg drain, and pericardial effusion s/p IR drain placement on 8/10/2018.    Plan:  - Monitor pericardial drain output, keep to water seal  - awaiting echo prior to drain removal.      Please call IR at extension [phone] with any questions, concerns, or issues regarding above.

## 2018-08-14 NOTE — PROGRESS NOTE ADULT - ASSESSMENT
83 yo F with a PMH significant for recent diagnosis of lung adenocarcinoma, R breast CA s/p lumpectomy, and HTN.  Presented to the ED on 8/8 with progressive SOB and was found to have left lung white out.  Also found to have large pericardial effusion complicated by pericardial tamponade, now s/p pericardial drain.     #Pericardial effusion  - likely malignant given hx. of active malignancy and bloody effusion. Pericardial drain in place and continues to drain significantly. Hemodynamically stable but continues to be tachycardic. Would recommend IV hydration. LV function appeared normal on echocardiogram.  - Will continue to monitor output from pericardial drain.   - repeat TTE  - IR to evaluate for drain removal    #Atrial tachyarrhythmia - likely irritation from pericardial drain/underlying pathology of pericardial effusion  -cont metoprolol 12.5mg BID for now. if continues to have significant tachycardia, consider discontinuing losartan in favor of uptitration of betablocker  -not candidate for systemic AC at this time; may need to consider when invasive procedures are no longer planned.    #HTN  -BP controlled at present    Fabricio Stauffer MD  [id number] 83 yo F with a PMH significant for recent diagnosis of lung adenocarcinoma, R breast CA s/p lumpectomy, and HTN.  Presented to the ED on 8/8 with progressive SOB and was found to have left lung white out.  Also found to have large pericardial effusion complicated by pericardial tamponade, now s/p pericardial drain.     #Pericardial effusion  - likely malignant given hx. of active malignancy and bloody effusion. Pericardial drain in place and continues to drain significantly. Hemodynamically stable but continues to be tachycardic. Would recommend IV hydration. LV function appeared normal on echocardiogram.  - Will continue to monitor output from pericardial drain.   - repeat TTE  - IR to evaluate for drain removal    #Atrial tachyarrhythmia - likely irritation from pericardial drain/underlying pathology of pericardial effusion  -cont metoprolol 12.5mg BID for now. If continues to have significant tachycardia, consider discontinuing losartan in favor of uptitration of beta blocker  -not candidate for systemic AC at this time; may need to consider when invasive procedures are no longer planned.    #HTN  -BP controlled at present    Fabricio Stauffer MD  [id number]    Antwan Campos M.D.  Cardiology Consult Service  [phone] or [phone]

## 2018-08-14 NOTE — PROGRESS NOTE ADULT - SUBJECTIVE AND OBJECTIVE BOX
Seen and examined. Resting comfortable. No acute events overnight. Denies short of breath.   VITAL SIGNS      Vital Signs Last 24 Hrs  T(C): 36.4 (18 @ 15:03), Max: 36.7 (18 @ 00:29)  T(F): 97.5 (18 @ 15:03), Max: 98 (18 @ 00:29)  HR: 109 (18 @ 18:25) (83 - 111)  BP: 116/73 (18 @ 18:25) (80/51 - 126/82)  RR: 17 (18 @ 15:03) (17 - 18)  SpO2: 98% (18 @ 15:03) (92% - 100%)             @ 07:01  -   @ 07:00  --------------------------------------------------------  IN: 300 mL / OUT: 449 mL / NET: -149 mL     @ 07:01  -   @ 19:18  --------------------------------------------------------  IN: 480 mL / OUT: 400 mL / NET: 80 mL       Daily     Daily Weight in k.9 (14 Aug 2018 08:46)  Admit Wt: Drug Dosing Weight  Height (cm): 165.1 (08 Aug 2018 15:40)  Weight (kg): 52.2 (09 Aug 2018 22:40)  BMI (kg/m2): 19.2 (09 Aug 2018 22:40)  BSA (m2): 1.56 (09 Aug 2018 22:40)          MEDICATIONS  acetaminophen   Tablet. 650 milliGRAM(s) Oral every 6 hours PRN  anastrozole 1 milliGRAM(s) Oral daily  aspirin enteric coated 81 milliGRAM(s) Oral daily  docusate sodium 100 milliGRAM(s) Oral three times a day  DULoxetine 30 milliGRAM(s) Oral daily  enoxaparin Injectable 40 milliGRAM(s) SubCutaneous every 24 hours  ferrous    sulfate 325 milliGRAM(s) Oral daily  lactobacillus acidophilus 1 Tablet(s) Oral two times a day with meals  losartan 50 milliGRAM(s) Oral daily  metoprolol tartrate 12.5 milliGRAM(s) Oral two times a day  ondansetron Injectable 4 milliGRAM(s) IV Push every 6 hours PRN  oxyCODONE    5 mG/acetaminophen 325 mG 1 Tablet(s) Oral every 4 hours PRN  pantoprazole    Tablet 40 milliGRAM(s) Oral before breakfast  senna 2 Tablet(s) Oral at bedtime  simvastatin 20 milliGRAM(s) Oral at bedtime      PHYSICAL EXAM    Subjective:  N  Neurology: alert and oriented x 3, nonfocal, no gross deficits  CV : s1s1 reg no MRGS  Lungs: CTA, equal chest expansion  Drains: left pigtail clamped no airleak no crepitus dsg CDI [id number] to water seal, pericardial drain to seal output 15ml  Abdomen: soft, nontender, nondistended, positive bowel sounds, last bowel movement   :    voids  Extremities:   +2  BLLE edema,  +distal pulses present  b/l , no calf tenderness b/l , warm and perfused b/l    CXR:    LABS      138  |  101  |  21  ----------------------------<  124<H>  4.5   |  26  |  0.87    Ca    8.9      14 Aug 2018 10:13  Phos  2.8     08-13  Mg     1.7     08-14                                   12.6   8.9   )-----------( 344      ( 14 Aug 2018 10:17 )             39.5                   PAST MEDICAL & SURGICAL HISTORY:  Lung mass  Primary osteoarthritis of left hip  History of breast cancer: , no chemo/radiation  s/p lumpectomy  Iron deficiency anemia  Retinal tear  Osteoarthritis  Glaucoma  Fistula of vagina: rectovaginal fistula  RBBB  Ptosis: left eye - eye lid surgery   Anxiety  Hyperlipidemia  Osteopenia  Hypertension  Ureterovaginal prolapse  Status post left knee replacement: 2016  S/P hip replacement, right: 2016  - right hip  History of lumpectomy of right breast: 2015  History of cataract surgery, left: and right 2016  Elective surgery: reversal of colostomy 2015  History of tonsillectomy  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 Small left ventricle.  2. Normal left ventricular systolic function. No segmental  wall motion abnormalities.  3.Trace pericardial effusion.  ------------------------------------------------------------------------    < end of copied text >      < from: Xray Chest 1 View- PORTABLE-Routine (18 @ 09:10) >  IMPRESSION: Unchanged large left pleural effusion.      < end of copied text >

## 2018-08-14 NOTE — PROGRESS NOTE ADULT - SUBJECTIVE AND OBJECTIVE BOX
Chief Complaint: "I could not sleep".    History of Present Illness:    The patient stated she is frustrated today and sad.  She has discomfort from her drains.  No fevers.  No chest pain.  She has some nausea.  No vomiting. No calf pain.  She has some SOB with exertion.  Overall is feeling weak.     MEDICATIONS  (STANDING):  anastrozole 1 milliGRAM(s) Oral daily  aspirin enteric coated 81 milliGRAM(s) Oral daily  docusate sodium 100 milliGRAM(s) Oral three times a day  DULoxetine 30 milliGRAM(s) Oral daily  enoxaparin Injectable 40 milliGRAM(s) SubCutaneous every 24 hours  ferrous    sulfate 325 milliGRAM(s) Oral daily  lactobacillus acidophilus 1 Tablet(s) Oral two times a day with meals  losartan 50 milliGRAM(s) Oral daily  metoprolol tartrate 12.5 milliGRAM(s) Oral two times a day  pantoprazole    Tablet 40 milliGRAM(s) Oral before breakfast  senna 2 Tablet(s) Oral at bedtime  simvastatin 20 milliGRAM(s) Oral at bedtime    MEDICATIONS  (PRN):  acetaminophen   Tablet. 650 milliGRAM(s) Oral every 6 hours PRN Mild Pain (1 - 3)  ondansetron Injectable 4 milliGRAM(s) IV Push every 6 hours PRN Nausea  oxyCODONE    5 mG/acetaminophen 325 mG 1 Tablet(s) Oral every 4 hours PRN Moderate Pain (4 - 6)      Allergies    Levaquin (Diarrhea)    Intolerances    Dilaudid (Other)      Vital Signs Last 24 Hrs  T(C): 36.3 (14 Aug 2018 04:53), Max: 36.7 (14 Aug 2018 00:29)  T(F): 97.4 (14 Aug 2018 04:53), Max: 98 (14 Aug 2018 00:29)  HR: 96 (14 Aug 2018 04:53) (83 - 111)  BP: 126/82 (14 Aug 2018 04:53) (98/62 - 126/82)  BP(mean): --  RR: 18 (14 Aug 2018 04:53) (18 - 19)  SpO2: 100% (14 Aug 2018 04:53) (96% - 100%)    PHYSICAL EXAM  General: weak  HEENT: clear oropharynx, anicteric sclera, pink conjunctiva  Neck: supple  CV: normal S1/S2 , positive pericardial drain  Lungs: decreased at bases, left sided chest tube  Abdomen: soft non-tender non-distended,  positive bowel sounds  Ext: no edema  Skin: no rashes   Lymph Nodes: No LAD in neck  Neuro: alert and oriented X 3    LABS:                          11.8   8.3   )-----------( 296      ( 13 Aug 2018 10:06 )             37.1         Mean Cell Volume : 94.3 fl  Mean Cell Hemoglobin : 30.1 pg  Mean Cell Hemoglobin Concentration : 31.9 gm/dL  Auto Neutrophil # : x  Auto Lymphocyte # : x  Auto Monocyte # : x  Auto Eosinophil # : x  Auto Basophil # : x  Auto Neutrophil % : x  Auto Lymphocyte % : x  Auto Monocyte % : x  Auto Eosinophil % : x  Auto Basophil % : x      Serial CBC's  08-13 @ 10:06  Hct-37.1 / Hgb-11.8 / Plat-296 / RBC-3.93 / WBC-8.3  Serial CBC's  08-12 @ 08:11  Hct-37.9 / Hgb-11.8 / Plat-265 / RBC-4.06 / WBC-9.21  Serial CBC's  08-11 @ 08:42  Hct-40.4 / Hgb-12.2 / Plat-313 / RBC-4.29 / WBC-12.15      08-13    136  |  101  |  22  ----------------------------<  121<H>  4.0   |  25  |  0.90    Ca    8.8      13 Aug 2018 09:58  Phos  2.8     08-13  Mg     1.6     08-13

## 2018-08-14 NOTE — PROGRESS NOTE ADULT - PROBLEM SELECTOR PLAN 1
s/p pericardiocentesis and pericardial drain placement in IR  Maintain to water seal. Strict I & Os  cardiology following   NPO after midnight  for left VATS pleurex placement in am first case with Dr Avila   No growth in pericardial fluid cultures  Repeat TTE, plan to d/c pericardial drain in IR tomorrow if drainage remains minimal.

## 2018-08-15 ENCOUNTER — APPOINTMENT (OUTPATIENT)
Dept: THORACIC SURGERY | Facility: HOSPITAL | Age: 83
End: 2018-08-15

## 2018-08-15 LAB
ANION GAP SERPL CALC-SCNC: 13 MMOL/L — SIGNIFICANT CHANGE UP (ref 5–17)
BUN SERPL-MCNC: 18 MG/DL — SIGNIFICANT CHANGE UP (ref 7–23)
CALCIUM SERPL-MCNC: 8.9 MG/DL — SIGNIFICANT CHANGE UP (ref 8.4–10.5)
CHLORIDE SERPL-SCNC: 100 MMOL/L — SIGNIFICANT CHANGE UP (ref 96–108)
CO2 SERPL-SCNC: 24 MMOL/L — SIGNIFICANT CHANGE UP (ref 22–31)
COMMENT - FLUIDS: SIGNIFICANT CHANGE UP
CREAT SERPL-MCNC: 0.75 MG/DL — SIGNIFICANT CHANGE UP (ref 0.5–1.3)
CULTURE RESULTS: SIGNIFICANT CHANGE UP
GLUCOSE SERPL-MCNC: 91 MG/DL — SIGNIFICANT CHANGE UP (ref 70–99)
HCT VFR BLD CALC: 39.8 % — SIGNIFICANT CHANGE UP (ref 34.5–45)
HGB BLD-MCNC: 12.1 G/DL — SIGNIFICANT CHANGE UP (ref 11.5–15.5)
MAGNESIUM SERPL-MCNC: 2 MG/DL — SIGNIFICANT CHANGE UP (ref 1.6–2.6)
MCHC RBC-ENTMCNC: 28.3 PG — SIGNIFICANT CHANGE UP (ref 27–34)
MCHC RBC-ENTMCNC: 30.4 GM/DL — LOW (ref 32–36)
MCV RBC AUTO: 93.2 FL — SIGNIFICANT CHANGE UP (ref 80–100)
PHOSPHATE SERPL-MCNC: 2.6 MG/DL — SIGNIFICANT CHANGE UP (ref 2.5–4.5)
PLATELET # BLD AUTO: 354 K/UL — SIGNIFICANT CHANGE UP (ref 150–400)
POTASSIUM SERPL-MCNC: 4.1 MMOL/L — SIGNIFICANT CHANGE UP (ref 3.5–5.3)
POTASSIUM SERPL-SCNC: 4.1 MMOL/L — SIGNIFICANT CHANGE UP (ref 3.5–5.3)
RBC # BLD: 4.27 M/UL — SIGNIFICANT CHANGE UP (ref 3.8–5.2)
RBC # FLD: 15.7 % — HIGH (ref 10.3–14.5)
SODIUM SERPL-SCNC: 137 MMOL/L — SIGNIFICANT CHANGE UP (ref 135–145)
SPECIMEN SOURCE: SIGNIFICANT CHANGE UP
WBC # BLD: 7.78 K/UL — SIGNIFICANT CHANGE UP (ref 3.8–10.5)
WBC # FLD AUTO: 7.78 K/UL — SIGNIFICANT CHANGE UP (ref 3.8–10.5)

## 2018-08-15 PROCEDURE — 99232 SBSQ HOSP IP/OBS MODERATE 35: CPT | Mod: GC

## 2018-08-15 PROCEDURE — 32550 INSERT PLEURAL CATH: CPT

## 2018-08-15 PROCEDURE — 71045 X-RAY EXAM CHEST 1 VIEW: CPT | Mod: 26

## 2018-08-15 PROCEDURE — 32601 THORACOSCOPY DIAGNOSTIC: CPT

## 2018-08-15 RX ORDER — LOSARTAN POTASSIUM 100 MG/1
50 TABLET, FILM COATED ORAL DAILY
Qty: 0 | Refills: 0 | Status: DISCONTINUED | OUTPATIENT
Start: 2018-08-15 | End: 2018-08-16

## 2018-08-15 RX ORDER — PANTOPRAZOLE SODIUM 20 MG/1
40 TABLET, DELAYED RELEASE ORAL
Qty: 0 | Refills: 0 | Status: DISCONTINUED | OUTPATIENT
Start: 2018-08-15 | End: 2018-08-24

## 2018-08-15 RX ORDER — OXYCODONE AND ACETAMINOPHEN 5; 325 MG/1; MG/1
1 TABLET ORAL EVERY 4 HOURS
Qty: 0 | Refills: 0 | Status: DISCONTINUED | OUTPATIENT
Start: 2018-08-15 | End: 2018-08-22

## 2018-08-15 RX ORDER — DOCUSATE SODIUM 100 MG
100 CAPSULE ORAL THREE TIMES A DAY
Qty: 0 | Refills: 0 | Status: DISCONTINUED | OUTPATIENT
Start: 2018-08-15 | End: 2018-08-24

## 2018-08-15 RX ORDER — ONDANSETRON 8 MG/1
4 TABLET, FILM COATED ORAL EVERY 6 HOURS
Qty: 0 | Refills: 0 | Status: DISCONTINUED | OUTPATIENT
Start: 2018-08-15 | End: 2018-08-24

## 2018-08-15 RX ORDER — LACTOBACILLUS ACIDOPHILUS 100MM CELL
1 CAPSULE ORAL
Qty: 0 | Refills: 0 | Status: DISCONTINUED | OUTPATIENT
Start: 2018-08-15 | End: 2018-08-24

## 2018-08-15 RX ORDER — MORPHINE SULFATE 50 MG/1
2 CAPSULE, EXTENDED RELEASE ORAL ONCE
Qty: 0 | Refills: 0 | Status: DISCONTINUED | OUTPATIENT
Start: 2018-08-15 | End: 2018-08-15

## 2018-08-15 RX ORDER — FERROUS SULFATE 325(65) MG
325 TABLET ORAL DAILY
Qty: 0 | Refills: 0 | Status: DISCONTINUED | OUTPATIENT
Start: 2018-08-15 | End: 2018-08-24

## 2018-08-15 RX ORDER — ACETAMINOPHEN 500 MG
1000 TABLET ORAL ONCE
Qty: 0 | Refills: 0 | Status: DISCONTINUED | OUTPATIENT
Start: 2018-08-15 | End: 2018-08-15

## 2018-08-15 RX ORDER — SODIUM CHLORIDE 9 MG/ML
250 INJECTION, SOLUTION INTRAVENOUS ONCE
Qty: 0 | Refills: 0 | Status: COMPLETED | OUTPATIENT
Start: 2018-08-15 | End: 2018-08-15

## 2018-08-15 RX ORDER — SENNA PLUS 8.6 MG/1
2 TABLET ORAL AT BEDTIME
Qty: 0 | Refills: 0 | Status: DISCONTINUED | OUTPATIENT
Start: 2018-08-15 | End: 2018-08-24

## 2018-08-15 RX ORDER — ONDANSETRON 8 MG/1
4 TABLET, FILM COATED ORAL ONCE
Qty: 0 | Refills: 0 | Status: COMPLETED | OUTPATIENT
Start: 2018-08-15 | End: 2018-08-16

## 2018-08-15 RX ORDER — DULOXETINE HYDROCHLORIDE 30 MG/1
30 CAPSULE, DELAYED RELEASE ORAL DAILY
Qty: 0 | Refills: 0 | Status: DISCONTINUED | OUTPATIENT
Start: 2018-08-15 | End: 2018-08-24

## 2018-08-15 RX ORDER — METOPROLOL TARTRATE 50 MG
12.5 TABLET ORAL
Qty: 0 | Refills: 0 | Status: DISCONTINUED | OUTPATIENT
Start: 2018-08-15 | End: 2018-08-20

## 2018-08-15 RX ORDER — SIMVASTATIN 20 MG/1
20 TABLET, FILM COATED ORAL AT BEDTIME
Qty: 0 | Refills: 0 | Status: DISCONTINUED | OUTPATIENT
Start: 2018-08-15 | End: 2018-08-24

## 2018-08-15 RX ORDER — ACETAMINOPHEN 500 MG
650 TABLET ORAL EVERY 6 HOURS
Qty: 0 | Refills: 0 | Status: DISCONTINUED | OUTPATIENT
Start: 2018-08-15 | End: 2018-08-24

## 2018-08-15 RX ADMIN — SODIUM CHLORIDE 1000 MILLILITER(S): 9 INJECTION, SOLUTION INTRAVENOUS at 18:20

## 2018-08-15 RX ADMIN — DULOXETINE HYDROCHLORIDE 30 MILLIGRAM(S): 30 CAPSULE, DELAYED RELEASE ORAL at 22:28

## 2018-08-15 RX ADMIN — Medication 100 MILLIGRAM(S): at 22:28

## 2018-08-15 RX ADMIN — OXYCODONE AND ACETAMINOPHEN 1 TABLET(S): 5; 325 TABLET ORAL at 23:15

## 2018-08-15 RX ADMIN — OXYCODONE AND ACETAMINOPHEN 1 TABLET(S): 5; 325 TABLET ORAL at 22:28

## 2018-08-15 RX ADMIN — Medication 12.5 MILLIGRAM(S): at 05:26

## 2018-08-15 RX ADMIN — Medication 12.5 MILLIGRAM(S): at 22:28

## 2018-08-15 RX ADMIN — MORPHINE SULFATE 2 MILLIGRAM(S): 50 CAPSULE, EXTENDED RELEASE ORAL at 13:38

## 2018-08-15 RX ADMIN — LOSARTAN POTASSIUM 50 MILLIGRAM(S): 100 TABLET, FILM COATED ORAL at 05:26

## 2018-08-15 RX ADMIN — SIMVASTATIN 20 MILLIGRAM(S): 20 TABLET, FILM COATED ORAL at 22:28

## 2018-08-15 NOTE — PROGRESS NOTE ADULT - SUBJECTIVE AND OBJECTIVE BOX
CHIEF COMPLAINT:Patient is a 84y old  Female who presents with a chief complaint of Progressive exertional dyspnea, dry cough for 2 weeks (09 Aug 2018 12:48)  no acute evnts    Allergies:  Dilaudid (Other)  Levaquin (Diarrhea)      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  S/P hip replacement, right: 6/2016  - right hip  History of lumpectomy of right breast: July 2015  History of cataract surgery, left: and right 2016  Elective surgery: reversal of colostomy July 2015  History of tonsillectomy  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 No nausea, vomiting, diarrhea or constipation, + low appetite  GENITOURINARY: No dysuria, urinary frequency or urgency, no hematuria  NEUROLOGICAL: No headaches, memory loss, loss of strength, numbness, or tremors  SKIN: No itching, burning, rashes, or lesions   MUSCULOSKELETAL: No joint pain or swelling; No muscle, back, or extremity pain      Medications:  MEDICATIONS  (STANDING):  anastrozole 1 milliGRAM(s) Oral daily  aspirin enteric coated 81 milliGRAM(s) Oral daily  docusate sodium 100 milliGRAM(s) Oral three times a day  DULoxetine 30 milliGRAM(s) Oral daily  enoxaparin Injectable 40 milliGRAM(s) SubCutaneous every 24 hours  ferrous    sulfate 325 milliGRAM(s) Oral daily  lactobacillus acidophilus 1 Tablet(s) Oral two times a day with meals  losartan 50 milliGRAM(s) Oral daily  metoprolol tartrate 12.5 milliGRAM(s) Oral two times a day  pantoprazole    Tablet 40 milliGRAM(s) Oral before breakfast  senna 2 Tablet(s) Oral at bedtime  simvastatin 20 milliGRAM(s) Oral at bedtime    MEDICATIONS  (PRN):  acetaminophen   Tablet. 650 milliGRAM(s) Oral every 6 hours PRN Mild Pain (1 - 3)  ondansetron Injectable 4 milliGRAM(s) IV Push every 6 hours PRN Nausea  oxyCODONE    5 mG/acetaminophen 325 mG 1 Tablet(s) Oral every 4 hours PRN Moderate Pain (4 - 6)    	    PHYSICAL EXAM:  T(C): 36.3 (08-15-18 @ 05:06), Max: 36.7 (08-15-18 @ 00:16)  HR: 78 (08-15-18 @ 05:06) (78 - 109)  BP: 148/86 (08-15-18 @ 05:06) (80/51 - 148/86)  RR: 18 (08-15-18 @ 05:06) (17 - 18)  SpO2: 98% (08-15-18 @ 05:06) (92% - 98%)  Wt(kg): --  I&O's Summary    14 Aug 2018 07:01  -  15 Aug 2018 07:00  --------------------------------------------------------  IN: 780 mL / OUT: 804 mL / NET: -24 mL    15 Aug 2018 07:01  -  15 Aug 2018 12:10  --------------------------------------------------------  IN: 0 mL / OUT: 0 mL / NET: 0 mL      Appearance: Normal	  HEENT:   NCAT, PERRL, EOMI	  Lymphatic: No lymphadenopathy  Cardiovascular: Normal S1 S2, RRR  Respiratory: dec BS L side  Psychiatry: A & O x 3, Mood & affect appropriate  Gastrointestinal:  Soft, Non-tender, + BS  Skin: No rashes, No ecchymoses, No cyanosis	  Neurologic: Non-focal  Extremities: Normal range of motion, No clubbing, cyanosis or edema    LABS:	 	    CARDIAC MARKERS:                                12.1   7.78  )-----------( 354      ( 15 Aug 2018 07:13 )             39.8     08-15    137  |  100  |  18  ----------------------------<  91  4.1   |  24  |  0.75    Ca    8.9      15 Aug 2018 06:51  Phos  2.6     08-15  Mg     2.0     08-15      proBNP:   Lipid Profile:   HgA1c:   TSH:

## 2018-08-15 NOTE — CHART NOTE - NSCHARTNOTEFT_GEN_A_CORE
Called to bedside. to evaluate pt with hypotension. SBP 57 Pt given 200mcg of phenylephrine and in prone position. Dr Tapia at bedside. SBP increased to 90's. Pt given 250ml bolus of normosol. B/P increased to 120's. Pt states she was nauseaous and in pain and then felt dizzy. Currently HR 90's . Pt given zofran as well

## 2018-08-15 NOTE — PROGRESS NOTE ADULT - ASSESSMENT
85 y/o female with a PMH of OA, osteoporosis, RBBB, hyperlipidemia, early stage breast cancer and now with advanced lung cancer admitted with SOB secondary to pleural/pericardial effusion.     1. Adenocarcinoma Lung  / SOB  - CTA negative for PE, shows combination of pleural effusion and large left hilar mass causing compression of left upper lobe  - outside molecular studies from 7-27-18 on peripheral blood with possible ZAC mutation no other options.  Left pleural fluid cytology at  on 8-2-18 c/w adenoCA.   - was scheduled for staging PETCT as outpatient.  MRI head on 8-11-18 was negative for mets.   - S/P left pigtail placement on 8-9-18 as well as pericardiocentesis for moderate pericardial effusion on 8-10-18.  Cytology on pericardial effusion c/w adenoCA discuss with Dr. Bustillo full moleculars in progress including PDL1.  Was consistent with lung primary and daughter is aware.   - Rad Onc evaluation appreciated - will hold off on palliative radiation for now as she feels better after drainage of pleural and pericardial fluid   - will d/w Dr. Sanchez and decide on systemic treatment plan once molecular studies come back from pericardial fluid and stable.  -  Left sided VATS for today on 8-15-18 with Dr Avila.    2. Breast cancer - early stage ER/KS+ Her2- s/p lumpectomy on arimidex  - continue arimidex    3. Dysphagia -  - possibly related to compression or due to the SOB - better    4. pain / constipation   - cont percocet for pain   - colace and senna    Molecular studies on pericardial fluid in progress discussed with Dr. Bustillo.  L VATS for 8-15-18  Will discuss with daughter discussed in detail yesterday.

## 2018-08-15 NOTE — PROGRESS NOTE ADULT - SUBJECTIVE AND OBJECTIVE BOX
Cardiology Progress Note    Interval events: No acute events overnight. 4 cc of pericardial drain output. Cytology consistent with malignancy. TTE with trace effusion.    Tele: sinus 80s    Medications:  acetaminophen   Tablet. 650 milliGRAM(s) Oral every 6 hours PRN  anastrozole 1 milliGRAM(s) Oral daily  aspirin enteric coated 81 milliGRAM(s) Oral daily  docusate sodium 100 milliGRAM(s) Oral three times a day  DULoxetine 30 milliGRAM(s) Oral daily  enoxaparin Injectable 40 milliGRAM(s) SubCutaneous every 24 hours  ferrous    sulfate 325 milliGRAM(s) Oral daily  lactobacillus acidophilus 1 Tablet(s) Oral two times a day with meals  losartan 50 milliGRAM(s) Oral daily  metoprolol tartrate 12.5 milliGRAM(s) Oral two times a day  ondansetron Injectable 4 milliGRAM(s) IV Push every 6 hours PRN  oxyCODONE    5 mG/acetaminophen 325 mG 1 Tablet(s) Oral every 4 hours PRN  pantoprazole    Tablet 40 milliGRAM(s) Oral before breakfast  senna 2 Tablet(s) Oral at bedtime  simvastatin 20 milliGRAM(s) Oral at bedtime      Review of Systems:  Constitutional: [ ] Fever [ ] Chills [ ] Fatigue [ ] Weight change   HEENT: [ ] Blurred vision [ ] Eye Pain [ ] Headache [ ] Runny nose [ ] Sore Throat   Respiratory: [ ] Cough [ ] Wheezing [ ] Shortness of breath  Cardiovascular: [ ] Chest Pain [ ] Palpitations [ ] LUI [ ] PND [ ] Orthopnea  Gastrointestinal: [ ] Abdominal Pain [ ] Diarrhea [ ] Constipation [ ] Hemorrhoids [ ] Nausea [ ] Vomiting  Genitourinary: [ ] Nocturia [ ] Dysuria [ ] Incontinence  Extremities: [ ] Swelling [ ] Joint Pain  Neurologic: [ ] Focal deficit [ ] Paresthesias [ ] Syncope  Lymphatic: [ ] Swelling [ ] Lymphadenopathy   Skin: [ ] Rash [ ] Ecchymoses [ ] Wounds [ ] Lesions  Psychiatry: [ ] Depression [ ] Suicidal/Homicidal Ideation [ ] Anxiety [ ] Sleep Disturbances  [x] 10 point review of systems is otherwise negative except as mentioned above            [ ]Unable to obtain    Vitals:  T(C): 36.3 (08-15-18 @ 05:06), Max: 36.7 (08-15-18 @ 00:16)  HR: 78 (08-15-18 @ 05:06) (78 - 109)  BP: 148/86 (08-15-18 @ 05:06) (80/51 - 148/86)  BP(mean): --  RR: 18 (08-15-18 @ 05:06) (17 - 18)  SpO2: 98% (08-15-18 @ 05:06) (92% - 98%)  Wt(kg): --  Daily     Daily Weight in k.9 (14 Aug 2018 08:46)  I&O's Summary    13 Aug 2018 07:01  -  14 Aug 2018 07:00  --------------------------------------------------------  IN: 300 mL / OUT: 449 mL / NET: -149 mL    14 Aug 2018 07:01  -  15 Aug 2018 05:30  --------------------------------------------------------  IN: 780 mL / OUT: 804 mL / NET: -24 mL        Physical Exam:  GEN:  NAD  HEENT:  PERRL, EOMI.  Normal oral mucosa NC/AT  HEART:  Normal S1, physiologic split S2, RRR, No m/r/g appreciated, No edema, no elevation in JVP  CHEST:  Clear to auscultation bilaterally  ABD:  Soft, Non-tender, Non-distended, BS+  EXT:  No clubbing, No joint deformity   NEURO:  Non-focal  No lymphadenopathy  PSYCH:  AAOx3, Mood & affect appropriate  SKIN:  No rashes, No ecchymoses, No cyanosis  Procedural Access Site: pericardial drain site cdi    Labs:                        12.6   8.9   )-----------( 344      ( 14 Aug 2018 10:17 )             39.5     08-14    138  |  101  |  21  ----------------------------<  124<H>  4.5   |  26  |  0.87    Ca    8.9      14 Aug 2018 10:13  Phos  2.8     08-13  Mg     1.7     08-14    Serum Pro-Brain Natriuretic Peptide: 575 pg/mL ( @ 16:35)    New results/imaging:  TTE  CONCLUSIONS:  1. Small left ventricle.  2. Normal left ventricular systolic function. No segmental  wall motion abnormalities.  3.Trace pericardial effusion. Cardiology Progress Note    Interval events: No acute events overnight. 4 cc of pericardial drain output. Cytology consistent with malignancy. TTE with trace effusion.    Tele: sinus 80s    Medications:  acetaminophen   Tablet. 650 milliGRAM(s) Oral every 6 hours PRN  anastrozole 1 milliGRAM(s) Oral daily  aspirin enteric coated 81 milliGRAM(s) Oral daily  docusate sodium 100 milliGRAM(s) Oral three times a day  DULoxetine 30 milliGRAM(s) Oral daily  enoxaparin Injectable 40 milliGRAM(s) SubCutaneous every 24 hours  ferrous    sulfate 325 milliGRAM(s) Oral daily  lactobacillus acidophilus 1 Tablet(s) Oral two times a day with meals  losartan 50 milliGRAM(s) Oral daily  metoprolol tartrate 12.5 milliGRAM(s) Oral two times a day  ondansetron Injectable 4 milliGRAM(s) IV Push every 6 hours PRN  oxyCODONE    5 mG/acetaminophen 325 mG 1 Tablet(s) Oral every 4 hours PRN  pantoprazole    Tablet 40 milliGRAM(s) Oral before breakfast  senna 2 Tablet(s) Oral at bedtime  simvastatin 20 milliGRAM(s) Oral at bedtime      Review of Systems:  Constitutional: [ ] Fever [ ] Chills [ ] Fatigue [ ] Weight change   HEENT: [ ] Blurred vision [ ] Eye Pain [ ] Headache [ ] Runny nose [ ] Sore Throat   Respiratory: [ ] Cough [ ] Wheezing [ ] Shortness of breath  Cardiovascular: [ ] Chest Pain [ ] Palpitations [ ] LUI [ ] PND [ ] Orthopnea  Gastrointestinal: [ ] Abdominal Pain [ ] Diarrhea [ ] Constipation [ ] Hemorrhoids [ ] Nausea [ ] Vomiting  Genitourinary: [ ] Nocturia [ ] Dysuria [ ] Incontinence  Extremities: [ ] Swelling [ ] Joint Pain  Neurologic: [ ] Focal deficit [ ] Paresthesias [ ] Syncope  Lymphatic: [ ] Swelling [ ] Lymphadenopathy   Skin: [ ] Rash [ ] Ecchymoses [ ] Wounds [ ] Lesions  Psychiatry: [ ] Depression [ ] Suicidal/Homicidal Ideation [ ] Anxiety [ ] Sleep Disturbances  [x] 10 point review of systems is otherwise negative except as mentioned above             Vitals:  T(C): 36.3 (08-15-18 @ 05:06), Max: 36.7 (08-15-18 @ 00:16)  HR: 78 (08-15-18 @ 05:06) (78 - 109)  BP: 148/86 (08-15-18 @ 05:06) (80/51 - 148/86)  RR: 18 (08-15-18 @ 05:06) (17 - 18)  SpO2: 98% (08-15-18 @ 05:06) (92% - 98%)  Daily Weight in k.9 (14 Aug 2018 08:46)      I&O's Summary    13 Aug 2018 07:01  -  14 Aug 2018 07:00  --------------------------------------------------------  IN: 300 mL / OUT: 449 mL / NET: -149 mL    14 Aug 2018 07:01  -  15 Aug 2018 05:30  --------------------------------------------------------  IN: 780 mL / OUT: 804 mL / NET: -24 mL        Physical Exam:  GEN:  NAD  HEENT:  PERRL, EOMI.  Normal oral mucosa NC/AT  HEART:  Normal S1, physiologic split S2, RRR, No m/r/g appreciated, No edema, no elevation in JVP  CHEST:  Clear to auscultation bilaterally  ABD:  Soft, Non-tender, Non-distended, BS+  EXT:  No clubbing, No joint deformity   NEURO:  Non-focal  No lymphadenopathy  PSYCH:  AAOx3, Mood & affect appropriate  SKIN:  No rashes, No ecchymoses, No cyanosis  Procedural Access Site: pericardial drain site cdi    Tele: sinus 80s      Labs:                        12.6   8.9   )-----------( 344      ( 14 Aug 2018 10:17 )             39.5     08-14    138  |  101  |  21  ----------------------------<  124<H>  4.5   |  26  |  0.87    Ca    8.9      14 Aug 2018 10:13  Phos  2.8     08-13  Mg     1.7     08-14    Serum Pro-Brain Natriuretic Peptide: 575 pg/mL ( @ 16:35)    New results/imaging:  TTE  CONCLUSIONS:  1. Small left ventricle.  2. Normal left ventricular systolic function. No segmental wall motion abnormalities.  3.Trace pericardial effusion.

## 2018-08-15 NOTE — PROGRESS NOTE ADULT - SUBJECTIVE AND OBJECTIVE BOX
Follow-up Pulm Progress Note    No new respiratory events overnight.  Denies SOB/CP. o2 sat 96% on nc  awaiting pleurax     Medications:  MEDICATIONS  (STANDING):  anastrozole 1 milliGRAM(s) Oral daily  aspirin enteric coated 81 milliGRAM(s) Oral daily  docusate sodium 100 milliGRAM(s) Oral three times a day  DULoxetine 30 milliGRAM(s) Oral daily  enoxaparin Injectable 40 milliGRAM(s) SubCutaneous every 24 hours  ferrous    sulfate 325 milliGRAM(s) Oral daily  lactobacillus acidophilus 1 Tablet(s) Oral two times a day with meals  losartan 50 milliGRAM(s) Oral daily  metoprolol tartrate 12.5 milliGRAM(s) Oral two times a day  pantoprazole    Tablet 40 milliGRAM(s) Oral before breakfast  senna 2 Tablet(s) Oral at bedtime  simvastatin 20 milliGRAM(s) Oral at bedtime    MEDICATIONS  (PRN):  acetaminophen   Tablet. 650 milliGRAM(s) Oral every 6 hours PRN Mild Pain (1 - 3)  ondansetron Injectable 4 milliGRAM(s) IV Push every 6 hours PRN Nausea  oxyCODONE    5 mG/acetaminophen 325 mG 1 Tablet(s) Oral every 4 hours PRN Moderate Pain (4 - 6)          Vital Signs Last 24 Hrs  T(C): 36.7 (15 Aug 2018 13:51), Max: 36.7 (15 Aug 2018 00:16)  T(F): 98.1 (15 Aug 2018 13:51), Max: 98.1 (15 Aug 2018 00:16)  HR: 98 (15 Aug 2018 13:51) (78 - 109)  BP: 103/70 (15 Aug 2018 13:51) (80/51 - 148/86)  BP(mean): --  RR: 18 (15 Aug 2018 13:51) (17 - 18)  SpO2: 93% (15 Aug 2018 13:51) (92% - 98%)          08-14 @ 07:01  -  08-15 @ 07:00  --------------------------------------------------------  IN: 780 mL / OUT: 804 mL / NET: -24 mL          LABS:                        12.1   7.78  )-----------( 354      ( 15 Aug 2018 07:13 )             39.8     08-15    137  |  100  |  18  ----------------------------<  91  4.1   |  24  |  0.75    Ca    8.9      15 Aug 2018 06:51  Phos  2.6     08-15  Mg     2.0     08-15            CAPILLARY BLOOD GLUCOSE                        Fluid characteristics  -- 08-10 @ 10:01  pH 7.34    tprot <0.6    Cell count  Appearance Bloody  Fluid type --  BF lymph 10  color Red  eosinophil --  PMN 13  Mesothelial --  Monocyte 6  Other body cells 71      CULTURES: (if applicable)  Culture Results:   No growth at 5 days (08-10 @ 12:16)  Culture Results:   Testing in progress (08-10 @ 12:16)          Physical Examination:  PULM: decreased bs  bilaterally, no significant sputum production  CVS: S1, S2 heard    RADIOLOGY REVIEWED  CXR: < from: Xray Chest 1 View- PORTABLE-Routine (08.15.18 @ 09:44) >  IMPRESSION:     Left lung opacification. A pigtail catheter overlies the midportion of   the left upper thorax and a second pigtail catheter overlies theleft   lower thorax. Consider a lateral radiograph for further evaluation.      These findings were discussed with NP SKYLER RIVERS  at 8/15/2018 11:32   AM by Dr. Bull with read back confirmation.     < end of copied text > Follow-up Pulm Progress Note    No new respiratory events overnight.  Denies SOB/CP. o2 sat 96% on nc  awaiting pleurax     Medications:  MEDICATIONS  (STANDING):  anastrozole 1 milliGRAM(s) Oral daily  aspirin enteric coated 81 milliGRAM(s) Oral daily  docusate sodium 100 milliGRAM(s) Oral three times a day  DULoxetine 30 milliGRAM(s) Oral daily  enoxaparin Injectable 40 milliGRAM(s) SubCutaneous every 24 hours  ferrous    sulfate 325 milliGRAM(s) Oral daily  lactobacillus acidophilus 1 Tablet(s) Oral two times a day with meals  losartan 50 milliGRAM(s) Oral daily  metoprolol tartrate 12.5 milliGRAM(s) Oral two times a day  pantoprazole    Tablet 40 milliGRAM(s) Oral before breakfast  senna 2 Tablet(s) Oral at bedtime  simvastatin 20 milliGRAM(s) Oral at bedtime    MEDICATIONS  (PRN):  acetaminophen   Tablet. 650 milliGRAM(s) Oral every 6 hours PRN Mild Pain (1 - 3)  ondansetron Injectable 4 milliGRAM(s) IV Push every 6 hours PRN Nausea  oxyCODONE    5 mG/acetaminophen 325 mG 1 Tablet(s) Oral every 4 hours PRN Moderate Pain (4 - 6)          Vital Signs Last 24 Hrs  T(C): 36.7 (15 Aug 2018 13:51), Max: 36.7 (15 Aug 2018 00:16)  T(F): 98.1 (15 Aug 2018 13:51), Max: 98.1 (15 Aug 2018 00:16)  HR: 98 (15 Aug 2018 13:51) (78 - 109)  BP: 103/70 (15 Aug 2018 13:51) (80/51 - 148/86)  BP(mean): --  RR: 18 (15 Aug 2018 13:51) (17 - 18)  SpO2: 93% (15 Aug 2018 13:51) (92% - 98%)          08-14 @ 07:01  -  08-15 @ 07:00  --------------------------------------------------------  IN: 780 mL / OUT: 804 mL / NET: -24 mL          LABS:                        12.1   7.78  )-----------( 354      ( 15 Aug 2018 07:13 )             39.8     08-15    137  |  100  |  18  ----------------------------<  91  4.1   |  24  |  0.75    Ca    8.9      15 Aug 2018 06:51  Phos  2.6     08-15  Mg     2.0     08-15            CAPILLARY BLOOD GLUCOSE                        Fluid characteristics  -- 08-10 @ 10:01  pH 7.34    tprot <0.6    Cell count  Appearance Bloody  Fluid type --  BF lymph 10  color Red  eosinophil --  PMN 13  Mesothelial --  Monocyte 6  Other body cells 71      CULTURES: (if applicable)  Culture Results:   No growth at 5 days (08-10 @ 12:16)  Culture Results:   Testing in progress (08-10 @ 12:16)      Cytopathology - Non Gyn Report (08.10.18 @ 10:08)    Cytopathology - Non Gyn Report:   ACCESSION No:  [id number]    NE GRIDER                       2        Cytopathology Report            Specimen(s) Submitted  PERICARDIAL FLUID      Clinical History  Lung cancer. cardiac tamponade.      Gross Description  Received: 80  ml of bloody fluid received in [facility]  Prepared: 1 Thin Prep slide, 1 cell block, 1 smear      Final Diagnosis  PERICARDIAL FLUID  POSITIVE FOR MALIGNANT CELLS.  Adenocarcinoma compatible with lung primary              Physical Examination:  PULM: decreased bs  bilaterally, no significant sputum production  CVS: S1, S2 heard    RADIOLOGY REVIEWED  CXR: < from: Xray Chest 1 View- PORTABLE-Routine (08.15.18 @ 09:44) >  IMPRESSION:     Left lung opacification. A pigtail catheter overlies the midportion of   the left upper thorax and a second pigtail catheter overlies theleft   lower thorax. Consider a lateral radiograph for further evaluation.      These findings were discussed with NP SKYLER RIVERS  at 8/15/2018 11:32   AM by Dr. uBll with read back confirmation.     < end of copied text >

## 2018-08-15 NOTE — PROGRESS NOTE ADULT - ASSESSMENT
85 yo F with a PMH significant for recent diagnosis of lung adenocarcinoma, R breast CA s/p lumpectomy, and HTN.  Presented to the ED on 8/8 with progressive SOB and was found to have left lung white out.  Also found to have large pericardial effusion complicated by pericardial tamponade, now s/p pericardial drain.     #Pericardial effusion  - cytology consistent with malignancy  - f/u IR to evaluate for drain removal    #Atrial tachyarrhythmia - likely irritation from pericardial drain/underlying pathology of pericardial effusion  -cont metoprolol 12.5mg BID for now. If continues to have significant tachycardia, consider discontinuing losartan in favor of uptitration of beta blocker  -not candidate for systemic AC at this time; may need to consider when invasive procedures are no longer planned.    #HTN  -BP controlled at present    Fabricio Stauffer MD  [id number] 85 yo F with a PMH significant for recent diagnosis of lung adenocarcinoma, R breast CA s/p lumpectomy, and HTN.  Presented to the ED on 8/8 with progressive SOB and was found to have left lung white out.  Also found to have large pericardial effusion complicated by pericardial tamponade, now s/p pericardial drain.     #Pericardial effusion  - cytology consistent with malignancy  - f/u IR to evaluate for drain removal    #Atrial tachyarrhythmia - likely irritation from pericardial drain/underlying pathology of pericardial effusion  -cont metoprolol 12.5mg BID for now. If continues to have significant tachycardia, consider discontinuing losartan in favor of uptitration of beta blocker  -not candidate for systemic AC at this time; may need to consider when invasive procedures are no longer planned.    #HTN  -BP controlled at present    Fabricio Stauffer MD  [id number]    Antwan Campos M.D.  Cardiology Consult Service  [phone] or [phone]

## 2018-08-15 NOTE — BRIEF OPERATIVE NOTE - PROCEDURE
<<-----Click on this checkbox to enter Procedure VATS, with chest tube insertion for drainage of pleural effusion  08/15/2018  VATS assisted pleurex tunneled catheter insertion  Active  AR

## 2018-08-15 NOTE — BRIEF OPERATIVE NOTE - OPERATION/FINDINGS
serosanguinous effusion in the Lt thorax, malignant lesions to the parietal surface of the chest wall

## 2018-08-15 NOTE — PROGRESS NOTE ADULT - SUBJECTIVE AND OBJECTIVE BOX
Chief Complaint: "I am weak".    History of Present Illness:    The patient overall feels weak.  No chest pain.  No pleuritic chest pain.  No abdominal pain.   No nausea.  No vomiting.  No fevers.  She has pain from her drains that are in place.  She has SOB with minimal exertion.  No diarrhea.  No calf pain. Remainder ROS is stable.     MEDICATIONS  (STANDING):  anastrozole 1 milliGRAM(s) Oral daily  aspirin enteric coated 81 milliGRAM(s) Oral daily  docusate sodium 100 milliGRAM(s) Oral three times a day  DULoxetine 30 milliGRAM(s) Oral daily  enoxaparin Injectable 40 milliGRAM(s) SubCutaneous every 24 hours  ferrous    sulfate 325 milliGRAM(s) Oral daily  lactobacillus acidophilus 1 Tablet(s) Oral two times a day with meals  losartan 50 milliGRAM(s) Oral daily  metoprolol tartrate 12.5 milliGRAM(s) Oral two times a day  pantoprazole    Tablet 40 milliGRAM(s) Oral before breakfast  senna 2 Tablet(s) Oral at bedtime  simvastatin 20 milliGRAM(s) Oral at bedtime    MEDICATIONS  (PRN):  acetaminophen   Tablet. 650 milliGRAM(s) Oral every 6 hours PRN Mild Pain (1 - 3)  ondansetron Injectable 4 milliGRAM(s) IV Push every 6 hours PRN Nausea  oxyCODONE    5 mG/acetaminophen 325 mG 1 Tablet(s) Oral every 4 hours PRN Moderate Pain (4 - 6)      Allergies    Levaquin (Diarrhea)    Intolerances    Dilaudid (Other)      Vital Signs Last 24 Hrs  T(C): 36.3 (15 Aug 2018 05:06), Max: 36.7 (15 Aug 2018 00:16)  T(F): 97.3 (15 Aug 2018 05:06), Max: 98.1 (15 Aug 2018 00:16)  HR: 78 (15 Aug 2018 05:06) (78 - 109)  BP: 148/86 (15 Aug 2018 05:06) (80/51 - 148/86)  BP(mean): --  RR: 18 (15 Aug 2018 05:06) (17 - 18)  SpO2: 98% (15 Aug 2018 05:06) (92% - 98%)    PHYSICAL EXAM  General: weak  HEENT: clear oropharynx, anicteric sclera, pink conjunctiva  CV: normal S1/S2, pericardial drain  Lungs: decreased at bases left more then right, left chest tube  Abdomen: soft non-tender non-distended, positive bowel sounds  Ext: no edema  Skin: no rashes   Lymph Nodes: No LAD in neck  Neuro: alert and oriented X 3, no focal deficits, weak    LABS:                          12.1   7.78  )-----------( 354      ( 15 Aug 2018 07:13 )             39.8         Mean Cell Volume : 93.2 fl  Mean Cell Hemoglobin : 28.3 pg  Mean Cell Hemoglobin Concentration : 30.4 gm/dL  Auto Neutrophil # : x  Auto Lymphocyte # : x  Auto Monocyte # : x  Auto Eosinophil # : x  Auto Basophil # : x  Auto Neutrophil % : x  Auto Lymphocyte % : x  Auto Monocyte % : x  Auto Eosinophil % : x  Auto Basophil % : x      Serial CBC's  08-15 @ 07:13  Hct-39.8 / Hgb-12.1 / Plat-354 / RBC-4.27 / WBC-7.78  Serial CBC's  08-14 @ 10:17  Hct-39.5 / Hgb-12.6 / Plat-344 / RBC-4.20 / WBC-8.9  Serial CBC's  08-13 @ 10:06  Hct-37.1 / Hgb-11.8 / Plat-296 / RBC-3.93 / WBC-8.3  Serial CBC's  08-12 @ 08:11  Hct-37.9 / Hgb-11.8 / Plat-265 / RBC-4.06 / WBC-9.21      08-15    137  |  100  |  18  ----------------------------<  91  4.1   |  24  |  0.75    Ca    8.9      15 Aug 2018 06:51  Phos  2.6     08-15  Mg     2.0     08-15

## 2018-08-15 NOTE — PROGRESS NOTE ADULT - ASSESSMENT
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 Found to have large L pleural effusion causing complete atelectasis of L lung. She had 1L thoracentesis 1 week ago as outpt.      Problem/Plan - 1:  ·  Problem: Pleural effusion.  Plan: s/p L pigtail by CTS, monitor output, plan for VATS tomorrow by CTS     Problem/Plan - 2:  ·  Problem: Adenocarcinoma of lung, unspecified laterality.  Plan: Apparent stage 4 disease. Drainage of Pleural effusion, Onc appreciated  MRI brain pending  RadOnc appreciated, hold off palliative radiation as improved w/o     Problem/Plan - 3:  ·  Problem: Dysphagia, unspecified type.  Plan: See above.  Will assume aspiration risk.   S/S.   Nutrition evaluation.      Problem/Plan - 4:  ·  Problem: DVT prophylaxis    Pericardial effusion w/tamponade - likely malignant, repeat TTE done, await IR to review and decide on drain removal    d/c planning after pericardial drain removed and pleurex in place

## 2018-08-15 NOTE — PROGRESS NOTE ADULT - PROBLEM SELECTOR PLAN 2
s/p 8/10 L pigtail cath   - L lung atelectasis likely mostly 2nd proximal hilar mass causing extrinsic compression on proximal airway with effusion  -Plan for Pleurex with thoracic tomorrow   -L pigtail clamped  for pleurax today s/p 8/10 L pigtail cath   - L lung atelectasis likely mostly 2nd proximal hilar mass causing extrinsic compression on proximal airway with effusion    -L pigtail clamped  for pleurax today

## 2018-08-15 NOTE — PROGRESS NOTE ADULT - PROBLEM SELECTOR PLAN 1
Exudative bloody pericardial effusion  -Likely malignant   -Repeat TTE today with minimal pericardial effusion  -Plan to pull drain in OR today during Pleurex Exudative bloody pericardial effusion  -8/10 cyto= adeno ca primary lung  -Repeat TTE today with minimal pericardial effusion  -Plan to pull drain in OR today during Pleurex

## 2018-08-16 DIAGNOSIS — I95.9 HYPOTENSION, UNSPECIFIED: ICD-10-CM

## 2018-08-16 PROBLEM — R91.8 OTHER NONSPECIFIC ABNORMAL FINDING OF LUNG FIELD: Chronic | Status: ACTIVE | Noted: 2018-08-08

## 2018-08-16 LAB
ANION GAP SERPL CALC-SCNC: 11 MMOL/L — SIGNIFICANT CHANGE UP (ref 5–17)
ANION GAP SERPL CALC-SCNC: 12 MMOL/L — SIGNIFICANT CHANGE UP (ref 5–17)
BUN SERPL-MCNC: 16 MG/DL — SIGNIFICANT CHANGE UP (ref 7–23)
BUN SERPL-MCNC: 19 MG/DL — SIGNIFICANT CHANGE UP (ref 7–23)
CALCIUM SERPL-MCNC: 8.6 MG/DL — SIGNIFICANT CHANGE UP (ref 8.4–10.5)
CALCIUM SERPL-MCNC: 8.6 MG/DL — SIGNIFICANT CHANGE UP (ref 8.4–10.5)
CHLORIDE SERPL-SCNC: 101 MMOL/L — SIGNIFICANT CHANGE UP (ref 96–108)
CHLORIDE SERPL-SCNC: 97 MMOL/L — SIGNIFICANT CHANGE UP (ref 96–108)
CO2 SERPL-SCNC: 20 MMOL/L — LOW (ref 22–31)
CO2 SERPL-SCNC: 27 MMOL/L — SIGNIFICANT CHANGE UP (ref 22–31)
CREAT SERPL-MCNC: 0.65 MG/DL — SIGNIFICANT CHANGE UP (ref 0.5–1.3)
CREAT SERPL-MCNC: 0.89 MG/DL — SIGNIFICANT CHANGE UP (ref 0.5–1.3)
GLUCOSE SERPL-MCNC: 149 MG/DL — HIGH (ref 70–99)
GLUCOSE SERPL-MCNC: 98 MG/DL — SIGNIFICANT CHANGE UP (ref 70–99)
HCT VFR BLD CALC: 40 % — SIGNIFICANT CHANGE UP (ref 34.5–45)
HGB BLD-MCNC: 12.3 G/DL — SIGNIFICANT CHANGE UP (ref 11.5–15.5)
MAGNESIUM SERPL-MCNC: 2 MG/DL — SIGNIFICANT CHANGE UP (ref 1.6–2.6)
MCHC RBC-ENTMCNC: 28.5 PG — SIGNIFICANT CHANGE UP (ref 27–34)
MCHC RBC-ENTMCNC: 30.8 GM/DL — LOW (ref 32–36)
MCV RBC AUTO: 92.8 FL — SIGNIFICANT CHANGE UP (ref 80–100)
PHOSPHATE SERPL-MCNC: 3.4 MG/DL — SIGNIFICANT CHANGE UP (ref 2.5–4.5)
PLATELET # BLD AUTO: 312 K/UL — SIGNIFICANT CHANGE UP (ref 150–400)
POTASSIUM SERPL-MCNC: 4.4 MMOL/L — SIGNIFICANT CHANGE UP (ref 3.5–5.3)
POTASSIUM SERPL-MCNC: 5.5 MMOL/L — HIGH (ref 3.5–5.3)
POTASSIUM SERPL-SCNC: 4.4 MMOL/L — SIGNIFICANT CHANGE UP (ref 3.5–5.3)
POTASSIUM SERPL-SCNC: 5.5 MMOL/L — HIGH (ref 3.5–5.3)
RBC # BLD: 4.31 M/UL — SIGNIFICANT CHANGE UP (ref 3.8–5.2)
RBC # FLD: 15.7 % — HIGH (ref 10.3–14.5)
SODIUM SERPL-SCNC: 133 MMOL/L — LOW (ref 135–145)
SODIUM SERPL-SCNC: 135 MMOL/L — SIGNIFICANT CHANGE UP (ref 135–145)
WBC # BLD: 10.21 K/UL — SIGNIFICANT CHANGE UP (ref 3.8–10.5)
WBC # FLD AUTO: 10.21 K/UL — SIGNIFICANT CHANGE UP (ref 3.8–10.5)

## 2018-08-16 RX ORDER — ANASTROZOLE 1 MG/1
1 TABLET ORAL DAILY
Qty: 0 | Refills: 0 | Status: DISCONTINUED | OUTPATIENT
Start: 2018-08-16 | End: 2018-08-24

## 2018-08-16 RX ORDER — SODIUM CHLORIDE 9 MG/ML
1000 INJECTION INTRAMUSCULAR; INTRAVENOUS; SUBCUTANEOUS ONCE
Qty: 0 | Refills: 0 | Status: COMPLETED | OUTPATIENT
Start: 2018-08-16 | End: 2018-08-16

## 2018-08-16 RX ORDER — SODIUM CHLORIDE 9 MG/ML
500 INJECTION INTRAMUSCULAR; INTRAVENOUS; SUBCUTANEOUS ONCE
Qty: 0 | Refills: 0 | Status: COMPLETED | OUTPATIENT
Start: 2018-08-16 | End: 2018-08-16

## 2018-08-16 RX ORDER — ENOXAPARIN SODIUM 100 MG/ML
40 INJECTION SUBCUTANEOUS EVERY 24 HOURS
Qty: 0 | Refills: 0 | Status: DISCONTINUED | OUTPATIENT
Start: 2018-08-16 | End: 2018-08-23

## 2018-08-16 RX ORDER — ASPIRIN/CALCIUM CARB/MAGNESIUM 324 MG
81 TABLET ORAL DAILY
Qty: 0 | Refills: 0 | Status: DISCONTINUED | OUTPATIENT
Start: 2018-08-17 | End: 2018-08-24

## 2018-08-16 RX ADMIN — ONDANSETRON 4 MILLIGRAM(S): 8 TABLET, FILM COATED ORAL at 05:17

## 2018-08-16 RX ADMIN — Medication 1 TABLET(S): at 08:22

## 2018-08-16 RX ADMIN — Medication 1 TABLET(S): at 17:32

## 2018-08-16 RX ADMIN — Medication 650 MILLIGRAM(S): at 11:22

## 2018-08-16 RX ADMIN — Medication 650 MILLIGRAM(S): at 23:35

## 2018-08-16 RX ADMIN — Medication 100 MILLIGRAM(S): at 21:11

## 2018-08-16 RX ADMIN — Medication 650 MILLIGRAM(S): at 18:00

## 2018-08-16 RX ADMIN — ONDANSETRON 4 MILLIGRAM(S): 8 TABLET, FILM COATED ORAL at 19:20

## 2018-08-16 RX ADMIN — Medication 325 MILLIGRAM(S): at 13:48

## 2018-08-16 RX ADMIN — ONDANSETRON 4 MILLIGRAM(S): 8 TABLET, FILM COATED ORAL at 11:11

## 2018-08-16 RX ADMIN — DULOXETINE HYDROCHLORIDE 30 MILLIGRAM(S): 30 CAPSULE, DELAYED RELEASE ORAL at 13:47

## 2018-08-16 RX ADMIN — SODIUM CHLORIDE 1000 MILLILITER(S): 9 INJECTION INTRAMUSCULAR; INTRAVENOUS; SUBCUTANEOUS at 19:55

## 2018-08-16 RX ADMIN — PANTOPRAZOLE SODIUM 40 MILLIGRAM(S): 20 TABLET, DELAYED RELEASE ORAL at 05:16

## 2018-08-16 RX ADMIN — Medication 12.5 MILLIGRAM(S): at 21:11

## 2018-08-16 RX ADMIN — Medication 100 MILLIGRAM(S): at 13:47

## 2018-08-16 RX ADMIN — Medication 650 MILLIGRAM(S): at 06:00

## 2018-08-16 RX ADMIN — Medication 650 MILLIGRAM(S): at 12:00

## 2018-08-16 RX ADMIN — SENNA PLUS 2 TABLET(S): 8.6 TABLET ORAL at 21:11

## 2018-08-16 RX ADMIN — Medication 650 MILLIGRAM(S): at 05:15

## 2018-08-16 RX ADMIN — Medication 650 MILLIGRAM(S): at 17:32

## 2018-08-16 RX ADMIN — Medication 100 MILLIGRAM(S): at 05:16

## 2018-08-16 RX ADMIN — LOSARTAN POTASSIUM 50 MILLIGRAM(S): 100 TABLET, FILM COATED ORAL at 05:16

## 2018-08-16 RX ADMIN — ENOXAPARIN SODIUM 40 MILLIGRAM(S): 100 INJECTION SUBCUTANEOUS at 21:11

## 2018-08-16 RX ADMIN — SIMVASTATIN 20 MILLIGRAM(S): 20 TABLET, FILM COATED ORAL at 21:11

## 2018-08-16 RX ADMIN — ANASTROZOLE 1 MILLIGRAM(S): 1 TABLET ORAL at 13:47

## 2018-08-16 RX ADMIN — SODIUM CHLORIDE 500 MILLILITER(S): 9 INJECTION INTRAMUSCULAR; INTRAVENOUS; SUBCUTANEOUS at 11:37

## 2018-08-16 NOTE — PROGRESS NOTE ADULT - SUBJECTIVE AND OBJECTIVE BOX
84y Female s/p pericardial drain on 8/10/2018 in Interventional Radiology with Dr Sofia.     Patient seen and examined @ bedside. Site clean and dry. No complaints offered.    T(F): 97.4 (08-16-18 @ 12:34), Max: 97.9 (08-15-18 @ 20:30)  HR: 115 (08-16-18 @ 17:35) (80 - 115)  BP: 99/62 (08-16-18 @ 17:35) (51/30 - 162/63)  RR: 18 (08-16-18 @ 12:34) (14 - 20)  SpO2: 98% (08-16-18 @ 12:34) (96% - 100%)    LABS:                        12.3   10.21 )-----------( 312      ( 16 Aug 2018 07:22 )             40.0     08-16    135  |  97  |  19  ----------------------------<  149<H>  4.4   |  27  |  0.89    Ca    8.6      16 Aug 2018 10:58  Phos  3.4     08-16  Mg     2.0     08-16    Echo:  Trace pericardial effusion.    PHYSICAL EXAM:  General: Nontoxic, in NAD  Neuro:  Alert & oriented x 3  Chest: Dressing is clean and dry.   Abdomen: soft, NTND.     Assessment: 84y Female s/p pericardial drain for malignant pericardial effusion.      Plan:  - Pericardial drain removed without incident. Dry sterile placed. Given the malignant nature of the effusion it may recur.      Please call IR at extension [phone] with any questions, concerns, or issues regarding above.

## 2018-08-16 NOTE — PROGRESS NOTE ADULT - ASSESSMENT
85 y/o female with a PMH of OA, osteoporosis, RBBB, hyperlipidemia, early stage breast cancer and now with advanced lung cancer admitted with SOB secondary to pleural/pericardial effusion.     1. Adenocarcinoma Lung  / SOB  - CTA negative for PE, shows combination of pleural effusion and large left hilar mass causing compression of left upper lobe  - outside molecular studies from 7-27-18 on peripheral blood with possible ZAC mutation no other options.  Left pleural fluid cytology at [facility] on 8-2-18 c/w adenoCA.   - was scheduled for staging PETCT as outpatient.  MRI head on 8-11-18 was negative for mets.   - S/P left pigtail placement on 8-9-18 as well as pericardiocentesis for moderate pericardial effusion on 8-10-18.  Cytology on pericardial effusion c/w adenoCA discuss with Dr. Bustillo full moleculars in progress including PDL1.  Was consistent with lung primary and daughter is aware.   - Rad Onc evaluation appreciated - will hold off on palliative radiation for now as she feels better after drainage of pleural and pericardial fluid   - will d/w Dr. Sanchez and decide on systemic treatment plan once molecular studies come back from pericardial fluid and stable.  -  S/P left sided VATS on 8-15-18  -  Family does not want pericardial window so drain still in place    2. Breast cancer - early stage ER/[place]+ Her2- s/p lumpectomy on arimidex  - continue arimidex    3. Dysphagia -  - possibly related to compression or due to the SOB - better    Molecular studies on pericardial fluid in progress discussed with Dr. Bustillo.  S/P Left VATS on 8-15-18  Will discuss with daughter.  Dr Sanchez primary oncologist is aware of situation.

## 2018-08-16 NOTE — PROGRESS NOTE ADULT - SUBJECTIVE AND OBJECTIVE BOX
Chief Complaint: "I am weak".    History of Present Illness:    The patient is uncomfortable from procedure last night.  She does have some discomfort.  No calf pain.  No fevers.  No chest pain.  She has SOB with minimal exertion.  No nausea.  No vomiting.  Overall she is feeling weak..     MEDICATIONS  (STANDING):  docusate sodium 100 milliGRAM(s) Oral three times a day  DULoxetine 30 milliGRAM(s) Oral daily  ferrous    sulfate 325 milliGRAM(s) Oral daily  lactobacillus acidophilus 1 Tablet(s) Oral two times a day with meals  losartan 50 milliGRAM(s) Oral daily  metoprolol tartrate 12.5 milliGRAM(s) Oral two times a day  pantoprazole    Tablet 40 milliGRAM(s) Oral before breakfast  senna 2 Tablet(s) Oral at bedtime  simvastatin 20 milliGRAM(s) Oral at bedtime    MEDICATIONS  (PRN):  acetaminophen   Tablet. 650 milliGRAM(s) Oral every 6 hours PRN Mild Pain (1 - 3)  ondansetron Injectable 4 milliGRAM(s) IV Push every 6 hours PRN Nausea  oxyCODONE    5 mG/acetaminophen 325 mG 1 Tablet(s) Oral every 4 hours PRN Moderate Pain (4 - 6)      Allergies    Levaquin (Diarrhea)    Intolerances    Dilaudid (Other)      Vital Signs Last 24 Hrs  T(C): 36.6 (16 Aug 2018 04:25), Max: 36.7 (15 Aug 2018 13:51)  T(F): 97.9 (16 Aug 2018 04:25), Max: 98.1 (15 Aug 2018 13:51)  HR: 104 (16 Aug 2018 04:25) (95 - 111)  BP: 133/77 (16 Aug 2018 04:25) (51/30 - 162/63)  BP(mean): 93 (15 Aug 2018 20:30) (36 - 98)  RR: 18 (16 Aug 2018 04:25) (14 - 20)  SpO2: 96% (16 Aug 2018 04:25) (93% - 100%)    PHYSICAL EXAM  General: weak  HEENT: clear oropharynx, anicteric sclera, pink conjunctiva  CV: normal S1/S2. pericardial drain  Lungs: decreased at bases, left sided chest tube  Abdomen: soft non-tender mildly-distended, positive bowel sounds  Ext: trace edema  Skin: no rashes   Neuro: alert and oriented X 2-3, weak    LABS:                          12.1   7.78  )-----------( 354      ( 15 Aug 2018 07:13 )             39.8         Mean Cell Volume : 93.2 fl  Mean Cell Hemoglobin : 28.3 pg  Mean Cell Hemoglobin Concentration : 30.4 gm/dL  Auto Neutrophil # : x  Auto Lymphocyte # : x  Auto Monocyte # : x  Auto Eosinophil # : x  Auto Basophil # : x  Auto Neutrophil % : x  Auto Lymphocyte % : x  Auto Monocyte % : x  Auto Eosinophil % : x  Auto Basophil % : x      Serial CBC's  08-15 @ 07:13  Hct-39.8 / Hgb-12.1 / Plat-354 / RBC-4.27 / WBC-7.78  Serial CBC's  08-14 @ 10:17  Hct-39.5 / Hgb-12.6 / Plat-344 / RBC-4.20 / WBC-8.9  Serial CBC's  08-13 @ 10:06  Hct-37.1 / Hgb-11.8 / Plat-296 / RBC-3.93 / WBC-8.3  Serial CBC's  08-12 @ 08:11  Hct-37.9 / Hgb-11.8 / Plat-265 / RBC-4.06 / WBC-9.21      08-16    133<L>  |  101  |  16  ----------------------------<  98  5.5<H>   |  20<L>  |  0.65    Ca    8.6      16 Aug 2018 06:20  Phos  3.4     08-16  Mg     2.0     08-16

## 2018-08-16 NOTE — PROVIDER CONTACT NOTE (CHANGE IN STATUS NOTIFICATION) - ASSESSMENT
Patient A+Ox4, was c/o bubbly feeling in epigastric area that relieves by belching  BP 99/66 
BP 68/44, afebrile, sinus tach on tele 115, spO2 85% on room air  pt c/o nausea and dizziness  pt alert and verbally responsive  MEWS 8 triggered

## 2018-08-16 NOTE — PROGRESS NOTE ADULT - ASSESSMENT
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 Found to have large L pleural effusion causing complete atelectasis of L lung. She had 1L thoracentesis 1 week ago as outpt. Found to have cardiac tamponade s/p pericardial drain placement 8/10, s/p L pleural drain placed in IR 8/10. s/p L Pleurex 8/15.

## 2018-08-16 NOTE — PROGRESS NOTE ADULT - PROBLEM SELECTOR PLAN 2
Malignant pericardial effusion s/p drainage  -Plan to d/c pericardial drain today in IR if blood pressure remains stable  -No output

## 2018-08-16 NOTE — PROGRESS NOTE ADULT - ASSESSMENT
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 Found to have large L pleural effusion causing complete atelectasis of L lung. She had 1L thoracentesis 1 week ago as outpt.      Problem/Plan - 1:  ·  Problem: Pleural effusion.  Plan: s/p L pigtail by CTS, monitor output, now s/p VATS, pleurex     Problem/Plan - 2:  ·  Problem: Adenocarcinoma of lung, unspecified laterality.  Plan: Apparent stage 4 disease. Drainage of Pleural effusion, Onc appreciated  MRI brain pending  RadOnc appreciated, hold off palliative radiation as improved w/o     Problem/Plan - 3:  ·  Problem: Dysphagia, unspecified type.  Plan: See above.  Will assume aspiration risk.   S/S.   Nutrition evaluation.      Problem/Plan - 4:  ·  Problem: DVT prophylaxis    Pericardial effusion w/tamponade - likely malignant, repeat TTE done, await drain removal by IR    d/c planning after pericardial drain removed, likely tomorrow

## 2018-08-16 NOTE — PROGRESS NOTE ADULT - PROBLEM SELECTOR PLAN 1
s/p pericardiocentesis and pericardial drain placement in IR  Minimal drainage  Pericardial drain to be discontinued per IR today.    Maintain to water seal.   cardiology following  No growth in pericardial fluid cultures thus far  Fluid (+) for maliganant cells per cytology  F/U heme/onc recommendation   Repeat echo post removal of drain

## 2018-08-16 NOTE — PROGRESS NOTE ADULT - SUBJECTIVE AND OBJECTIVE BOX
CHIEF COMPLAINT:Patient is a 84y old  Female who presents with a chief complaint of Progressive exertional dyspnea, dry cough for 2 weeks (09 Aug 2018 12:48)  s/p pleurex yesterday    Allergies:  Dilaudid (Other)  Levaquin (Diarrhea)      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  S/P hip replacement, right: 6/2016  - right hip  History of lumpectomy of right breast: July 2015  History of cataract surgery, left: and right 2016  Elective surgery: reversal of colostomy July 2015  History of tonsillectomy  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 No nausea, vomiting, diarrhea or constipation, + low appetite  GENITOURINARY: No dysuria, urinary frequency or urgency, no hematuria  NEUROLOGICAL: No headaches, memory loss, loss of strength, numbness, or tremors  SKIN: No itching, burning, rashes, or lesions   MUSCULOSKELETAL: No joint pain or swelling; No muscle, back, or extremity pain      Medications:  MEDICATIONS  (STANDING):  anastrozole 1 milliGRAM(s) Oral daily  docusate sodium 100 milliGRAM(s) Oral three times a day  DULoxetine 30 milliGRAM(s) Oral daily  ferrous    sulfate 325 milliGRAM(s) Oral daily  lactobacillus acidophilus 1 Tablet(s) Oral two times a day with meals  losartan 50 milliGRAM(s) Oral daily  metoprolol tartrate 12.5 milliGRAM(s) Oral two times a day  pantoprazole    Tablet 40 milliGRAM(s) Oral before breakfast  senna 2 Tablet(s) Oral at bedtime  simvastatin 20 milliGRAM(s) Oral at bedtime    MEDICATIONS  (PRN):  acetaminophen   Tablet. 650 milliGRAM(s) Oral every 6 hours PRN Mild Pain (1 - 3)  ondansetron Injectable 4 milliGRAM(s) IV Push every 6 hours PRN Nausea  oxyCODONE    5 mG/acetaminophen 325 mG 1 Tablet(s) Oral every 4 hours PRN Moderate Pain (4 - 6)    	    PHYSICAL EXAM:  T(C): 36.6 (08-16-18 @ 04:25), Max: 36.7 (08-15-18 @ 13:51)  HR: 104 (08-16-18 @ 04:25) (95 - 111)  BP: 133/77 (08-16-18 @ 04:25) (51/30 - 162/63)  RR: 18 (08-16-18 @ 04:25) (14 - 20)  SpO2: 96% (08-16-18 @ 04:25) (93% - 100%)  Wt(kg): --  I&O's Summary    15 Aug 2018 07:01  -  16 Aug 2018 07:00  --------------------------------------------------------  IN: 350 mL / OUT: 1445 mL / NET: -1095 mL      Appearance: Normal	  HEENT:   NCAT, PERRL, EOMI	  Lymphatic: No lymphadenopathy  Cardiovascular: Normal S1 S2, RRR  Respiratory: dec BS L side  Psychiatry: A & O x 3, Mood & affect appropriate  Gastrointestinal:  Soft, Non-tender, + BS  Skin: No rashes, No ecchymoses, No cyanosis	  Neurologic: Non-focal  Extremities: Normal range of motion, No clubbing, cyanosis or edema    LABS:	 	    CARDIAC MARKERS:                                12.3   10.21 )-----------( 312      ( 16 Aug 2018 07:22 )             40.0     08-16    135  |  97  |  19  ----------------------------<  149<H>  4.4   |  27  |  0.89    Ca    8.6      16 Aug 2018 10:58  Phos  3.4     08-16  Mg     2.0     08-16      proBNP:   Lipid Profile:   HgA1c:   TSH:

## 2018-08-16 NOTE — PROGRESS NOTE ADULT - PROBLEM SELECTOR PLAN 2
Pulmonary following  s/p left Vats decortication and pleurex cath placement 8/15.  Overnight pleurex cath drainage 40cc  Pleurex cath capped-To be drained every Mon, Wed, Fri up to 1 L  Signing off at this time. Reconsult PRN Pulmonary following  s/p left Vats decortication and pleurex cath placement 8/15.  Overnight pleurex cath drainage 40cc  Pleurex cath capped-To be drained every Mon, Wed, Fri up to 1 L  Signing off at this time. Reconsult PRN  Discussed w/ Dr. Avila

## 2018-08-16 NOTE — PROGRESS NOTE ADULT - SUBJECTIVE AND OBJECTIVE BOX
Vital Signs Last 24 Hrs  T(C): 36.3 (18 @ 12:34), Max: 36.6 (08-15-18 @ 20:30)  T(F): 97.4 (18 @ 12:34), Max: 97.9 (08-15-18 @ 20:30)  HR: 109 (18 @ 13:39) (80 - 111)  BP: 115/68 (18 @ 13:39) (51/30 - 162/63)  RR: 18 (18 @ 12:34) (14 - 20)  SpO2: 98% (18 @ 12:34) (96% - 100%)            08-15 @ 07:01  -   @ 07:00  --------------------------------------------------------  IN: 350 mL / OUT: 1445 mL / NET: -1095 mL     @ 07:01  -   @ 16:25  --------------------------------------------------------  IN: 860 mL / OUT: 200 mL / NET: 660 mL       Daily     Daily Weight in k.7 (16 Aug 2018 07:59)  Admit Wt: Drug Dosing Weight  Height (cm): 165.1 (08 Aug 2018 15:40)  Weight (kg): 54.5 (15 Aug 2018 16:21)  BMI (kg/m2): 20 (15 Aug 2018 16:21)  BSA (m2): 1.59 (15 Aug 2018 16:21)      PHYSICAL EXAM  Subjective: Pt states she feels ok. Denies any sob, chest pain or palpitations at this time  Neurology: alert and oriented x 3, nonfocal, no gross deficits  CV :s1, s2  Lungs: Left base diminished. Left pleurex cath in place c/d/I  Abdomen: soft, NT,ND, (+) Bowel sounds  :  voiding  Extremities: -c/c/e      LABS      135  |  97  |  19  ----------------------------<  149<H>  4.4   |  27  |  0.89    Ca    8.6      16 Aug 2018 10:58  Phos  3.4       Mg     2.0                                        12.3   10.21 )-----------( 312      ( 16 Aug 2018 07:22 )             40.0

## 2018-08-16 NOTE — CHART NOTE - NSCHARTNOTEFT_GEN_A_CORE
Notified by RN; pt. is hypotensive at 68/44. Pt. seen and examined at bedside. Reports feeling some lightheadedness and nausea that has since resolved. No JVD on physical exam. Pt. was previously hypotensive in the PACU s/p VATS procedure and was given 250mL bolus and phenylephrine. Pt. originally admitted w/ pericardial effusion w/ tamponade. Pt. given 1L of NS bolus x 1 hour. BP s/p IVF was 106/70. Continue to monitor pt. for signs of tamponade. F/u w/ primary team in AM.    -Polina Michaud PA-C. #[id number].

## 2018-08-16 NOTE — PROGRESS NOTE ADULT - ASSESSMENT
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 Found to have large L pleural effusion causing complete atelectasis of L lung. She had 1L thoracentesis 1 week ago as output. CT scan revealed mod pericardial effusion and large pleural effusion pt remained persistently tachycardic and tachypneic 2d echo revealed increased pericardial effusion read as large with  tamponade/RV  collapse. Thoracic surgery consulted.   8/10 left pigtail by surgical resident 1L fluid out. Small PTX on CXR, pt asymptomatic, will continue to monitor. Percardiocentesis in  cc of dark red fluid removed.   8/11: Drains functioning well. CT scan Noted, No pleurix needed. D/w Dr. Mancini  8/12: Continue pleural and pericardial drain for now  8/13 Pericardial drain with minimal drainage 24ml/24h, continue water seal today, discussed with IR to d/c drain tomorrow, will repeat TTE. Continue left pigtail to LWS 193ml/24h.   8/14 echo revealed no effusion   8/15: s/p Left VATS decortication and pleurex catheter placement  8/16: Hypotensive this AM, fluids bolus -stable otherwise. 02 sat 98 %. Overnight pleurex cath drainage 40cc. Pleurex cath capped without any issues. Pericardial drainage minimal-to be discontinued per IR today.

## 2018-08-16 NOTE — PROVIDER CONTACT NOTE (CHANGE IN STATUS NOTIFICATION) - BACKGROUND
Due for 5pm  lopressor 12.5mg - no parameters
adm with pleural effusion  pt now with pleurex catheter clamped
pta/yes

## 2018-08-16 NOTE — PROGRESS NOTE ADULT - SUBJECTIVE AND OBJECTIVE BOX
Follow-up Pulm Progress Note    Hypotensive in PACU post-op and this morning, 63/32 at 11AM, improved to 93/50 after 500cc fluid bolus  Otherwise appears non-toxic, no complaints  98% on 3L NC  s/p Pleurex yesterday     Medications:  MEDICATIONS  (STANDING):  anastrozole 1 milliGRAM(s) Oral daily  docusate sodium 100 milliGRAM(s) Oral three times a day  DULoxetine 30 milliGRAM(s) Oral daily  ferrous    sulfate 325 milliGRAM(s) Oral daily  lactobacillus acidophilus 1 Tablet(s) Oral two times a day with meals  metoprolol tartrate 12.5 milliGRAM(s) Oral two times a day  pantoprazole    Tablet 40 milliGRAM(s) Oral before breakfast  senna 2 Tablet(s) Oral at bedtime  simvastatin 20 milliGRAM(s) Oral at bedtime    MEDICATIONS  (PRN):  acetaminophen   Tablet. 650 milliGRAM(s) Oral every 6 hours PRN Mild Pain (1 - 3)  ondansetron Injectable 4 milliGRAM(s) IV Push every 6 hours PRN Nausea  oxyCODONE    5 mG/acetaminophen 325 mG 1 Tablet(s) Oral every 4 hours PRN Moderate Pain (4 - 6)          Vital Signs Last 24 Hrs  T(C): 36.3 (16 Aug 2018 12:34), Max: 36.7 (15 Aug 2018 13:51)  T(F): 97.4 (16 Aug 2018 12:34), Max: 98.1 (15 Aug 2018 13:51)  HR: 84 (16 Aug 2018 12:34) (80 - 111)  BP: 110/72 (16 Aug 2018 12:34) (51/30 - 162/63)  BP(mean): 93 (15 Aug 2018 20:30) (36 - 98)  RR: 18 (16 Aug 2018 12:34) (14 - 20)  SpO2: 98% (16 Aug 2018 12:34) (93% - 100%) on 3L NC          08-15 @ 07:01  -  08-16 @ 07:00  --------------------------------------------------------  IN: 350 mL / OUT: 1445 mL / NET: -1095 mL          LABS:                        12.3   10.21 )-----------( 312      ( 16 Aug 2018 07:22 )             40.0     08-16    135  |  97  |  19  ----------------------------<  149<H>  4.4   |  27  |  0.89    Ca    8.6      16 Aug 2018 10:58  Phos  3.4     08-16  Mg     2.0     08-16            CAPILLARY BLOOD GLUCOSE                        Fluid characteristics  -- 08-10 @ 10:01  pH 7.34    tprot <0.6    Cell count  Appearance Bloody  Fluid type --  BF lymph 10  color Red  eosinophil --  PMN 13  Mesothelial --  Monocyte 6  Other body cells 71      CULTURES: (if applicable)  Culture Results:   No growth at 5 days (08-10 @ 12:16)  Culture Results:   Testing in progress (08-10 @ 12:16)          Physical Examination:  PULM: Decreased BS L  CVS: S1, S2 heard    RADIOLOGY REVIEWED  CXR: L lung opacification Follow-up Pulm Progress Note    Hypotensive in PACU post-op and this morning, 63/32 at 11AM, improved to 93/50 after 500cc fluid bolus  Otherwise appears non-toxic, no complaints  98% on 3L NC  s/p Pleurex yesterday     Medications:  MEDICATIONS  (STANDING):  anastrozole 1 milliGRAM(s) Oral daily  docusate sodium 100 milliGRAM(s) Oral three times a day  DULoxetine 30 milliGRAM(s) Oral daily  ferrous    sulfate 325 milliGRAM(s) Oral daily  lactobacillus acidophilus 1 Tablet(s) Oral two times a day with meals  metoprolol tartrate 12.5 milliGRAM(s) Oral two times a day  pantoprazole    Tablet 40 milliGRAM(s) Oral before breakfast  senna 2 Tablet(s) Oral at bedtime  simvastatin 20 milliGRAM(s) Oral at bedtime    MEDICATIONS  (PRN):  acetaminophen   Tablet. 650 milliGRAM(s) Oral every 6 hours PRN Mild Pain (1 - 3)  ondansetron Injectable 4 milliGRAM(s) IV Push every 6 hours PRN Nausea  oxyCODONE    5 mG/acetaminophen 325 mG 1 Tablet(s) Oral every 4 hours PRN Moderate Pain (4 - 6)    Vital Signs Last 24 Hrs  T(C): 36.3 (16 Aug 2018 12:34), Max: 36.7 (15 Aug 2018 13:51)  T(F): 97.4 (16 Aug 2018 12:34), Max: 98.1 (15 Aug 2018 13:51)  HR: 84 (16 Aug 2018 12:34) (80 - 111)  BP: 110/72 (16 Aug 2018 12:34) (51/30 - 162/63)  BP(mean): 93 (15 Aug 2018 20:30) (36 - 98)  RR: 18 (16 Aug 2018 12:34) (14 - 20)  SpO2: 98% (16 Aug 2018 12:34) (93% - 100%) on 3L NC    08-15 @ 07:01  -  08-16 @ 07:00  --------------------------------------------------------  IN: 350 mL / OUT: 1445 mL / NET: -1095 mL    LABS:                        12.3   10.21 )-----------( 312      ( 16 Aug 2018 07:22 )             40.0     08-16    135  |  97  |  19  ----------------------------<  149<H>  4.4   |  27  |  0.89    Ca    8.6      16 Aug 2018 10:58  Phos  3.4     08-16  Mg     2.0     08-16      CAPILLARY BLOOD GLUCOSE    Fluid characteristics  -- 08-10 @ 10:01  pH 7.34    tprot <0.6    Cell count  Appearance Bloody  Fluid type --  BF lymph 10  color Red  eosinophil --  PMN 13  Mesothelial --  Monocyte 6  Other body cells 71      CULTURES: (if applicable)  Culture Results:   No growth at 5 days (08-10 @ 12:16)  Culture Results:   Testing in progress (08-10 @ 12:16)          Physical Examination:  PULM: Decreased BS L  CVS: S1, S2 heard    RADIOLOGY REVIEWED  CXR: L lung opacification

## 2018-08-17 LAB
ANION GAP SERPL CALC-SCNC: 14 MMOL/L — SIGNIFICANT CHANGE UP (ref 5–17)
APPEARANCE UR: CLEAR — SIGNIFICANT CHANGE UP
BACTERIA # UR AUTO: NEGATIVE — SIGNIFICANT CHANGE UP
BILIRUB UR-MCNC: NEGATIVE — SIGNIFICANT CHANGE UP
BUN SERPL-MCNC: 20 MG/DL — SIGNIFICANT CHANGE UP (ref 7–23)
CALCIUM SERPL-MCNC: 8.6 MG/DL — SIGNIFICANT CHANGE UP (ref 8.4–10.5)
CHLORIDE SERPL-SCNC: 98 MMOL/L — SIGNIFICANT CHANGE UP (ref 96–108)
CK MB CFR SERPL CALC: 2.3 NG/ML — SIGNIFICANT CHANGE UP (ref 0–3.8)
CK SERPL-CCNC: 27 U/L — SIGNIFICANT CHANGE UP (ref 25–170)
CO2 SERPL-SCNC: 26 MMOL/L — SIGNIFICANT CHANGE UP (ref 22–31)
COLOR SPEC: YELLOW — SIGNIFICANT CHANGE UP
CREAT SERPL-MCNC: 0.99 MG/DL — SIGNIFICANT CHANGE UP (ref 0.5–1.3)
DIFF PNL FLD: NEGATIVE — SIGNIFICANT CHANGE UP
EPI CELLS # UR: 1 /HPF — SIGNIFICANT CHANGE UP (ref 0–5)
GLUCOSE SERPL-MCNC: 111 MG/DL — HIGH (ref 70–99)
GLUCOSE UR QL: NEGATIVE MG/DL — SIGNIFICANT CHANGE UP
HCT VFR BLD CALC: 36.6 % — SIGNIFICANT CHANGE UP (ref 34.5–45)
HGB BLD-MCNC: 11.5 G/DL — SIGNIFICANT CHANGE UP (ref 11.5–15.5)
HYALINE CASTS # UR AUTO: 2 /LPF — SIGNIFICANT CHANGE UP (ref 0–7)
KETONES UR-MCNC: NEGATIVE — SIGNIFICANT CHANGE UP
LACTATE SERPL-SCNC: 1.2 MMOL/L — SIGNIFICANT CHANGE UP (ref 0.7–2)
LEUKOCYTE ESTERASE UR-ACNC: NEGATIVE — SIGNIFICANT CHANGE UP
MCHC RBC-ENTMCNC: 29.8 PG — SIGNIFICANT CHANGE UP (ref 27–34)
MCHC RBC-ENTMCNC: 31.4 GM/DL — LOW (ref 32–36)
MCV RBC AUTO: 95.1 FL — SIGNIFICANT CHANGE UP (ref 80–100)
NITRITE UR-MCNC: NEGATIVE — SIGNIFICANT CHANGE UP
NT-PROBNP SERPL-SCNC: 2216 PG/ML — HIGH (ref 0–300)
PH UR: 5.5 — SIGNIFICANT CHANGE UP (ref 5–8)
PLATELET # BLD AUTO: 299 K/UL — SIGNIFICANT CHANGE UP (ref 150–400)
POTASSIUM SERPL-MCNC: 4.6 MMOL/L — SIGNIFICANT CHANGE UP (ref 3.5–5.3)
POTASSIUM SERPL-SCNC: 4.6 MMOL/L — SIGNIFICANT CHANGE UP (ref 3.5–5.3)
PROT UR-MCNC: ABNORMAL MG/DL
RBC # BLD: 3.85 M/UL — SIGNIFICANT CHANGE UP (ref 3.8–5.2)
RBC # FLD: 14.6 % — HIGH (ref 10.3–14.5)
RBC CASTS # UR COMP ASSIST: 2 /HPF — SIGNIFICANT CHANGE UP (ref 0–4)
SODIUM SERPL-SCNC: 138 MMOL/L — SIGNIFICANT CHANGE UP (ref 135–145)
SP GR SPEC: 1.02 — SIGNIFICANT CHANGE UP (ref 1.01–1.02)
TROPONIN T, HIGH SENSITIVITY RESULT: 34 NG/L — SIGNIFICANT CHANGE UP (ref 0–51)
UROBILINOGEN FLD QL: NEGATIVE MG/DL — SIGNIFICANT CHANGE UP
WBC # BLD: 11.2 K/UL — HIGH (ref 3.8–10.5)
WBC # FLD AUTO: 11.2 K/UL — HIGH (ref 3.8–10.5)
WBC UR QL: 1 /HPF — SIGNIFICANT CHANGE UP (ref 0–5)

## 2018-08-17 PROCEDURE — 93308 TTE F-UP OR LMTD: CPT | Mod: 26

## 2018-08-17 PROCEDURE — 71045 X-RAY EXAM CHEST 1 VIEW: CPT | Mod: 26

## 2018-08-17 PROCEDURE — 93321 DOPPLER ECHO F-UP/LMTD STD: CPT | Mod: 26

## 2018-08-17 PROCEDURE — 71045 X-RAY EXAM CHEST 1 VIEW: CPT | Mod: 26,77

## 2018-08-17 RX ORDER — LANOLIN ALCOHOL/MO/W.PET/CERES
3 CREAM (GRAM) TOPICAL AT BEDTIME
Qty: 0 | Refills: 0 | Status: DISCONTINUED | OUTPATIENT
Start: 2018-08-17 | End: 2018-08-17

## 2018-08-17 RX ORDER — SIMETHICONE 80 MG/1
80 TABLET, CHEWABLE ORAL ONCE
Qty: 0 | Refills: 0 | Status: COMPLETED | OUTPATIENT
Start: 2018-08-17 | End: 2018-08-17

## 2018-08-17 RX ADMIN — Medication 1 TABLET(S): at 17:09

## 2018-08-17 RX ADMIN — Medication 100 MILLIGRAM(S): at 06:02

## 2018-08-17 RX ADMIN — Medication 650 MILLIGRAM(S): at 06:05

## 2018-08-17 RX ADMIN — PANTOPRAZOLE SODIUM 40 MILLIGRAM(S): 20 TABLET, DELAYED RELEASE ORAL at 06:02

## 2018-08-17 RX ADMIN — SENNA PLUS 2 TABLET(S): 8.6 TABLET ORAL at 21:44

## 2018-08-17 RX ADMIN — Medication 12.5 MILLIGRAM(S): at 09:57

## 2018-08-17 RX ADMIN — DULOXETINE HYDROCHLORIDE 30 MILLIGRAM(S): 30 CAPSULE, DELAYED RELEASE ORAL at 12:21

## 2018-08-17 RX ADMIN — Medication 100 MILLIGRAM(S): at 21:44

## 2018-08-17 RX ADMIN — Medication 81 MILLIGRAM(S): at 12:21

## 2018-08-17 RX ADMIN — Medication 12.5 MILLIGRAM(S): at 21:44

## 2018-08-17 RX ADMIN — ENOXAPARIN SODIUM 40 MILLIGRAM(S): 100 INJECTION SUBCUTANEOUS at 21:44

## 2018-08-17 RX ADMIN — Medication 1 TABLET(S): at 09:54

## 2018-08-17 RX ADMIN — SIMVASTATIN 20 MILLIGRAM(S): 20 TABLET, FILM COATED ORAL at 21:45

## 2018-08-17 RX ADMIN — Medication 650 MILLIGRAM(S): at 00:15

## 2018-08-17 RX ADMIN — Medication 3 MILLIGRAM(S): at 01:07

## 2018-08-17 RX ADMIN — Medication 650 MILLIGRAM(S): at 06:44

## 2018-08-17 RX ADMIN — ANASTROZOLE 1 MILLIGRAM(S): 1 TABLET ORAL at 17:09

## 2018-08-17 RX ADMIN — SIMETHICONE 80 MILLIGRAM(S): 80 TABLET, CHEWABLE ORAL at 01:07

## 2018-08-17 RX ADMIN — Medication 325 MILLIGRAM(S): at 12:22

## 2018-08-17 NOTE — CHART NOTE - NSCHARTNOTEFT_GEN_A_CORE
84 year old female pt with PMH breast CA 2015 s/p lumpectomy, GERD, recent diagnosis of L hilar and perihilar mass consistent with tumor, and recent thoracentesis draining 1L fluid from L, presenting sent in by her oncologist for admission for worsening shortness of breath. 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 S/p Left VATS decortication and pleurex catheter placement on 8/15. Today left pneumothorax shown on CXR. Speech and swallow evaluation pending for c/o dysphagia.    Pt seen for malnutrition follow-up. Pt resting, reports fair appetite. Has tried the Prosource and Danactive, does not like the prosource taste. Denies new diet preferences,    Source: Patient [x]    Family [ ]     other [x] electronic medical records    Diet : Dysphagia 3 soft with nectar thickened liquids + Supplement: Prosource x 2 daily (120 kcal, 30 grams protein daily)    Patient reports [ ] nausea  [ ] vomiting [ ] diarrhea [ ] constipation  [ ]chewing problems [ ] swallowing issues  [x] other: Denies GI distress at this time, noted last BM 8/15. Endorses ongoing difficulty with swallowing food/liquids at times "especially in the morning", diet downgraded to soft with nectar liquids today to address    PO intake (meals):  < 50% [x] 50-75% [x]   % [ ]  other :  PO intake (supplements): variable ~0% of prosource    Source for PO intake [x] Patient [ ] family [ ] chart [ ] staff [ ] other    Enteral /Parenteral Nutrition: n/a    Current Weight: 120.5 pounds (current as of 8/16, standing, no edema noted). Admit wt 115 pounds.    Pertinent Medications: MEDICATIONS  (STANDING):  anastrozole 1 milliGRAM(s) Oral daily  aspirin enteric coated 81 milliGRAM(s) Oral daily  docusate sodium 100 milliGRAM(s) Oral three times a day  DULoxetine 30 milliGRAM(s) Oral daily  enoxaparin Injectable 40 milliGRAM(s) SubCutaneous every 24 hours  ferrous    sulfate 325 milliGRAM(s) Oral daily  lactobacillus acidophilus 1 Tablet(s) Oral two times a day with meals  metoprolol tartrate 12.5 milliGRAM(s) Oral two times a day  pantoprazole    Tablet 40 milliGRAM(s) Oral before breakfast  senna 2 Tablet(s) Oral at bedtime  simvastatin 20 milliGRAM(s) Oral at bedtime    MEDICATIONS  (PRN):  acetaminophen   Tablet. 650 milliGRAM(s) Oral every 6 hours PRN Mild Pain (1 - 3)  ondansetron Injectable 4 milliGRAM(s) IV Push every 6 hours PRN Nausea  oxyCODONE    5 mG/acetaminophen 325 mG 1 Tablet(s) Oral every 4 hours PRN Moderate Pain (4 - 6)    Pertinent Labs:  08-17 Na138 mmol/L Glu 111 mg/dL<H> K+ 4.6 mmol/L Cr  0.99 mg/dL BUN 20 mg/dL 08-16 Phos 3.4 mg/dL      Skin: No pressure ulcers noted      Estimated Needs:   [x] no change since previous assessment  [ ] recalculated:       Previous Nutrition Diagnosis: Moderate Malnutrition         Nutrition Diagnosis is [x] ongoing, addressed with liberalized diet and supplements  [ ] resolved [ ] not applicable          New Nutrition Diagnosis: [x] not applicable         Interventions:     1) Recommend continue dysphagia 3 soft with nectar thickened liquid diet order to promote ease of swallowing. Pt denies new diet preferences.   2) Pt amenable to continue Prosource x 2 daily, encouraged her to mix with juice or food to help mask taste, pt willing to try. Pt does not want Ensure supplements.        Monitoring and Evaluation:     [x] PO intake [x] Tolerance to diet prescription [x] weights [x] follow up per protocol    [x] other: RD remains available: Elizabet Hewitt MS, RDN, CDN, CDE. #[phone].

## 2018-08-17 NOTE — PROGRESS NOTE ADULT - SUBJECTIVE AND OBJECTIVE BOX
Follow-up Pulm Progress Note    Intermittently hypotensive overnight (68/44) responded to 1L NS bolus  c/o dysphagia again this morning, part of her presenting symptom  Short of breath, unable to speak in full sentences   94% on RA, /70 auscultated manually     Medications:  MEDICATIONS  (STANDING):  anastrozole 1 milliGRAM(s) Oral daily  aspirin enteric coated 81 milliGRAM(s) Oral daily  docusate sodium 100 milliGRAM(s) Oral three times a day  DULoxetine 30 milliGRAM(s) Oral daily  enoxaparin Injectable 40 milliGRAM(s) SubCutaneous every 24 hours  ferrous    sulfate 325 milliGRAM(s) Oral daily  lactobacillus acidophilus 1 Tablet(s) Oral two times a day with meals  metoprolol tartrate 12.5 milliGRAM(s) Oral two times a day  pantoprazole    Tablet 40 milliGRAM(s) Oral before breakfast  senna 2 Tablet(s) Oral at bedtime  simvastatin 20 milliGRAM(s) Oral at bedtime    MEDICATIONS  (PRN):  acetaminophen   Tablet. 650 milliGRAM(s) Oral every 6 hours PRN Mild Pain (1 - 3)  ondansetron Injectable 4 milliGRAM(s) IV Push every 6 hours PRN Nausea  oxyCODONE    5 mG/acetaminophen 325 mG 1 Tablet(s) Oral every 4 hours PRN Moderate Pain (4 - 6)          Vital Signs Last 24 Hrs  T(C): 36.5 (17 Aug 2018 04:44), Max: 36.7 (16 Aug 2018 20:43)  T(F): 97.7 (17 Aug 2018 04:44), Max: 98.1 (16 Aug 2018 20:43)  HR: 108 (17 Aug 2018 09:59) (80 - 115)  BP: 126/83 (17 Aug 2018 09:59) (58/38 - 126/83)  BP(mean): --  RR: 18 (17 Aug 2018 04:44) (18 - 20)  SpO2: 95% (17 Aug 2018 04:55) (85% - 98%) on RA          08-16 @ 07:01  -  08-17 @ 07:00  --------------------------------------------------------  IN: 2220 mL / OUT: 450 mL / NET: 1770 mL          LABS:                        11.5   11.2  )-----------( 299      ( 17 Aug 2018 09:56 )             36.6     08-17    138  |  98  |  20  ----------------------------<  111<H>  4.6   |  26  |  0.99    Ca    8.6      17 Aug 2018 09:39  Phos  3.4     08-16  Mg     2.0     08-16            CAPILLARY BLOOD GLUCOSE                        Fluid characteristics  -- 08-10 @ 10:01  pH 7.34    tprot <0.6    Cell count  Appearance Bloody  Fluid type --  BF lymph 10  color Red  eosinophil --  PMN 13  Mesothelial --  Monocyte 6  Other body cells 71      CULTURES: (if applicable)  Culture Results:   No growth at 5 days (08-10 @ 12:16)  Culture Results:   Testing in progress (08-10 @ 12:16)          Physical Examination:  PULM: Decreased BS L   CVS: S1, S2 heard    RADIOLOGY REVIEWED  CXR: 8/15: L lung opacification Follow-up Pulm Progress Note    Intermittently hypotensive overnight (68/44) responded to 1L NS bolus  c/o dysphagia again this morning, part of her presenting symptom  Short of breath, unable to speak in full sentences   94% on RA, /70 auscultated manually     Medications:  MEDICATIONS  (STANDING):  anastrozole 1 milliGRAM(s) Oral daily  aspirin enteric coated 81 milliGRAM(s) Oral daily  docusate sodium 100 milliGRAM(s) Oral three times a day  DULoxetine 30 milliGRAM(s) Oral daily  enoxaparin Injectable 40 milliGRAM(s) SubCutaneous every 24 hours  ferrous    sulfate 325 milliGRAM(s) Oral daily  lactobacillus acidophilus 1 Tablet(s) Oral two times a day with meals  metoprolol tartrate 12.5 milliGRAM(s) Oral two times a day  pantoprazole    Tablet 40 milliGRAM(s) Oral before breakfast  senna 2 Tablet(s) Oral at bedtime  simvastatin 20 milliGRAM(s) Oral at bedtime    MEDICATIONS  (PRN):  acetaminophen   Tablet. 650 milliGRAM(s) Oral every 6 hours PRN Mild Pain (1 - 3)  ondansetron Injectable 4 milliGRAM(s) IV Push every 6 hours PRN Nausea  oxyCODONE    5 mG/acetaminophen 325 mG 1 Tablet(s) Oral every 4 hours PRN Moderate Pain (4 - 6)    Vital Signs Last 24 Hrs  T(C): 36.5 (17 Aug 2018 04:44), Max: 36.7 (16 Aug 2018 20:43)  T(F): 97.7 (17 Aug 2018 04:44), Max: 98.1 (16 Aug 2018 20:43)  HR: 108 (17 Aug 2018 09:59) (80 - 115)  BP: 126/83 (17 Aug 2018 09:59) (58/38 - 126/83)  BP(mean): --  RR: 18 (17 Aug 2018 04:44) (18 - 20)  SpO2: 95% (17 Aug 2018 04:55) (85% - 98%) on RA    08-16 @ 07:01  -  08-17 @ 07:00  --------------------------------------------------------  IN: 2220 mL / OUT: 450 mL / NET: 1770 mL    LABS:                        11.5   11.2  )-----------( 299      ( 17 Aug 2018 09:56 )             36.6     08-17    138  |  98  |  20  ----------------------------<  111<H>  4.6   |  26  |  0.99    Ca    8.6      17 Aug 2018 09:39  Phos  3.4     08-16  Mg     2.0     08-16    CAPILLARY BLOOD GLUCOSE    Fluid characteristics  -- 08-10 @ 10:01  pH 7.34    tprot <0.6    Cell count  Appearance Bloody  Fluid type --  BF lymph 10  color Red  eosinophil --  PMN 13  Mesothelial --  Monocyte 6  Other body cells 71      CULTURES: (if applicable)  Culture Results:   No growth at 5 days (08-10 @ 12:16)  Culture Results:   Testing in progress (08-10 @ 12:16)    Physical Examination:  PULM: Decreased BS L   CVS: S1, S2 heard    RADIOLOGY REVIEWED  CXR: 8/15: L lung opacification

## 2018-08-17 NOTE — PROGRESS NOTE ADULT - PROBLEM SELECTOR PLAN 2
Pulmonary following  s/p left Vats decortication and pleurex cath placement 8/15.  Left pneumothorax on CXR pronounced. Pleurex cath reconnected to LWS pleurovac.   F/U repeat CXR in 2 hours.   Discussed w/ Dr. Avila

## 2018-08-17 NOTE — PROGRESS NOTE ADULT - SUBJECTIVE AND OBJECTIVE BOX
CHIEF COMPLAINT:Patient is a 84y old  Female who presents with a chief complaint of Progressive exertional dyspnea, dry cough for 2 weeks (09 Aug 2018 12:48)  pericardial drain removed    Allergies:  Dilaudid (Other)  Levaquin (Diarrhea)      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  S/P hip replacement, right: 6/2016  - right hip  History of lumpectomy of right breast: July 2015  History of cataract surgery, left: and right 2016  Elective surgery: reversal of colostomy July 2015  History of tonsillectomy  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 No nausea, vomiting, diarrhea or constipation, + low appetite  GENITOURINARY: No dysuria, urinary frequency or urgency, no hematuria  NEUROLOGICAL: No headaches, memory loss, loss of strength, numbness, or tremors  SKIN: No itching, burning, rashes, or lesions   MUSCULOSKELETAL: No joint pain or swelling; No muscle, back, or extremity pain      Medications:  MEDICATIONS  (STANDING):  anastrozole 1 milliGRAM(s) Oral daily  aspirin enteric coated 81 milliGRAM(s) Oral daily  docusate sodium 100 milliGRAM(s) Oral three times a day  DULoxetine 30 milliGRAM(s) Oral daily  enoxaparin Injectable 40 milliGRAM(s) SubCutaneous every 24 hours  ferrous    sulfate 325 milliGRAM(s) Oral daily  lactobacillus acidophilus 1 Tablet(s) Oral two times a day with meals  metoprolol tartrate 12.5 milliGRAM(s) Oral two times a day  pantoprazole    Tablet 40 milliGRAM(s) Oral before breakfast  senna 2 Tablet(s) Oral at bedtime  simvastatin 20 milliGRAM(s) Oral at bedtime    MEDICATIONS  (PRN):  acetaminophen   Tablet. 650 milliGRAM(s) Oral every 6 hours PRN Mild Pain (1 - 3)  ondansetron Injectable 4 milliGRAM(s) IV Push every 6 hours PRN Nausea  oxyCODONE    5 mG/acetaminophen 325 mG 1 Tablet(s) Oral every 4 hours PRN Moderate Pain (4 - 6)    	    PHYSICAL EXAM:  T(C): 36.5 (08-17-18 @ 04:44), Max: 36.7 (08-16-18 @ 20:43)  HR: 108 (08-17-18 @ 09:59) (80 - 115)  BP: 126/83 (08-17-18 @ 09:59) (58/38 - 126/83)  RR: 18 (08-17-18 @ 04:44) (18 - 20)  SpO2: 95% (08-17-18 @ 04:55) (85% - 98%)  Wt(kg): --  I&O's Summary    16 Aug 2018 07:01  -  17 Aug 2018 07:00  --------------------------------------------------------  IN: 2220 mL / OUT: 450 mL / NET: 1770 mL      Appearance: Normal	  HEENT:   NCAT, PERRL, EOMI	  Lymphatic: No lymphadenopathy  Cardiovascular: Normal S1 S2, RRR  Respiratory: dec BS L side  Psychiatry: A & O x 3, Mood & affect appropriate  Gastrointestinal:  Soft, Non-tender, + BS  Skin: No rashes, No ecchymoses, No cyanosis	  Neurologic: Non-focal  Extremities: Normal range of motion, No clubbing, cyanosis or edema    LABS:	 	    CARDIAC MARKERS:  CARDIAC MARKERS ( 17 Aug 2018 09:39 )  x     / x     / 27 U/L / x     / x                                    11.5   11.2  )-----------( 299      ( 17 Aug 2018 09:56 )             36.6     08-17    138  |  98  |  20  ----------------------------<  111<H>  4.6   |  26  |  0.99    Ca    8.6      17 Aug 2018 09:39  Phos  3.4     08-16  Mg     2.0     08-16      proBNP:   Lipid Profile:   HgA1c:   TSH:

## 2018-08-17 NOTE — PROVIDER CONTACT NOTE (OTHER) - SITUATION
pt c/o mid/epigastric chest pressure that she has had for the past few days s/p pleural and pericardial drain placements and removal. pt states she still has chest pressure and gas pains

## 2018-08-17 NOTE — PROGRESS NOTE ADULT - PROBLEM SELECTOR PLAN 1
s/p pericardiocentesis and pericardial drain placement in IR  Minimal drainage  Pericardial drain to be discontinued per IR 8/16  cardiology following  No growth in pericardial fluid cultures thus far  Fluid (+) for maliganant cells per cytology  F/U heme/onc recommendation   Repeat echo post removal of drain shows small pericardial effusion compared to previous TTE.

## 2018-08-17 NOTE — PROGRESS NOTE ADULT - ASSESSMENT
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 Found to have large L pleural effusion causing complete atelectasis of L lung. She had 1L thoracentesis 1 week ago as outpt.      Problem/Plan - 1:  ·  Problem: Pleural effusion.  Plan: s/p L pigtail by CTS, monitor output, now s/p VATS, pleurex     Problem/Plan - 2:  ·  Problem: Adenocarcinoma of lung, unspecified laterality.  Plan: Apparent stage 4 disease. Drainage of Pleural effusion, Onc appreciated  MRI brain pending  RadOnc appreciated, hold off palliative radiation as improved w/o     Problem/Plan - 3:  ·  Problem: Dysphagia, unspecified type.  Plan: See above.  Will assume aspiration risk.   S/S.   Nutrition evaluation.      Problem/Plan - 4:  ·  Problem: DVT prophylaxis    Pericardial effusion w/tamponade - likely malignant, drain removed yesterday, small effusion on TTE today, no signs of tamponade    Hypotension overnight - possibly volume depletion from prolonged NPO, BP improved w/IVF, monitor, encourage PO intake    D/c planning if BP stable and cleared by Pulm/ Cards

## 2018-08-17 NOTE — CHART NOTE - NSCHARTNOTEFT_GEN_A_CORE
Called by Radiology that pt. with a moderate size pneumothorax on chest x-ray.  Called Thoracic surgery [phone] to evaluate pt.  Pt. seen and examined at bedside.   Vitals wnl.   Discussed with attending Dr. Aldridge.   [facility]

## 2018-08-17 NOTE — PROGRESS NOTE ADULT - SUBJECTIVE AND OBJECTIVE BOX
Vital Signs Last 24 Hrs  T(C): 36.5 (08-17-18 @ 04:44), Max: 36.7 (08-16-18 @ 20:43)  T(F): 97.7 (08-17-18 @ 04:44), Max: 98.1 (08-16-18 @ 20:43)  HR: 108 (08-17-18 @ 09:59) (102 - 115)  BP: 126/83 (08-17-18 @ 09:59) (68/44 - 126/83)  RR: 18 (08-17-18 @ 04:44) (18 - 20)  SpO2: 95% (08-17-18 @ 04:55) (85% - 98%)            08-16 @ 07:01  -  08-17 @ 07:00  --------------------------------------------------------  IN: 2220 mL / OUT: 450 mL / NET: 1770 mL    08-17 @ 07:01  -  08-17 @ 13:05  --------------------------------------------------------  IN: 100 mL / OUT: 0 mL / NET: 100 mL       Daily     Daily   Admit Wt: Drug Dosing Weight  Height (cm): 165.1 (08 Aug 2018 15:40)  Weight (kg): 54.5 (15 Aug 2018 16:21)  BMI (kg/m2): 20 (15 Aug 2018 16:21)  BSA (m2): 1.59 (15 Aug 2018 16:21)      CAPILLARY BLOOD GLUCOSE              MEDICATIONS  acetaminophen   Tablet. 650 milliGRAM(s) Oral every 6 hours PRN  anastrozole 1 milliGRAM(s) Oral daily  aspirin enteric coated 81 milliGRAM(s) Oral daily  docusate sodium 100 milliGRAM(s) Oral three times a day  DULoxetine 30 milliGRAM(s) Oral daily  enoxaparin Injectable 40 milliGRAM(s) SubCutaneous every 24 hours  ferrous    sulfate 325 milliGRAM(s) Oral daily  lactobacillus acidophilus 1 Tablet(s) Oral two times a day with meals  metoprolol tartrate 12.5 milliGRAM(s) Oral two times a day  ondansetron Injectable 4 milliGRAM(s) IV Push every 6 hours PRN  oxyCODONE    5 mG/acetaminophen 325 mG 1 Tablet(s) Oral every 4 hours PRN  pantoprazole    Tablet 40 milliGRAM(s) Oral before breakfast  senna 2 Tablet(s) Oral at bedtime  simvastatin 20 milliGRAM(s) Oral at bedtime      PHYSICAL EXAM  Subjective: Pt states she feels more sob today compared to yesterday  Neurology: alert and oriented x 3, nonfocal, no gross deficits  CV : s1, s2  Lungs: LL diminished   Abdomen: soft, NT,ND, (+) Bowel sounds  :  voiding  Extremities:  -c/c/e    LABS  08-17    138  |  98  |  20  ----------------------------<  111<H>  4.6   |  26  |  0.99    Ca    8.6      17 Aug 2018 09:39  Phos  3.4     08-16  Mg     2.0     08-16                                   11.5   11.2  )-----------( 299      ( 17 Aug 2018 09:56 )             36.6

## 2018-08-17 NOTE — PROGRESS NOTE ADULT - PROBLEM SELECTOR PLAN 1
-f/u repeat TTE this morning to r/o tamponade recurrence  -Check CE x1 (added on to AM labs)  -Pan culture   -Lactate normal  -Losartan stopped yesterday

## 2018-08-17 NOTE — PROGRESS NOTE ADULT - ASSESSMENT
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 Found to have large L pleural effusion causing complete atelectasis of L lung. She had 1L thoracentesis 1 week ago as output. CT scan revealed mod pericardial effusion and large pleural effusion pt remained persistently tachycardic and tachypneic 2d echo revealed increased pericardial effusion read as large with  tamponade/RV  collapse. Thoracic surgery consulted.   8/10 left pigtail by surgical resident 1L fluid out. Small PTX on CXR, pt asymptomatic, will continue to monitor. Percardiocentesis in  cc of dark red fluid removed.   8/11: Drains functioning well. CT scan Noted, No pleurix needed. D/w Dr. Mancini  8/12: Continue pleural and pericardial drain for now  8/13 Pericardial drain with minimal drainage 24ml/24h, continue water seal today, discussed with IR to d/c drain tomorrow, will repeat TTE. Continue left pigtail to LWS 193ml/24h.   8/14 echo revealed no effusion   8/15: s/p Left VATS decortication and pleurex catheter placement  8/16: Hypotensive this AM, fluids bolus -stable otherwise. 02 sat 98 %. Overnight pleurex cath drainage 40cc. Pleurex cath capped without any issues. Pericardial drainage minimal-to be discontinued per IR today.   8/17: Left pneumothorax on CXR pronounced. Pleurex cath reconnected to LWS pleurovac. F/U repeat CXR in 2 hours. Repeat TTE today indicative of small pericardial effusion compared to previous TTE.

## 2018-08-18 LAB
ANION GAP SERPL CALC-SCNC: 10 MMOL/L — SIGNIFICANT CHANGE UP (ref 5–17)
BUN SERPL-MCNC: 14 MG/DL — SIGNIFICANT CHANGE UP (ref 7–23)
CALCIUM SERPL-MCNC: 9.1 MG/DL — SIGNIFICANT CHANGE UP (ref 8.4–10.5)
CHLORIDE SERPL-SCNC: 99 MMOL/L — SIGNIFICANT CHANGE UP (ref 96–108)
CO2 SERPL-SCNC: 26 MMOL/L — SIGNIFICANT CHANGE UP (ref 22–31)
CREAT SERPL-MCNC: 0.75 MG/DL — SIGNIFICANT CHANGE UP (ref 0.5–1.3)
CULTURE RESULTS: NO GROWTH — SIGNIFICANT CHANGE UP
GLUCOSE SERPL-MCNC: 96 MG/DL — SIGNIFICANT CHANGE UP (ref 70–99)
HCT VFR BLD CALC: 34.8 % — SIGNIFICANT CHANGE UP (ref 34.5–45)
HGB BLD-MCNC: 11.1 G/DL — LOW (ref 11.5–15.5)
MCHC RBC-ENTMCNC: 29.6 PG — SIGNIFICANT CHANGE UP (ref 27–34)
MCHC RBC-ENTMCNC: 31.9 GM/DL — LOW (ref 32–36)
MCV RBC AUTO: 92.8 FL — SIGNIFICANT CHANGE UP (ref 80–100)
PLATELET # BLD AUTO: 295 K/UL — SIGNIFICANT CHANGE UP (ref 150–400)
POTASSIUM SERPL-MCNC: 4.6 MMOL/L — SIGNIFICANT CHANGE UP (ref 3.5–5.3)
POTASSIUM SERPL-SCNC: 4.6 MMOL/L — SIGNIFICANT CHANGE UP (ref 3.5–5.3)
RBC # BLD: 3.75 M/UL — LOW (ref 3.8–5.2)
RBC # FLD: 16.1 % — HIGH (ref 10.3–14.5)
SODIUM SERPL-SCNC: 135 MMOL/L — SIGNIFICANT CHANGE UP (ref 135–145)
SPECIMEN SOURCE: SIGNIFICANT CHANGE UP
WBC # BLD: 10.29 K/UL — SIGNIFICANT CHANGE UP (ref 3.8–10.5)
WBC # FLD AUTO: 10.29 K/UL — SIGNIFICANT CHANGE UP (ref 3.8–10.5)

## 2018-08-18 PROCEDURE — 71045 X-RAY EXAM CHEST 1 VIEW: CPT | Mod: 26

## 2018-08-18 RX ADMIN — Medication 650 MILLIGRAM(S): at 06:24

## 2018-08-18 RX ADMIN — Medication 650 MILLIGRAM(S): at 14:00

## 2018-08-18 RX ADMIN — SIMVASTATIN 20 MILLIGRAM(S): 20 TABLET, FILM COATED ORAL at 22:01

## 2018-08-18 RX ADMIN — ANASTROZOLE 1 MILLIGRAM(S): 1 TABLET ORAL at 12:20

## 2018-08-18 RX ADMIN — Medication 1 TABLET(S): at 17:52

## 2018-08-18 RX ADMIN — Medication 100 MILLIGRAM(S): at 05:17

## 2018-08-18 RX ADMIN — Medication 650 MILLIGRAM(S): at 14:01

## 2018-08-18 RX ADMIN — Medication 650 MILLIGRAM(S): at 05:18

## 2018-08-18 RX ADMIN — Medication 1 TABLET(S): at 09:04

## 2018-08-18 RX ADMIN — OXYCODONE AND ACETAMINOPHEN 1 TABLET(S): 5; 325 TABLET ORAL at 20:15

## 2018-08-18 RX ADMIN — DULOXETINE HYDROCHLORIDE 30 MILLIGRAM(S): 30 CAPSULE, DELAYED RELEASE ORAL at 12:19

## 2018-08-18 RX ADMIN — PANTOPRAZOLE SODIUM 40 MILLIGRAM(S): 20 TABLET, DELAYED RELEASE ORAL at 05:17

## 2018-08-18 RX ADMIN — Medication 12.5 MILLIGRAM(S): at 09:04

## 2018-08-18 RX ADMIN — Medication 81 MILLIGRAM(S): at 12:19

## 2018-08-18 RX ADMIN — Medication 12.5 MILLIGRAM(S): at 22:01

## 2018-08-18 RX ADMIN — ENOXAPARIN SODIUM 40 MILLIGRAM(S): 100 INJECTION SUBCUTANEOUS at 22:02

## 2018-08-18 RX ADMIN — OXYCODONE AND ACETAMINOPHEN 1 TABLET(S): 5; 325 TABLET ORAL at 19:45

## 2018-08-18 RX ADMIN — Medication 325 MILLIGRAM(S): at 12:20

## 2018-08-18 NOTE — PROGRESS NOTE ADULT - PROBLEM SELECTOR PLAN 1
s/p pericardiocentesis and pericardial drain placement in IR  Minimal drainage  Pericardial drain discontinued per IR 8/16  cardiology following  No growth in pericardial fluid cultures thus far  Fluid (+) for maliganant cells per cytology  F/U heme/onc recommendation   Repeat echo post removal of drain shows small pericardial effusion compared to previous TTE.

## 2018-08-18 NOTE — PROGRESS NOTE ADULT - SUBJECTIVE AND OBJECTIVE BOX
Vital Signs Last 24 Hrs  T(C): 36.8 (08-18-18 @ 13:42), Max: 36.8 (08-18-18 @ 13:42)  T(F): 98.3 (08-18-18 @ 13:42), Max: 98.3 (08-18-18 @ 13:42)  HR: 105 (08-18-18 @ 13:42) (104 - 116)  BP: 101/65 (08-18-18 @ 13:42) (101/65 - 145/81)  RR: 19 (08-18-18 @ 13:42) (18 - 19)  SpO2: 96% (08-18-18 @ 13:42) (96% - 96%)            08-17 @ 07:01  -  08-18 @ 07:00  --------------------------------------------------------  IN: 220 mL / OUT: 336 mL / NET: -116 mL    08-18 @ 07:01  -  08-18 @ 15:45  --------------------------------------------------------  IN: 240 mL / OUT: 0 mL / NET: 240 mL       Daily     Daily   Admit Wt: Drug Dosing Weight  Height (cm): 165.1 (08 Aug 2018 15:40)  Weight (kg): 54.5 (15 Aug 2018 16:21)  BMI (kg/m2): 20 (15 Aug 2018 16:21)  BSA (m2): 1.59 (15 Aug 2018 16:21)      CAPILLARY BLOOD GLUCOSE              MEDICATIONS  acetaminophen   Tablet. 650 milliGRAM(s) Oral every 6 hours PRN  anastrozole 1 milliGRAM(s) Oral daily  aspirin enteric coated 81 milliGRAM(s) Oral daily  docusate sodium 100 milliGRAM(s) Oral three times a day  DULoxetine 30 milliGRAM(s) Oral daily  enoxaparin Injectable 40 milliGRAM(s) SubCutaneous every 24 hours  ferrous    sulfate 325 milliGRAM(s) Oral daily  lactobacillus acidophilus 1 Tablet(s) Oral two times a day with meals  metoprolol tartrate 12.5 milliGRAM(s) Oral two times a day  ondansetron Injectable 4 milliGRAM(s) IV Push every 6 hours PRN  oxyCODONE    5 mG/acetaminophen 325 mG 1 Tablet(s) Oral every 4 hours PRN  pantoprazole    Tablet 40 milliGRAM(s) Oral before breakfast  senna 2 Tablet(s) Oral at bedtime  simvastatin 20 milliGRAM(s) Oral at bedtime      PHYSICAL EXAM  Subjective: Pt states she feels better today  Neurology: alert and oriented x 3, nonfocal, no gross deficits  CV : s1, s2  Lungs: Diminished breath sounds left lung  Abdomen: soft, NT,ND, (+) Bowel sounds  :  voiding  Extremities:  -c/c/e     LABS  08-18    135  |  99  |  14  ----------------------------<  96  4.6   |  26  |  0.75    Ca    9.1      18 Aug 2018 07:19                                   11.1   10.29 )-----------( 295      ( 18 Aug 2018 09:59 )             34.8

## 2018-08-18 NOTE — PROGRESS NOTE ADULT - SUBJECTIVE AND OBJECTIVE BOX
CHIEF COMPLAINT:Patient is a 84y old  Female who presents with a chief complaint of Progressive exertional dyspnea, dry cough for 2 weeks (09 Aug 2018 12:48)    	        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  S/P hip replacement, right: 6/2016  - right hip  History of lumpectomy of right breast: July 2015  History of cataract surgery, left: and right 2016  Elective surgery: reversal of colostomy July 2015  History of tonsillectomy  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 No nausea, vomiting, or hematemesis; No diarrhea or constipation. No melena or hematochezia.  GENITOURINARY: No dysuria, frequency, hematuria, or incontinence  NEUROLOGICAL: No headaches,   MUSCULOSKELETAL: No joint pain or swelling; No muscle, back, or extremity pain    Medications:  MEDICATIONS  (STANDING):  anastrozole 1 milliGRAM(s) Oral daily  aspirin enteric coated 81 milliGRAM(s) Oral daily  docusate sodium 100 milliGRAM(s) Oral three times a day  DULoxetine 30 milliGRAM(s) Oral daily  enoxaparin Injectable 40 milliGRAM(s) SubCutaneous every 24 hours  ferrous    sulfate 325 milliGRAM(s) Oral daily  lactobacillus acidophilus 1 Tablet(s) Oral two times a day with meals  metoprolol tartrate 12.5 milliGRAM(s) Oral two times a day  pantoprazole    Tablet 40 milliGRAM(s) Oral before breakfast  senna 2 Tablet(s) Oral at bedtime  simvastatin 20 milliGRAM(s) Oral at bedtime    MEDICATIONS  (PRN):  acetaminophen   Tablet. 650 milliGRAM(s) Oral every 6 hours PRN Mild Pain (1 - 3)  ondansetron Injectable 4 milliGRAM(s) IV Push every 6 hours PRN Nausea  oxyCODONE    5 mG/acetaminophen 325 mG 1 Tablet(s) Oral every 4 hours PRN Moderate Pain (4 - 6)    	    PHYSICAL EXAM:  T(C): 36.4 (08-18-18 @ 05:01), Max: 36.6 (08-17-18 @ 14:03)  HR: 111 (08-18-18 @ 09:04) (102 - 116)  BP: 119/75 (08-18-18 @ 09:04) (97/61 - 145/81)  RR: 18 (08-18-18 @ 05:01) (18 - 19)  SpO2: 96% (08-18-18 @ 05:01) (96% - 99%)  Wt(kg): --  I&O's Summary    17 Aug 2018 07:01  -  18 Aug 2018 07:00  --------------------------------------------------------  IN: 220 mL / OUT: 336 mL / NET: -116 mL        Appearance: Normal	  HEENT:   Normal oral mucosa, PERRL, EOMI	  Lymphatic: No lymphadenopathy  Cardiovascular: Normal S1 S2, No JVD, No murmurs, No edema  Respiratory: dec bs ct in place  Psychiatry: A & O x 3,   Gastrointestinal:  Soft, Non-tender, + BS	  Skin: No rashes, No ecchymoses, No cyanosis	  Neurologic: Non-focal  Extremities: Normal range of motion, No clubbing, cyanosis or edema  Vascular: Peripheral pulses palpable     TELEMETRY: 	    ECG:  	  RADIOLOGY:  OTHER: 	  	  LABS:	 	    CARDIAC MARKERS:  CARDIAC MARKERS ( 17 Aug 2018 09:39 )  x     / x     / 27 U/L / x     / 2.3 ng/mL                                11.5   11.2  )-----------( 299      ( 17 Aug 2018 09:56 )             36.6     08-18    135  |  99  |  14  ----------------------------<  96  4.6   |  26  |  0.75    Ca    9.1      18 Aug 2018 07:19      proBNP: Serum Pro-Brain Natriuretic Peptide: 2216 pg/mL (08-17 @ 09:39)    Lipid Profile:   HgA1c:   TSH:

## 2018-08-18 NOTE — PROGRESS NOTE ADULT - PROBLEM SELECTOR PLAN 2
Pulmonary following  s/p left Vats decortication and pleurex cath placement 8/15.  Left pneumothorax on CXR pronounced. Pleurex cath reconnected to S pleurovac.  -Pleurex cath capped today 8/18. To be drained every Mon/wed/Fri up to 1 L.  -Primary team to arrange for visiting nurse services for pleurex cath drainage at home.  -F/U w/ Dr. Avila in 10 days post discharge  -D/W Dr. Silverman

## 2018-08-18 NOTE — PROGRESS NOTE ADULT - SUBJECTIVE AND OBJECTIVE BOX
notes reviewed    patient resting comfortably in bed  c/o dysphagia to pills/solid food x 3 days  denies vomiting/pain  denies chills/rigors/hemoptysis    /75  pulse-111 afebrile    sclera anicteric/conj.-pale  lungs-decreased breath sounds B bases  card-tachy  abd-soft/no ascites  no calf tenederness    labs  8/17  wbc-11.2/hgb-11.5/plt-299                   UA-neg.                   creatinine-.75    imp  Met adenoca lung          molecular markers pending from pericardial fluid  8/10/18          s/p vats/decortication/ pleurex 8/15          no treatment to date//further management based on markers          chest tube-ptx per cts          pleurex removed 8/16          echo 8/17-limited//slight increase pericardial fluid            Breast cancer-early stage  on arimidex           DVT prophylaxis

## 2018-08-18 NOTE — PROGRESS NOTE ADULT - SUBJECTIVE AND OBJECTIVE BOX
Follow-up Pulm Progress Note    No new respiratory events overnight.  Indigestion.     Medications:  MEDICATIONS  (STANDING):  anastrozole 1 milliGRAM(s) Oral daily  aspirin enteric coated 81 milliGRAM(s) Oral daily  docusate sodium 100 milliGRAM(s) Oral three times a day  DULoxetine 30 milliGRAM(s) Oral daily  enoxaparin Injectable 40 milliGRAM(s) SubCutaneous every 24 hours  ferrous    sulfate 325 milliGRAM(s) Oral daily  lactobacillus acidophilus 1 Tablet(s) Oral two times a day with meals  metoprolol tartrate 12.5 milliGRAM(s) Oral two times a day  pantoprazole    Tablet 40 milliGRAM(s) Oral before breakfast  senna 2 Tablet(s) Oral at bedtime  simvastatin 20 milliGRAM(s) Oral at bedtime    MEDICATIONS  (PRN):  acetaminophen   Tablet. 650 milliGRAM(s) Oral every 6 hours PRN Mild Pain (1 - 3)  ondansetron Injectable 4 milliGRAM(s) IV Push every 6 hours PRN Nausea  oxyCODONE    5 mG/acetaminophen 325 mG 1 Tablet(s) Oral every 4 hours PRN Moderate Pain (4 - 6)    Vital Signs Last 24 Hrs  T(C): 36.8 (18 Aug 2018 13:42), Max: 36.8 (18 Aug 2018 13:42)  T(F): 98.3 (18 Aug 2018 13:42), Max: 98.3 (18 Aug 2018 13:42)  HR: 105 (18 Aug 2018 13:42) (104 - 116)  BP: 101/65 (18 Aug 2018 13:42) (101/65 - 145/81)  BP(mean): --  RR: 19 (18 Aug 2018 13:42) (18 - 19)  SpO2: 96% (18 Aug 2018 13:42) (96% - 96%)    08-17 @ 07:01  -  -18 @ 07:00  --------------------------------------------------------  IN: 220 mL / OUT: 336 mL / NET: -116 mL    LABS:                        11.1   10.29 )-----------( 295      ( 18 Aug 2018 09:59 )             34.8         135  |  99  |  14  ----------------------------<  96  4.6   |  26  |  0.75    Ca    9.1      18 Aug 2018 07:19    CARDIAC MARKERS ( 17 Aug 2018 09:39 )  x     / x     / 27 U/L / x     / 2.3 ng/mL    CAPILLARY BLOOD GLUCOSE    Urinalysis Basic - ( 17 Aug 2018 14:23 )    Color: Yellow / Appearance: Clear / S.025 / pH: x  Gluc: x / Ketone: Negative  / Bili: Negative / Urobili: Negative mg/dL   Blood: x / Protein: Trace mg/dL / Nitrite: Negative   Leuk Esterase: Negative / RBC: 2 /HPF / WBC 1 /HPF   Sq Epi: x / Non Sq Epi: 1 /HPF / Bacteria: Negative    Serum Pro-Brain Natriuretic Peptide: 2216 pg/mL (18 @ 09:39)    Fluid characteristics  -- 08-10 @ 10:01  pH 7.34    tprot <0.6    Cell count  Appearance Bloody  Fluid type --  BF lymph 10  color Red  eosinophil --  PMN 13  Mesothelial --  Monocyte 6  Other body cells 71      CULTURES: (if applicable)    Culture - Blood (collected 18 @ 19:29)  Source: .Blood Blood-Peripheral  Preliminary Report (18 @ 20:01):    No growth to date.    Culture - Urine (collected 18 @ 14:28)  Source: .Urine Catheterized  Final Report (18 @ 11:10):    No growth    Culture - Acid Fast - Body Fluid w/Smear (collected 08-10-18 @ 12:16)  Source: .Body Fluid Pericardial Fluid  Preliminary Report (18 @ 15:02):    No growth at 1 week.    Culture - Body Fluid with Gram Stain (collected 08-10-18 @ 12:16)  Source: .Body Fluid Pericardial Fluid  Gram Stain (08-10-18 @ 14:49):    Few polymorphonuclear leukocytes seen per low power field    No organisms seen per oil power field  Final Report (08-15-18 @ 08:37):    No growth at 5 days    Culture - Fungal, Body Fluid (collected 08-10-18 @ 12:16)  Source: .Body Fluid Pericardial Fluid  Preliminary Report (18 @ 15:04):    No fungus isolated at 1 week. No additional interim reports will be    issued unless there is a change in culture status.    Physical Examination:  PULM: decreased BS on left  CVS: S1, S2 heard    RADIOLOGY REVIEWED  CXR: left PTX, left lung mass    CT chest:    TTE:

## 2018-08-18 NOTE — PROGRESS NOTE ADULT - ASSESSMENT
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 Found to have large L pleural effusion causing complete atelectasis of L lung. She had 1L thoracentesis     ·  Problem: Pleural effusion.  Plan: s/p L pigtail by CTS, monitor output, now s/p VATS, pleurex      ·  Problem: Adenocarcinoma of lung, unspecified laterality.    Plan: Apparent stage 4 disease. Drainage of Pleural effusion, Onc appreciated  RadOnc appreciated, hold off palliative radiation as improved w/o      ·  Problem: Dysphagia, unspecified type.  Plan: See above.  Will assume aspiration risk.   S/S.   Nutrition evaluation.     ·  Problem: DVT prophylaxis    Pericardial effusion w/tamponade - likely malignant, drain removed    small effusion on TTE   , no signs of tamponade    Hypotension  possibly volume depletion from prolonged NPO, BP improved w/IVF,   monitor, encourage PO intake    D/c planning if BP stable and cleared by Pulm/ Cards

## 2018-08-18 NOTE — PROGRESS NOTE ADULT - ASSESSMENT
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 Found to have large L pleural effusion causing complete atelectasis of L lung. She had 1L thoracentesis 1 week ago as output. CT scan revealed mod pericardial effusion and large pleural effusion pt remained persistently tachycardic and tachypneic 2d echo revealed increased pericardial effusion read as large with  tamponade/RV  collapse. Thoracic surgery consulted.   8/10 left pigtail by surgical resident 1L fluid out. Small PTX on CXR, pt asymptomatic, will continue to monitor. Percardiocentesis in  cc of dark red fluid removed.   8/11: Drains functioning well. CT scan Noted, No pleurix needed. D/w Dr. Mancini  8/12: Continue pleural and pericardial drain for now  8/13 Pericardial drain with minimal drainage 24ml/24h, continue water seal today, discussed with IR to d/c drain tomorrow, will repeat TTE. Continue left pigtail to LWS 193ml/24h.   8/14 echo revealed no effusion   8/15: s/p Left VATS decortication and pleurex catheter placement  8/16: Hypotensive this AM, fluids bolus -stable otherwise. 02 sat 98 %. Overnight pleurex cath drainage 40cc. Pleurex cath capped without any issues. Pericardial drainage minimal-to be discontinued per IR today.   8/17: Left pneumothorax on CXR pronounced. Pleurex cath reconnected to LWS pleurovac. F/U repeat CXR in 2 hours. Repeat TTE today indicative of small pericardial effusion compared to previous TTE.   8/18: Repeat CXR -unchanged pnemothorax of left lung. Chronic -trapped lung. Pleurex cath capped.

## 2018-08-19 LAB
ANION GAP SERPL CALC-SCNC: 11 MMOL/L — SIGNIFICANT CHANGE UP (ref 5–17)
BUN SERPL-MCNC: 13 MG/DL — SIGNIFICANT CHANGE UP (ref 7–23)
CALCIUM SERPL-MCNC: 9.3 MG/DL — SIGNIFICANT CHANGE UP (ref 8.4–10.5)
CHLORIDE SERPL-SCNC: 100 MMOL/L — SIGNIFICANT CHANGE UP (ref 96–108)
CO2 SERPL-SCNC: 27 MMOL/L — SIGNIFICANT CHANGE UP (ref 22–31)
CREAT SERPL-MCNC: 0.72 MG/DL — SIGNIFICANT CHANGE UP (ref 0.5–1.3)
GLUCOSE SERPL-MCNC: 93 MG/DL — SIGNIFICANT CHANGE UP (ref 70–99)
HCT VFR BLD CALC: 37.9 % — SIGNIFICANT CHANGE UP (ref 34.5–45)
HGB BLD-MCNC: 11.7 G/DL — SIGNIFICANT CHANGE UP (ref 11.5–15.5)
MCHC RBC-ENTMCNC: 28.7 PG — SIGNIFICANT CHANGE UP (ref 27–34)
MCHC RBC-ENTMCNC: 30.9 GM/DL — LOW (ref 32–36)
MCV RBC AUTO: 93.1 FL — SIGNIFICANT CHANGE UP (ref 80–100)
PLATELET # BLD AUTO: 328 K/UL — SIGNIFICANT CHANGE UP (ref 150–400)
POTASSIUM SERPL-MCNC: 5.5 MMOL/L — HIGH (ref 3.5–5.3)
POTASSIUM SERPL-SCNC: 5.5 MMOL/L — HIGH (ref 3.5–5.3)
RBC # BLD: 4.07 M/UL — SIGNIFICANT CHANGE UP (ref 3.8–5.2)
RBC # FLD: 16.2 % — HIGH (ref 10.3–14.5)
SODIUM SERPL-SCNC: 138 MMOL/L — SIGNIFICANT CHANGE UP (ref 135–145)
WBC # BLD: 8 K/UL — SIGNIFICANT CHANGE UP (ref 3.8–10.5)
WBC # FLD AUTO: 8 K/UL — SIGNIFICANT CHANGE UP (ref 3.8–10.5)

## 2018-08-19 RX ORDER — SODIUM POLYSTYRENE SULFONATE 4.1 MEQ/G
15 POWDER, FOR SUSPENSION ORAL ONCE
Qty: 0 | Refills: 0 | Status: COMPLETED | OUTPATIENT
Start: 2018-08-19 | End: 2018-08-19

## 2018-08-19 RX ADMIN — Medication 325 MILLIGRAM(S): at 11:50

## 2018-08-19 RX ADMIN — SODIUM POLYSTYRENE SULFONATE 15 GRAM(S): 4.1 POWDER, FOR SUSPENSION ORAL at 09:24

## 2018-08-19 RX ADMIN — PANTOPRAZOLE SODIUM 40 MILLIGRAM(S): 20 TABLET, DELAYED RELEASE ORAL at 06:20

## 2018-08-19 RX ADMIN — SIMVASTATIN 20 MILLIGRAM(S): 20 TABLET, FILM COATED ORAL at 21:28

## 2018-08-19 RX ADMIN — Medication 650 MILLIGRAM(S): at 11:51

## 2018-08-19 RX ADMIN — DULOXETINE HYDROCHLORIDE 30 MILLIGRAM(S): 30 CAPSULE, DELAYED RELEASE ORAL at 11:50

## 2018-08-19 RX ADMIN — Medication 12.5 MILLIGRAM(S): at 09:24

## 2018-08-19 RX ADMIN — Medication 650 MILLIGRAM(S): at 12:00

## 2018-08-19 RX ADMIN — Medication 81 MILLIGRAM(S): at 11:50

## 2018-08-19 RX ADMIN — OXYCODONE AND ACETAMINOPHEN 1 TABLET(S): 5; 325 TABLET ORAL at 21:28

## 2018-08-19 RX ADMIN — Medication 1 TABLET(S): at 09:24

## 2018-08-19 RX ADMIN — Medication 12.5 MILLIGRAM(S): at 21:28

## 2018-08-19 RX ADMIN — ANASTROZOLE 1 MILLIGRAM(S): 1 TABLET ORAL at 11:51

## 2018-08-19 RX ADMIN — OXYCODONE AND ACETAMINOPHEN 1 TABLET(S): 5; 325 TABLET ORAL at 21:58

## 2018-08-19 RX ADMIN — Medication 1 TABLET(S): at 17:24

## 2018-08-19 RX ADMIN — ENOXAPARIN SODIUM 40 MILLIGRAM(S): 100 INJECTION SUBCUTANEOUS at 21:28

## 2018-08-19 RX ADMIN — Medication 100 MILLIGRAM(S): at 06:20

## 2018-08-19 NOTE — SWALLOW BEDSIDE ASSESSMENT ADULT - SWALLOW EVAL: CURRENT DIET
Dysphagia 3 with nectar thickened fluid
Pt initially NPO this am however MD changed diet order to regular texture prior to initiation of this evaluation.

## 2018-08-19 NOTE — SWALLOW BEDSIDE ASSESSMENT ADULT - SWALLOW EVAL: DIAGNOSIS
Consult for bedside swallow evaluation received and appreciated. Pt continues to decline assessment of the swallow mechanism. Pt and son were counselled re: aspiration s/s, risks and precautions.  Pt states she would not want to have thickened liquids or food cut up/pureed as she would prefer to choose her own foods and will cut them into small pieces as needed.  Pt also reports that she keeps her food moist and if it is too dry she will expel bolus from mouth as she prefers not to swallow it. Of note, Pt was taking sips of water during conversation with no overt s/s of aspiration. Pt also states that pills are occasionally difficult to swallow therefore suggest pills are crushed as medically feasible. Reduced coordination of respiration and phonation; therefore would assume correlation with deglutition. Therefore, if oral feeding is continued as per patient wishes, would recommend soft diet to reduce mastication.

## 2018-08-19 NOTE — PROGRESS NOTE ADULT - SUBJECTIVE AND OBJECTIVE BOX
CHIEF COMPLAINT:Patient is a 84y old  Female who presents with a chief complaint of Progressive exertional dyspnea, dry cough for 2 weeks (09 Aug 2018 12:48)    	        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  S/P hip replacement, right: 6/2016  - right hip  History of lumpectomy of right breast: July 2015  History of cataract surgery, left: and right 2016  Elective surgery: reversal of colostomy July 2015  History of tonsillectomy  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 No nausea, vomiting, or hematemesis; Ndysphagia  GENITOURINARY: No dysuria, frequency, hematuria, or incontinence  NEUROLOGICAL: No headaches, memory loss, loss of strength, numbness, or tremors  MUSCULOSKELETAL: No joint pain or swelling; No muscle, back, or extremity pain    Medications:  MEDICATIONS  (STANDING):  anastrozole 1 milliGRAM(s) Oral daily  aspirin enteric coated 81 milliGRAM(s) Oral daily  docusate sodium 100 milliGRAM(s) Oral three times a day  DULoxetine 30 milliGRAM(s) Oral daily  enoxaparin Injectable 40 milliGRAM(s) SubCutaneous every 24 hours  ferrous    sulfate 325 milliGRAM(s) Oral daily  lactobacillus acidophilus 1 Tablet(s) Oral two times a day with meals  metoprolol tartrate 12.5 milliGRAM(s) Oral two times a day  pantoprazole    Tablet 40 milliGRAM(s) Oral before breakfast  senna 2 Tablet(s) Oral at bedtime  simvastatin 20 milliGRAM(s) Oral at bedtime    MEDICATIONS  (PRN):  acetaminophen   Tablet. 650 milliGRAM(s) Oral every 6 hours PRN Mild Pain (1 - 3)  ondansetron Injectable 4 milliGRAM(s) IV Push every 6 hours PRN Nausea  oxyCODONE    5 mG/acetaminophen 325 mG 1 Tablet(s) Oral every 4 hours PRN Moderate Pain (4 - 6)    	    PHYSICAL EXAM:  T(C): 37 (08-19-18 @ 13:47), Max: 37 (08-19-18 @ 13:47)  HR: 105 (08-19-18 @ 13:47) (105 - 125)  BP: 96/59 (08-19-18 @ 13:47) (96/59 - 144/91)  RR: 18 (08-19-18 @ 13:47) (18 - 20)  SpO2: 95% (08-19-18 @ 13:47) (95% - 98%)  Wt(kg): --  I&O's Summary    18 Aug 2018 07:01  -  19 Aug 2018 07:00  --------------------------------------------------------  IN: 360 mL / OUT: 100 mL / NET: 260 mL    19 Aug 2018 07:01  -  19 Aug 2018 14:35  --------------------------------------------------------  IN: 477 mL / OUT: 0 mL / NET: 477 mL        Appearance: Normal	  HEENT:   Normal oral mucosa, PERRL, EOMI	  Lymphatic: No lymphadenopathy  Cardiovascular: Normal S1 S2, No JVD, No murmurs,  Respiratory: dec sob  Psychiatry: A & O x 3, Mood & affect appropriate  Gastrointestinal:  Soft, Non-tender, + BS	  Skin: No rashes, No ecchymoses, No cyanosis	  Neurologic: Non-focal  Extremities: Normal range of motion, No clubbing, cyanosis   Vascular: Peripheral pulses palpable 2+ bilaterally    TELEMETRY: 	    ECG:  	  RADIOLOGY:  OTHER: 	  	  LABS:	 	    CARDIAC MARKERS:                                11.7   8.00  )-----------( 328      ( 19 Aug 2018 09:28 )             37.9     08-19    138  |  100  |  13  ----------------------------<  93  5.5<H>   |  27  |  0.72    Ca    9.3      19 Aug 2018 07:32      proBNP:   Lipid Profile:   HgA1c:   TSH:

## 2018-08-19 NOTE — PROGRESS NOTE ADULT - SUBJECTIVE AND OBJECTIVE BOX
Patient feels better today  still some difficulty swallowing x several days    minimal caloric intake//has not been ambulating    molecular markers on adenocarcinoma of pericardial fluid--pending  markers include  EGFR/kras/ROS1/ALK/PDL1    w/u for dysphagia--ENT/S@S  CONTINUE ARIMIDEX FOR EARLY STAGE BREAST CANCER

## 2018-08-19 NOTE — SWALLOW BEDSIDE ASSESSMENT ADULT - COMMENTS
8/8/10 CT Chest: No evidence of pulmonary embolism. There is a 2.2 x 2.6 x 3.5 cm spiculated mass in the right upper lobe, concerning for malignancy. There is narrowing of the left upper lobar pulmonary artery with associated hypoperfusion of the left upper lobe, suggestive of mass encasing the left upper lobe pulmonary artery. The exact size is difficult to determine on this examination. Soft tissue surrounds the inferior trachea and bilateral main bronchi, which may represent conglomerate lymphadenopathy. Right hilar and paratracheal lymphadenopathy. Moderate pericardial effusion. Large left pleural effusion.  Indeterminate 1.5 cm left adrenal nodule. 8/10/18 IR note: Limited unenhanced CT of the thorax demonstrated left lung consolidation, small left pneumothorax s/p chest tube, and moderate pericardial effusion. CT guided pericardia drainage performed with insertion of an 8 Fr drain. Appx 660cc dark red fluid aspirated. Drain placed to Pleur Evac. Pt tolerated procedure without difficulty. Full report to follow. 8/10/18 Per MD: Adenocarcinoma of lung; Drainage of Pleural effusion, MRI brain pending; RadOnc appreciated, plan for palliative radiation. 8/15: s/p Left VATS decortication and pleurex catheter placement 8/16: Hypotensive this AM, fluids bolus -stable otherwise. 02 sat 98 %. Overnight pleurex cath drainage 40cc. Pleurex cath capped without any issues. Pericardial drainage minimal-to be discontinued per IR today. 8/17: Left pneumothorax on CXR pronounced. Pleurex cath reconnected to LWS pleurovac. F/U repeat CXR in 2 hours. Repeat TTE today indicative of small pericardial effusion compared to previous TTE. 8/18: Repeat CXR -unchanged pnemothorax of left lung. Chronic -trapped lung. Pleurex cath capped. C/o difficulty swallowing for several days, per RD note, worse in the morning. Pt now amenable to swallow consult.

## 2018-08-19 NOTE — PROGRESS NOTE ADULT - ASSESSMENT
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 Found to have large L pleural effusion causing complete atelectasis of L lung. She had 1L thoracentesis     ·  Problem: Pleural effusion.  Plan: s/p L pigtail by CTS, monitor output, now s/p VATS, pleurex  ct d  c  ed        ·  Problem: Adenocarcinoma of lung, unspecified laterality.    Plan: Apparent stage 4 disease. Drainage of Pleural effusion, Onc appreciated f/u stains  RadOnc appreciated, hold off palliative radiation as improved w/o      ·  Problem: Dysphagia, unspecified type.  Plan: See above.  Will assume aspiration risk.   S/S eval   Nutrition evaluation.     ·  Problem: DVT prophylaxis    Pericardial effusion w/tamponade - likely malignant, drain removed    small effusion on TTE   , no signs of tamponade    Hypotension  possibly volume depletion from prolonged NPO, BP improved w/IVF,   monitor, encourage PO intake

## 2018-08-20 DIAGNOSIS — I48.91 UNSPECIFIED ATRIAL FIBRILLATION: ICD-10-CM

## 2018-08-20 LAB
ALBUMIN SERPL ELPH-MCNC: 1.8 G/DL — LOW (ref 3.3–5)
ALP SERPL-CCNC: 62 U/L — SIGNIFICANT CHANGE UP (ref 40–120)
ALT FLD-CCNC: 9 U/L — LOW (ref 10–45)
ANION GAP SERPL CALC-SCNC: 13 MMOL/L — SIGNIFICANT CHANGE UP (ref 5–17)
AST SERPL-CCNC: 12 U/L — SIGNIFICANT CHANGE UP (ref 10–40)
BASE EXCESS BLDA CALC-SCNC: 3.3 MMOL/L — HIGH (ref -2–2)
BASOPHILS # BLD AUTO: 0.1 K/UL — SIGNIFICANT CHANGE UP (ref 0–0.2)
BASOPHILS NFR BLD AUTO: 0.6 % — SIGNIFICANT CHANGE UP (ref 0–2)
BILIRUB SERPL-MCNC: 0.2 MG/DL — SIGNIFICANT CHANGE UP (ref 0.2–1.2)
BUN SERPL-MCNC: 14 MG/DL — SIGNIFICANT CHANGE UP (ref 7–23)
CALCIUM SERPL-MCNC: 8 MG/DL — LOW (ref 8.4–10.5)
CHLORIDE SERPL-SCNC: 101 MMOL/L — SIGNIFICANT CHANGE UP (ref 96–108)
CO2 BLDA-SCNC: 29 MMOL/L — SIGNIFICANT CHANGE UP (ref 22–30)
CO2 SERPL-SCNC: 24 MMOL/L — SIGNIFICANT CHANGE UP (ref 22–31)
CREAT SERPL-MCNC: 0.72 MG/DL — SIGNIFICANT CHANGE UP (ref 0.5–1.3)
EOSINOPHIL # BLD AUTO: 0.7 K/UL — HIGH (ref 0–0.5)
EOSINOPHIL NFR BLD AUTO: 7.6 % — HIGH (ref 0–6)
GAS PNL BLDA: SIGNIFICANT CHANGE UP
GLUCOSE BLDC GLUCOMTR-MCNC: 121 MG/DL — HIGH (ref 70–99)
GLUCOSE SERPL-MCNC: 145 MG/DL — HIGH (ref 70–99)
HCO3 BLDA-SCNC: 28 MMOL/L — SIGNIFICANT CHANGE UP (ref 21–29)
HCT VFR BLD CALC: 30.6 % — LOW (ref 34.5–45)
HCT VFR BLD CALC: 40.9 % — SIGNIFICANT CHANGE UP (ref 34.5–45)
HGB BLD-MCNC: 11.7 G/DL — SIGNIFICANT CHANGE UP (ref 11.5–15.5)
HGB BLD-MCNC: 9.9 G/DL — LOW (ref 11.5–15.5)
HOROWITZ INDEX BLDA+IHG-RTO: SIGNIFICANT CHANGE UP
LYMPHOCYTES # BLD AUTO: 2.2 K/UL — SIGNIFICANT CHANGE UP (ref 1–3.3)
LYMPHOCYTES # BLD AUTO: 24.9 % — SIGNIFICANT CHANGE UP (ref 13–44)
MAGNESIUM SERPL-MCNC: 1.6 MG/DL — SIGNIFICANT CHANGE UP (ref 1.6–2.6)
MCHC RBC-ENTMCNC: 26.8 PG — LOW (ref 27–34)
MCHC RBC-ENTMCNC: 28.6 GM/DL — LOW (ref 32–36)
MCHC RBC-ENTMCNC: 30.2 PG — SIGNIFICANT CHANGE UP (ref 27–34)
MCHC RBC-ENTMCNC: 32.3 GM/DL — SIGNIFICANT CHANGE UP (ref 32–36)
MCV RBC AUTO: 93.4 FL — SIGNIFICANT CHANGE UP (ref 80–100)
MCV RBC AUTO: 93.7 FL — SIGNIFICANT CHANGE UP (ref 80–100)
MONOCYTES # BLD AUTO: 1.2 K/UL — HIGH (ref 0–0.9)
MONOCYTES NFR BLD AUTO: 12.8 % — SIGNIFICANT CHANGE UP (ref 2–14)
NEUTROPHILS # BLD AUTO: 4.9 K/UL — SIGNIFICANT CHANGE UP (ref 1.8–7.4)
NEUTROPHILS NFR BLD AUTO: 54 % — SIGNIFICANT CHANGE UP (ref 43–77)
PCO2 BLDA: 44 MMHG — SIGNIFICANT CHANGE UP (ref 32–46)
PH BLDA: 7.42 — SIGNIFICANT CHANGE UP (ref 7.35–7.45)
PHOSPHATE SERPL-MCNC: 2.9 MG/DL — SIGNIFICANT CHANGE UP (ref 2.5–4.5)
PLATELET # BLD AUTO: 377 K/UL — SIGNIFICANT CHANGE UP (ref 150–400)
PLATELET # BLD AUTO: 404 K/UL — HIGH (ref 150–400)
PO2 BLDA: 216 MMHG — HIGH (ref 74–108)
POTASSIUM SERPL-MCNC: 3.4 MMOL/L — LOW (ref 3.5–5.3)
POTASSIUM SERPL-SCNC: 3.4 MMOL/L — LOW (ref 3.5–5.3)
PROT SERPL-MCNC: 4.8 G/DL — LOW (ref 6–8.3)
RBC # BLD: 3.27 M/UL — LOW (ref 3.8–5.2)
RBC # BLD: 4.37 M/UL — SIGNIFICANT CHANGE UP (ref 3.8–5.2)
RBC # FLD: 13.9 % — SIGNIFICANT CHANGE UP (ref 10.3–14.5)
RBC # FLD: 14 % — SIGNIFICANT CHANGE UP (ref 10.3–14.5)
SAO2 % BLDA: 99 % — HIGH (ref 92–96)
SODIUM SERPL-SCNC: 138 MMOL/L — SIGNIFICANT CHANGE UP (ref 135–145)
TROPONIN T, HIGH SENSITIVITY RESULT: 32 NG/L — SIGNIFICANT CHANGE UP (ref 0–51)
WBC # BLD: 6.9 K/UL — SIGNIFICANT CHANGE UP (ref 3.8–10.5)
WBC # BLD: 9 K/UL — SIGNIFICANT CHANGE UP (ref 3.8–10.5)
WBC # FLD AUTO: 6.9 K/UL — SIGNIFICANT CHANGE UP (ref 3.8–10.5)
WBC # FLD AUTO: 9 K/UL — SIGNIFICANT CHANGE UP (ref 3.8–10.5)

## 2018-08-20 PROCEDURE — 99232 SBSQ HOSP IP/OBS MODERATE 35: CPT | Mod: GC

## 2018-08-20 PROCEDURE — 71045 X-RAY EXAM CHEST 1 VIEW: CPT | Mod: 26

## 2018-08-20 PROCEDURE — 71250 CT THORAX DX C-: CPT | Mod: 26

## 2018-08-20 PROCEDURE — 93010 ELECTROCARDIOGRAM REPORT: CPT

## 2018-08-20 RX ORDER — MAGNESIUM SULFATE 500 MG/ML
2 VIAL (ML) INJECTION ONCE
Qty: 0 | Refills: 0 | Status: COMPLETED | OUTPATIENT
Start: 2018-08-20 | End: 2018-08-20

## 2018-08-20 RX ORDER — METOPROLOL TARTRATE 50 MG
25 TABLET ORAL
Qty: 0 | Refills: 0 | Status: DISCONTINUED | OUTPATIENT
Start: 2018-08-20 | End: 2018-08-24

## 2018-08-20 RX ORDER — POTASSIUM CHLORIDE 20 MEQ
40 PACKET (EA) ORAL ONCE
Qty: 0 | Refills: 0 | Status: COMPLETED | OUTPATIENT
Start: 2018-08-20 | End: 2018-08-20

## 2018-08-20 RX ORDER — POTASSIUM CHLORIDE 20 MEQ
40 PACKET (EA) ORAL ONCE
Qty: 0 | Refills: 0 | Status: DISCONTINUED | OUTPATIENT
Start: 2018-08-20 | End: 2018-08-20

## 2018-08-20 RX ORDER — METOPROLOL TARTRATE 50 MG
12.5 TABLET ORAL ONCE
Qty: 0 | Refills: 0 | Status: COMPLETED | OUTPATIENT
Start: 2018-08-20 | End: 2018-08-20

## 2018-08-20 RX ADMIN — OXYCODONE AND ACETAMINOPHEN 1 TABLET(S): 5; 325 TABLET ORAL at 22:08

## 2018-08-20 RX ADMIN — Medication 40 MILLIEQUIVALENT(S): at 07:59

## 2018-08-20 RX ADMIN — Medication 81 MILLIGRAM(S): at 13:21

## 2018-08-20 RX ADMIN — ANASTROZOLE 1 MILLIGRAM(S): 1 TABLET ORAL at 13:21

## 2018-08-20 RX ADMIN — ENOXAPARIN SODIUM 40 MILLIGRAM(S): 100 INJECTION SUBCUTANEOUS at 21:08

## 2018-08-20 RX ADMIN — Medication 12.5 MILLIGRAM(S): at 10:35

## 2018-08-20 RX ADMIN — Medication 325 MILLIGRAM(S): at 13:21

## 2018-08-20 RX ADMIN — OXYCODONE AND ACETAMINOPHEN 1 TABLET(S): 5; 325 TABLET ORAL at 21:13

## 2018-08-20 RX ADMIN — Medication 650 MILLIGRAM(S): at 20:00

## 2018-08-20 RX ADMIN — Medication 650 MILLIGRAM(S): at 06:13

## 2018-08-20 RX ADMIN — Medication 12.5 MILLIGRAM(S): at 13:22

## 2018-08-20 RX ADMIN — Medication 25 MILLIGRAM(S): at 17:52

## 2018-08-20 RX ADMIN — PANTOPRAZOLE SODIUM 40 MILLIGRAM(S): 20 TABLET, DELAYED RELEASE ORAL at 06:23

## 2018-08-20 RX ADMIN — SIMVASTATIN 20 MILLIGRAM(S): 20 TABLET, FILM COATED ORAL at 21:08

## 2018-08-20 RX ADMIN — Medication 650 MILLIGRAM(S): at 18:49

## 2018-08-20 RX ADMIN — Medication 50 GRAM(S): at 06:41

## 2018-08-20 RX ADMIN — Medication 1 TABLET(S): at 08:00

## 2018-08-20 RX ADMIN — Medication 650 MILLIGRAM(S): at 06:43

## 2018-08-20 RX ADMIN — DULOXETINE HYDROCHLORIDE 30 MILLIGRAM(S): 30 CAPSULE, DELAYED RELEASE ORAL at 13:21

## 2018-08-20 RX ADMIN — Medication 1 TABLET(S): at 17:50

## 2018-08-20 NOTE — PROGRESS NOTE ADULT - SUBJECTIVE AND OBJECTIVE BOX
Follow-up Pulm Progress Note    s/p RRT this morning for Afib with RVR/hypotension  Currently rate controlled, comfortable   95% on 3L NC  -250cc L pleurex today       Medications:  MEDICATIONS  (STANDING):  anastrozole 1 milliGRAM(s) Oral daily  aspirin enteric coated 81 milliGRAM(s) Oral daily  docusate sodium 100 milliGRAM(s) Oral three times a day  DULoxetine 30 milliGRAM(s) Oral daily  enoxaparin Injectable 40 milliGRAM(s) SubCutaneous every 24 hours  ferrous    sulfate 325 milliGRAM(s) Oral daily  lactobacillus acidophilus 1 Tablet(s) Oral two times a day with meals  metoprolol tartrate 25 milliGRAM(s) Oral two times a day  pantoprazole    Tablet 40 milliGRAM(s) Oral before breakfast  senna 2 Tablet(s) Oral at bedtime  simvastatin 20 milliGRAM(s) Oral at bedtime    MEDICATIONS  (PRN):  acetaminophen   Tablet. 650 milliGRAM(s) Oral every 6 hours PRN Mild Pain (1 - 3)  ondansetron Injectable 4 milliGRAM(s) IV Push every 6 hours PRN Nausea  oxyCODONE    5 mG/acetaminophen 325 mG 1 Tablet(s) Oral every 4 hours PRN Moderate Pain (4 - 6)          Vital Signs Last 24 Hrs  T(C): 37.4 (20 Aug 2018 13:26), Max: 37.4 (20 Aug 2018 13:26)  T(F): 99.3 (20 Aug 2018 13:26), Max: 99.3 (20 Aug 2018 13:26)  HR: 91 (20 Aug 2018 14:16) (87 - 110)  BP: 98/61 (20 Aug 2018 13:26) (94/59 - 109/71)  BP(mean): --  RR: 17 (20 Aug 2018 13:26) (16 - 19)  SpO2: 93% (20 Aug 2018 14:16) (93% - 98%) on RA    ABG - ( 20 Aug 2018 06:43 )  pH, Arterial: 7.42  pH, Blood: x     /  pCO2: 44    /  pO2: 216   / HCO3: 28    / Base Excess: 3.3   /  SaO2: 99                    08-19 @ 07:01  -  08-20 @ 07:00  --------------------------------------------------------  IN: 1127 mL / OUT: 0 mL / NET: 1127 mL          LABS:                        11.7   6.9   )-----------( 404      ( 20 Aug 2018 08:21 )             40.9     08-20    138  |  101  |  14  ----------------------------<  145<H>  3.4<L>   |  24  |  0.72    Ca    8.0<L>      20 Aug 2018 05:42  Phos  2.9     08-20  Mg     1.6     08-20    TPro  4.8<L>  /  Alb  1.8<L>  /  TBili  0.2  /  DBili  x   /  AST  12  /  ALT  9<L>  /  AlkPhos  62  08-20          CAPILLARY BLOOD GLUCOSE      POCT Blood Glucose.: 121 mg/dL (20 Aug 2018 04:50)                        CULTURES: (if applicable)  Culture Results:   No growth to date. (08-19 @ 09:39)  Culture Results:   No growth to date. (08-17 @ 19:29)  Culture Results:   No growth (08-17 @ 14:28)    Most recent blood culture -- 08-19 @ 09:39   -- -- .Blood Blood-Peripheral 08-19 @ 09:39  Most recent blood culture -- 08-17 @ 19:29   -- -- .Blood Blood-Peripheral 08-17 @ 19:29  Most recent blood culture -- 08-17 @ 14:28   -- -- .Urine Catheterized 08-17 @ 14:28        Physical Examination:  PULM: Decreased BS L   CVS: S1, S2 heard    RADIOLOGY REVIEWED  CXR: L PTX  L effusion, L lung mass Follow-up Pulm Progress Note    s/p RRT this morning for Afib with RVR/hypotension  Currently rate controlled, comfortable   95% on 3L NC  -250cc L pleurex today       Medications:  MEDICATIONS  (STANDING):  anastrozole 1 milliGRAM(s) Oral daily  aspirin enteric coated 81 milliGRAM(s) Oral daily  docusate sodium 100 milliGRAM(s) Oral three times a day  DULoxetine 30 milliGRAM(s) Oral daily  enoxaparin Injectable 40 milliGRAM(s) SubCutaneous every 24 hours  ferrous    sulfate 325 milliGRAM(s) Oral daily  lactobacillus acidophilus 1 Tablet(s) Oral two times a day with meals  metoprolol tartrate 25 milliGRAM(s) Oral two times a day  pantoprazole    Tablet 40 milliGRAM(s) Oral before breakfast  senna 2 Tablet(s) Oral at bedtime  simvastatin 20 milliGRAM(s) Oral at bedtime    MEDICATIONS  (PRN):  acetaminophen   Tablet. 650 milliGRAM(s) Oral every 6 hours PRN Mild Pain (1 - 3)  ondansetron Injectable 4 milliGRAM(s) IV Push every 6 hours PRN Nausea  oxyCODONE    5 mG/acetaminophen 325 mG 1 Tablet(s) Oral every 4 hours PRN Moderate Pain (4 - 6)    Vital Signs Last 24 Hrs  T(C): 37.4 (20 Aug 2018 13:26), Max: 37.4 (20 Aug 2018 13:26)  T(F): 99.3 (20 Aug 2018 13:26), Max: 99.3 (20 Aug 2018 13:26)  HR: 91 (20 Aug 2018 14:16) (87 - 110)  BP: 98/61 (20 Aug 2018 13:26) (94/59 - 109/71)  BP(mean): --  RR: 17 (20 Aug 2018 13:26) (16 - 19)  SpO2: 93% (20 Aug 2018 14:16) (93% - 98%) on RA    ABG - ( 20 Aug 2018 06:43 )  pH, Arterial: 7.42  pH, Blood: x     /  pCO2: 44    /  pO2: 216   / HCO3: 28    / Base Excess: 3.3   /  SaO2: 99        08-19 @ 07:01  -  08-20 @ 07:00  --------------------------------------------------------  IN: 1127 mL / OUT: 0 mL / NET: 1127 mL    LABS:                        11.7   6.9   )-----------( 404      ( 20 Aug 2018 08:21 )             40.9     08-20    138  |  101  |  14  ----------------------------<  145<H>  3.4<L>   |  24  |  0.72    Ca    8.0<L>      20 Aug 2018 05:42  Phos  2.9     08-20  Mg     1.6     08-20    TPro  4.8<L>  /  Alb  1.8<L>  /  TBili  0.2  /  DBili  x   /  AST  12  /  ALT  9<L>  /  AlkPhos  62  08-20    CAPILLARY BLOOD GLUCOSE      POCT Blood Glucose.: 121 mg/dL (20 Aug 2018 04:50)    CULTURES: (if applicable)  Culture Results:   No growth to date. (08-19 @ 09:39)  Culture Results:   No growth to date. (08-17 @ 19:29)  Culture Results:   No growth (08-17 @ 14:28)    Most recent blood culture -- 08-19 @ 09:39   -- -- .Blood Blood-Peripheral 08-19 @ 09:39  Most recent blood culture -- 08-17 @ 19:29   -- -- .Blood Blood-Peripheral 08-17 @ 19:29  Most recent blood culture -- 08-17 @ 14:28   -- -- .Urine Catheterized 08-17 @ 14:28        Physical Examination:  PULM: Decreased BS L   CVS: S1, S2 heard    RADIOLOGY REVIEWED  CXR: L PTX  L effusion, L lung mass

## 2018-08-20 NOTE — PROVIDER CONTACT NOTE (OTHER) - ACTION/TREATMENT ORDERED:
EKG done  Lopressor PO given as ordered.  Please see notes for further details  Will cont to monitor.
Please see notes for further details  Will cont to monitor
NP assessed pt at bedside. Tylenol was given, will f/u on further pain management with team   no need to clamp chest tube, continue to monitor output
melatonin dc'd d/t possible side effects
EKG, PA will f/u
Final decision made by PA not to give terbutaline.
Tylenol for pain management, simethicone for gas discomfort

## 2018-08-20 NOTE — PROGRESS NOTE ADULT - SUBJECTIVE AND OBJECTIVE BOX
Cardiology Progress Note    Interval events: Episode of afib with RVR to 150s overnight with hypotension. Required rip and amiodarone 150 mg. Now in NSR, maintaining BP.    Tele: NSR 70s    Medications:  acetaminophen   Tablet. 650 milliGRAM(s) Oral every 6 hours PRN  anastrozole 1 milliGRAM(s) Oral daily  aspirin enteric coated 81 milliGRAM(s) Oral daily  docusate sodium 100 milliGRAM(s) Oral three times a day  DULoxetine 30 milliGRAM(s) Oral daily  enoxaparin Injectable 40 milliGRAM(s) SubCutaneous every 24 hours  ferrous    sulfate 325 milliGRAM(s) Oral daily  lactobacillus acidophilus 1 Tablet(s) Oral two times a day with meals  metoprolol tartrate 12.5 milliGRAM(s) Oral two times a day  ondansetron Injectable 4 milliGRAM(s) IV Push every 6 hours PRN  oxyCODONE    5 mG/acetaminophen 325 mG 1 Tablet(s) Oral every 4 hours PRN  pantoprazole    Tablet 40 milliGRAM(s) Oral before breakfast  senna 2 Tablet(s) Oral at bedtime  simvastatin 20 milliGRAM(s) Oral at bedtime      Review of Systems:  Constitutional: [ ] Fever [ ] Chills [ ] Fatigue [ ] Weight change   HEENT: [ ] Blurred vision [ ] Eye Pain [ ] Headache [ ] Runny nose [ ] Sore Throat   Respiratory: [ ] Cough [ ] Wheezing [ ] Shortness of breath  Cardiovascular: [ ] Chest Pain [ ] Palpitations [ ] LUI [ ] PND [ ] Orthopnea  Gastrointestinal: [ ] Abdominal Pain [ ] Diarrhea [ ] Constipation [ ] Hemorrhoids [ ] Nausea [ ] Vomiting  Genitourinary: [ ] Nocturia [ ] Dysuria [ ] Incontinence  Extremities: [ ] Swelling [ ] Joint Pain  Neurologic: [ ] Focal deficit [ ] Paresthesias [ ] Syncope  Lymphatic: [ ] Swelling [ ] Lymphadenopathy   Skin: [ ] Rash [ ] Ecchymoses [ ] Wounds [ ] Lesions  Psychiatry: [ ] Depression [ ] Suicidal/Homicidal Ideation [ ] Anxiety [ ] Sleep Disturbances  [x] 10 point review of systems is otherwise negative except as mentioned above            [ ]Unable to obtain    Vitals:  T(C): 36.6 (18 @ 05:45), Max: 37 (18 @ 13:47)  HR: 92 (18 @ 10:36) (87 - 110)  BP: 108/68 (18 @ 10:36) (96/59 - 109/71)  BP(mean): --  RR: 19 (18 @ 05:45) (16 - 19)  SpO2: 97% (18 @ 05:45) (95% - 97%)  Wt(kg): --  Daily     Daily Weight in k.6 (20 Aug 2018 10:09)  I&O's Summary    19 Aug 2018 07:  -  20 Aug 2018 07:00  --------------------------------------------------------  IN: 1127 mL / OUT: 0 mL / NET: 1127 mL    20 Aug 2018 07:01  -  20 Aug 2018 10:50  --------------------------------------------------------  IN: 0 mL / OUT: 250 mL / NET: -250 mL        Physical Exam:  NAD  PERRL, EOMI  Normal oral [place] NC/AT  S1, S2, RRR, No m/r/g appreciated, No edema, no elevation in JVP  Clear to auscultation bilaterally  Soft, Non-tender, Non-distended, BS+  No clubbing, No joint deformity   Non-focal  No lymphadenopathy  AAOx3, Mood & affect appropriate  No rashes, No ecchymoses, No cyanosis    Labs:                        11.7   6.9   )-----------( 404      ( 20 Aug 2018 08:21 )             40.9     08-20    138  |  101  |  14  ----------------------------<  145<H>  3.4<L>   |  24  |  0.72    Ca    8.0<L>      20 Aug 2018 05:42  Phos  2.9       Mg     1.6         TPro  4.8<L>  /  Alb  1.8<L>  /  TBili  0.2  /  DBili  x   /  AST  12  /  ALT  9<L>  /  AlkPhos  62      Serum Pro-Brain Natriuretic Peptide: 2216 pg/mL ( @ 09:39)    New results/imaging:  TTE   Conclusions:  Technically difficult, limited TTE to evaluate the  pericardium.  1. Thickened pericardium with small pericardial effusion  seen adjacent to the right heart.

## 2018-08-20 NOTE — CHART NOTE - NSCHARTNOTEFT_GEN_A_CORE
Patient is a 84y old  Female who presents with a chief complaint of Progressive exertional dyspnea, dry cough for 2 weeks (09 Aug 2018 12:48)  Pt noted to develop AFib with RVR rates up to 170's non sustaining.  Per RN, Pt was dizzy, & hypotensive.  Pt seen at bedside, appeared pale, diaphoretic, eyes open, conversive, followed commands.  Pt remained hypotensive, RN had difficult time even attempting to obtain a manual BP.   100% NRB applied, EKG was obtained which revealed  AFib rates 150's.   RRT promptly called for further evaluation of pt & intervention      Vital Signs Last 24 Hrs  T(C): 36.6 (19 Aug 2018 20:37), Max: 37 (19 Aug 2018 13:47)  T(F): 97.8 (19 Aug 2018 20:37), Max: 98.6 (19 Aug 2018 13:47)  HR: 110 (19 Aug 2018 20:37) (105 - 125)  BP: 98/61 (19 Aug 2018 20:37) (96/59 - 127/78)  BP(mean): --  RR: 16 (19 Aug 2018 20:37) (16 - 18)  SpO2: 95% (19 Aug 2018 20:37) (95% - 95%)      Labs:                          9.9    9.0   )-----------( 377      ( 20 Aug 2018 05:42 )             30.6     08-19    138  |  100  |  13  ----------------------------<  93  5.5<H>   |  27  |  0.72    Ca    9.3      19 Aug 2018 07:32      Radiology:    Assessment & Plan:  HPI:  NIGHT HOSPITALIST:   Patient UNKNOWN to me previously, assigned to me at this point via the ER and by Dr. 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 Ca for dyspnea.  Patient also with progressive globus and early satiety.   No chest pain/pressure.  No dyspnea at present.   No HA, no focal weakness.   No abdominal pain, no red blood per rectum or melena.   No back pain, no tearing back pain.   No dysuria.   Patient with anorexia and unspecified weight loss.   No sick contacts.  Remaining review of systems not contributory. (08 Aug 2018 22:01)    RRT called as noted above. Will await for full evaluation & recommendations of team  Pt's dtr. Jonathan Kumari contacted, & was apprised of above events.   Per dtr. she does not believe her mother would want to be kept alive on machines, & would not want to be intubated.  She will make further decisions after speaking to her , & stated she is on her way back to [place] from [place].   Contact # given to dtr. @ [phone] if she wanted to speak to me or another team member.      Follow up with Attending in AM.    Cherrie Zuleta PA-C

## 2018-08-20 NOTE — PROGRESS NOTE ADULT - PROBLEM SELECTOR PLAN 2
Pulmonary following  s/p left Vats decortication and pleurex cath placement 8/15.  Left pneumothorax on CXR pronounced. Pleurex cath connect to pleuravac.   -Pleurex cath capped today 8/18. To be drained every Mon/wed/Fri up to 1 L.  -Primary team to arrange for visiting nurse services for pleurex cath drainage at home.  8/20 Pleurx drained for 250 cc no Thoracic intervention at this time.  -F/U w/ Dr. Avila in 10 days post discharge [phone]  [phone] [facility]  -D/W Dr. Silverman

## 2018-08-20 NOTE — PROGRESS NOTE ADULT - NSHPATTENDINGPLANDISCUSS_GEN_ALL_CORE
cardiology fellow, Dr. DONNIE Stauffer; patient seen and examined.  Hx., exam and labs as above.  I agree with the assessment and recommendations.
Dr Denis
Dr Silverman

## 2018-08-20 NOTE — CHART NOTE - NSCHARTNOTEFT_GEN_A_CORE
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 Found to have large L pleural effusion causing complete atelectasis of L lung s/p 1L thoracentesis, pericardial effusion s/p pericardiocentesis and pericardial drain placement in IR and drain DC'd on 8/16     RRT   - called for afib with RVR  - -150s, BP on manual check 60/40, mentating well, 100% on NRB, RR ~20  - pads placed on patient, NS 500cc given, rip started, amio 150 given, cards fellow called, second IV access obtained  - suspicion that BP was not as low as was measured as pt never lost the ability to mentate well through the episode  - if patient lost mentation, we were prepared to cardiovert  - CT surg NP TTE'd pt at bedside, found w/ mild pericardial effusion  - pt converted to sinus, BP improved to 140s, rip DC'd  - per cards rec's amio was not contined    Plan  - follow up cards rec's in AM, would consider continuing amio as pt appears to req more rate control than afforded by metop  - would discuss code status with family   - plan communicated to primary team     Bernardino Cagle, PGY 3  [facility] Pager: [phone]  [facility] Pager: [phone]

## 2018-08-20 NOTE — PROGRESS NOTE ADULT - ASSESSMENT
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 Found to have large L pleural effusion causing complete atelectasis of L lung. She had 1L thoracentesis     ·  Problem: Pleural effusion.  Plan: s/p L pigtail by CTS, monitor output, now s/p VATS, pleurex  drainage per CTS  Pneumothorax management per Pulm/CTS      ·  Problem: Adenocarcinoma of lung, unspecified laterality.    Plan: Apparent stage 4 disease. Drainage of Pleural effusion, Onc appreciated, [facility], hold off palliative radiation as improved w/o      ·  Problem: Dysphagia, unspecified type.  Plan: See above.  Will assume aspiration risk.   S/S eval   Nutrition evaluation.     ·  Problem: DVT prophylaxis    Pericardial effusion w/tamponade - likely malignant, drain removed    small effusion on TTE   , no signs of tamponade  repeat TTE today    Hypotension in setting of A fib - Cards appreciated, increase BB, TTE, Onc to comment reg AC

## 2018-08-20 NOTE — CHART NOTE - NSCHARTNOTEFT_GEN_A_CORE
CC: Rapid Atrial Fibrillation       HPI:  Called by RN  Patient denied headache, dizziness, CP, SOB, abdominal  pain, N/V/D, numbness/tingling, extremity weakness, dysuria.         ROS:  CONSTITUTIONAL:  No fever, chills, rigors  CARDIOVASCULAR:  No chest pain or palpitations  RESPIRATORY:   No SOB, cough, dyspnea on exertion.  No wheezing  GASTROINTESTINAL:  No abd pain, N/V, diarrhea/constipation  EXTREMITIES:  No swelling or joint pain  GENITOURINARY:  No burning on urination, increased frequency or urgency.  No flank pain  NEUROLOGIC:  No HA, visual disturbances  SKIN: No rashes        PAST MEDICAL & SURGICAL HISTORY:  Pleural effusion, not elsewhere classified  Diabetes mellitus (Father)  Handoff  MEWS Score  Lung mass  Primary osteoarthritis of left hip  Glaucoma  History of breast cancer  Iron deficiency anemia  Retinal tear  Osteoarthritis  Glaucoma  Fistula of vagina  RBBB  Ptosis  Anxiety  Hyperlipidemia  Osteopenia  Hypertension  Ureterovaginal prolapse  Pleural effusion, malignant  Pleural effusion, malignant  Pleural effusion  Hypotension  Tamponade  Pericardial effusion  Lung cancer  Need for prophylactic measure  Dysphagia, unspecified type  Adenocarcinoma of lung, unspecified laterality  Pleural effusion  VATS, with chest tube insertion for drainage of pleural effusion  Status post left knee replacement  S/P hip replacement, right  History of lumpectomy of right breast  History of cataract surgery, left  Elective surgery  History of tonsillectomy  Recto-vaginal fistula  H/O eye surgery  History of varicose vein stripping  History of carpal tunnel release  UTEROVAG PROLAP-INCOMPL  90+  Adenocarcinoma of lung        Vital Signs Last 24 Hrs  T(C): 36.6 (20 Aug 2018 05:45), Max: 37 (19 Aug 2018 13:47)  T(F): 97.9 (20 Aug 2018 05:45), Max: 98.6 (19 Aug 2018 13:47)  HR: 87 (20 Aug 2018 05:45) (87 - 125)  BP: 109/71 (20 Aug 2018 05:45) (96/59 - 127/78)  BP(mean): --  RR: 19 (20 Aug 2018 05:45) (16 - 19)  SpO2: 97% (20 Aug 2018 05:45) (95% - 97%)      Physical Exam:  General: WN/WD NAD, AOx3, nontoxic appearing  Head:  NC/AT  CV: RRR, S1S2   Respiratory: CTA B/L, nonlabored  Abdominal: (+) bowel sounds x4. Soft, NT, ND, no palpable mass, no guarding, or rebound tenderness  Genitourinary: ? Kwabena   MSK: No BLLE edema, + peripheral pulses, FROM all 4 extremity  Skin: (+) warm, dry   Psych: Appropriate affect       Labs:                        9.9    9.0   )-----------( 377      ( 20 Aug 2018 05:42 )             30.6     08-20    138  |  101  |  14  ----------------------------<  145<H>  3.4<L>   |  24  |  0.72    Ca    8.0<L>      20 Aug 2018 05:42  Phos  2.9     08-20  Mg     1.6     08-20    TPro  4.8<L>  /  Alb  1.8<L>  /  TBili  0.2  /  DBili  x   /  AST  12  /  ALT  9<L>  /  AlkPhos  62  08-20    ABG - ( 20 Aug 2018 06:43 )  pH, Arterial: 7.42  pH, Blood: x     /  pCO2: 44    /  pO2: 216   / HCO3: 28    / Base Excess: 3.3   /  SaO2: 99                Urinalysis Basic - ( 08-17 @ 14:23 )    Color: Negative / Appearance: Negative / SG: -- / pH: Negative  Gluc: Negative / Ketone: Yellow  / Bili: -- / Urobili: 2   Blood: 1 / Protein: -- / Nitrite: Negative   Leuk Esterase: Negative / RBC: 1.025 / WBC --   Sq Epi: Trace / Non Sq Epi: 2 / Bacteria: 5.5            Radiology:          Assessment & Plan:  HPI:  NIGHT HOSPITALIST:   Patient UNKNOWN to me previously, assigned to me at this point via the ER and by Dr. 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 Ca for dyspnea.  Patient also with progressive globus and early satiety.   No chest pain/pressure.  No dyspnea at present.   No HA, no focal weakness.   No abdominal pain, no red blood per rectum or melena.   No back pain, no tearing back pain.   No dysuria.   Patient with anorexia and unspecified weight loss.   No sick contacts.  Remaining review of systems not contributory. (08 Aug 2018 22:01)        -  -  -  -Primary Team to follow up in AM, attending to follow       Cherrie Zuleta PA-C  Dept of Medicine CC: Rapid Atrial Fibrillation with hypotension       HPI:  Pt s/p RRT for Rapid AFib rates 150's with subsequent hypotension, & hemodynamic instability. Pt did remain alert, conversive, followed commands.   At bedside, pt given IV bolus, Amiodarone 150 mg IVB X 1, & Phenylephrine gtt.   Of note, there was concern for infiltrated peripheral IV Lt forearm while Phenylephrine was infusing, & immediately was stopped.  However, on further inspection, no infiltration or induration noted, & noted adequate blood return & IV was flushed.   Cardiology evaluated pt. at bedside, and a bedside TTE completed, revealing small pericardial effusion.   Pt also seen by Thoracic Svc.   Pt subsequently became hemodynamically stable, & converted to NSR rate 99 bpm.   CXR done at bedside revealed stable appearing Lt PTX, & increasing Lt pleural effusion.   At this time, pt denies having cp, sob, dizziness, n/v or palpitations.       ROS:  CONSTITUTIONAL:  No fever, chills, rigors  CARDIOVASCULAR:  No chest pain or palpitations  RESPIRATORY:   No SOB, cough, dyspnea on exertion.  No wheezing  GASTROINTESTINAL:  No abd pain, N/V, diarrhea/constipation  EXTREMITIES:  + swelling or joint pain  NEUROLOGIC:  No HA, visual disturbances  SKIN: No rashes        PAST MEDICAL & SURGICAL HISTORY:  Past Medical History:  Anxiety    Fistula of vagina  rectovaginal fistula  Glaucoma    History of breast cancer  2015, no chemo/radiation  s/p lumpectomy  Hyperlipidemia    Hypertension    Iron deficiency anemia    Lung mass    Osteoarthritis    Osteopenia    Primary osteoarthritis of left hip    Ptosis  left eye - eye lid surgery 2002  RBBB    Retinal tear    Ureterovaginal prolapse.     Past Surgical History:  Elective surgery  reversal of colostomy July 2015  H/O eye surgery  bilateral ptosis  History of carpal tunnel release  bilateral  History of cataract surgery, left  and right 2016  History of lumpectomy of right breast  July 2015  History of tonsillectomy    History of varicose vein stripping  recent vascular evaluation - all normal  Recto-vaginal fistula  s/p repair 5/2015 with colostomy placement  S/P hip replacement, right  6/2016  - right hip  Status post left knee replacement  12/2016.        Vital Signs Last 24 Hrs  T(C): 36.6 (20 Aug 2018 05:45), Max: 37 (19 Aug 2018 13:47)  T(F): 97.9 (20 Aug 2018 05:45), Max: 98.6 (19 Aug 2018 13:47)  HR: 87 (20 Aug 2018 05:45) (87 - 125)  BP: 109/71 (20 Aug 2018 05:45) (96/59 - 127/78)  BP(mean): --  RR: 19 (20 Aug 2018 05:45) (16 - 19)  SpO2: 97% (20 Aug 2018 05:45) (95% - 97%)      Physical Exam:  General: WN/WD NAD, AOx3, nontoxic appearing  Head:  NC/AT  CV: RRR, S1S2   Respiratory: CTA B/L, nonlabored  Abdominal: (+) bowel sounds x4. Soft, NT, ND, no palpable mass, no guarding, or rebound tenderness  Genitourinary: ? Yuan   MSK: No BLLE edema, + peripheral pulses, FROM all 4 extremity  Skin: (+) warm, dry   Psych: Appropriate affect       Labs:                        9.9    9.0   )-----------( 377      ( 20 Aug 2018 05:42 )             30.6     08-20    138  |  101  |  14  ----------------------------<  145<H>  3.4<L>   |  24  |  0.72    Ca    8.0<L>      20 Aug 2018 05:42  Phos  2.9     08-20  Mg     1.6     08-20    TPro  4.8<L>  /  Alb  1.8<L>  /  TBili  0.2  /  DBili  x   /  AST  12  /  ALT  9<L>  /  AlkPhos  62  08-20    ABG - ( 20 Aug 2018 06:43 )  pH, Arterial: 7.42  pH, Blood: x     /  pCO2: 44    /  pO2: 216   / HCO3: 28    / Base Excess: 3.3   /  SaO2: 99                Urinalysis Basic - ( 08-17 @ 14:23 )    Color: Negative / Appearance: Negative / SG: -- / pH: Negative  Gluc: Negative / Ketone: Yellow  / Bili: -- / Urobili: 2   Blood: 1 / Protein: -- / Nitrite: Negative   Leuk Esterase: Negative / RBC: 1.025 / WBC --   Sq Epi: Trace / Non Sq Epi: 2 / Bacteria: 5.5      Radiology:       Assessment & Plan:  HPI:  NIGHT HOSPITALIST:   Patient UNKNOWN to me previously, assigned to me at this point via the ER and by Dr. 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 Ca for dyspnea.  Patient also with progressive globus and early satiety.   No chest pain/pressure.  No dyspnea at present.   No HA, no focal weakness.   No abdominal pain, no red blood per rectum or melena.   No back pain, no tearing back pain.   No dysuria.   Patient with anorexia and unspecified weight loss.   No sick contacts.  Remaining review of systems not contributory. (08 Aug 2018 22:01)    Pt seen post RRT as noted above. She remains asymptomatic, hemodynamically stable.    PLAN:  -Continue to monitor  -Replete K to > 4  -Replete Mg to > 2  -Rpt CBC as noted significant drop from previous          -may be dilutional          -Will rpt today to verify  -CXR completed          -Reveals stable Lt sided PTX, but with an increasing Lt pleural effusion          -Plan to drain effusion today vial Pleurx catheter  -Discussed with Dr. Lovett  Pt's dtr. Jonathan Kumari contacted, and updated on pt's current condition  -Primary Team to follow up in AM, attending to follow       Cherrie Zuleta PA-C  Dept of Medicine

## 2018-08-20 NOTE — PROGRESS NOTE ADULT - ASSESSMENT
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 Found to have large L pleural effusion causing complete atelectasis of L lung. She had 1L thoracentesis 1 week ago as output. CT scan revealed mod pericardial effusion and large pleural effusion pt remained persistently tachycardic and tachypneic 2d echo revealed increased pericardial effusion read as large with  tamponade/RV  collapse. Thoracic surgery consulted.   8/10 left pigtail by surgical resident 1L fluid out. Small PTX on CXR, pt asymptomatic, will continue to monitor. Percardiocentesis in  cc of dark red fluid removed.   8/11: Drains functioning well. CT scan Noted, No pleurix needed. D/w Dr. Mancini  8/12: Continue pleural and pericardial drain for now  8/13 Pericardial drain with minimal drainage 24ml/24h, continue water seal today, discussed with IR to d/c drain tomorrow, will repeat TTE. Continue left pigtail to LWS 193ml/24h.   8/14 echo revealed no effusion   8/15: s/p Left VATS decortication and pleurex catheter placement  8/16: Hypotensive this AM, fluids bolus -stable otherwise. 02 sat 98 %. Overnight pleurex cath drainage 40cc. Pleurex cath capped without any issues. Pericardial drainage minimal-to be discontinued per IR today.   8/17: Left pneumothorax on CXR pronounced. Pleurex cath reconnected to LWS pleurovac. F/U repeat CXR in 2 hours. Repeat TTE today indicative of small pericardial effusion compared to previous TTE.   8/18: Repeat CXR -unchanged pnemothorax of left lung. Chronic -trapped lung. Pleurex cath capped.  8/20 Pleurx drained 250 cc today  no Thoracic intervention at this time

## 2018-08-20 NOTE — PROGRESS NOTE ADULT - PROBLEM SELECTOR PLAN 2
Malignant L pleural effusion s/p L Pleurex 8/15  -L lung atelectasis likely mostly 2nd proximal hilar mass causing extrinsic compression on proximal airway with effusion  -Drain Pleurx M/W/F per thoracic

## 2018-08-20 NOTE — PROGRESS NOTE ADULT - SUBJECTIVE AND OBJECTIVE BOX
CHIEF COMPLAINT:Patient is a 84y old  Female who presents with a chief complaint of Progressive exertional dyspnea, dry cough for 2 weeks (09 Aug 2018 12:48)  episode of A fib w/hypotension      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  S/P hip replacement, right: 6/2016  - right hip  History of lumpectomy of right breast: July 2015  History of cataract surgery, left: and right 2016  Elective surgery: reversal of colostomy July 2015  History of tonsillectomy  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  H/O eye surgery: bilateral ptosis  History of varicose vein stripping: recent vascular evaluation - all normal  History of carpal tunnel release: bilateral          REVIEW OF SYSTEMS:  CONSTITUTIONAL: weak  EYES: No eye pain, visual disturbances, or discharge  NECK: No pain or stiffness  RESPIRATORY: No cough, wheezing, chills or hemoptysis; No Shortness of Breath  CARDIOVASCULAR: No chest pain, palpitations, passing out, + dizziness, no leg swelling  GASTROINTESTINAL: No abdominal or epigastric pain. No nausea, vomiting, or hematemesis; Ndysphagia  GENITOURINARY: No dysuria, frequency, hematuria, or incontinence  NEUROLOGICAL: No headaches, memory loss, loss of strength, numbness, or tremors  MUSCULOSKELETAL: No joint pain or swelling; No muscle, back, or extremity pain      Medications:  MEDICATIONS  (STANDING):  anastrozole 1 milliGRAM(s) Oral daily  aspirin enteric coated 81 milliGRAM(s) Oral daily  docusate sodium 100 milliGRAM(s) Oral three times a day  DULoxetine 30 milliGRAM(s) Oral daily  enoxaparin Injectable 40 milliGRAM(s) SubCutaneous every 24 hours  ferrous    sulfate 325 milliGRAM(s) Oral daily  lactobacillus acidophilus 1 Tablet(s) Oral two times a day with meals  metoprolol tartrate 25 milliGRAM(s) Oral two times a day  pantoprazole    Tablet 40 milliGRAM(s) Oral before breakfast  senna 2 Tablet(s) Oral at bedtime  simvastatin 20 milliGRAM(s) Oral at bedtime    MEDICATIONS  (PRN):  acetaminophen   Tablet. 650 milliGRAM(s) Oral every 6 hours PRN Mild Pain (1 - 3)  ondansetron Injectable 4 milliGRAM(s) IV Push every 6 hours PRN Nausea  oxyCODONE    5 mG/acetaminophen 325 mG 1 Tablet(s) Oral every 4 hours PRN Moderate Pain (4 - 6)    	    PHYSICAL EXAM:  T(C): 37.4 (08-20-18 @ 13:26), Max: 37.4 (08-20-18 @ 13:26)  HR: 91 (08-20-18 @ 14:16) (87 - 110)  BP: 98/61 (08-20-18 @ 13:26) (94/59 - 109/71)  RR: 17 (08-20-18 @ 13:26) (16 - 19)  SpO2: 93% (08-20-18 @ 14:16) (93% - 98%)  Wt(kg): --  I&O's Summary    19 Aug 2018 07:01  -  20 Aug 2018 07:00  --------------------------------------------------------  IN: 1127 mL / OUT: 0 mL / NET: 1127 mL    20 Aug 2018 07:01  -  20 Aug 2018 14:42  --------------------------------------------------------  IN: 0 mL / OUT: 250 mL / NET: -250 mL      Appearance: Normal	  HEENT:   Normal oral mucosa, PERRL, EOMI	  Lymphatic: No lymphadenopathy  Cardiovascular: Normal S1 S2, No JVD, No murmurs,  Respiratory: dec sob  Psychiatry: A & O x 3, Mood & affect appropriate  Gastrointestinal:  Soft, Non-tender, + BS	  Skin: No rashes, No ecchymoses, No cyanosis	  Neurologic: Non-focal  Extremities: Normal range of motion, No clubbing, cyanosis   Vascular: Peripheral pulses palpable 2+ bilaterally    LABS:	 	    CARDIAC MARKERS:                                11.7   6.9   )-----------( 404      ( 20 Aug 2018 08:21 )             40.9     08-20    138  |  101  |  14  ----------------------------<  145<H>  3.4<L>   |  24  |  0.72    Ca    8.0<L>      20 Aug 2018 05:42  Phos  2.9     08-20  Mg     1.6     08-20    TPro  4.8<L>  /  Alb  1.8<L>  /  TBili  0.2  /  DBili  x   /  AST  12  /  ALT  9<L>  /  AlkPhos  62  08-20    proBNP:   Lipid Profile:   HgA1c:   TSH:

## 2018-08-20 NOTE — PROVIDER CONTACT NOTE (OTHER) - ASSESSMENT
Pt asymptomatic  BP= 107/68  RJ=494  R=18  O2 sat= 96%
Pt asymptomatic.  /62  HR=160 - 180  R= 20
BP improving, VSS  Pt c/o chest and abd discomfort, similar to the discomfort she has had for the past few days  pt has pleurex catheter now closed and dressed, to be drained M/W/F
HR was controlled at change of shift s/p 12.5 lopressor PO   Pt now tachycardic HR 120s-160s, mainly staying 130s-140s  notified by tele pt having runs of Atach  pericardial drain and chest tube in place  Pt is asymptomatic, denies palpitations, CP or SOB
L chest tube to LWS (pigtail) just placed at bedside by surgery team   about 700 cc output in drain so far  pt now c/o midsternal chest pain upon inspiration, 6/10 dull
Peripheral IV (20G in left forearm) suspicious for infiltration. MAR made aware and PA covering patient also made aware.
pt feeling confused this morning, stating "where are we?" "where are we going?"  pt also states she "hears cats and dogs next door"  VSS, BP WDL
Statement Selected

## 2018-08-20 NOTE — CHART NOTE - NSCHARTNOTEFT_GEN_A_CORE
I was called to the RRT for AF with RVR with HRs to 150s and hypotension (60/40s). Patient was mentating well when I got to the RRT and she had already received IV amiodarone 150 mg and 500 ml NS. There was a bedside echo performed to assess for re-acculmulation of effusion, which showed small effusion and no RV or RA collapse. Patient then quickly went back into SR with HR 80s and then hada BP of 140/80s.     - cont volume resuscitation  - cont metoprolol  - can consider PO amiodarone I was called to the RRT for AF with RVR with HRs to 150s and hypotension (60/40s). Patient was mentating well when I got to the RRT and she had already received IV amiodarone 150 mg and 500 ml NS. There was a bedside echo performed to assess for re-acculmulation of effusion, which showed small effusion and no RV or RA collapse. Patient then quickly went back into SR with HR 80s and then hada BP of 140/80s.     - cont volume resuscitation  - cont metoprolol  - can consider PO amiodarone  - would get formal limited TTE I was called to the RRT for AF with RVR with HRs to 150s and hypotension (60/40s). Patient was mentating well when I got to the RRT and she had already received IV amiodarone 150 mg and 500 ml NS. There was a bedside echo performed to assess for re-acculmulation of effusion, which showed small effusion and no RV or RA collapse. Patient then quickly went back into SR with HR 80s and then hada BP of 140/80s.     - cont volume resuscitation  - uptitrate metoprolol  - would get formal limited TTE

## 2018-08-20 NOTE — PROGRESS NOTE ADULT - ASSESSMENT
85 yo F with a PMH significant for recent diagnosis of lung adenocarcinoma, R breast CA s/p lumpectomy, and HTN.  Presented to the ED on 8/8 with progressive SOB and was found to have left lung white out.  Also found to have large pericardial effusion complicated by pericardial tamponade, now s/p pericardial drain.   Pericardial drain removed  Episode of afib with rvr and hypotension    #Pericardial effusion  - cytology consistent with malignancy  - monitor TTE    #Atrial tachyarrhythmia - likely irritation from pericardial drain/underlying pathology of pericardial effusion  -uptitrate metoprolol to 25 mg BID  -given AFIb with RVR episode and elevated CHADsVASC would systemically anticoagulate however given effusions would defer AC at this time; unless ok from heme onc perspective    #HTN  -BP controlled at present    Fabricio Stauffer MD  [id number]

## 2018-08-20 NOTE — PROGRESS NOTE ADULT - PROBLEM SELECTOR PLAN 1
Malignant pericardial effusion s/p drainage. Pericardial drain removed 8/16  -Repeat TTE 8/17 with stable effusion

## 2018-08-20 NOTE — PROGRESS NOTE ADULT - SUBJECTIVE AND OBJECTIVE BOX
Subjective " Hello " OOBin chair    VITAL SIGNS  Vital Signs Last 24 Hrs  T(C): 37.4 (18 @ 13:26), Max: 37.4 (18 @ 13:26)  T(F): 99.3 (18 @ 13:26), Max: 99.3 (18 @ 13:26)  HR: 91 (18 @ 14:16) (87 - 110)  BP: 98/61 (18 @ 13:26) (94/59 - 109/71)  RR: 17 (18 @ 13:26) (16 - 19)  SpO2: 93% (18 @ 14:16) (93% - 98%)            @ 07:01  -   @ 07:00  --------------------------------------------------------  IN: 1127 mL / OUT: 0 mL / NET: 1127 mL     @ 07:01  -   @ 14:55  --------------------------------------------------------  IN: 720 mL / OUT: 650 mL / NET: 70 mL  Daily Weight in k.6 (20 Aug 2018 10:09)    CAPILLARY BLOOD GLUCOSE  POCT Blood Glucose.: 121 mg/dL (20 Aug 2018 04:50)    Drains: Pleurex drained for 250 cc      PHYSICAL EXAM  Neurology: alert and oriented x 3, nonfocal, no gross deficits  CV : NSR   Lungs: left pleurx in place on supplemental oxygen   Abdomen: soft, nontender, nondistended, positive bowel sounds,   :   voids          Extremities:   b/l LE warm well perfused

## 2018-08-20 NOTE — PROGRESS NOTE ADULT - SUBJECTIVE AND OBJECTIVE BOX
Chief Complaint:  fu    History of Present Illness:  The patient is comfortable. Had RRT this am with rapid afib. SOB controlled. No calf pain.  No fevers.  No chest pain. No nausea.  No vomiting.  Overall she is feeling weak but stable.     MEDICATIONS  (STANDING):  anastrozole 1 milliGRAM(s) Oral daily  aspirin enteric coated 81 milliGRAM(s) Oral daily  docusate sodium 100 milliGRAM(s) Oral three times a day  DULoxetine 30 milliGRAM(s) Oral daily  enoxaparin Injectable 40 milliGRAM(s) SubCutaneous every 24 hours  ferrous    sulfate 325 milliGRAM(s) Oral daily  lactobacillus acidophilus 1 Tablet(s) Oral two times a day with meals  metoprolol tartrate 25 milliGRAM(s) Oral two times a day  pantoprazole    Tablet 40 milliGRAM(s) Oral before breakfast  senna 2 Tablet(s) Oral at bedtime  simvastatin 20 milliGRAM(s) Oral at bedtime    MEDICATIONS  (PRN):  acetaminophen   Tablet. 650 milliGRAM(s) Oral every 6 hours PRN Mild Pain (1 - 3)  ondansetron Injectable 4 milliGRAM(s) IV Push every 6 hours PRN Nausea  oxyCODONE    5 mG/acetaminophen 325 mG 1 Tablet(s) Oral every 4 hours PRN Moderate Pain (4 - 6)      Allergies    Levaquin (Diarrhea)    Intolerances    Dilaudid (Other)      Vital Signs Last 24 Hrs  T(C): 37.4 (20 Aug 2018 13:26), Max: 37.4 (20 Aug 2018 13:26)  T(F): 99.3 (20 Aug 2018 13:26), Max: 99.3 (20 Aug 2018 13:26)  HR: 96 (20 Aug 2018 17:54) (87 - 97)  BP: 124/71 (20 Aug 2018 17:54) (94/59 - 124/71)  BP(mean): --  RR: 17 (20 Aug 2018 13:26) (17 - 19)  SpO2: 93% (20 Aug 2018 14:16) (93% - 98%)    PHYSICAL EXAM  General: weak  HEENT: clear oropharynx, anicteric sclera, pink conjunctiva  CV: normal S1/S2.   Lungs: decreased at bases, left pleurx catheter  Abdomen: soft non-tender mildly-distended, positive bowel sounds  Ext: trace edema  Skin: no rashes   Neuro: alert and oriented X 2-3, weak    LABS:                          11.7   6.9   )-----------( 404      ( 20 Aug 2018 08:21 )             40.9         Mean Cell Volume : 93.7 fl  Mean Cell Hemoglobin : 26.8 pg  Mean Cell Hemoglobin Concentration : 28.6 gm/dL  Auto Neutrophil # : x  Auto Lymphocyte # : x  Auto Monocyte # : x  Auto Eosinophil # : x  Auto Basophil # : x  Auto Neutrophil % : x  Auto Lymphocyte % : x  Auto Monocyte % : x  Auto Eosinophil % : x  Auto Basophil % : x      Serial CBC's  08-20 @ 08:21  Hct-40.9 / Hgb-11.7 / Plat-404 / RBC-4.37 / WBC-6.9  Serial CBC's  08-20 @ 05:42  Hct-30.6 / Hgb-9.9 / Plat-377 / RBC-3.27 / WBC-9.0  Serial CBC's  08-19 @ 09:28  Hct-37.9 / Hgb-11.7 / Plat-328 / RBC-4.07 / WBC-8.00  Serial CBC's  08-18 @ 09:59  Hct-34.8 / Hgb-11.1 / Plat-295 / RBC-3.75 / WBC-10.29  Serial CBC's  08-17 @ 09:56  Hct-36.6 / Hgb-11.5 / Plat-299 / RBC-3.85 / WBC-11.2      08-20    138  |  101  |  14  ----------------------------<  145<H>  3.4<L>   |  24  |  0.72    Ca    8.0<L>      20 Aug 2018 05:42  Phos  2.9     08-20  Mg     1.6     08-20    TPro  4.8<L>  /  Alb  1.8<L>  /  TBili  0.2  /  DBili  x   /  AST  12  /  ALT  9<L>  /  AlkPhos  62  08-20

## 2018-08-21 LAB
ANION GAP SERPL CALC-SCNC: 10 MMOL/L — SIGNIFICANT CHANGE UP (ref 5–17)
BUN SERPL-MCNC: 14 MG/DL — SIGNIFICANT CHANGE UP (ref 7–23)
CALCIUM SERPL-MCNC: 8.8 MG/DL — SIGNIFICANT CHANGE UP (ref 8.4–10.5)
CHLORIDE SERPL-SCNC: 99 MMOL/L — SIGNIFICANT CHANGE UP (ref 96–108)
CO2 SERPL-SCNC: 29 MMOL/L — SIGNIFICANT CHANGE UP (ref 22–31)
CREAT SERPL-MCNC: 0.83 MG/DL — SIGNIFICANT CHANGE UP (ref 0.5–1.3)
GLUCOSE SERPL-MCNC: 102 MG/DL — HIGH (ref 70–99)
HCT VFR BLD CALC: 36.1 % — SIGNIFICANT CHANGE UP (ref 34.5–45)
HGB BLD-MCNC: 11.2 G/DL — LOW (ref 11.5–15.5)
MCHC RBC-ENTMCNC: 28.7 PG — SIGNIFICANT CHANGE UP (ref 27–34)
MCHC RBC-ENTMCNC: 31 GM/DL — LOW (ref 32–36)
MCV RBC AUTO: 92.6 FL — SIGNIFICANT CHANGE UP (ref 80–100)
PLATELET # BLD AUTO: 354 K/UL — SIGNIFICANT CHANGE UP (ref 150–400)
POTASSIUM SERPL-MCNC: 5.3 MMOL/L — SIGNIFICANT CHANGE UP (ref 3.5–5.3)
POTASSIUM SERPL-SCNC: 5.3 MMOL/L — SIGNIFICANT CHANGE UP (ref 3.5–5.3)
RBC # BLD: 3.9 M/UL — SIGNIFICANT CHANGE UP (ref 3.8–5.2)
RBC # FLD: 16 % — HIGH (ref 10.3–14.5)
SODIUM SERPL-SCNC: 138 MMOL/L — SIGNIFICANT CHANGE UP (ref 135–145)
WBC # BLD: 7.14 K/UL — SIGNIFICANT CHANGE UP (ref 3.8–10.5)
WBC # FLD AUTO: 7.14 K/UL — SIGNIFICANT CHANGE UP (ref 3.8–10.5)

## 2018-08-21 PROCEDURE — 93308 TTE F-UP OR LMTD: CPT | Mod: 26

## 2018-08-21 PROCEDURE — 99232 SBSQ HOSP IP/OBS MODERATE 35: CPT

## 2018-08-21 PROCEDURE — 93321 DOPPLER ECHO F-UP/LMTD STD: CPT | Mod: 26

## 2018-08-21 PROCEDURE — 71045 X-RAY EXAM CHEST 1 VIEW: CPT | Mod: 26

## 2018-08-21 RX ADMIN — SIMVASTATIN 20 MILLIGRAM(S): 20 TABLET, FILM COATED ORAL at 21:22

## 2018-08-21 RX ADMIN — Medication 1 TABLET(S): at 12:20

## 2018-08-21 RX ADMIN — PANTOPRAZOLE SODIUM 40 MILLIGRAM(S): 20 TABLET, DELAYED RELEASE ORAL at 05:35

## 2018-08-21 RX ADMIN — OXYCODONE AND ACETAMINOPHEN 1 TABLET(S): 5; 325 TABLET ORAL at 22:21

## 2018-08-21 RX ADMIN — Medication 25 MILLIGRAM(S): at 05:35

## 2018-08-21 RX ADMIN — Medication 650 MILLIGRAM(S): at 15:17

## 2018-08-21 RX ADMIN — Medication 81 MILLIGRAM(S): at 12:20

## 2018-08-21 RX ADMIN — Medication 1 TABLET(S): at 18:45

## 2018-08-21 RX ADMIN — Medication 650 MILLIGRAM(S): at 15:45

## 2018-08-21 RX ADMIN — SENNA PLUS 2 TABLET(S): 8.6 TABLET ORAL at 21:22

## 2018-08-21 RX ADMIN — OXYCODONE AND ACETAMINOPHEN 1 TABLET(S): 5; 325 TABLET ORAL at 21:22

## 2018-08-21 RX ADMIN — MORPHINE SULFATE 2 MILLIGRAM(S): 50 CAPSULE, EXTENDED RELEASE ORAL at 14:00

## 2018-08-21 RX ADMIN — ANASTROZOLE 1 MILLIGRAM(S): 1 TABLET ORAL at 12:20

## 2018-08-21 RX ADMIN — Medication 100 MILLIGRAM(S): at 12:21

## 2018-08-21 RX ADMIN — Medication 325 MILLIGRAM(S): at 12:21

## 2018-08-21 RX ADMIN — DULOXETINE HYDROCHLORIDE 30 MILLIGRAM(S): 30 CAPSULE, DELAYED RELEASE ORAL at 12:20

## 2018-08-21 RX ADMIN — Medication 25 MILLIGRAM(S): at 18:45

## 2018-08-21 RX ADMIN — ENOXAPARIN SODIUM 40 MILLIGRAM(S): 100 INJECTION SUBCUTANEOUS at 21:22

## 2018-08-21 NOTE — ADVANCED PRACTICE NURSE CONSULT - ASSESSMENT
reviewed pt's hx in documents-spoke with Dr Urena this am-as per MD pt had RRT and was hypotensive-this am remains weak chemotherapy tx on hold for today MD to re-evaluate pt in am

## 2018-08-21 NOTE — PROGRESS NOTE ADULT - PROBLEM SELECTOR PLAN 2
Malignant L pleural effusion s/p L Pleurex 8/15  -LUANN atelectasis likely mostly 2nd proximal hilar mass causing extrinsic compression on proximal airway with effusion  -L hydroPTX likely r/t trapped lung with near total collapse of LLL  -Drain Pleurx M/W/F per thoracic

## 2018-08-21 NOTE — PROGRESS NOTE ADULT - ASSESSMENT
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 Found to have large L pleural effusion causing complete atelectasis of L lung. She had 1L thoracentesis     ·  Problem: Pleural effusion.  Plan: s/p L pigtail by CTS, monitor output, now s/p VATS, pleurex  drainage per CTS  Pneumothorax management per Pulm/CTS      ·  Problem: Adenocarcinoma of lung, unspecified laterality.    Plan: Apparent stage 4 disease. Drainage of Pleural effusion, Onc appreciated, planned for outpt [facility] appreciated, hold off palliative radiation as improved w/o      ·  Problem: Dysphagia, unspecified type.  Plan: See above.  Will assume aspiration risk.   S/S eval   Nutrition evaluation.     ·  Problem: DVT prophylaxis    Pericardial effusion w/tamponade - likely malignant, drain removed    small effusion on TTE   , no signs of tamponade  repeat TTE done, read pending    Hypotension in setting of A fib - Cards appreciated, increase BB, TTE, AC per Cardio, no objection from Onc

## 2018-08-21 NOTE — PROGRESS NOTE ADULT - SUBJECTIVE AND OBJECTIVE BOX
events of yesterday noted    rapid A. Fib/with BP 60/40  off the floor  had echo/seen in xray dept    135/81 95 97.4  PE without change  meds-reviewed    labs 8/20  wbc-6.9/hgb-11.7/plt-404         8/21 K-4.3    Stage IV adenocarcinoma lung-malignant pericardial effusion  PDl1-80%  keytruda to be started but given events of yesterday, will treat tomorrow  d/w chemo nurse    breast cancer-early stage -on arimidex  A. fib--issue of AC pending clinical course  GI/DVT prophylaxis  Rad-onc--holding on RT  Aspiration precautions

## 2018-08-21 NOTE — PROGRESS NOTE ADULT - SUBJECTIVE AND OBJECTIVE BOX
Follow-up Pulm Progress Note    No new respiratory events overnight.  Denies SOB/CP.   Feeling better today   99% on 3L NC  Tele: SR/ST     Medications:  MEDICATIONS  (STANDING):  anastrozole 1 milliGRAM(s) Oral daily  aspirin enteric coated 81 milliGRAM(s) Oral daily  docusate sodium 100 milliGRAM(s) Oral three times a day  DULoxetine 30 milliGRAM(s) Oral daily  enoxaparin Injectable 40 milliGRAM(s) SubCutaneous every 24 hours  ferrous    sulfate 325 milliGRAM(s) Oral daily  lactobacillus acidophilus 1 Tablet(s) Oral two times a day with meals  metoprolol tartrate 25 milliGRAM(s) Oral two times a day  pantoprazole    Tablet 40 milliGRAM(s) Oral before breakfast  senna 2 Tablet(s) Oral at bedtime  simvastatin 20 milliGRAM(s) Oral at bedtime    MEDICATIONS  (PRN):  acetaminophen   Tablet. 650 milliGRAM(s) Oral every 6 hours PRN Mild Pain (1 - 3)  ondansetron Injectable 4 milliGRAM(s) IV Push every 6 hours PRN Nausea  oxyCODONE    5 mG/acetaminophen 325 mG 1 Tablet(s) Oral every 4 hours PRN Moderate Pain (4 - 6)          Vital Signs Last 24 Hrs  T(C): 36.3 (21 Aug 2018 04:58), Max: 37.4 (20 Aug 2018 13:26)  T(F): 97.4 (21 Aug 2018 04:58), Max: 99.3 (20 Aug 2018 13:26)  HR: 95 (21 Aug 2018 04:58) (87 - 96)  BP: 135/81 (21 Aug 2018 04:58) (98/61 - 135/81)  BP(mean): --  RR: 18 (21 Aug 2018 04:58) (17 - 18)  SpO2: 98% (21 Aug 2018 04:58) (93% - 99%) on 3L NC    ABG - ( 20 Aug 2018 06:43 )  pH, Arterial: 7.42  pH, Blood: x     /  pCO2: 44    /  pO2: 216   / HCO3: 28    / Base Excess: 3.3   /  SaO2: 99                    08-20 @ 07:01  -  08-21 @ 07:00  --------------------------------------------------------  IN: 1194 mL / OUT: 650 mL / NET: 544 mL          LABS:                        11.2   7.14  )-----------( 354      ( 21 Aug 2018 07:26 )             36.1     08-21    138  |  99  |  14  ----------------------------<  102<H>  5.3   |  29  |  0.83    Ca    8.8      21 Aug 2018 06:20  Phos  2.9     08-20  Mg     1.6     08-20    TPro  4.8<L>  /  Alb  1.8<L>  /  TBili  0.2  /  DBili  x   /  AST  12  /  ALT  9<L>  /  AlkPhos  62  08-20          CAPILLARY BLOOD GLUCOSE      POCT Blood Glucose.: 121 mg/dL (20 Aug 2018 04:50)                        CULTURES: (if applicable)  Culture Results:   No growth to date. (08-19 @ 09:39)  Culture Results:   No growth to date. (08-17 @ 19:29)  Culture Results:   No growth (08-17 @ 14:28)    Most recent blood culture -- 08-19 @ 09:39   -- -- .Blood Blood-Peripheral 08-19 @ 09:39  Most recent blood culture -- 08-17 @ 19:29   -- -- .Blood Blood-Peripheral 08-17 @ 19:29  Most recent blood culture -- 08-17 @ 14:28   -- -- .Urine Catheterized 08-17 @ 14:28        Physical Examination:  PULM: Bronchial BS L   CVS: S1, S2 heard    RADIOLOGY REVIEWED  CT chest: < from: CT Chest No Cont (08.20.18 @ 23:45) >  FINDINGS:     AIRWAYS, LUNGS, PLEURA: Left upper and lower lobe bronchiremains   obliterated.    The left Pleurx catheter enters the chest at the level of the 8-9 rib   space and its tip terminates within the left upper anterior pleural   space. Interval increase of left hydropneumothorax with near total   collapse of left lower lobe, increased since prior. The left upper lobe   remains collapsed with infiltrating tumor.    Small layering right pleural effusion is increased.    Stable 2.7 x 2.4 cm consolidative mass within the right upper lobe right   representing ametastatic or synchronous focus of lung cancer with some   post obstructive pneumonia.    2.3 cm right lower lobe groundglass nodule is again noted and likely   represent a focus of adenocarcinoma in situ spectrum.    MEDIASTINUM, LYMPH NODES: Multiple enlarged mediastinal lymph nodes are   unchanged.    HEART AND VASCULATURE: The heart is mildly enlarged. Stable small   pericardial effusion. Multivessel coronary atherosclerosis. Thoracic   aorta and pulmonary artery normal in course and caliber.Aortic   atherosclerosis.    UPPER ABDOMEN: Stable nodular thickening of the left adrenal gland,   possibly metastatic.    BONES AND SOFT TISSUES: No aggressive osseous lesion. Spinal and   shoulders degenerative changes. Old fracture deformity of the left 5-8   ribs.    LOWER NECK: Multiple small thyroid nodules.    IMPRESSION:     *  The left Pleurx catheter enters the chest at the level of the 8-9 rib   space and its tip terminates within the left upper anterior pleural   space.     *  Interval increase of left hydropneumothorax with near total collapse   of left lower lobe, increased since prior. The left upper lobe remains   collapsed with infiltrating tumor.    *  Small layering right pleural effusion is increased.    *  Stable 2.7 x 2.4 cm consolidative mass within the right upper lobe   right representing a metastatic or synchronous focus of lung cancer with   some post obstructive pneumonia.    *  Stable small pericardial effusion.    < end of copied text > Follow-up Pulm Progress Note    No new respiratory events overnight.  Denies SOB/CP.   Feeling better today   99% on 3L NC  Tele: SR/ST     Medications:  MEDICATIONS  (STANDING):  anastrozole 1 milliGRAM(s) Oral daily  aspirin enteric coated 81 milliGRAM(s) Oral daily  docusate sodium 100 milliGRAM(s) Oral three times a day  DULoxetine 30 milliGRAM(s) Oral daily  enoxaparin Injectable 40 milliGRAM(s) SubCutaneous every 24 hours  ferrous    sulfate 325 milliGRAM(s) Oral daily  lactobacillus acidophilus 1 Tablet(s) Oral two times a day with meals  metoprolol tartrate 25 milliGRAM(s) Oral two times a day  pantoprazole    Tablet 40 milliGRAM(s) Oral before breakfast  senna 2 Tablet(s) Oral at bedtime  simvastatin 20 milliGRAM(s) Oral at bedtime    MEDICATIONS  (PRN):  acetaminophen   Tablet. 650 milliGRAM(s) Oral every 6 hours PRN Mild Pain (1 - 3)  ondansetron Injectable 4 milliGRAM(s) IV Push every 6 hours PRN Nausea  oxyCODONE    5 mG/acetaminophen 325 mG 1 Tablet(s) Oral every 4 hours PRN Moderate Pain (4 - 6)    Vital Signs Last 24 Hrs  T(C): 36.3 (21 Aug 2018 04:58), Max: 37.4 (20 Aug 2018 13:26)  T(F): 97.4 (21 Aug 2018 04:58), Max: 99.3 (20 Aug 2018 13:26)  HR: 95 (21 Aug 2018 04:58) (87 - 96)  BP: 135/81 (21 Aug 2018 04:58) (98/61 - 135/81)  BP(mean): --  RR: 18 (21 Aug 2018 04:58) (17 - 18)  SpO2: 98% (21 Aug 2018 04:58) (93% - 99%) on 3L NC    ABG - ( 20 Aug 2018 06:43 )  pH, Arterial: 7.42  pH, Blood: x     /  pCO2: 44    /  pO2: 216   / HCO3: 28    / Base Excess: 3.3   /  SaO2: 99        08-20 @ 07:01  -  08-21 @ 07:00  --------------------------------------------------------  IN: 1194 mL / OUT: 650 mL / NET: 544 mL    LABS:                        11.2   7.14  )-----------( 354      ( 21 Aug 2018 07:26 )             36.1     08-21    138  |  99  |  14  ----------------------------<  102<H>  5.3   |  29  |  0.83    Ca    8.8      21 Aug 2018 06:20  Phos  2.9     08-20  Mg     1.6     08-20    TPro  4.8<L>  /  Alb  1.8<L>  /  TBili  0.2  /  DBili  x   /  AST  12  /  ALT  9<L>  /  AlkPhos  62  08-20    CAPILLARY BLOOD GLUCOSE      POCT Blood Glucose.: 121 mg/dL (20 Aug 2018 04:50)    CULTURES: (if applicable)  Culture Results:   No growth to date. (08-19 @ 09:39)  Culture Results:   No growth to date. (08-17 @ 19:29)  Culture Results:   No growth (08-17 @ 14:28)    Most recent blood culture -- 08-19 @ 09:39   -- -- .Blood Blood-Peripheral 08-19 @ 09:39  Most recent blood culture -- 08-17 @ 19:29   -- -- .Blood Blood-Peripheral 08-17 @ 19:29  Most recent blood culture -- 08-17 @ 14:28   -- -- .Urine Catheterized 08-17 @ 14:28        Physical Examination:  PULM: Bronchial BS L   CVS: S1, S2 heard    RADIOLOGY REVIEWED  CT chest: < from: CT Chest No Cont (08.20.18 @ 23:45) >  FINDINGS:     AIRWAYS, LUNGS, PLEURA: Left upper and lower lobe bronchiremains   obliterated.    The left Pleurx catheter enters the chest at the level of the 8-9 rib   space and its tip terminates within the left upper anterior pleural   space. Interval increase of left hydropneumothorax with near total   collapse of left lower lobe, increased since prior. The left upper lobe   remains collapsed with infiltrating tumor.    Small layering right pleural effusion is increased.    Stable 2.7 x 2.4 cm consolidative mass within the right upper lobe right   representing ametastatic or synchronous focus of lung cancer with some   post obstructive pneumonia.    2.3 cm right lower lobe groundglass nodule is again noted and likely   represent a focus of adenocarcinoma in situ spectrum.    MEDIASTINUM, LYMPH NODES: Multiple enlarged mediastinal lymph nodes are   unchanged.    HEART AND VASCULATURE: The heart is mildly enlarged. Stable small   pericardial effusion. Multivessel coronary atherosclerosis. Thoracic   aorta and pulmonary artery normal in course and caliber.Aortic   atherosclerosis.    UPPER ABDOMEN: Stable nodular thickening of the left adrenal gland,   possibly metastatic.    BONES AND SOFT TISSUES: No aggressive osseous lesion. Spinal and   shoulders degenerative changes. Old fracture deformity of the left 5-8   ribs.    LOWER NECK: Multiple small thyroid nodules.    IMPRESSION:     *  The left Pleurx catheter enters the chest at the level of the 8-9 rib   space and its tip terminates within the left upper anterior pleural   space.     *  Interval increase of left hydropneumothorax with near total collapse   of left lower lobe, increased since prior. The left upper lobe remains   collapsed with infiltrating tumor.    *  Small layering right pleural effusion is increased.    *  Stable 2.7 x 2.4 cm consolidative mass within the right upper lobe   right representing a metastatic or synchronous focus of lung cancer with   some post obstructive pneumonia.    *  Stable small pericardial effusion.    < end of copied text >

## 2018-08-21 NOTE — PROGRESS NOTE ADULT - SUBJECTIVE AND OBJECTIVE BOX
CHIEF COMPLAINT:Patient is a 84y old  Female who presents with a chief complaint of Progressive exertional dyspnea, dry cough for 2 weeks (09 Aug 2018 12:48)  no acute events    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  S/P hip replacement, right: 6/2016  - right hip  History of lumpectomy of right breast: July 2015  History of cataract surgery, left: and right 2016  Elective surgery: reversal of colostomy July 2015  History of tonsillectomy  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 No nausea, vomiting, or hematemesis; Ndysphagia  GENITOURINARY: No dysuria, frequency, hematuria, or incontinence  NEUROLOGICAL: No headaches, memory loss, loss of strength, numbness, or tremors  MUSCULOSKELETAL: No joint pain or swelling; No muscle, back, or extremity pain      Medications:  MEDICATIONS  (STANDING):  anastrozole 1 milliGRAM(s) Oral daily  aspirin enteric coated 81 milliGRAM(s) Oral daily  docusate sodium 100 milliGRAM(s) Oral three times a day  DULoxetine 30 milliGRAM(s) Oral daily  enoxaparin Injectable 40 milliGRAM(s) SubCutaneous every 24 hours  ferrous    sulfate 325 milliGRAM(s) Oral daily  lactobacillus acidophilus 1 Tablet(s) Oral two times a day with meals  metoprolol tartrate 25 milliGRAM(s) Oral two times a day  pantoprazole    Tablet 40 milliGRAM(s) Oral before breakfast  senna 2 Tablet(s) Oral at bedtime  simvastatin 20 milliGRAM(s) Oral at bedtime    MEDICATIONS  (PRN):  acetaminophen   Tablet. 650 milliGRAM(s) Oral every 6 hours PRN Mild Pain (1 - 3)  ondansetron Injectable 4 milliGRAM(s) IV Push every 6 hours PRN Nausea  oxyCODONE    5 mG/acetaminophen 325 mG 1 Tablet(s) Oral every 4 hours PRN Moderate Pain (4 - 6)    	    PHYSICAL EXAM:  T(C): 36.8 (08-21-18 @ 13:19), Max: 36.8 (08-21-18 @ 13:19)  HR: 107 (08-21-18 @ 13:19) (87 - 107)  BP: 102/65 (08-21-18 @ 13:19) (102/65 - 135/81)  RR: 15 (08-21-18 @ 13:19) (15 - 18)  SpO2: 95% (08-21-18 @ 13:19) (93% - 99%)  Wt(kg): --  I&O's Summary    20 Aug 2018 07:01  -  21 Aug 2018 07:00  --------------------------------------------------------  IN: 1194 mL / OUT: 650 mL / NET: 544 mL    21 Aug 2018 07:01  -  21 Aug 2018 14:15  --------------------------------------------------------  IN: 600 mL / OUT: 0 mL / NET: 600 mL      Appearance: Normal	  HEENT:   Normal oral mucosa, PERRL, EOMI	  Lymphatic: No lymphadenopathy  Cardiovascular: Normal S1 S2, No JVD, No murmurs,  Respiratory: dec sob, L pleurex  Psychiatry: A & O x 3, Mood & affect appropriate  Gastrointestinal:  Soft, Non-tender, + BS	  Skin: No rashes, No ecchymoses, No cyanosis	  Neurologic: Non-focal  Extremities: Normal range of motion, No clubbing, cyanosis   Vascular: Peripheral pulses palpable 2+ bilaterally  LABS:	 	    CARDIAC MARKERS:                                11.2   7.14  )-----------( 354      ( 21 Aug 2018 07:26 )             36.1     08-21    138  |  99  |  14  ----------------------------<  102<H>  5.3   |  29  |  0.83    Ca    8.8      21 Aug 2018 06:20  Phos  2.9     08-20  Mg     1.6     08-20    TPro  4.8<L>  /  Alb  1.8<L>  /  TBili  0.2  /  DBili  x   /  AST  12  /  ALT  9<L>  /  AlkPhos  62  08-20    proBNP:   Lipid Profile:   HgA1c:   TSH:

## 2018-08-22 DIAGNOSIS — Z71.89 OTHER SPECIFIED COUNSELING: ICD-10-CM

## 2018-08-22 LAB
ANION GAP SERPL CALC-SCNC: 9 MMOL/L — SIGNIFICANT CHANGE UP (ref 5–17)
BUN SERPL-MCNC: 13 MG/DL — SIGNIFICANT CHANGE UP (ref 7–23)
CALCIUM SERPL-MCNC: 9 MG/DL — SIGNIFICANT CHANGE UP (ref 8.4–10.5)
CHLORIDE SERPL-SCNC: 97 MMOL/L — SIGNIFICANT CHANGE UP (ref 96–108)
CO2 SERPL-SCNC: 31 MMOL/L — SIGNIFICANT CHANGE UP (ref 22–31)
CREAT SERPL-MCNC: 0.8 MG/DL — SIGNIFICANT CHANGE UP (ref 0.5–1.3)
CULTURE RESULTS: SIGNIFICANT CHANGE UP
GLUCOSE SERPL-MCNC: 100 MG/DL — HIGH (ref 70–99)
HCT VFR BLD CALC: 37.9 % — SIGNIFICANT CHANGE UP (ref 34.5–45)
HGB BLD-MCNC: 11.7 G/DL — SIGNIFICANT CHANGE UP (ref 11.5–15.5)
MAGNESIUM SERPL-MCNC: 1.8 MG/DL — SIGNIFICANT CHANGE UP (ref 1.6–2.6)
MCHC RBC-ENTMCNC: 29.3 PG — SIGNIFICANT CHANGE UP (ref 27–34)
MCHC RBC-ENTMCNC: 30.9 GM/DL — LOW (ref 32–36)
MCV RBC AUTO: 95 FL — SIGNIFICANT CHANGE UP (ref 80–100)
PLATELET # BLD AUTO: 381 K/UL — SIGNIFICANT CHANGE UP (ref 150–400)
POTASSIUM SERPL-MCNC: 4.7 MMOL/L — SIGNIFICANT CHANGE UP (ref 3.5–5.3)
POTASSIUM SERPL-SCNC: 4.7 MMOL/L — SIGNIFICANT CHANGE UP (ref 3.5–5.3)
RBC # BLD: 3.99 M/UL — SIGNIFICANT CHANGE UP (ref 3.8–5.2)
RBC # FLD: 15.5 % — HIGH (ref 10.3–14.5)
SODIUM SERPL-SCNC: 137 MMOL/L — SIGNIFICANT CHANGE UP (ref 135–145)
SPECIMEN SOURCE: SIGNIFICANT CHANGE UP
WBC # BLD: 8.04 K/UL — SIGNIFICANT CHANGE UP (ref 3.8–10.5)
WBC # FLD AUTO: 8.04 K/UL — SIGNIFICANT CHANGE UP (ref 3.8–10.5)

## 2018-08-22 RX ORDER — PEMBROLIZUMAB 25 MG/ML
200 INJECTION, SOLUTION INTRAVENOUS ONCE
Qty: 0 | Refills: 0 | Status: DISCONTINUED | OUTPATIENT
Start: 2018-08-22 | End: 2018-08-24

## 2018-08-22 RX ADMIN — DULOXETINE HYDROCHLORIDE 30 MILLIGRAM(S): 30 CAPSULE, DELAYED RELEASE ORAL at 13:30

## 2018-08-22 RX ADMIN — Medication 325 MILLIGRAM(S): at 13:31

## 2018-08-22 RX ADMIN — Medication 650 MILLIGRAM(S): at 06:50

## 2018-08-22 RX ADMIN — Medication 81 MILLIGRAM(S): at 13:28

## 2018-08-22 RX ADMIN — Medication 1 TABLET(S): at 13:29

## 2018-08-22 RX ADMIN — Medication 650 MILLIGRAM(S): at 14:30

## 2018-08-22 RX ADMIN — Medication 650 MILLIGRAM(S): at 17:00

## 2018-08-22 RX ADMIN — SIMVASTATIN 20 MILLIGRAM(S): 20 TABLET, FILM COATED ORAL at 21:45

## 2018-08-22 RX ADMIN — PANTOPRAZOLE SODIUM 40 MILLIGRAM(S): 20 TABLET, DELAYED RELEASE ORAL at 06:18

## 2018-08-22 RX ADMIN — ANASTROZOLE 1 MILLIGRAM(S): 1 TABLET ORAL at 13:30

## 2018-08-22 RX ADMIN — Medication 100 MILLIGRAM(S): at 06:18

## 2018-08-22 RX ADMIN — Medication 100 MILLIGRAM(S): at 21:45

## 2018-08-22 RX ADMIN — Medication 650 MILLIGRAM(S): at 06:19

## 2018-08-22 RX ADMIN — Medication 25 MILLIGRAM(S): at 21:45

## 2018-08-22 RX ADMIN — ENOXAPARIN SODIUM 40 MILLIGRAM(S): 100 INJECTION SUBCUTANEOUS at 21:45

## 2018-08-22 RX ADMIN — Medication 25 MILLIGRAM(S): at 06:18

## 2018-08-22 RX ADMIN — Medication 650 MILLIGRAM(S): at 13:29

## 2018-08-22 RX ADMIN — SENNA PLUS 2 TABLET(S): 8.6 TABLET ORAL at 21:45

## 2018-08-22 RX ADMIN — Medication 650 MILLIGRAM(S): at 18:00

## 2018-08-22 RX ADMIN — Medication 100 MILLIGRAM(S): at 13:30

## 2018-08-22 NOTE — PROGRESS NOTE ADULT - SUBJECTIVE AND OBJECTIVE BOX
Chief Complaint:  FU    History of Present Illness:  The patient is comfortable. sat in chair most of the day. no further episodes of afib. SOB controlled. now on NC o2. No calf pain.  No fevers.  No chest pain. No nausea.  No vomiting.  Overall she is feeling weak but stable.       MEDICATIONS  (STANDING):  anastrozole 1 milliGRAM(s) Oral daily  aspirin enteric coated 81 milliGRAM(s) Oral daily  docusate sodium 100 milliGRAM(s) Oral three times a day  DULoxetine 30 milliGRAM(s) Oral daily  enoxaparin Injectable 40 milliGRAM(s) SubCutaneous every 24 hours  ferrous    sulfate 325 milliGRAM(s) Oral daily  lactobacillus acidophilus 1 Tablet(s) Oral two times a day with meals  metoprolol tartrate 25 milliGRAM(s) Oral two times a day  pantoprazole    Tablet 40 milliGRAM(s) Oral before breakfast  senna 2 Tablet(s) Oral at bedtime  simvastatin 20 milliGRAM(s) Oral at bedtime    MEDICATIONS  (PRN):  acetaminophen   Tablet. 650 milliGRAM(s) Oral every 6 hours PRN Mild Pain (1 - 3)  ondansetron Injectable 4 milliGRAM(s) IV Push every 6 hours PRN Nausea  oxyCODONE    5 mG/acetaminophen 325 mG 1 Tablet(s) Oral every 4 hours PRN Moderate Pain (4 - 6)      Allergies    Levaquin (Diarrhea)    Intolerances    Dilaudid (Other)      Vital Signs Last 24 Hrs  T(C): 36.9 (22 Aug 2018 12:59), Max: 37.1 (21 Aug 2018 20:54)  T(F): 98.5 (22 Aug 2018 12:59), Max: 98.7 (21 Aug 2018 20:54)  HR: 95 (22 Aug 2018 12:59) (95 - 107)  BP: 111/71 (22 Aug 2018 12:59) (103/64 - 151/83)  BP(mean): --  RR: 17 (22 Aug 2018 12:59) (17 - 18)  SpO2: 96% (22 Aug 2018 12:59) (85% - 98%)    PHYSICAL EXAM  General: weak  HEENT: clear oropharynx, anicteric sclera, pink conjunctiva  CV: normal S1/S2. regular, tachy  Lungs: decreased at bases, left pleurx catheter  Abdomen: soft non-tender mildly-distended, positive bowel sounds  Ext: trace edema  Skin: no rashes   Neuro: alert and oriented X 2-3, weak      LABS:                          11.7   8.04  )-----------( 381      ( 22 Aug 2018 08:10 )             37.9         Mean Cell Volume : 95.0 fl  Mean Cell Hemoglobin : 29.3 pg  Mean Cell Hemoglobin Concentration : 30.9 gm/dL  Auto Neutrophil # : x  Auto Lymphocyte # : x  Auto Monocyte # : x  Auto Eosinophil # : x  Auto Basophil # : x  Auto Neutrophil % : x  Auto Lymphocyte % : x  Auto Monocyte % : x  Auto Eosinophil % : x  Auto Basophil % : x      Serial CBC's  08-22 @ 08:10  Hct-37.9 / Hgb-11.7 / Plat-381 / RBC-3.99 / WBC-8.04  Serial CBC's  08-21 @ 07:26  Hct-36.1 / Hgb-11.2 / Plat-354 / RBC-3.90 / WBC-7.14  Serial CBC's  08-20 @ 08:21  Hct-40.9 / Hgb-11.7 / Plat-404 / RBC-4.37 / WBC-6.9  Serial CBC's  08-20 @ 05:42  Hct-30.6 / Hgb-9.9 / Plat-377 / RBC-3.27 / WBC-9.0  Serial CBC's  08-19 @ 09:28  Hct-37.9 / Hgb-11.7 / Plat-328 / RBC-4.07 / WBC-8.00      08-22    137  |  97  |  13  ----------------------------<  100<H>  4.7   |  31  |  0.80    Ca    9.0      22 Aug 2018 06:56  Mg     1.8     08-22

## 2018-08-22 NOTE — PROGRESS NOTE ADULT - SUBJECTIVE AND OBJECTIVE BOX
CHIEF COMPLAINT:Patient is a 84y old  Female who presents with a chief complaint of Progressive exertional dyspnea, dry cough for 2 weeks (09 Aug 2018 12:48)  no acute events    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  S/P hip replacement, right: 6/2016  - right hip  History of lumpectomy of right breast: July 2015  History of cataract surgery, left: and right 2016  Elective surgery: reversal of colostomy July 2015  History of tonsillectomy  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 No nausea, vomiting, or hematemesis; Ndysphagia  GENITOURINARY: No dysuria, frequency, hematuria, or incontinence  NEUROLOGICAL: No headaches, memory loss, loss of strength, numbness, or tremors  MUSCULOSKELETAL: No joint pain or swelling; No muscle, back, or extremity pain      Medications:  MEDICATIONS  (STANDING):  anastrozole 1 milliGRAM(s) Oral daily  aspirin enteric coated 81 milliGRAM(s) Oral daily  docusate sodium 100 milliGRAM(s) Oral three times a day  DULoxetine 30 milliGRAM(s) Oral daily  enoxaparin Injectable 40 milliGRAM(s) SubCutaneous every 24 hours  ferrous    sulfate 325 milliGRAM(s) Oral daily  lactobacillus acidophilus 1 Tablet(s) Oral two times a day with meals  metoprolol tartrate 25 milliGRAM(s) Oral two times a day  pantoprazole    Tablet 40 milliGRAM(s) Oral before breakfast  senna 2 Tablet(s) Oral at bedtime  simvastatin 20 milliGRAM(s) Oral at bedtime    MEDICATIONS  (PRN):  acetaminophen   Tablet. 650 milliGRAM(s) Oral every 6 hours PRN Mild Pain (1 - 3)  ondansetron Injectable 4 milliGRAM(s) IV Push every 6 hours PRN Nausea  oxyCODONE    5 mG/acetaminophen 325 mG 1 Tablet(s) Oral every 4 hours PRN Moderate Pain (4 - 6)    	    PHYSICAL EXAM:  T(C): 36.3 (08-22-18 @ 05:09), Max: 37.1 (08-21-18 @ 20:54)  HR: 101 (08-22-18 @ 05:09) (101 - 107)  BP: 151/83 (08-22-18 @ 05:09) (102/65 - 151/83)  RR: 18 (08-22-18 @ 05:09) (15 - 18)  SpO2: 98% (08-22-18 @ 08:46) (85% - 98%)  Wt(kg): --  I&O's Summary    21 Aug 2018 07:01  -  22 Aug 2018 07:00  --------------------------------------------------------  IN: 1120 mL / OUT: 0 mL / NET: 1120 mL    22 Aug 2018 07:01  -  22 Aug 2018 11:43  --------------------------------------------------------  IN: 360 mL / OUT: 0 mL / NET: 360 mL      Appearance: Normal	  HEENT:   Normal oral mucosa, PERRL, EOMI	  Lymphatic: No lymphadenopathy  Cardiovascular: Normal S1 S2, No JVD, No murmurs,  Respiratory: dec sob, L pleurex  Psychiatry: A & O x 3, Mood & affect appropriate  Gastrointestinal:  Soft, Non-tender, + BS	  Skin: No rashes, No ecchymoses, No cyanosis	  Neurologic: Non-focal  Extremities: Normal range of motion, No clubbing, cyanosis   Vascular: Peripheral pulses palpable 2+ bilaterally    LABS:	 	    CARDIAC MARKERS:                                11.7   8.04  )-----------( 381      ( 22 Aug 2018 08:10 )             37.9     08-22    137  |  97  |  13  ----------------------------<  100<H>  4.7   |  31  |  0.80    Ca    9.0      22 Aug 2018 06:56  Mg     1.8     08-22      proBNP:   Lipid Profile:   HgA1c:   TSH:

## 2018-08-22 NOTE — PROGRESS NOTE ADULT - ASSESSMENT
Bronchitis: Care Instructions  Your Care Instructions    Bronchitis is inflammation of the bronchial tubes, which carry air to the lungs. The tubes swell and produce mucus, or phlegm. The mucus and inflamed bronchial tubes make you cough. You may have trouble breathing. Most cases of bronchitis are caused by viruses like those that cause colds. Antibiotics usually do not help and they may be harmful. Bronchitis usually develops rapidly and lasts about 2 to 3 weeks in otherwise healthy people. Follow-up care is a key part of your treatment and safety. Be sure to make and go to all appointments, and call your doctor if you are having problems. It's also a good idea to know your test results and keep a list of the medicines you take. How can you care for yourself at home? · Take all medicines exactly as prescribed. Call your doctor if you think you are having a problem with your medicine. · Get some extra rest.  · Take an over-the-counter pain medicine, such as acetaminophen (Tylenol), ibuprofen (Advil, Motrin), or naproxen (Aleve) to reduce fever and relieve body aches. Read and follow all instructions on the label. · Do not take two or more pain medicines at the same time unless the doctor told you to. Many pain medicines have acetaminophen, which is Tylenol. Too much acetaminophen (Tylenol) can be harmful. · Take an over-the-counter cough medicine that contains dextromethorphan to help quiet a dry, hacking cough so that you can sleep. Avoid cough medicines that have more than one active ingredient. Read and follow all instructions on the label. · Breathe moist air from a humidifier, hot shower, or sink filled with hot water. The heat and moisture will thin mucus so you can cough it out. · Do not smoke. Smoking can make bronchitis worse. If you need help quitting, talk to your doctor about stop-smoking programs and medicines. These can increase your chances of quitting for good.   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 Found to have large L pleural effusion causing complete atelectasis of L lung. She had 1L thoracentesis     ·  Problem: Pleural effusion.  Plan: s/p L pigtail by CTS, monitor output, now s/p VATS, pleurex  drainage per CTS  Pneumothorax management per Pulm/CTS      ·  Problem: Adenocarcinoma of lung, unspecified laterality.    Plan: Apparent stage 4 disease. Drainage of Pleural effusion, Onc appreciated, [facility]  Rad[facility] appreciated, hold off palliative radiation as improved w/o      ·  Problem: Dysphagia, unspecified type.  Plan: See above.  Will assume aspiration risk.   S/S eval   Nutrition evaluation.     ·  Problem: DVT prophylaxis    Pericardial effusion w/tamponade - likely malignant, drain removed    small effusion on TTE   , no signs of tamponade  repeat TTE done, read pending    Hypotension in setting of A fib - Cards appreciated, increase BB, TTE, AC per Cardio, no objection from Onc    d/c planning if no objection from Pulm/Cardio/Onc/CTS for help? Call 911 anytime you think you may need emergency care. For example, call if:  ? · You have severe trouble breathing. ?Call your doctor now or seek immediate medical care if:  ? · You have new or worse trouble breathing. ? · You cough up dark brown or bloody mucus (sputum). ? · You have a new or higher fever. ? · You have a new rash. ? Watch closely for changes in your health, and be sure to contact your doctor if:  ? · You cough more deeply or more often, especially if you notice more mucus or a change in the color of your mucus. ? · You are not getting better as expected. Where can you learn more? Go to http://steven-jose luis.info/. Enter H333 in the search box to learn more about \"Bronchitis: Care Instructions. \"  Current as of: May 12, 2017  Content Version: 11.4  © 7092-5458 [employer]. Care instructions adapted under license by [employer] (which disclaims liability or warranty for this information). If you have questions about a medical condition or this instruction, always ask your healthcare professional. [address] any warranty or liability for your use of this information.

## 2018-08-22 NOTE — PROGRESS NOTE ADULT - ASSESSMENT
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 Found to have large L pleural effusion causing complete atelectasis of L lung. She had 1L thoracentesis 1 week ago as outpt. Found to have cardiac tamponade s/p pericardial drain placement 8/10, s/p L pleural drain placed in IR 8/10. s/p L Pleurex 8/15.

## 2018-08-22 NOTE — PROGRESS NOTE ADULT - PROBLEM SELECTOR PLAN 5
Discussed with patient, daughter at bedside  -Patient states she would not want to be resuscitated in the event of cardiac arrest. Would not want to be placed on ventilator but proceed with all other medical interventions at present  -DNR/DNI placed on chart

## 2018-08-22 NOTE — PROGRESS NOTE ADULT - SUBJECTIVE AND OBJECTIVE BOX
Follow-up Pulm Progress Note    No new respiratory events overnight.  Denies SOB/CP.   -375cc L Pleurx today   99% on 2L NC    Medications:  MEDICATIONS  (STANDING):  anastrozole 1 milliGRAM(s) Oral daily  aspirin enteric coated 81 milliGRAM(s) Oral daily  docusate sodium 100 milliGRAM(s) Oral three times a day  DULoxetine 30 milliGRAM(s) Oral daily  enoxaparin Injectable 40 milliGRAM(s) SubCutaneous every 24 hours  ferrous    sulfate 325 milliGRAM(s) Oral daily  lactobacillus acidophilus 1 Tablet(s) Oral two times a day with meals  metoprolol tartrate 25 milliGRAM(s) Oral two times a day  pantoprazole    Tablet 40 milliGRAM(s) Oral before breakfast  pembrolizumab IVPB 200 milliGRAM(s) IV Intermittent once  senna 2 Tablet(s) Oral at bedtime  simvastatin 20 milliGRAM(s) Oral at bedtime    MEDICATIONS  (PRN):  acetaminophen   Tablet. 650 milliGRAM(s) Oral every 6 hours PRN Mild Pain (1 - 3)  ondansetron Injectable 4 milliGRAM(s) IV Push every 6 hours PRN Nausea  oxyCODONE    5 mG/acetaminophen 325 mG 1 Tablet(s) Oral every 4 hours PRN Moderate Pain (4 - 6)          Vital Signs Last 24 Hrs  T(C): 36.6 (22 Aug 2018 16:29), Max: 37.1 (21 Aug 2018 20:54)  T(F): 97.9 (22 Aug 2018 16:29), Max: 98.7 (21 Aug 2018 20:54)  HR: 102 (22 Aug 2018 16:29) (95 - 107)  BP: 122/73 (22 Aug 2018 16:29) (103/64 - 151/83)  BP(mean): --  RR: 17 (22 Aug 2018 16:29) (17 - 18)  SpO2: 97% (22 Aug 2018 16:29) (85% - 98%) on 2L NC          08-21 @ 07:01  -  08-22 @ 07:00  --------------------------------------------------------  IN: 1120 mL / OUT: 0 mL / NET: 1120 mL          LABS:                        11.7   8.04  )-----------( 381      ( 22 Aug 2018 08:10 )             37.9     08-22    137  |  97  |  13  ----------------------------<  100<H>  4.7   |  31  |  0.80    Ca    9.0      22 Aug 2018 06:56  Mg     1.8     08-22            CAPILLARY BLOOD GLUCOSE                            CULTURES: (if applicable)  Culture Results:   No growth to date. (08-19 @ 09:39)  Culture Results:   No growth to date. (08-17 @ 19:29)  Culture Results:   No growth (08-17 @ 14:28)    Most recent blood culture -- 08-19 @ 09:39   -- -- .Blood Blood-Peripheral 08-19 @ 09:39  Most recent blood culture -- 08-17 @ 19:29   -- -- .Blood Blood-Peripheral 08-17 @ 19:29        Physical Examination:  PULM: Bronchial BS L   CVS: S1, S2 heard    RADIOLOGY REVIEWED  CT chest: < from: CT Chest No Cont (08.20.18 @ 23:45) >  AIRWAYS, LUNGS, PLEURA: Left upper and lower lobe bronchiremains   obliterated.    The left Pleurx catheter enters the chest at the level of the 8-9 rib   space and its tip terminates within the left upper anterior pleural   space. Interval increase of left hydropneumothorax with near total   collapse of left lower lobe, increased since prior. The left upper lobe   remains collapsed with infiltrating tumor.    Small layering right pleural effusion is increased.    Stable 2.7 x 2.4 cm consolidative mass within the right upper lobe right   representing ametastatic or synchronous focus of lung cancer with some   post obstructive pneumonia.    2.3 cm right lower lobe groundglass nodule is again noted and likely   represent a focus of adenocarcinoma in situ spectrum.    MEDIASTINUM, LYMPH NODES: Multiple enlarged mediastinal lymph nodes are   unchanged.    HEART AND VASCULATURE: The heart is mildly enlarged. Stable small   pericardial effusion. Multivessel coronary atherosclerosis. Thoracic   aorta and pulmonary artery normal in course and caliber.Aortic   atherosclerosis.    UPPER ABDOMEN: Stable nodular thickening of the left adrenal gland,   possibly metastatic.    BONES AND SOFT TISSUES: No aggressive osseous lesion. Spinal and   shoulders degenerative changes. Old fracture deformity of the left 5-8   ribs.    LOWER NECK: Multiple small thyroid nodules.    IMPRESSION:     *  The left Pleurx catheter enters the chest at the level of the 8-9 rib   space and its tip terminates within the left upper anterior pleural   space.     *  Interval increase of left hydropneumothorax with near total collapse   of left lower lobe, increased since prior. The left upper lobe remains   collapsed with infiltrating tumor.    *  Small layering right pleural effusion is increased.    *  Stable 2.7 x 2.4 cm consolidative mass within the right upper lobe   right representing a metastatic or synchronous focus of lung cancer with   some post obstructive pneumonia.    *  Stable small pericardial effusion.    < end of copied text > Follow-up Pulm Progress Note    No new respiratory events overnight.  Denies SOB/CP.   -375cc L Pleurx today   99% on 2L NC    Medications:  MEDICATIONS  (STANDING):  anastrozole 1 milliGRAM(s) Oral daily  aspirin enteric coated 81 milliGRAM(s) Oral daily  docusate sodium 100 milliGRAM(s) Oral three times a day  DULoxetine 30 milliGRAM(s) Oral daily  enoxaparin Injectable 40 milliGRAM(s) SubCutaneous every 24 hours  ferrous    sulfate 325 milliGRAM(s) Oral daily  lactobacillus acidophilus 1 Tablet(s) Oral two times a day with meals  metoprolol tartrate 25 milliGRAM(s) Oral two times a day  pantoprazole    Tablet 40 milliGRAM(s) Oral before breakfast  pembrolizumab IVPB 200 milliGRAM(s) IV Intermittent once  senna 2 Tablet(s) Oral at bedtime  simvastatin 20 milliGRAM(s) Oral at bedtime    MEDICATIONS  (PRN):  acetaminophen   Tablet. 650 milliGRAM(s) Oral every 6 hours PRN Mild Pain (1 - 3)  ondansetron Injectable 4 milliGRAM(s) IV Push every 6 hours PRN Nausea  oxyCODONE    5 mG/acetaminophen 325 mG 1 Tablet(s) Oral every 4 hours PRN Moderate Pain (4 - 6)    Vital Signs Last 24 Hrs  T(C): 36.6 (22 Aug 2018 16:29), Max: 37.1 (21 Aug 2018 20:54)  T(F): 97.9 (22 Aug 2018 16:29), Max: 98.7 (21 Aug 2018 20:54)  HR: 102 (22 Aug 2018 16:29) (95 - 107)  BP: 122/73 (22 Aug 2018 16:29) (103/64 - 151/83)  BP(mean): --  RR: 17 (22 Aug 2018 16:29) (17 - 18)  SpO2: 97% (22 Aug 2018 16:29) (85% - 98%) on 2L NC    08-21 @ 07:01  -  08-22 @ 07:00  --------------------------------------------------------  IN: 1120 mL / OUT: 0 mL / NET: 1120 mL    LABS:                        11.7   8.04  )-----------( 381      ( 22 Aug 2018 08:10 )             37.9     08-22    137  |  97  |  13  ----------------------------<  100<H>  4.7   |  31  |  0.80    Ca    9.0      22 Aug 2018 06:56  Mg     1.8     08-22    CAPILLARY BLOOD GLUCOSE    CULTURES: (if applicable)  Culture Results:   No growth to date. (08-19 @ 09:39)  Culture Results:   No growth to date. (08-17 @ 19:29)  Culture Results:   No growth (08-17 @ 14:28)    Most recent blood culture -- 08-19 @ 09:39   -- -- .Blood Blood-Peripheral 08-19 @ 09:39  Most recent blood culture -- 08-17 @ 19:29   -- -- .Blood Blood-Peripheral 08-17 @ 19:29        Physical Examination:  PULM: Bronchial BS L   CVS: S1, S2 heard    RADIOLOGY REVIEWED  CT chest: < from: CT Chest No Cont (08.20.18 @ 23:45) >  AIRWAYS, LUNGS, PLEURA: Left upper and lower lobe bronchiremains   obliterated.    The left Pleurx catheter enters the chest at the level of the 8-9 rib   space and its tip terminates within the left upper anterior pleural   space. Interval increase of left hydropneumothorax with near total   collapse of left lower lobe, increased since prior. The left upper lobe   remains collapsed with infiltrating tumor.    Small layering right pleural effusion is increased.    Stable 2.7 x 2.4 cm consolidative mass within the right upper lobe right   representing ametastatic or synchronous focus of lung cancer with some   post obstructive pneumonia.    2.3 cm right lower lobe groundglass nodule is again noted and likely   represent a focus of adenocarcinoma in situ spectrum.    MEDIASTINUM, LYMPH NODES: Multiple enlarged mediastinal lymph nodes are   unchanged.    HEART AND VASCULATURE: The heart is mildly enlarged. Stable small   pericardial effusion. Multivessel coronary atherosclerosis. Thoracic   aorta and pulmonary artery normal in course and caliber.Aortic   atherosclerosis.    UPPER ABDOMEN: Stable nodular thickening of the left adrenal gland,   possibly metastatic.    BONES AND SOFT TISSUES: No aggressive osseous lesion. Spinal and   shoulders degenerative changes. Old fracture deformity of the left 5-8   ribs.    LOWER NECK: Multiple small thyroid nodules.    IMPRESSION:     *  The left Pleurx catheter enters the chest at the level of the 8-9 rib   space and its tip terminates within the left upper anterior pleural   space.     *  Interval increase of left hydropneumothorax with near total collapse   of left lower lobe, increased since prior. The left upper lobe remains   collapsed with infiltrating tumor.    *  Small layering right pleural effusion is increased.    *  Stable 2.7 x 2.4 cm consolidative mass within the right upper lobe   right representing a metastatic or synchronous focus of lung cancer with   some post obstructive pneumonia.    *  Stable small pericardial effusion.    < end of copied text >

## 2018-08-22 NOTE — ADVANCED PRACTICE NURSE CONSULT - ASSESSMENT
Patient received in bed, alert and oriented x 3, capable of expressing all needs to staff. Cycle 1, day 1/1,Height and weight verified.  Consent in chart. Lab results as per Md rajendra aware of same. Vital signs stable prior to chemotherapy and  within acceptable parameters, see sunrise. Pt  and daughter educated on the importance of saving urine, verbalizes good understanding. Pt  and daughter education done re chemo regimen, drug effects and potential side effects, written materials provided, pt states understanding. Pt with left arm peripheral line, site free from signs and symptoms of infection, good blood return obtained. Pembrolizumab (Keytruda)  200mg in 100cc NS infuse over 30 minutes. Same initiated at 1730, patient tolerated same well without immediate side effects.

## 2018-08-22 NOTE — CHART NOTE - NSCHARTNOTEFT_GEN_A_CORE
Patient with Pleural effusions secondary to Lung cancer now requires home oxygen, RA sat 89, with ambulation RA 84, on 2lnc 98 w ambulation. Patient with Pleural effusions secondary to Lung cancer now requires home oxygen, RA sat 89 while resting, with ambulation RA 85, on 2lnc 98 w ambulation.

## 2018-08-22 NOTE — PROGRESS NOTE ADULT - ASSESSMENT
83 y/o female with a PMH of OA, osteoporosis, RBBB, hyperlipidemia, early stage breast cancer and now with advanced lung cancer admitted with SOB secondary to pleural/pericardial effusion.     1. Adenocarcinoma Lung  / SOB  - CTA negative for PE, shows combination of pleural effusion and large left hilar mass causing compression of left upper lobe  - outside molecular studies from 7-27-18 on peripheral blood with possible ZAC mutation no other options.  Left pleural fluid cytology at  on 8-2-18 c/w adenoCA.   - was scheduled for staging PETCT as outpatient.  MRI head on 8-11-18 was negative for mets.   - S/P left pigtail placement on 8-9-18 as well as pericardiocentesis for moderate pericardial effusion on 8-10-18.  Cytology on pericardial effusion c/w adenoCA. Molecular study from previous thoracentesis at [facility] showed high PDL-1 expression and she is therefore a candidate for first line immunotherapy with Keytruda.  D/W patient and she is agreeable to proceed with treatment, written consent obtained. patient stable today from cardiac standpoint. Will proceed with treatment today.  - Rad Onc evaluation appreciated - will hold off on palliative radiation for now as she feels better after drainage of pleural and pericardial fluid   - d/w Dr. Sanchez would like to start systemic treatment with Keytruda before discharge  -  S/P left sided VATS on 8-15-18  -  Family does not want pericardial window, drain removed    2. Breast cancer - early stage ER/MN+ Her2- s/p lumpectomy on arimidex  - continue arimidex    3. Dysphagia -  - possibly related to compression or due to the SOB - better    4. Rapid Afib - RRT this AM. Now back in NSR.  - no objection from heme/onc standpoint to anticoagulate. currently on low dose lovenox.    Molecular studies show high PDL-1 expression.  S/P Left VATS on 8-15-18  Will start treatment with immunotherapy. Orders written by Dr. Sanchez and faxed to Deneen Roche. Written consent obtained. will proceed with first dose today.   D/w RN, 8 jey RN and chemo pharm  d/w daughter

## 2018-08-23 LAB
ANION GAP SERPL CALC-SCNC: 12 MMOL/L — SIGNIFICANT CHANGE UP (ref 5–17)
BUN SERPL-MCNC: 12 MG/DL — SIGNIFICANT CHANGE UP (ref 7–23)
CALCIUM SERPL-MCNC: 8.7 MG/DL — SIGNIFICANT CHANGE UP (ref 8.4–10.5)
CHLORIDE SERPL-SCNC: 98 MMOL/L — SIGNIFICANT CHANGE UP (ref 96–108)
CO2 SERPL-SCNC: 28 MMOL/L — SIGNIFICANT CHANGE UP (ref 22–31)
CREAT SERPL-MCNC: 0.71 MG/DL — SIGNIFICANT CHANGE UP (ref 0.5–1.3)
GLUCOSE SERPL-MCNC: 96 MG/DL — SIGNIFICANT CHANGE UP (ref 70–99)
HCT VFR BLD CALC: 36.2 % — SIGNIFICANT CHANGE UP (ref 34.5–45)
HGB BLD-MCNC: 11.2 G/DL — LOW (ref 11.5–15.5)
MCHC RBC-ENTMCNC: 28.6 PG — SIGNIFICANT CHANGE UP (ref 27–34)
MCHC RBC-ENTMCNC: 30.9 GM/DL — LOW (ref 32–36)
MCV RBC AUTO: 92.6 FL — SIGNIFICANT CHANGE UP (ref 80–100)
PLATELET # BLD AUTO: 326 K/UL — SIGNIFICANT CHANGE UP (ref 150–400)
POTASSIUM SERPL-MCNC: 4.2 MMOL/L — SIGNIFICANT CHANGE UP (ref 3.5–5.3)
POTASSIUM SERPL-SCNC: 4.2 MMOL/L — SIGNIFICANT CHANGE UP (ref 3.5–5.3)
RBC # BLD: 3.91 M/UL — SIGNIFICANT CHANGE UP (ref 3.8–5.2)
RBC # FLD: 15.8 % — HIGH (ref 10.3–14.5)
SODIUM SERPL-SCNC: 138 MMOL/L — SIGNIFICANT CHANGE UP (ref 135–145)
WBC # BLD: 8.08 K/UL — SIGNIFICANT CHANGE UP (ref 3.8–10.5)
WBC # FLD AUTO: 8.08 K/UL — SIGNIFICANT CHANGE UP (ref 3.8–10.5)

## 2018-08-23 RX ORDER — ASPIRIN/CALCIUM CARB/MAGNESIUM 324 MG
1 TABLET ORAL
Qty: 0 | Refills: 0 | COMMUNITY

## 2018-08-23 RX ORDER — ACETAMINOPHEN 500 MG
1 TABLET ORAL
Qty: 0 | Refills: 0 | COMMUNITY

## 2018-08-23 RX ORDER — DULOXETINE HYDROCHLORIDE 30 MG/1
1 CAPSULE, DELAYED RELEASE ORAL
Qty: 0 | Refills: 0 | COMMUNITY

## 2018-08-23 RX ORDER — OMEPRAZOLE 10 MG/1
1 CAPSULE, DELAYED RELEASE ORAL
Qty: 0 | Refills: 0 | COMMUNITY

## 2018-08-23 RX ORDER — APIXABAN 2.5 MG/1
2.5 TABLET, FILM COATED ORAL EVERY 12 HOURS
Qty: 0 | Refills: 0 | Status: DISCONTINUED | OUTPATIENT
Start: 2018-08-23 | End: 2018-08-24

## 2018-08-23 RX ORDER — ONDANSETRON 8 MG/1
4 TABLET, FILM COATED ORAL ONCE
Qty: 0 | Refills: 0 | Status: COMPLETED | OUTPATIENT
Start: 2018-08-23 | End: 2018-08-23

## 2018-08-23 RX ORDER — APIXABAN 2.5 MG/1
1 TABLET, FILM COATED ORAL
Qty: 60 | Refills: 0 | OUTPATIENT
Start: 2018-08-23 | End: 2018-09-21

## 2018-08-23 RX ORDER — L.ACIDOPH/B.ANIMALIS/B.LONGUM 15B CELL
1 CAPSULE ORAL
Qty: 0 | Refills: 0 | COMMUNITY

## 2018-08-23 RX ORDER — FERROUS SULFATE 325(65) MG
1 TABLET ORAL
Qty: 0 | Refills: 0 | COMMUNITY

## 2018-08-23 RX ORDER — APIXABAN 2.5 MG/1
2.5 TABLET, FILM COATED ORAL EVERY 12 HOURS
Qty: 0 | Refills: 0 | Status: DISCONTINUED | OUTPATIENT
Start: 2018-08-23 | End: 2018-08-23

## 2018-08-23 RX ORDER — PROCHLORPERAZINE MALEATE 5 MG
1 TABLET ORAL
Qty: 0 | Refills: 0 | COMMUNITY

## 2018-08-23 RX ADMIN — SIMVASTATIN 20 MILLIGRAM(S): 20 TABLET, FILM COATED ORAL at 22:40

## 2018-08-23 RX ADMIN — Medication 1 TABLET(S): at 08:09

## 2018-08-23 RX ADMIN — APIXABAN 2.5 MILLIGRAM(S): 2.5 TABLET, FILM COATED ORAL at 18:22

## 2018-08-23 RX ADMIN — PANTOPRAZOLE SODIUM 40 MILLIGRAM(S): 20 TABLET, DELAYED RELEASE ORAL at 05:13

## 2018-08-23 RX ADMIN — Medication 1 TABLET(S): at 18:22

## 2018-08-23 RX ADMIN — Medication 81 MILLIGRAM(S): at 11:30

## 2018-08-23 RX ADMIN — ANASTROZOLE 1 MILLIGRAM(S): 1 TABLET ORAL at 11:30

## 2018-08-23 RX ADMIN — ONDANSETRON 4 MILLIGRAM(S): 8 TABLET, FILM COATED ORAL at 11:30

## 2018-08-23 RX ADMIN — Medication 100 MILLIGRAM(S): at 13:27

## 2018-08-23 RX ADMIN — OXYCODONE AND ACETAMINOPHEN 1 TABLET(S): 5; 325 TABLET ORAL at 02:03

## 2018-08-23 RX ADMIN — SENNA PLUS 2 TABLET(S): 8.6 TABLET ORAL at 22:40

## 2018-08-23 RX ADMIN — Medication 100 MILLIGRAM(S): at 05:13

## 2018-08-23 RX ADMIN — Medication 25 MILLIGRAM(S): at 18:22

## 2018-08-23 RX ADMIN — Medication 325 MILLIGRAM(S): at 11:29

## 2018-08-23 RX ADMIN — DULOXETINE HYDROCHLORIDE 30 MILLIGRAM(S): 30 CAPSULE, DELAYED RELEASE ORAL at 11:29

## 2018-08-23 RX ADMIN — OXYCODONE AND ACETAMINOPHEN 1 TABLET(S): 5; 325 TABLET ORAL at 01:03

## 2018-08-23 RX ADMIN — Medication 650 MILLIGRAM(S): at 22:42

## 2018-08-23 RX ADMIN — Medication 100 MILLIGRAM(S): at 22:40

## 2018-08-23 RX ADMIN — Medication 25 MILLIGRAM(S): at 05:13

## 2018-08-23 NOTE — CHART NOTE - NSCHARTNOTEFT_GEN_A_CORE
84 year old female pt with PMH breast CA 2015 s/p lumpectomy, GERD, recent diagnosis of L hilar and perihilar mass consistent with tumor, and recent thoracentesis draining 1L fluid, sent in by her oncologist for admission for worsening shortness of breath. 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 S/p Left VATS decortication and pleurex catheter placement on 8/15. S/p chemo yesterday, noted plans to continue as outpatient. Speech and swallow saw pt on 8/19 for c/o dysphagia, but SLP notes pt declined assessment of the swallow mechanism. On Regular diet per pt wishes; she is aware to choose foods as she tolerates from menu selections.    Pt seen for malnutrition follow-up. Pt continues to reports fair appetite, mild nausea today.    Source: Patient [x]    Family [ ]     other [x] electronic medical records    Diet : Regular    Patient reports [x] nausea  [ ] vomiting [ ] diarrhea [ ] constipation  [ ]chewing problems [ ] swallowing issues  [x] other: Mild nausea reported    PO intake (meals):  < 50% [ ] 50-75% [x]   % [ ]  other :    Source for PO intake [x] Patient [ ] family [ ] chart [ ] staff [ ] other    Enteral /Parenteral Nutrition: n/a    Current Weight: 124.5 pounds (current, standing, no edema noted). Admit wt 115 pounds.    Pertinent Medications: MEDICATIONS  (STANDING):  anastrozole 1 milliGRAM(s) Oral daily  apixaban 2.5 milliGRAM(s) Oral every 12 hours  aspirin enteric coated 81 milliGRAM(s) Oral daily  docusate sodium 100 milliGRAM(s) Oral three times a day  DULoxetine 30 milliGRAM(s) Oral daily  ferrous    sulfate 325 milliGRAM(s) Oral daily  lactobacillus acidophilus 1 Tablet(s) Oral two times a day with meals  metoprolol tartrate 25 milliGRAM(s) Oral two times a day  pantoprazole    Tablet 40 milliGRAM(s) Oral before breakfast  pembrolizumab IVPB 200 milliGRAM(s) IV Intermittent once  senna 2 Tablet(s) Oral at bedtime  simvastatin 20 milliGRAM(s) Oral at bedtime    MEDICATIONS  (PRN):  acetaminophen   Tablet. 650 milliGRAM(s) Oral every 6 hours PRN Mild Pain (1 - 3)  ondansetron Injectable 4 milliGRAM(s) IV Push every 6 hours PRN Nausea      Pertinent Labs:  08-23 Na138 mmol/L Glu 96 mg/dL K+ 4.2 mmol/L Cr  0.71 mg/dL BUN 12 mg/dL 08-20 Phos 2.9 mg/dL 08-20 Alb 1.8 g/dL<L>      Skin: No pressure ulcers noted      Estimated Needs:   [x] no change since previous assessment  [ ] recalculated:       Previous Nutrition Diagnosis: Moderate Malnutrition         Nutrition Diagnosis is [x] ongoing, addressed with liberalized diet and supplements  [ ] resolved [ ] not applicable          New Nutrition Diagnosis: [x] not applicable         Interventions:     1) Regular diet per pt request. Pt denies new diet preferences.   2) Pt does not wants Ensure supplement, has trialed prosource and she did not like it. Encourage po intake of meals/snacks as tolerated, small more frequent meals if needed. Pt voiced understanding.     Monitoring and Evaluation:     [x] PO intake [x] Tolerance to diet prescription [x] weights [x] follow up per protocol    [x] other: RD remains available: Elizabet Hewitt MS, RDN, CDN, CDE. #[phone].

## 2018-08-23 NOTE — PROGRESS NOTE ADULT - ASSESSMENT
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 Found to have large L pleural effusion causing complete atelectasis of L lung. She had 1L thoracentesis     ·  Problem: Pleural effusion.  Plan: s/p L pigtail by CTS, monitor output, now s/p VATS, pleurex  drainage per CTS  Pneumothorax management per Pulm/CTS      ·  Problem: Adenocarcinoma of lung, unspecified laterality.    Plan: Apparent stage 4 disease. Drainage of Pleural effusion, Onc appreciated, s/p chemo yesterday, tolerated well, to continue as outpt  RadOnc appreciated, hold off palliative radiation as improved w/o      ·  Problem: Dysphagia, unspecified type.  Plan: See above.  Will assume aspiration risk.   S/S eval   Nutrition evaluation.     ·  Problem: DVT prophylaxis    Pericardial effusion w/tamponade - likely malignant, drain removed    small effusion on TTE   , no signs of tamponade  repeat TTE done, read pending    Hypotension in setting of A fib - Cards appreciated, increase BB, TTE, start Eliquis, no objection from Onc    d/c planning if no objection from Pulm/Cardio/Onc/CTS

## 2018-08-23 NOTE — PROGRESS NOTE ADULT - SUBJECTIVE AND OBJECTIVE BOX
Follow-up Pulm Progress Note    No new respiratory events overnight.  Denies SOB/CP.   s/p Keytruda first dose today  97% on 2L NC    Medications:  MEDICATIONS  (STANDING):  anastrozole 1 milliGRAM(s) Oral daily  apixaban 2.5 milliGRAM(s) Oral every 12 hours  aspirin enteric coated 81 milliGRAM(s) Oral daily  docusate sodium 100 milliGRAM(s) Oral three times a day  DULoxetine 30 milliGRAM(s) Oral daily  ferrous    sulfate 325 milliGRAM(s) Oral daily  lactobacillus acidophilus 1 Tablet(s) Oral two times a day with meals  metoprolol tartrate 25 milliGRAM(s) Oral two times a day  pantoprazole    Tablet 40 milliGRAM(s) Oral before breakfast  pembrolizumab IVPB 200 milliGRAM(s) IV Intermittent once  senna 2 Tablet(s) Oral at bedtime  simvastatin 20 milliGRAM(s) Oral at bedtime    MEDICATIONS  (PRN):  acetaminophen   Tablet. 650 milliGRAM(s) Oral every 6 hours PRN Mild Pain (1 - 3)  ondansetron Injectable 4 milliGRAM(s) IV Push every 6 hours PRN Nausea          Vital Signs Last 24 Hrs  T(C): 36.6 (23 Aug 2018 04:15), Max: 36.6 (22 Aug 2018 16:29)  T(F): 97.8 (23 Aug 2018 04:15), Max: 97.9 (22 Aug 2018 16:29)  HR: 90 (23 Aug 2018 04:15) (88 - 102)  BP: 149/75 (23 Aug 2018 04:15) (122/73 - 149/85)  BP(mean): --  RR: 18 (23 Aug 2018 04:15) (17 - 18)  SpO2: 98% (23 Aug 2018 04:15) (97% - 99%) on 2L NC          08-22 @ 07:01  -  08-23 @ 07:00  --------------------------------------------------------  IN: 940 mL / OUT: 500 mL / NET: 440 mL          LABS:                        11.2   8.08  )-----------( 326      ( 23 Aug 2018 07:37 )             36.2     08-23    138  |  98  |  12  ----------------------------<  96  4.2   |  28  |  0.71    Ca    8.7      23 Aug 2018 06:48  Mg     1.8     08-22            CAPILLARY BLOOD GLUCOSE                            CULTURES: (if applicable)  Culture Results:   No growth to date. (08-19 @ 09:39)  Culture Results:   No growth at 5 days. (08-17 @ 19:29)  Culture Results:   No growth (08-17 @ 14:28)    Most recent blood culture -- 08-19 @ 09:39   -- -- .Blood Blood-Peripheral 08-19 @ 09:39        Physical Examination:  PULM: Bronchial BS L  CVS: S1, S2 heard    RADIOLOGY REVIEWED Follow-up Pulm Progress Note    No new respiratory events overnight.  Denies SOB/CP.   s/p Keytruda first dose today  97% on 2L NC    Medications:  MEDICATIONS  (STANDING):  anastrozole 1 milliGRAM(s) Oral daily  apixaban 2.5 milliGRAM(s) Oral every 12 hours  aspirin enteric coated 81 milliGRAM(s) Oral daily  docusate sodium 100 milliGRAM(s) Oral three times a day  DULoxetine 30 milliGRAM(s) Oral daily  ferrous    sulfate 325 milliGRAM(s) Oral daily  lactobacillus acidophilus 1 Tablet(s) Oral two times a day with meals  metoprolol tartrate 25 milliGRAM(s) Oral two times a day  pantoprazole    Tablet 40 milliGRAM(s) Oral before breakfast  pembrolizumab IVPB 200 milliGRAM(s) IV Intermittent once  senna 2 Tablet(s) Oral at bedtime  simvastatin 20 milliGRAM(s) Oral at bedtime    MEDICATIONS  (PRN):  acetaminophen   Tablet. 650 milliGRAM(s) Oral every 6 hours PRN Mild Pain (1 - 3)  ondansetron Injectable 4 milliGRAM(s) IV Push every 6 hours PRN Nausea    Vital Signs Last 24 Hrs  T(C): 36.6 (23 Aug 2018 04:15), Max: 36.6 (22 Aug 2018 16:29)  T(F): 97.8 (23 Aug 2018 04:15), Max: 97.9 (22 Aug 2018 16:29)  HR: 90 (23 Aug 2018 04:15) (88 - 102)  BP: 149/75 (23 Aug 2018 04:15) (122/73 - 149/85)  BP(mean): --  RR: 18 (23 Aug 2018 04:15) (17 - 18)  SpO2: 98% (23 Aug 2018 04:15) (97% - 99%) on 2L NC    08-22 @ 07:01  -  08-23 @ 07:00  --------------------------------------------------------  IN: 940 mL / OUT: 500 mL / NET: 440 mL      LABS:                        11.2   8.08  )-----------( 326      ( 23 Aug 2018 07:37 )             36.2     08-23    138  |  98  |  12  ----------------------------<  96  4.2   |  28  |  0.71    Ca    8.7      23 Aug 2018 06:48  Mg     1.8     08-22    CAPILLARY BLOOD GLUCOSE    CULTURES: (if applicable)  Culture Results:   No growth to date. (08-19 @ 09:39)  Culture Results:   No growth at 5 days. (08-17 @ 19:29)  Culture Results:   No growth (08-17 @ 14:28)    Most recent blood culture -- 08-19 @ 09:39   -- -- .Blood Blood-Peripheral 08-19 @ 09:39        Physical Examination:  PULM: Bronchial BS L  CVS: S1, S2 heard    RADIOLOGY REVIEWED

## 2018-08-23 NOTE — PROGRESS NOTE ADULT - SUBJECTIVE AND OBJECTIVE BOX
Chief Complaint:  fu    History of Present Illness:  The patient is comfortable. sitting in chair. some nausea today. no further episodes of afib. SOB controlled. now on NC o2. had No calf pain.  No fevers.  No chest pain. No vomiting.  Overall she is feeling fairly well.       MEDICATIONS  (STANDING):  anastrozole 1 milliGRAM(s) Oral daily  aspirin enteric coated 81 milliGRAM(s) Oral daily  docusate sodium 100 milliGRAM(s) Oral three times a day  DULoxetine 30 milliGRAM(s) Oral daily  enoxaparin Injectable 40 milliGRAM(s) SubCutaneous every 24 hours  ferrous    sulfate 325 milliGRAM(s) Oral daily  lactobacillus acidophilus 1 Tablet(s) Oral two times a day with meals  metoprolol tartrate 25 milliGRAM(s) Oral two times a day  pantoprazole    Tablet 40 milliGRAM(s) Oral before breakfast  pembrolizumab IVPB 200 milliGRAM(s) IV Intermittent once  senna 2 Tablet(s) Oral at bedtime  simvastatin 20 milliGRAM(s) Oral at bedtime    MEDICATIONS  (PRN):  acetaminophen   Tablet. 650 milliGRAM(s) Oral every 6 hours PRN Mild Pain (1 - 3)  ondansetron Injectable 4 milliGRAM(s) IV Push every 6 hours PRN Nausea      Allergies    Levaquin (Diarrhea)    Intolerances    Dilaudid (Other)      Vital Signs Last 24 Hrs  T(C): 36.6 (23 Aug 2018 04:15), Max: 36.9 (22 Aug 2018 12:59)  T(F): 97.8 (23 Aug 2018 04:15), Max: 98.5 (22 Aug 2018 12:59)  HR: 90 (23 Aug 2018 04:15) (88 - 102)  BP: 149/75 (23 Aug 2018 04:15) (111/71 - 149/85)  BP(mean): --  RR: 18 (23 Aug 2018 04:15) (17 - 18)  SpO2: 98% (23 Aug 2018 04:15) (96% - 99%)    PHYSICAL EXAM  General: comfortable,   HEENT: clear oropharynx, anicteric sclera, pink conjunctiva  CV: normal S1/S2. regular, tachy  Lungs: decreased at bases, left pleurx catheter  Abdomen: soft non-tender mildly-distended, positive bowel sounds  Ext: trace edema  Skin: no rashes   Neuro: alert, weak    LABS:                          11.2   8.08  )-----------( 326      ( 23 Aug 2018 07:37 )             36.2         Mean Cell Volume : 92.6 fl  Mean Cell Hemoglobin : 28.6 pg  Mean Cell Hemoglobin Concentration : 30.9 gm/dL  Auto Neutrophil # : x  Auto Lymphocyte # : x  Auto Monocyte # : x  Auto Eosinophil # : x  Auto Basophil # : x  Auto Neutrophil % : x  Auto Lymphocyte % : x  Auto Monocyte % : x  Auto Eosinophil % : x  Auto Basophil % : x      Serial CBC's  08-23 @ 07:37  Hct-36.2 / Hgb-11.2 / Plat-326 / RBC-3.91 / WBC-8.08  Serial CBC's  08-22 @ 08:10  Hct-37.9 / Hgb-11.7 / Plat-381 / RBC-3.99 / WBC-8.04  Serial CBC's  08-21 @ 07:26  Hct-36.1 / Hgb-11.2 / Plat-354 / RBC-3.90 / WBC-7.14  Serial CBC's  08-20 @ 08:21  Hct-40.9 / Hgb-11.7 / Plat-404 / RBC-4.37 / WBC-6.9  Serial CBC's  08-20 @ 05:42  Hct-30.6 / Hgb-9.9 / Plat-377 / RBC-3.27 / WBC-9.0      08-23    138  |  98  |  12  ----------------------------<  96  4.2   |  28  |  0.71    Ca    8.7      23 Aug 2018 06:48  Mg     1.8     08-22

## 2018-08-23 NOTE — PROGRESS NOTE ADULT - ATTENDING COMMENTS
Patient interviewed and examined.  Chart reviewed and note edited where appropriate.  Case discussed with fellow.  Agree w/ Assessment and Plan as outlined.  Repeat limited ECHO.    Juan Chew MD [facility]  Spectra:  [phone]  Office: [phone]
repeat CXR with suction still shows left lung down  not much else to do  follow clinically  discussed with Thoracic NP
CT with aeration of superior segment LLL. Suggest pleurex catheter.
Pt w intrinsic atelectasis lt lung, s/p chest tube. Left Upper lobe still collapsed with evident ptx. Would repeat Ct chest and if no expansion of LLL then no role for pleurex.
pleurx unable to be done until monday  s/p chest tube  f/u onc recs  f/u thoracic  f/u new TTE Monday
as above  f/u thoracic re: pericardial drain
as above  not much modifiable on chest CT  pleurx is in proper position  pt to discuss GOC with her family
as above  repeat chest CT to confirm placement and make sure there is nothing modifiable with trapped lung
repeat CXR with suction still shows left lung down  not much else to do  follow clinically  discussed with Thoracic NP
as above
as above  pt DNR

## 2018-08-23 NOTE — PROGRESS NOTE ADULT - SUBJECTIVE AND OBJECTIVE BOX
CHIEF COMPLAINT:Patient is a 84y old  Female who presents with a chief complaint of Progressive exertional dyspnea, dry cough for 2 weeks (09 Aug 2018 12:48)  s/p chemo yesterday, tolerated well    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  S/P hip replacement, right: 6/2016  - right hip  History of lumpectomy of right breast: July 2015  History of cataract surgery, left: and right 2016  Elective surgery: reversal of colostomy July 2015  History of tonsillectomy  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 No nausea, vomiting, or hematemesis; Ndysphagia  GENITOURINARY: No dysuria, frequency, hematuria, or incontinence  NEUROLOGICAL: No headaches, memory loss, loss of strength, numbness, or tremors  MUSCULOSKELETAL: No joint pain or swelling; No muscle, back, or extremity pain      Medications:  MEDICATIONS  (STANDING):  anastrozole 1 milliGRAM(s) Oral daily  apixaban 2.5 milliGRAM(s) Oral every 12 hours  aspirin enteric coated 81 milliGRAM(s) Oral daily  docusate sodium 100 milliGRAM(s) Oral three times a day  DULoxetine 30 milliGRAM(s) Oral daily  ferrous    sulfate 325 milliGRAM(s) Oral daily  lactobacillus acidophilus 1 Tablet(s) Oral two times a day with meals  metoprolol tartrate 25 milliGRAM(s) Oral two times a day  pantoprazole    Tablet 40 milliGRAM(s) Oral before breakfast  pembrolizumab IVPB 200 milliGRAM(s) IV Intermittent once  senna 2 Tablet(s) Oral at bedtime  simvastatin 20 milliGRAM(s) Oral at bedtime    MEDICATIONS  (PRN):  acetaminophen   Tablet. 650 milliGRAM(s) Oral every 6 hours PRN Mild Pain (1 - 3)  ondansetron Injectable 4 milliGRAM(s) IV Push every 6 hours PRN Nausea    	    PHYSICAL EXAM:  T(C): 36.6 (08-23-18 @ 04:15), Max: 36.6 (08-22-18 @ 16:29)  HR: 90 (08-23-18 @ 04:15) (88 - 102)  BP: 149/75 (08-23-18 @ 04:15) (122/73 - 149/85)  RR: 18 (08-23-18 @ 04:15) (17 - 18)  SpO2: 98% (08-23-18 @ 04:15) (97% - 99%)  Wt(kg): --  I&O's Summary    22 Aug 2018 07:01  -  23 Aug 2018 07:00  --------------------------------------------------------  IN: 940 mL / OUT: 500 mL / NET: 440 mL    23 Aug 2018 07:01  -  23 Aug 2018 13:28  --------------------------------------------------------  IN: 360 mL / OUT: 0 mL / NET: 360 mL      Appearance: Normal	  HEENT:   Normal oral mucosa, PERRL, EOMI	  Lymphatic: No lymphadenopathy  Cardiovascular: Normal S1 S2, No JVD, No murmurs,  Respiratory: dec sob, L pleurex  Psychiatry: A & O x 3, Mood & affect appropriate  Gastrointestinal:  Soft, Non-tender, + BS	  Skin: No rashes, No ecchymoses, No cyanosis	  Neurologic: Non-focal  Extremities: Normal range of motion, No clubbing, cyanosis   Vascular: Peripheral pulses palpable 2+ bilaterally    LABS:	 	    CARDIAC MARKERS:                                11.2   8.08  )-----------( 326      ( 23 Aug 2018 07:37 )             36.2     08-23    138  |  98  |  12  ----------------------------<  96  4.2   |  28  |  0.71    Ca    8.7      23 Aug 2018 06:48  Mg     1.8     08-22      proBNP:   Lipid Profile:   HgA1c:   TSH:

## 2018-08-23 NOTE — CHART NOTE - NSCHARTNOTESELECT_GEN_ALL_CORE
Tachycardia/Event Note
Event Note
Event Note/-  Nurse Practitioner/Medicine
Event Note/Pulmonary
Event Note/RRT activation
Event Note/RRT note
Fellow Note/Event Note
Medicine/Event Note
Nutrition Services
Post RRT evaluation/Event Note
Pulmonary/Event Note
Rapid Response
o2 note/Event Note

## 2018-08-24 VITALS
DIASTOLIC BLOOD PRESSURE: 55 MMHG | TEMPERATURE: 97 F | RESPIRATION RATE: 18 BRPM | OXYGEN SATURATION: 99 % | HEART RATE: 104 BPM | SYSTOLIC BLOOD PRESSURE: 92 MMHG

## 2018-08-24 LAB
ANION GAP SERPL CALC-SCNC: 10 MMOL/L — SIGNIFICANT CHANGE UP (ref 5–17)
BUN SERPL-MCNC: 14 MG/DL — SIGNIFICANT CHANGE UP (ref 7–23)
CALCIUM SERPL-MCNC: 9.2 MG/DL — SIGNIFICANT CHANGE UP (ref 8.4–10.5)
CHLORIDE SERPL-SCNC: 95 MMOL/L — LOW (ref 96–108)
CO2 SERPL-SCNC: 30 MMOL/L — SIGNIFICANT CHANGE UP (ref 22–31)
CREAT SERPL-MCNC: 0.84 MG/DL — SIGNIFICANT CHANGE UP (ref 0.5–1.3)
CULTURE RESULTS: SIGNIFICANT CHANGE UP
GLUCOSE SERPL-MCNC: 95 MG/DL — SIGNIFICANT CHANGE UP (ref 70–99)
HCT VFR BLD CALC: 36 % — SIGNIFICANT CHANGE UP (ref 34.5–45)
HGB BLD-MCNC: 10.9 G/DL — LOW (ref 11.5–15.5)
MAGNESIUM SERPL-MCNC: 1.7 MG/DL — SIGNIFICANT CHANGE UP (ref 1.6–2.6)
MCHC RBC-ENTMCNC: 28.3 PG — SIGNIFICANT CHANGE UP (ref 27–34)
MCHC RBC-ENTMCNC: 30.3 GM/DL — LOW (ref 32–36)
MCV RBC AUTO: 93.5 FL — SIGNIFICANT CHANGE UP (ref 80–100)
PLATELET # BLD AUTO: 339 K/UL — SIGNIFICANT CHANGE UP (ref 150–400)
POTASSIUM SERPL-MCNC: 3.7 MMOL/L — SIGNIFICANT CHANGE UP (ref 3.5–5.3)
POTASSIUM SERPL-SCNC: 3.7 MMOL/L — SIGNIFICANT CHANGE UP (ref 3.5–5.3)
RBC # BLD: 3.85 M/UL — SIGNIFICANT CHANGE UP (ref 3.8–5.2)
RBC # FLD: 16 % — HIGH (ref 10.3–14.5)
SODIUM SERPL-SCNC: 135 MMOL/L — SIGNIFICANT CHANGE UP (ref 135–145)
SPECIMEN SOURCE: SIGNIFICANT CHANGE UP
WBC # BLD: 7.92 K/UL — SIGNIFICANT CHANGE UP (ref 3.8–10.5)
WBC # FLD AUTO: 7.92 K/UL — SIGNIFICANT CHANGE UP (ref 3.8–10.5)

## 2018-08-24 PROCEDURE — 88342 IMHCHEM/IMCYTCHM 1ST ANTB: CPT

## 2018-08-24 PROCEDURE — 93005 ELECTROCARDIOGRAM TRACING: CPT

## 2018-08-24 PROCEDURE — 87206 SMEAR FLUORESCENT/ACID STAI: CPT

## 2018-08-24 PROCEDURE — 97116 GAIT TRAINING THERAPY: CPT

## 2018-08-24 PROCEDURE — 80048 BASIC METABOLIC PNL TOTAL CA: CPT

## 2018-08-24 PROCEDURE — C1729: CPT

## 2018-08-24 PROCEDURE — 86901 BLOOD TYPING SEROLOGIC RH(D): CPT

## 2018-08-24 PROCEDURE — 83605 ASSAY OF LACTIC ACID: CPT

## 2018-08-24 PROCEDURE — C1769: CPT

## 2018-08-24 PROCEDURE — 83880 ASSAY OF NATRIURETIC PEPTIDE: CPT

## 2018-08-24 PROCEDURE — 82550 ASSAY OF CK (CPK): CPT

## 2018-08-24 PROCEDURE — 82553 CREATINE MB FRACTION: CPT

## 2018-08-24 PROCEDURE — 87040 BLOOD CULTURE FOR BACTERIA: CPT

## 2018-08-24 PROCEDURE — 97530 THERAPEUTIC ACTIVITIES: CPT

## 2018-08-24 PROCEDURE — 89051 BODY FLUID CELL COUNT: CPT

## 2018-08-24 PROCEDURE — 33015: CPT

## 2018-08-24 PROCEDURE — 80053 COMPREHEN METABOLIC PANEL: CPT

## 2018-08-24 PROCEDURE — 84157 ASSAY OF PROTEIN OTHER: CPT

## 2018-08-24 PROCEDURE — 71045 X-RAY EXAM CHEST 1 VIEW: CPT

## 2018-08-24 PROCEDURE — 93308 TTE F-UP OR LMTD: CPT

## 2018-08-24 PROCEDURE — 97110 THERAPEUTIC EXERCISES: CPT

## 2018-08-24 PROCEDURE — 84100 ASSAY OF PHOSPHORUS: CPT

## 2018-08-24 PROCEDURE — C1894: CPT

## 2018-08-24 PROCEDURE — 97161 PT EVAL LOW COMPLEX 20 MIN: CPT

## 2018-08-24 PROCEDURE — 87205 SMEAR GRAM STAIN: CPT

## 2018-08-24 PROCEDURE — 71275 CT ANGIOGRAPHY CHEST: CPT

## 2018-08-24 PROCEDURE — 86900 BLOOD TYPING SEROLOGIC ABO: CPT

## 2018-08-24 PROCEDURE — A9585: CPT

## 2018-08-24 PROCEDURE — 81001 URINALYSIS AUTO W/SCOPE: CPT

## 2018-08-24 PROCEDURE — 85027 COMPLETE CBC AUTOMATED: CPT

## 2018-08-24 PROCEDURE — 82042 OTHER SOURCE ALBUMIN QUAN EA: CPT

## 2018-08-24 PROCEDURE — 88112 CYTOPATH CELL ENHANCE TECH: CPT

## 2018-08-24 PROCEDURE — 82803 BLOOD GASES ANY COMBINATION: CPT

## 2018-08-24 PROCEDURE — 83986 ASSAY PH BODY FLUID NOS: CPT

## 2018-08-24 PROCEDURE — 83735 ASSAY OF MAGNESIUM: CPT

## 2018-08-24 PROCEDURE — 86923 COMPATIBILITY TEST ELECTRIC: CPT

## 2018-08-24 PROCEDURE — 87015 SPECIMEN INFECT AGNT CONCNTJ: CPT

## 2018-08-24 PROCEDURE — 75989 ABSCESS DRAINAGE UNDER X-RAY: CPT

## 2018-08-24 PROCEDURE — 99285 EMERGENCY DEPT VISIT HI MDM: CPT

## 2018-08-24 PROCEDURE — 71250 CT THORAX DX C-: CPT

## 2018-08-24 PROCEDURE — 93321 DOPPLER ECHO F-UP/LMTD STD: CPT

## 2018-08-24 PROCEDURE — 93306 TTE W/DOPPLER COMPLETE: CPT

## 2018-08-24 PROCEDURE — 86850 RBC ANTIBODY SCREEN: CPT

## 2018-08-24 PROCEDURE — 87086 URINE CULTURE/COLONY COUNT: CPT

## 2018-08-24 PROCEDURE — 87070 CULTURE OTHR SPECIMN AEROBIC: CPT

## 2018-08-24 PROCEDURE — 83615 LACTATE (LD) (LDH) ENZYME: CPT

## 2018-08-24 PROCEDURE — 82962 GLUCOSE BLOOD TEST: CPT

## 2018-08-24 PROCEDURE — 84484 ASSAY OF TROPONIN QUANT: CPT

## 2018-08-24 PROCEDURE — 87102 FUNGUS ISOLATION CULTURE: CPT

## 2018-08-24 PROCEDURE — 87116 MYCOBACTERIA CULTURE: CPT

## 2018-08-24 PROCEDURE — 70553 MRI BRAIN STEM W/O & W/DYE: CPT

## 2018-08-24 PROCEDURE — 85610 PROTHROMBIN TIME: CPT

## 2018-08-24 PROCEDURE — 88341 IMHCHEM/IMCYTCHM EA ADD ANTB: CPT

## 2018-08-24 PROCEDURE — 87075 CULTR BACTERIA EXCEPT BLOOD: CPT

## 2018-08-24 PROCEDURE — 82945 GLUCOSE OTHER FLUID: CPT

## 2018-08-24 PROCEDURE — 85730 THROMBOPLASTIN TIME PARTIAL: CPT

## 2018-08-24 PROCEDURE — 88305 TISSUE EXAM BY PATHOLOGIST: CPT

## 2018-08-24 PROCEDURE — 92610 EVALUATE SWALLOWING FUNCTION: CPT

## 2018-08-24 RX ORDER — DULOXETINE HYDROCHLORIDE 30 MG/1
1 CAPSULE, DELAYED RELEASE ORAL
Qty: 0 | Refills: 0 | COMMUNITY
Start: 2018-08-24

## 2018-08-24 RX ORDER — ASPIRIN/CALCIUM CARB/MAGNESIUM 324 MG
1 TABLET ORAL
Qty: 0 | Refills: 0 | COMMUNITY
Start: 2018-08-24

## 2018-08-24 RX ORDER — FERROUS SULFATE 325(65) MG
1 TABLET ORAL
Qty: 0 | Refills: 0 | COMMUNITY
Start: 2018-08-24

## 2018-08-24 RX ORDER — ACETAMINOPHEN 500 MG
2 TABLET ORAL
Qty: 0 | Refills: 0 | COMMUNITY
Start: 2018-08-24

## 2018-08-24 RX ORDER — DULOXETINE HYDROCHLORIDE 30 MG/1
2 CAPSULE, DELAYED RELEASE ORAL
Qty: 0 | Refills: 0 | COMMUNITY
Start: 2018-08-24

## 2018-08-24 RX ORDER — SIMVASTATIN 20 MG/1
1 TABLET, FILM COATED ORAL
Qty: 0 | Refills: 0 | COMMUNITY
Start: 2018-08-24

## 2018-08-24 RX ORDER — OMEPRAZOLE 10 MG/1
1 CAPSULE, DELAYED RELEASE ORAL
Qty: 30 | Refills: 0 | OUTPATIENT
Start: 2018-08-24 | End: 2018-09-22

## 2018-08-24 RX ORDER — METOPROLOL TARTRATE 50 MG
1 TABLET ORAL
Qty: 0 | Refills: 0 | COMMUNITY
Start: 2018-08-24

## 2018-08-24 RX ORDER — METOPROLOL TARTRATE 50 MG
1 TABLET ORAL
Qty: 60 | Refills: 0 | OUTPATIENT
Start: 2018-08-24 | End: 2018-09-22

## 2018-08-24 RX ORDER — ANASTROZOLE 1 MG/1
1 TABLET ORAL
Qty: 0 | Refills: 0 | COMMUNITY
Start: 2018-08-24

## 2018-08-24 RX ORDER — LACTOBACILLUS ACIDOPHILUS 100MM CELL
1 CAPSULE ORAL
Qty: 0 | Refills: 0 | COMMUNITY
Start: 2018-08-24

## 2018-08-24 RX ADMIN — APIXABAN 2.5 MILLIGRAM(S): 2.5 TABLET, FILM COATED ORAL at 05:35

## 2018-08-24 RX ADMIN — ANASTROZOLE 1 MILLIGRAM(S): 1 TABLET ORAL at 11:39

## 2018-08-24 RX ADMIN — Medication 1 TABLET(S): at 08:49

## 2018-08-24 RX ADMIN — Medication 100 MILLIGRAM(S): at 05:30

## 2018-08-24 RX ADMIN — DULOXETINE HYDROCHLORIDE 30 MILLIGRAM(S): 30 CAPSULE, DELAYED RELEASE ORAL at 11:39

## 2018-08-24 RX ADMIN — Medication 650 MILLIGRAM(S): at 11:37

## 2018-08-24 RX ADMIN — PANTOPRAZOLE SODIUM 40 MILLIGRAM(S): 20 TABLET, DELAYED RELEASE ORAL at 05:30

## 2018-08-24 RX ADMIN — Medication 81 MILLIGRAM(S): at 11:39

## 2018-08-24 RX ADMIN — Medication 325 MILLIGRAM(S): at 11:40

## 2018-08-24 RX ADMIN — Medication 25 MILLIGRAM(S): at 05:30

## 2018-08-24 NOTE — PROGRESS NOTE ADULT - PROBLEM SELECTOR PLAN 3
Stage IV disease with L hilar mass, RUL mass, L malignant effusion, likely malignant pericardial effusion, indeterminate adrenal nodule   -f/u molecular studies with heme-onc  -Rad-onc consult appreciated
Malignant L pleural effusion s/p L Pleurex 8/15  - L lung atelectasis likely mostly 2nd proximal hilar mass causing extrinsic compression on proximal airway with effusion
Malignant L pleural effusion s/p L Pleurex 8/15  - L lung atelectasis likely mostly 2nd proximal hilar mass causing extrinsic compression on proximal airway with effusion  -Drainage per thoracic
Stage IV disease with L hilar mass, RUL mass, L malignant effusion, likely malignant pericardial effusion, indeterminate adrenal nodule   -f/u molecular studies with heme-onc  -Rad-onc consult appreciated
Stage IV disease with L hilar mass, RUL mass, L malignant effusion, likely malignant pericardial effusion, indeterminate adrenal nodule   -f/u molecular studies with heme-onc  -Rad-onc consult appreciated
Stage IV disease with L hilar mass, RUL mass, L malignant effusion, likely malignant pericardial effusion, indeterminate adrenal nodule   -f/u molecular studies with outpt heme-onc  -Rad-onc consult appreciated
Stage IV disease with L hilar mass, RUL mass, L malignant effusion, likely malignant pericardial effusion, indeterminate adrenal nodule   -f/u molecular studies with outpt heme-onc  -Rad-onc consult appreciated
Stage IV disease with L hilar mass, RUL mass, L malignant effusion, malignant pericardial effusion, indeterminate adrenal nodule   -To start immunotherapy inpt per heme-onc  -Recommend GOC discussion
Stage IV disease with L hilar mass, RUL mass, L malignant effusion, malignant pericardial effusion, indeterminate adrenal nodule   -To start immunotherapy today per heme-onc
Stage IV disease with L hilar mass, RUL mass, L malignant effusion, malignant pericardial effusion, indeterminate adrenal nodule   -f/u molecular studies with heme-onc  -Rad-onc consult appreciated
Stage IV disease with L hilar mass, RUL mass, L malignant effusion, malignant pericardial effusion, indeterminate adrenal nodule   -s/p first dose Keytruda 8/23
Stage IV disease with L hilar mass, RUL mass, L malignant effusion, malignant pericardial effusion, indeterminate adrenal nodule   -s/p first dose Keytruda today
Malignant L pleural effusion s/p L Pleurex 8/15  - L lung atelectasis likely mostly 2nd proximal hilar mass causing extrinsic compression on proximal airway with effusion  -Drainage per thoracic

## 2018-08-24 NOTE — PROGRESS NOTE ADULT - SUBJECTIVE AND OBJECTIVE BOX
patient resting comfortably in chair    excited about discharge today  tremendous family support at home    no adverse reaction to Keytruda 8/22  outpatient f.u with Dr. Daniel paz is every 3 weeks

## 2018-08-24 NOTE — PROGRESS NOTE ADULT - PROBLEM SELECTOR PROBLEM 2
Pleural effusion
Pleural effusion
Pericardial effusion
Pleural effusion
Tamponade
Tamponade
Pleural effusion
Tamponade

## 2018-08-24 NOTE — PROGRESS NOTE ADULT - PROBLEM SELECTOR PLAN 4
Stage IV disease with L hilar mass, RUL mass, L malignant effusion, malignant pericardial effusion, indeterminate adrenal nodule   -f/u molecular studies with heme-onc  -Rad-onc consult appreciated
with RVR  -Metoprolol increased to 25mg BID

## 2018-08-24 NOTE — PROGRESS NOTE ADULT - SUBJECTIVE AND OBJECTIVE BOX
CHIEF COMPLAINT:Patient is a 84y old  Female who presents with a chief complaint of Progressive exertional dyspnea, dry cough for 2 weeks (09 Aug 2018 12:48)  no acute events    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  S/P hip replacement, right: 6/2016  - right hip  History of lumpectomy of right breast: July 2015  History of cataract surgery, left: and right 2016  Elective surgery: reversal of colostomy July 2015  History of tonsillectomy  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 No nausea, vomiting, or hematemesis; Ndysphagia  GENITOURINARY: No dysuria, frequency, hematuria, or incontinence  NEUROLOGICAL: No headaches, memory loss, loss of strength, numbness, or tremors  MUSCULOSKELETAL: No joint pain or swelling; No muscle, back, or extremity pain      Medications:  MEDICATIONS  (STANDING):  anastrozole 1 milliGRAM(s) Oral daily  apixaban 2.5 milliGRAM(s) Oral every 12 hours  aspirin enteric coated 81 milliGRAM(s) Oral daily  docusate sodium 100 milliGRAM(s) Oral three times a day  DULoxetine 30 milliGRAM(s) Oral daily  ferrous    sulfate 325 milliGRAM(s) Oral daily  lactobacillus acidophilus 1 Tablet(s) Oral two times a day with meals  metoprolol tartrate 25 milliGRAM(s) Oral two times a day  pantoprazole    Tablet 40 milliGRAM(s) Oral before breakfast  pembrolizumab IVPB 200 milliGRAM(s) IV Intermittent once  senna 2 Tablet(s) Oral at bedtime  simvastatin 20 milliGRAM(s) Oral at bedtime    MEDICATIONS  (PRN):  acetaminophen   Tablet. 650 milliGRAM(s) Oral every 6 hours PRN Mild Pain (1 - 3)  ondansetron Injectable 4 milliGRAM(s) IV Push every 6 hours PRN Nausea    	    PHYSICAL EXAM:  T(C): 36.8 (08-24-18 @ 04:42), Max: 36.8 (08-24-18 @ 04:42)  HR: 98 (08-24-18 @ 04:42) (90 - 98)  BP: 129/79 (08-24-18 @ 04:42) (95/58 - 129/79)  RR: 20 (08-24-18 @ 04:42) (17 - 20)  SpO2: 97% (08-24-18 @ 04:42) (97% - 97%)  Wt(kg): --  I&O's Summary    23 Aug 2018 07:01  -  24 Aug 2018 07:00  --------------------------------------------------------  IN: 600 mL / OUT: 0 mL / NET: 600 mL    24 Aug 2018 07:01  -  24 Aug 2018 13:00  --------------------------------------------------------  IN: 120 mL / OUT: 0 mL / NET: 120 mL      Appearance: Normal	  HEENT:   Normal oral mucosa, PERRL, EOMI	  Lymphatic: No lymphadenopathy  Cardiovascular: Normal S1 S2, No JVD, No murmurs,  Respiratory: dec sob, L pleurex  Psychiatry: A & O x 3, Mood & affect appropriate  Gastrointestinal:  Soft, Non-tender, + BS	  Skin: No rashes, No ecchymoses, No cyanosis	  Neurologic: Non-focal  Extremities: Normal range of motion, No clubbing, cyanosis   Vascular: Peripheral pulses palpable 2+ bilaterally    LABS:	 	    CARDIAC MARKERS:                                10.9   7.92  )-----------( 339      ( 24 Aug 2018 07:22 )             36.0     08-24    135  |  95<L>  |  14  ----------------------------<  95  3.7   |  30  |  0.84    Ca    9.2      24 Aug 2018 06:22  Mg     1.7     08-24      proBNP:   Lipid Profile:   HgA1c:   TSH:

## 2018-08-24 NOTE — PROGRESS NOTE ADULT - PROBLEM SELECTOR PROBLEM 4
Adenocarcinoma of lung, unspecified laterality
Afib
Adenocarcinoma of lung, unspecified laterality

## 2018-08-24 NOTE — PROGRESS NOTE ADULT - PROBLEM SELECTOR PLAN 1
Malignant pericardial effusion s/p drainage. Pericardial drain removed 8/16  -Repeat TTE 8/17 with stable effusion  -d/c planning home today

## 2018-08-24 NOTE — PROGRESS NOTE ADULT - PROBLEM SELECTOR PROBLEM 1
Pericardial effusion
Hypotension
Hypotension
Pericardial effusion
Pleural effusion
Tamponade
Hypotension
Pericardial effusion

## 2018-08-24 NOTE — PROGRESS NOTE ADULT - SUBJECTIVE AND OBJECTIVE BOX
Follow-up Pulm Progress Note    No new respiratory events overnight.  Denies SOB/CP.   98% on 2L NC    Medications:  MEDICATIONS  (STANDING):  anastrozole 1 milliGRAM(s) Oral daily  apixaban 2.5 milliGRAM(s) Oral every 12 hours  aspirin enteric coated 81 milliGRAM(s) Oral daily  docusate sodium 100 milliGRAM(s) Oral three times a day  DULoxetine 30 milliGRAM(s) Oral daily  ferrous    sulfate 325 milliGRAM(s) Oral daily  lactobacillus acidophilus 1 Tablet(s) Oral two times a day with meals  metoprolol tartrate 25 milliGRAM(s) Oral two times a day  pantoprazole    Tablet 40 milliGRAM(s) Oral before breakfast  pembrolizumab IVPB 200 milliGRAM(s) IV Intermittent once  senna 2 Tablet(s) Oral at bedtime  simvastatin 20 milliGRAM(s) Oral at bedtime    MEDICATIONS  (PRN):  acetaminophen   Tablet. 650 milliGRAM(s) Oral every 6 hours PRN Mild Pain (1 - 3)  ondansetron Injectable 4 milliGRAM(s) IV Push every 6 hours PRN Nausea          Vital Signs Last 24 Hrs  T(C): 36.8 (24 Aug 2018 04:42), Max: 36.8 (24 Aug 2018 04:42)  T(F): 98.2 (24 Aug 2018 04:42), Max: 98.2 (24 Aug 2018 04:42)  HR: 98 (24 Aug 2018 04:42) (90 - 98)  BP: 129/79 (24 Aug 2018 04:42) (95/58 - 129/79)  BP(mean): --  RR: 20 (24 Aug 2018 04:42) (17 - 20)  SpO2: 97% (24 Aug 2018 04:42) (97% - 97%) on 2L NC          08-23 @ 07:01  -  08-24 @ 07:00  --------------------------------------------------------  IN: 600 mL / OUT: 0 mL / NET: 600 mL          LABS:                        10.9   7.92  )-----------( 339      ( 24 Aug 2018 07:22 )             36.0     08-24    135  |  95<L>  |  14  ----------------------------<  95  3.7   |  30  |  0.84    Ca    9.2      24 Aug 2018 06:22  Mg     1.7     08-24            CAPILLARY BLOOD GLUCOSE                            CULTURES: (if applicable)  Culture Results:   No growth at 5 days. (08-19 @ 09:39)  Culture Results:   No growth at 5 days. (08-17 @ 19:29)  Culture Results:   No growth (08-17 @ 14:28)          Physical Examination:  PULM: Bronchial BS L   CVS: S1, S2 heard    RADIOLOGY REVIEWED  CXR: 8/21: L hydroPTX  L Lung mostly atelectatic

## 2018-08-24 NOTE — PROGRESS NOTE ADULT - PROBLEM SELECTOR PROBLEM 3
Adenocarcinoma of lung, unspecified laterality
Pleural effusion
Pleural effusion
Tamponade
Adenocarcinoma of lung, unspecified laterality
Pleural effusion

## 2018-08-24 NOTE — PROGRESS NOTE ADULT - PROVIDER SPECIALTY LIST ADULT
CT Surgery
Cardiology
Heme/Onc
Internal Medicine
Intervent Radiology
Pulmonology
Thoracic Surgery
Heme/Onc
Heme/Onc
Internal Medicine
Intervent Radiology
Cardiology
Internal Medicine
Pulmonology
Thoracic Surgery
Thoracic Surgery

## 2018-08-24 NOTE — PROGRESS NOTE ADULT - SUBJECTIVE AND OBJECTIVE BOX
Cardiology Follow Up  ==========================================  CC:     HPI:    Review Of Systems:  Negative except as documented above    Medications:    acetaminophen   Tablet.   650 milliGRAM(s) Oral (08-24-18 @ 11:37)   650 milliGRAM(s) Oral (08-23-18 @ 22:42)    anastrozole   1 milliGRAM(s) Oral (08-24-18 @ 11:39)    apixaban   2.5 milliGRAM(s) Oral (08-24-18 @ 05:35)   2.5 milliGRAM(s) Oral (08-23-18 @ 18:22)    aspirin enteric coated   81 milliGRAM(s) Oral (08-24-18 @ 11:39)    docusate sodium   100 milliGRAM(s) Oral (08-24-18 @ 05:30)   100 milliGRAM(s) Oral (08-23-18 @ 22:40)    DULoxetine   30 milliGRAM(s) Oral (08-24-18 @ 11:39)    ferrous    sulfate   325 milliGRAM(s) Oral (08-24-18 @ 11:40)    lactobacillus acidophilus   1 Tablet(s) Oral (08-24-18 @ 08:49)   1 Tablet(s) Oral (08-23-18 @ 18:22)    metoprolol tartrate   25 milliGRAM(s) Oral (08-24-18 @ 05:30)   25 milliGRAM(s) Oral (08-23-18 @ 18:22)    pantoprazole    Tablet   40 milliGRAM(s) Oral (08-24-18 @ 05:30)    senna   2 Tablet(s) Oral (08-23-18 @ 22:40)    simvastatin   20 milliGRAM(s) Oral (08-23-18 @ 22:40)        Telemetry:     Vital Signs Last 24 Hrs  T(C): 36.3 (24 Aug 2018 14:01), Max: 36.8 (24 Aug 2018 04:42)  T(F): 97.3 (24 Aug 2018 14:01), Max: 98.2 (24 Aug 2018 04:42)  HR: 104 (24 Aug 2018 14:01) (90 - 104)  BP: 92/55 (24 Aug 2018 14:01) (92/55 - 129/79)  BP(mean): --  RR: 18 (24 Aug 2018 14:01) (17 - 20)  SpO2: 99% (24 Aug 2018 14:01) (97% - 99%)  I&O's Summary    23 Aug 2018 07:01  -  24 Aug 2018 07:00  --------------------------------------------------------  IN: 600 mL / OUT: 0 mL / NET: 600 mL    24 Aug 2018 07:01  -  24 Aug 2018 14:41  --------------------------------------------------------  IN: 120 mL / OUT: 370 mL / NET: -250 mL        Physical Exam:  General: [x ] NAD  HEENT: [x ] EOMI [ x] PERRL  Cor: [x ] S1,S2 [x ] RRR [x ] No M/R/G   Lungs: [x ] CTA B/L [ x] Normal effort  Abd: [x ] Soft [x ] Non-tender [x ] +BS  Ext: [ x] No edema [x ] No cyanosis    Labs:                        10.9   7.92  )-----------( 339      ( 24 Aug 2018 07:22 )             36.0     08-24    135  |  95<L>  |  14  ----------------------------<  95  3.7   |  30  |  0.84    Ca    9.2      24 Aug 2018 06:22  Mg     1.7     08-24

## 2018-08-31 ENCOUNTER — APPOINTMENT (OUTPATIENT)
Dept: THORACIC SURGERY | Facility: CLINIC | Age: 83
End: 2018-08-31
Payer: MEDICARE

## 2018-08-31 VITALS
HEIGHT: 61 IN | RESPIRATION RATE: 16 BRPM | OXYGEN SATURATION: 94 % | BODY MASS INDEX: 24.55 KG/M2 | SYSTOLIC BLOOD PRESSURE: 116 MMHG | TEMPERATURE: 98.2 F | WEIGHT: 130 LBS | DIASTOLIC BLOOD PRESSURE: 74 MMHG | HEART RATE: 86 BPM

## 2018-08-31 DIAGNOSIS — L03.90 CELLULITIS, UNSPECIFIED: ICD-10-CM

## 2018-08-31 PROCEDURE — 99213 OFFICE O/P EST LOW 20 MIN: CPT

## 2018-09-07 ENCOUNTER — APPOINTMENT (OUTPATIENT)
Dept: FAMILY MEDICINE | Facility: CLINIC | Age: 83
End: 2018-09-07
Payer: MEDICARE

## 2018-09-07 VITALS
HEART RATE: 79 BPM | TEMPERATURE: 97.5 F | OXYGEN SATURATION: 99 % | WEIGHT: 123 LBS | BODY MASS INDEX: 24.15 KG/M2 | DIASTOLIC BLOOD PRESSURE: 56 MMHG | HEIGHT: 60 IN | SYSTOLIC BLOOD PRESSURE: 100 MMHG | RESPIRATION RATE: 18 BRPM

## 2018-09-07 DIAGNOSIS — Z85.3 PERSONAL HISTORY OF MALIGNANT NEOPLASM OF BREAST: ICD-10-CM

## 2018-09-07 DIAGNOSIS — F32.9 MAJOR DEPRESSIVE DISORDER, SINGLE EPISODE, UNSPECIFIED: ICD-10-CM

## 2018-09-07 DIAGNOSIS — Z23 ENCOUNTER FOR IMMUNIZATION: ICD-10-CM

## 2018-09-07 PROCEDURE — 99204 OFFICE O/P NEW MOD 45 MIN: CPT

## 2018-09-07 RX ORDER — PEMBROLIZUMAB 25 MG/ML
100 INJECTION, SOLUTION INTRAVENOUS
Qty: 1 | Refills: 0 | Status: ACTIVE | COMMUNITY
Start: 2018-09-07

## 2018-09-07 RX ORDER — AMOXICILLIN AND CLAVULANATE POTASSIUM 500; 125 MG/1; MG/1
500-125 TABLET, FILM COATED ORAL
Qty: 20 | Refills: 0 | Status: DISCONTINUED | COMMUNITY
Start: 2018-08-31 | End: 2018-09-07

## 2018-09-07 RX ORDER — OXYCODONE AND ACETAMINOPHEN 5; 325 MG/1; MG/1
5-325 TABLET ORAL
Qty: 60 | Refills: 0 | Status: DISCONTINUED | COMMUNITY
Start: 2017-01-03 | End: 2018-09-07

## 2018-09-07 RX ORDER — PROCHLORPERAZINE MALEATE 10 MG/1
10 TABLET ORAL
Qty: 30 | Refills: 0 | Status: ACTIVE | COMMUNITY
Start: 2018-09-07

## 2018-09-07 RX ORDER — ANASTROZOLE TABLETS 1 MG/1
1 TABLET ORAL DAILY
Qty: 1 | Refills: 0 | Status: ACTIVE | COMMUNITY
Start: 2018-09-07

## 2018-09-08 LAB
CULTURE RESULTS: SIGNIFICANT CHANGE UP
SPECIMEN SOURCE: SIGNIFICANT CHANGE UP

## 2018-09-10 DIAGNOSIS — Z60.2 PROBLEMS RELATED TO LIVING ALONE: ICD-10-CM

## 2018-09-10 SDOH — SOCIAL STABILITY - SOCIAL INSECURITY: PROBLEMS RELATED TO LIVING ALONE: Z60.2

## 2018-09-17 ENCOUNTER — APPOINTMENT (OUTPATIENT)
Dept: THORACIC SURGERY | Facility: CLINIC | Age: 83
End: 2018-09-17
Payer: MEDICARE

## 2018-09-17 VITALS
BODY MASS INDEX: 19.24 KG/M2 | HEART RATE: 98 BPM | DIASTOLIC BLOOD PRESSURE: 60 MMHG | OXYGEN SATURATION: 98 % | RESPIRATION RATE: 16 BRPM | WEIGHT: 98 LBS | HEIGHT: 60 IN | SYSTOLIC BLOOD PRESSURE: 102 MMHG

## 2018-09-17 DIAGNOSIS — G89.18 OTHER ACUTE POSTPROCEDURAL PAIN: ICD-10-CM

## 2018-09-17 DIAGNOSIS — J90 PLEURAL EFFUSION, NOT ELSEWHERE CLASSIFIED: ICD-10-CM

## 2018-09-17 DIAGNOSIS — C34.90 MALIGNANT NEOPLASM OF UNSPECIFIED PART OF UNSPECIFIED BRONCHUS OR LUNG: ICD-10-CM

## 2018-09-17 PROCEDURE — 99213 OFFICE O/P EST LOW 20 MIN: CPT

## 2018-09-17 RX ORDER — LIDOCAINE 5% 700 MG/1
5 PATCH TOPICAL
Qty: 14 | Refills: 2 | Status: ACTIVE | COMMUNITY
Start: 2018-09-17 | End: 1900-01-01

## 2018-09-18 RX ORDER — LIDOCAINE 5% 700 MG/1
5 PATCH TOPICAL
Qty: 14 | Refills: 2 | Status: ACTIVE | COMMUNITY
Start: 2018-09-18 | End: 1900-01-01

## 2018-09-26 ENCOUNTER — INPATIENT (INPATIENT)
Facility: HOSPITAL | Age: 83
LOS: 6 days | Discharge: HOME | DRG: 181 | End: 2018-10-03
Attending: INTERNAL MEDICINE | Admitting: INTERNAL MEDICINE
Payer: MEDICARE

## 2018-09-26 VITALS
RESPIRATION RATE: 22 BRPM | HEART RATE: 99 BPM | TEMPERATURE: 98 F | OXYGEN SATURATION: 91 % | DIASTOLIC BLOOD PRESSURE: 86 MMHG | SYSTOLIC BLOOD PRESSURE: 122 MMHG

## 2018-09-26 DIAGNOSIS — Z98.42 CATARACT EXTRACTION STATUS, LEFT EYE: Chronic | ICD-10-CM

## 2018-09-26 DIAGNOSIS — Z96.652 PRESENCE OF LEFT ARTIFICIAL KNEE JOINT: Chronic | ICD-10-CM

## 2018-09-26 DIAGNOSIS — Z98.89 OTHER SPECIFIED POSTPROCEDURAL STATES: Chronic | ICD-10-CM

## 2018-09-26 DIAGNOSIS — Z41.9 ENCOUNTER FOR PROCEDURE FOR PURPOSES OTHER THAN REMEDYING HEALTH STATE, UNSPECIFIED: Chronic | ICD-10-CM

## 2018-09-26 DIAGNOSIS — Z96.641 PRESENCE OF RIGHT ARTIFICIAL HIP JOINT: Chronic | ICD-10-CM

## 2018-09-26 DIAGNOSIS — J90 PLEURAL EFFUSION, NOT ELSEWHERE CLASSIFIED: ICD-10-CM

## 2018-09-26 LAB
ALBUMIN SERPL ELPH-MCNC: 3.5 G/DL — SIGNIFICANT CHANGE UP (ref 3.3–5)
ALP SERPL-CCNC: 103 U/L — SIGNIFICANT CHANGE UP (ref 40–120)
ALT FLD-CCNC: 13 U/L — SIGNIFICANT CHANGE UP (ref 10–45)
ANION GAP SERPL CALC-SCNC: 13 MMOL/L — SIGNIFICANT CHANGE UP (ref 5–17)
APTT BLD: 28.7 SEC — SIGNIFICANT CHANGE UP (ref 27.5–37.4)
AST SERPL-CCNC: 12 U/L — SIGNIFICANT CHANGE UP (ref 10–40)
BASOPHILS # BLD AUTO: 0 K/UL — SIGNIFICANT CHANGE UP (ref 0–0.2)
BASOPHILS NFR BLD AUTO: 0.1 % — SIGNIFICANT CHANGE UP (ref 0–2)
BILIRUB SERPL-MCNC: 0.3 MG/DL — SIGNIFICANT CHANGE UP (ref 0.2–1.2)
BUN SERPL-MCNC: 21 MG/DL — SIGNIFICANT CHANGE UP (ref 7–23)
CALCIUM SERPL-MCNC: 9.7 MG/DL — SIGNIFICANT CHANGE UP (ref 8.4–10.5)
CHLORIDE SERPL-SCNC: 96 MMOL/L — SIGNIFICANT CHANGE UP (ref 96–108)
CO2 SERPL-SCNC: 27 MMOL/L — SIGNIFICANT CHANGE UP (ref 22–31)
CREAT SERPL-MCNC: 0.94 MG/DL — SIGNIFICANT CHANGE UP (ref 0.5–1.3)
EOSINOPHIL # BLD AUTO: 0.1 K/UL — SIGNIFICANT CHANGE UP (ref 0–0.5)
EOSINOPHIL NFR BLD AUTO: 1.7 % — SIGNIFICANT CHANGE UP (ref 0–6)
GAS PNL BLDV: SIGNIFICANT CHANGE UP
GLUCOSE SERPL-MCNC: 117 MG/DL — HIGH (ref 70–99)
HCT VFR BLD CALC: 42.8 % — SIGNIFICANT CHANGE UP (ref 34.5–45)
HGB BLD-MCNC: 13.4 G/DL — SIGNIFICANT CHANGE UP (ref 11.5–15.5)
INR BLD: 1.29 RATIO — HIGH (ref 0.88–1.16)
LYMPHOCYTES # BLD AUTO: 0.8 K/UL — LOW (ref 1–3.3)
LYMPHOCYTES # BLD AUTO: 10.7 % — LOW (ref 13–44)
MCHC RBC-ENTMCNC: 29.5 PG — SIGNIFICANT CHANGE UP (ref 27–34)
MCHC RBC-ENTMCNC: 31.3 GM/DL — LOW (ref 32–36)
MCV RBC AUTO: 94.3 FL — SIGNIFICANT CHANGE UP (ref 80–100)
MONOCYTES # BLD AUTO: 0.6 K/UL — SIGNIFICANT CHANGE UP (ref 0–0.9)
MONOCYTES NFR BLD AUTO: 7.9 % — SIGNIFICANT CHANGE UP (ref 2–14)
NEUTROPHILS # BLD AUTO: 5.9 K/UL — SIGNIFICANT CHANGE UP (ref 1.8–7.4)
NEUTROPHILS NFR BLD AUTO: 79.5 % — HIGH (ref 43–77)
PLATELET # BLD AUTO: 286 K/UL — SIGNIFICANT CHANGE UP (ref 150–400)
POTASSIUM SERPL-MCNC: 4.7 MMOL/L — SIGNIFICANT CHANGE UP (ref 3.5–5.3)
POTASSIUM SERPL-SCNC: 4.7 MMOL/L — SIGNIFICANT CHANGE UP (ref 3.5–5.3)
PROT SERPL-MCNC: 6.8 G/DL — SIGNIFICANT CHANGE UP (ref 6–8.3)
PROTHROM AB SERPL-ACNC: 14.1 SEC — HIGH (ref 9.8–12.7)
RBC # BLD: 4.53 M/UL — SIGNIFICANT CHANGE UP (ref 3.8–5.2)
RBC # FLD: 16.2 % — HIGH (ref 10.3–14.5)
SODIUM SERPL-SCNC: 136 MMOL/L — SIGNIFICANT CHANGE UP (ref 135–145)
WBC # BLD: 7.5 K/UL — SIGNIFICANT CHANGE UP (ref 3.8–10.5)
WBC # FLD AUTO: 7.5 K/UL — SIGNIFICANT CHANGE UP (ref 3.8–10.5)

## 2018-09-26 PROCEDURE — 71045 X-RAY EXAM CHEST 1 VIEW: CPT | Mod: 26

## 2018-09-26 PROCEDURE — 99223 1ST HOSP IP/OBS HIGH 75: CPT

## 2018-09-26 PROCEDURE — 99285 EMERGENCY DEPT VISIT HI MDM: CPT | Mod: 25

## 2018-09-26 PROCEDURE — 93010 ELECTROCARDIOGRAM REPORT: CPT

## 2018-09-26 PROCEDURE — 71250 CT THORAX DX C-: CPT | Mod: 26

## 2018-09-26 RX ORDER — FERROUS SULFATE 325(65) MG
325 TABLET ORAL DAILY
Qty: 0 | Refills: 0 | Status: DISCONTINUED | OUTPATIENT
Start: 2018-09-26 | End: 2018-10-03

## 2018-09-26 RX ORDER — OXYCODONE HYDROCHLORIDE 5 MG/1
5 TABLET ORAL ONCE
Qty: 0 | Refills: 0 | Status: DISCONTINUED | OUTPATIENT
Start: 2018-09-26 | End: 2018-09-26

## 2018-09-26 RX ORDER — SIMVASTATIN 20 MG/1
20 TABLET, FILM COATED ORAL AT BEDTIME
Qty: 0 | Refills: 0 | Status: DISCONTINUED | OUTPATIENT
Start: 2018-09-26 | End: 2018-10-03

## 2018-09-26 RX ORDER — OXYCODONE AND ACETAMINOPHEN 5; 325 MG/1; MG/1
0.5 TABLET ORAL EVERY 6 HOURS
Qty: 0 | Refills: 0 | Status: DISCONTINUED | OUTPATIENT
Start: 2018-09-26 | End: 2018-10-02

## 2018-09-26 RX ORDER — SODIUM CHLORIDE 9 MG/ML
1000 INJECTION INTRAMUSCULAR; INTRAVENOUS; SUBCUTANEOUS ONCE
Qty: 0 | Refills: 0 | Status: COMPLETED | OUTPATIENT
Start: 2018-09-26 | End: 2018-09-26

## 2018-09-26 RX ORDER — LACTOBACILLUS ACIDOPHILUS 100MM CELL
1 CAPSULE ORAL DAILY
Qty: 0 | Refills: 0 | Status: DISCONTINUED | OUTPATIENT
Start: 2018-09-26 | End: 2018-10-03

## 2018-09-26 RX ORDER — METOPROLOL TARTRATE 50 MG
25 TABLET ORAL
Qty: 0 | Refills: 0 | Status: DISCONTINUED | OUTPATIENT
Start: 2018-09-26 | End: 2018-10-01

## 2018-09-26 RX ORDER — APIXABAN 2.5 MG/1
2.5 TABLET, FILM COATED ORAL EVERY 12 HOURS
Qty: 0 | Refills: 0 | Status: DISCONTINUED | OUTPATIENT
Start: 2018-09-26 | End: 2018-10-03

## 2018-09-26 RX ORDER — PANTOPRAZOLE SODIUM 20 MG/1
40 TABLET, DELAYED RELEASE ORAL
Qty: 0 | Refills: 0 | Status: DISCONTINUED | OUTPATIENT
Start: 2018-09-26 | End: 2018-10-03

## 2018-09-26 RX ORDER — DULOXETINE HYDROCHLORIDE 30 MG/1
60 CAPSULE, DELAYED RELEASE ORAL DAILY
Qty: 0 | Refills: 0 | Status: DISCONTINUED | OUTPATIENT
Start: 2018-09-26 | End: 2018-10-03

## 2018-09-26 RX ORDER — ACETAMINOPHEN 500 MG
650 TABLET ORAL EVERY 6 HOURS
Qty: 0 | Refills: 0 | Status: DISCONTINUED | OUTPATIENT
Start: 2018-09-26 | End: 2018-10-03

## 2018-09-26 RX ORDER — ANASTROZOLE 1 MG/1
1 TABLET ORAL DAILY
Qty: 0 | Refills: 0 | Status: DISCONTINUED | OUTPATIENT
Start: 2018-09-26 | End: 2018-10-03

## 2018-09-26 RX ORDER — LIDOCAINE 4 G/100G
1 CREAM TOPICAL
Qty: 0 | Refills: 0 | COMMUNITY

## 2018-09-26 RX ORDER — ACETAMINOPHEN 500 MG
1000 TABLET ORAL ONCE
Qty: 0 | Refills: 0 | Status: COMPLETED | OUTPATIENT
Start: 2018-09-26 | End: 2018-09-26

## 2018-09-26 RX ADMIN — Medication 1000 MILLIGRAM(S): at 20:40

## 2018-09-26 RX ADMIN — Medication 400 MILLIGRAM(S): at 19:49

## 2018-09-26 RX ADMIN — Medication 1000 MILLIGRAM(S): at 20:05

## 2018-09-26 RX ADMIN — OXYCODONE HYDROCHLORIDE 5 MILLIGRAM(S): 5 TABLET ORAL at 22:40

## 2018-09-26 RX ADMIN — APIXABAN 2.5 MILLIGRAM(S): 2.5 TABLET, FILM COATED ORAL at 23:59

## 2018-09-26 RX ADMIN — SODIUM CHLORIDE 500 MILLILITER(S): 9 INJECTION INTRAMUSCULAR; INTRAVENOUS; SUBCUTANEOUS at 19:15

## 2018-09-26 RX ADMIN — Medication 25 MILLIGRAM(S): at 23:59

## 2018-09-26 RX ADMIN — OXYCODONE HYDROCHLORIDE 5 MILLIGRAM(S): 5 TABLET ORAL at 19:49

## 2018-09-26 RX ADMIN — SIMVASTATIN 20 MILLIGRAM(S): 20 TABLET, FILM COATED ORAL at 23:59

## 2018-09-26 NOTE — H&P ADULT - NSHPOUTPATIENTPROVIDERS_GEN_ALL_CORE
Daniel - Oncology, [facility] (saw last time in hospital) Daniel - Oncology, Dr Domo Mansfield/Dr Ranjan Carrasquillo-Pulm ([facility] saw last time in hospital)

## 2018-09-26 NOTE — H&P ADULT - PROBLEM SELECTOR PLAN 3
Pt with very poor po intake in recent weeks, very poor appetite. Some of this may be related to her ongoing illness with possible contribution from chemotherapy. Patient reports that her nausea has been well controlled in recent weeks with minimal use of PRN compazine but still having very poor appetite.  Start calorie count.  F/U Oncology consult, may possibly benefit from use of marinol?

## 2018-09-26 NOTE — ED ADULT NURSE REASSESSMENT NOTE - NS ED NURSE REASSESS COMMENT FT1
Lidocaine patch removed from center of patient back
Report given to holding RN Sachi. Patient aware. Patient currently comfortable. VSS.
Report received from [facility]

## 2018-09-26 NOTE — ED PROVIDER NOTE - OBJECTIVE STATEMENT
85F with PMHx of stage IV lung CA, rectovaginal fistula, glaucoma, breast CA, HLD, HTN, iron deficiency anemia, lung mass, OA, osteopenia, ptosis (L eye), RBBB, retinal tear, ureterovaginal prolapse, PSHx of colostomy, eye surgery, carpal tunnel release, lumpectomy (R breast), R hip replacement, L knee replacement, on Keytruda infusion therapy, x2 weeks s/p last infusion treatment, and Eliquis. As per family at bedside, pt experienced decreased PO intake and change in mental state earlier this week. Pt presents to the ED with complaints of abdominal pain associated with foul odor. Recently, pt had plurex drained, associated with pain and anxiousness, as well as a chest tube inserted while admitted to the hospital recently. Oncologist saw the pt, said she was dehydrated and required fluids. Pt was recently discharged August 23rd, 2018. Takes Tylenol at home for pain relief. Denies fever, or any other complaints at this time.     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 As per family at bedside, pt experienced decreased PO intake and change in mental state earlier this week. Pt presents to the ED with complaints of abdominal pain associated with foul odor. Recently, pt had plurex drained, associated with pain and anxiousness, as well as a chest tube inserted while admitted to the hospital recently. Oncologist saw the pt, said she was dehydrated and required fluids. Pt was recently discharged August 23rd, 2018. Takes Tylenol and 5% lidocaine patch at home for pain relief. Denies fever, or any other complaints at this time.     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 As per family at bedside, pt experienced decreased PO intake and change in mental state earlier this week. Pt presents to the ED with complaints of worsening chest and back pain. Recently, pt had Pleurx drained, associated with pain and anxiousness, as well as a chest tube inserted while admitted to the hospital recently for pleural effusion. Oncologist saw the pt, said she was dehydrated and required fluids. Pt was recently discharged August 23rd, 2018. Takes Tylenol and 5% lidocaine patch at home for pain relief. Denies fever, or any other complaints at this time.     Dr. Valentino (onc)

## 2018-09-26 NOTE — H&P ADULT - PROBLEM SELECTOR PLAN 1
Patient with moderate pleural effusion on imaging despite recent drainage. Suspect that due to pain with drainage patient has only had partial drainage. Given that pain seems to begin with drainage of over 300cc's of fluid, should consider more frequent drainage of smaller amounts to allow gradual reduction that may be more tolerable.  Pulm consult in AM ([facility])  Will need drainage of pleurx catheter likely daily

## 2018-09-26 NOTE — H&P ADULT - PROBLEM SELECTOR PLAN 4
Patient is still on baby ASA despite being started on eliquis  Given somewhat bloody output from pericardial drain on prior admission as well as lack of hx of CAD/MI/CVA likely would benefit from only being on eliquis monotherapy as this would decrease risk of bleeding.  Family is amenable to this change though would eventually like to follow with a cardiologist regarding the afib

## 2018-09-26 NOTE — ED ADULT NURSE NOTE - NSIMPLEMENTINTERV_GEN_ALL_ED
Implemented All Fall Risk Interventions:  [place] to call system. Call bell, personal items and telephone within reach. Instruct patient to call for assistance. Room bathroom lighting operational. Non-slip footwear when patient is off stretcher. Physically safe environment: no spills, clutter or unnecessary equipment. Stretcher in lowest position, wheels locked, appropriate side rails in place. Provide visual cue, wrist band, yellow gown, etc. Monitor gait and stability. Monitor for mental status changes and reorient to person, place, and time. Review medications for side effects contributing to fall risk. Reinforce activity limits and safety measures with patient and family.

## 2018-09-26 NOTE — H&P ADULT - NSHPPHYSICALEXAM_GEN_ALL_CORE
Vital Signs Last 24 Hrs  T(C): 36.6 (26 Sep 2018 23:36), Max: 36.6 (26 Sep 2018 23:31)  T(F): 97.8 (26 Sep 2018 23:36), Max: 97.8 (26 Sep 2018 23:31)  HR: 95 (26 Sep 2018 23:36) (95 - 99)  BP: 124/74 (26 Sep 2018 23:36) (117/82 - 124/74)  BP(mean): --  RR: 20 (26 Sep 2018 23:36) (20 - 22)  SpO2: 100% (26 Sep 2018 23:36) (91% - 100%)    GENERAL: No acute distress, well-developed  HEAD:  Atraumatic, Normocephalic  EYES: EOMI, PERRLA, conjunctiva and sclera clear  ENT: Oral mucosa moist  NECK: Neck supple  CHEST/LUNG: Absent breath sounds on L, right lung without wheeze, no rales, no rhonchi.    HEART: IRR, borderline tachycardic, No murmurs, rubs, or gallops  ABDOMEN: Soft, Nontender, Nondistended; Bowel sounds present  EXTREMITIES:  No clubbing, cyanosis, or edema  VASCULAR: Posterior tibialis pulses intact bilaterally  PSYCH: Normal behavior, normal affect  NEUROLOGY: AAOx3  SKIN: grossly warm and dry

## 2018-09-26 NOTE — ED PROVIDER NOTE - CHPI ED SYMPTOMS POS
+abdominal pain, +foul odor/CHANGE IN LEVEL OF CONSCIOUSNESS/PAIN +chest pain, +back pain/CHANGE IN LEVEL OF CONSCIOUSNESS/PAIN

## 2018-09-26 NOTE — ED ADULT NURSE NOTE - OBJECTIVE STATEMENT
85F comes to ER c/o left side rib and back pain and dehydration. Patient has history of stage 4 lung CA. She gets her pleurex catheter drained 3x/week. Last output was 300cc today. Patients oncologist sent her to ED for dehydration. Patient is a&ox3. Sat 98% on o2- on o2 at baseline. States she has had this pain since pleurex catheter was inserted. Patient denies chest pain/fever/chills/urinary symptoms/vomiting/diarrhea/constipation. States she has no new shortness of breath. Patient has had raspy voice "for months" which family states is attributable to a likely mass. Will continue to monitor.

## 2018-09-26 NOTE — ED PROVIDER NOTE - BREATH SOUNDS
DIMINISHED/Diminished breath sounds on the L. Back with Pleurx catheter in place. Dressing around catheter: no evidence of erythema or active bleeding. Tender to palpation.

## 2018-09-26 NOTE — ED PROVIDER NOTE - MEDICAL DECISION MAKING DETAILS
85F with stage IV lung CA and worsening chest pain and effusion. Concern for increased effusion. Will treat pain, check CT, labs, IV fluid hydration for dehydration. Likely to be admitted for further workup.

## 2018-09-26 NOTE — H&P ADULT - ASSESSMENT
Pt is a pleasant 86 yo F w/pmhx of right breast CA s/p tylectomy in the past now on anastrozole, GERD, anemia, HTN, recent diagnosis of lung cancer with admission for large left sided pleural effusion w/left lung collapse s/p VATS with decortication as well as pleurx placement-now on chemotherapy with Keytruda, pericardial effusion with previous hospital course complicated by development of tamponade requiring pericardial drain now presenting with persistent left lung collapse and moderate left sided pleural effusion as well as dehydration 2/2 poor po intake with concern for failure to thrive	.

## 2018-09-26 NOTE — H&P ADULT - HISTORY OF PRESENT ILLNESS
Pt is a pleasant 84 yo F w/pmhx of right breast CA s/p tylectomy in the past now on anastrozole, GERD, anemia, HTN, recent diagnosis of lung cancer with admission for large left sided pleural effusion w/left lung collapse s/p VATS with decortication as well as pleurx placement-now on chemotherapy with Keytruda, pericardial effusion with previous hospital course complicated by development of tamponade requiring pericardial drain. Patient now presents with progressively worsening weakness as well as difficulty tolerating significant pleurx drainage. Patient and family report that since discharge she has been having her pleurx catheter drained every other day by visiting nurse. However, the patient has had significant difficulty tolerating full drainage as she begins to develop severe left check and back pain when more than approximately 300cc of fluid is removed. Because of this her nurses have stopped draining after that amount of fluid is taken off. Family reports that when there is not sufficient drainage, the next day her pleurx will sometimes leak fluid, though when sufficient fluid is removed this does not occur. Today patient had her pleurx drained of approximately 350cc's of fluid then went to her oncologist to have follow up. However, due to the fluid removal patient (that typically triggers pain) patient was very uncomfortable and was having her typical left sided chest and back pain when she went to the oncologist. Patient's oncologist became concerned regarding this pain as well as had concern regarding patient being dehydrated. Patient reports that since discharge she has had very poor appetite and intermittent nausea with chemotherapy. Because of this she is not eating or drinking well at home. Patient is currently not in pain, when she does have pain related the pleurx drainage she typically will take tylenol PRN, but occasionally has needed 1/2 tab of percocet (she does not usually take a full tablet) though she has only used 3 tablets so far total.  No diarrhea, no vomiting. No dysuria. No abdominal pain. No chest/back pain when patient has not recently had pleurx drained. Though patient is currently on supplemental oxygen patient denies feeling subjective dyspnea, though her physicians have noticed her appearing dyspneic in the past. Pt is a pleasant 84 yo F w/pmhx of right breast CA s/p tylectomy in the past now on anastrozole, GERD, anemia, HTN, recent diagnosis of lung cancer with admission for large left sided pleural effusion w/left lung collapse s/p VATS with decortication as well as pleurx placement-now on chemotherapy with Keytruda, pericardial effusion with previous hospital course complicated by development of tamponade requiring pericardial drain. Patient now presents with progressively worsening weakness as well as difficulty tolerating significant pleurx drainage. Patient and family report that since discharge she has been having her pleurx catheter drained every other day by visiting nurse. However, the patient has had significant difficulty tolerating full drainage as she begins to develop severe left check and back pain when more than approximately 300cc of fluid is removed. Because of this her nurses have stopped draining after that amount of fluid is taken off. Family reports that when there is not sufficient drainage, the next day her pleurx will sometimes leak fluid, though when sufficient fluid is removed this does not occur. Today patient had her pleurx drained of approximately 350cc's of fluid then went to her oncologist to have follow up. However, due to the fluid removal patient (that typically triggers pain) patient was very uncomfortable and was having her typical left sided chest and back pain when she went to the oncologist. Patient's oncologist became concerned regarding this pain as well as had concern regarding patient being dehydrated. Patient reports that since discharge she has had very poor appetite and intermittent nausea with chemotherapy. Because of this she is not eating or drinking well at home. Patient is currently not in pain, when she does have pain related the pleurx drainage she typically will take tylenol PRN, but occasionally has needed 1/2 tab of percocet (she does not usually take a full tablet) though she has only used 3 tablets so far total.  No diarrhea, no vomiting. No dysuria. No abdominal pain. No chest/back pain when patient has not recently had pleurx drained. Though patient is currently on supplemental oxygen patient denies feeling subjective dyspnea, though her physicians have noticed her appearing dyspneic in the past. Of note patient does not have any hx of CAD or MI, no family hx of similar, no hx of CVA.	Reports being placed on eliquis due to recent diagnosis of afib but has not followed with a cardiologist or clarified whether she should be on dual therapy as she is still taking aspirin 81.

## 2018-09-26 NOTE — H&P ADULT - NSHPLABSRESULTS_GEN_ALL_CORE
Labs personally reviewed:                        13.4   7.5   )-----------( 286      ( 26 Sep 2018 18:48 )             42.8       09-26    136  |  96  |  21  ----------------------------<  117<H>  4.7   |  27  |  0.94    Ca    9.7      26 Sep 2018 18:48    TPro  6.8  /  Alb  3.5  /  TBili  0.3  /  DBili  x   /  AST  12  /  ALT  13  /  AlkPhos  103  09-26            LIVER FUNCTIONS - ( 26 Sep 2018 18:48 )  Alb: 3.5 g/dL / Pro: 6.8 g/dL / ALK PHOS: 103 U/L / ALT: 13 U/L / AST: 12 U/L / GGT: x             PT/INR - ( 26 Sep 2018 19:19 )   PT: 14.1 sec;   INR: 1.29 ratio         PTT - ( 26 Sep 2018 19:19 )  PTT:28.7 sec    Imaging personally reviewed-moderate left sided pleural effusion with persistent left lung collapse, small pericardial effusion at this time.    EKG personally reviewed-NSR @ 99bpm with bifascicular block

## 2018-09-27 DIAGNOSIS — I48.91 UNSPECIFIED ATRIAL FIBRILLATION: ICD-10-CM

## 2018-09-27 DIAGNOSIS — R62.7 ADULT FAILURE TO THRIVE: ICD-10-CM

## 2018-09-27 DIAGNOSIS — R63.0 ANOREXIA: ICD-10-CM

## 2018-09-27 DIAGNOSIS — C79.9 SECONDARY MALIGNANT NEOPLASM OF UNSPECIFIED SITE: ICD-10-CM

## 2018-09-27 DIAGNOSIS — C34.90 MALIGNANT NEOPLASM OF UNSPECIFIED PART OF UNSPECIFIED BRONCHUS OR LUNG: ICD-10-CM

## 2018-09-27 DIAGNOSIS — I48.0 PAROXYSMAL ATRIAL FIBRILLATION: ICD-10-CM

## 2018-09-27 DIAGNOSIS — J90 PLEURAL EFFUSION, NOT ELSEWHERE CLASSIFIED: ICD-10-CM

## 2018-09-27 DIAGNOSIS — F41.9 ANXIETY DISORDER, UNSPECIFIED: ICD-10-CM

## 2018-09-27 LAB
ANION GAP SERPL CALC-SCNC: 13 MMOL/L — SIGNIFICANT CHANGE UP (ref 5–17)
BASOPHILS # BLD AUTO: 0.02 K/UL — SIGNIFICANT CHANGE UP (ref 0–0.2)
BASOPHILS NFR BLD AUTO: 0.3 % — SIGNIFICANT CHANGE UP (ref 0–2)
BUN SERPL-MCNC: 20 MG/DL — SIGNIFICANT CHANGE UP (ref 7–23)
CALCIUM SERPL-MCNC: 9.3 MG/DL — SIGNIFICANT CHANGE UP (ref 8.4–10.5)
CHLORIDE SERPL-SCNC: 96 MMOL/L — SIGNIFICANT CHANGE UP (ref 96–108)
CO2 SERPL-SCNC: 23 MMOL/L — SIGNIFICANT CHANGE UP (ref 22–31)
CREAT SERPL-MCNC: 0.79 MG/DL — SIGNIFICANT CHANGE UP (ref 0.5–1.3)
EOSINOPHIL # BLD AUTO: 0.33 K/UL — SIGNIFICANT CHANGE UP (ref 0–0.5)
EOSINOPHIL NFR BLD AUTO: 4.1 % — SIGNIFICANT CHANGE UP (ref 0–6)
GLUCOSE SERPL-MCNC: 104 MG/DL — HIGH (ref 70–99)
HCT VFR BLD CALC: 39.3 % — SIGNIFICANT CHANGE UP (ref 34.5–45)
HGB BLD-MCNC: 12 G/DL — SIGNIFICANT CHANGE UP (ref 11.5–15.5)
IMM GRANULOCYTES NFR BLD AUTO: 0.5 % — SIGNIFICANT CHANGE UP (ref 0–1.5)
LYMPHOCYTES # BLD AUTO: 1.05 K/UL — SIGNIFICANT CHANGE UP (ref 1–3.3)
LYMPHOCYTES # BLD AUTO: 13.2 % — SIGNIFICANT CHANGE UP (ref 13–44)
MAGNESIUM SERPL-MCNC: 1.6 MG/DL — SIGNIFICANT CHANGE UP (ref 1.6–2.6)
MCHC RBC-ENTMCNC: 28.6 PG — SIGNIFICANT CHANGE UP (ref 27–34)
MCHC RBC-ENTMCNC: 30.5 GM/DL — LOW (ref 32–36)
MCV RBC AUTO: 93.8 FL — SIGNIFICANT CHANGE UP (ref 80–100)
MONOCYTES # BLD AUTO: 1.03 K/UL — HIGH (ref 0–0.9)
MONOCYTES NFR BLD AUTO: 12.9 % — SIGNIFICANT CHANGE UP (ref 2–14)
NEUTROPHILS # BLD AUTO: 5.49 K/UL — SIGNIFICANT CHANGE UP (ref 1.8–7.4)
NEUTROPHILS NFR BLD AUTO: 69 % — SIGNIFICANT CHANGE UP (ref 43–77)
PHOSPHATE SERPL-MCNC: 2.9 MG/DL — SIGNIFICANT CHANGE UP (ref 2.5–4.5)
PLATELET # BLD AUTO: 252 K/UL — SIGNIFICANT CHANGE UP (ref 150–400)
POTASSIUM SERPL-MCNC: 3.6 MMOL/L — SIGNIFICANT CHANGE UP (ref 3.5–5.3)
POTASSIUM SERPL-SCNC: 3.6 MMOL/L — SIGNIFICANT CHANGE UP (ref 3.5–5.3)
RBC # BLD: 4.19 M/UL — SIGNIFICANT CHANGE UP (ref 3.8–5.2)
RBC # FLD: 17.8 % — HIGH (ref 10.3–14.5)
SODIUM SERPL-SCNC: 132 MMOL/L — LOW (ref 135–145)
WBC # BLD: 7.96 K/UL — SIGNIFICANT CHANGE UP (ref 3.8–10.5)
WBC # FLD AUTO: 7.96 K/UL — SIGNIFICANT CHANGE UP (ref 3.8–10.5)

## 2018-09-27 RX ORDER — LIDOCAINE 4 G/100G
1 CREAM TOPICAL DAILY
Qty: 0 | Refills: 0 | Status: DISCONTINUED | OUTPATIENT
Start: 2018-09-27 | End: 2018-10-03

## 2018-09-27 RX ORDER — DRONABINOL 2.5 MG
2.5 CAPSULE ORAL
Qty: 0 | Refills: 0 | Status: DISCONTINUED | OUTPATIENT
Start: 2018-09-27 | End: 2018-10-01

## 2018-09-27 RX ADMIN — SIMVASTATIN 20 MILLIGRAM(S): 20 TABLET, FILM COATED ORAL at 22:47

## 2018-09-27 RX ADMIN — PANTOPRAZOLE SODIUM 40 MILLIGRAM(S): 20 TABLET, DELAYED RELEASE ORAL at 09:09

## 2018-09-27 RX ADMIN — DULOXETINE HYDROCHLORIDE 60 MILLIGRAM(S): 30 CAPSULE, DELAYED RELEASE ORAL at 11:29

## 2018-09-27 RX ADMIN — Medication 650 MILLIGRAM(S): at 16:00

## 2018-09-27 RX ADMIN — Medication 25 MILLIGRAM(S): at 09:09

## 2018-09-27 RX ADMIN — Medication 650 MILLIGRAM(S): at 15:20

## 2018-09-27 RX ADMIN — APIXABAN 2.5 MILLIGRAM(S): 2.5 TABLET, FILM COATED ORAL at 10:42

## 2018-09-27 RX ADMIN — ANASTROZOLE 1 MILLIGRAM(S): 1 TABLET ORAL at 11:29

## 2018-09-27 RX ADMIN — Medication 25 MILLIGRAM(S): at 22:47

## 2018-09-27 RX ADMIN — APIXABAN 2.5 MILLIGRAM(S): 2.5 TABLET, FILM COATED ORAL at 22:47

## 2018-09-27 RX ADMIN — LIDOCAINE 1 PATCH: 4 CREAM TOPICAL at 18:41

## 2018-09-27 RX ADMIN — Medication 1 TABLET(S): at 11:29

## 2018-09-27 RX ADMIN — Medication 325 MILLIGRAM(S): at 11:29

## 2018-09-27 RX ADMIN — Medication 2.5 MILLIGRAM(S): at 18:51

## 2018-09-27 NOTE — CONSULT NOTE ADULT - SUBJECTIVE AND OBJECTIVE BOX
PULMONARY CONSULT    HPI: 84 y/o F with PMH of recently diagnosed Stage IV Lung CA (adenocarcinoma) with L hilar mass, L malignant pleural effusion, malignant pericardial effusion (Diagnosed 8/2018) s/p L Pleurex (8/2018) without much lung re-expansion, she had a complicated hospital course including cardiac tamponade s/p drainage, Afib with RVR, history of R breast CA, discharged newly oxygen dependent (2L). She was started on Keytruda. Sent in by oncologist for possible dehydration. Patient c/o pain with pleurex drainage, worse yesterday and decreased PO intake. CT chest with complete L lung collapse (not much changed from prior), states pain is currently improved. Denies shortness of breath, palpitations, cough, fever/chills.         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  S/P hip replacement, right: 6/2016  - right hip  History of lumpectomy of right breast: July 2015  History of cataract surgery, left: and right 2016  Elective surgery: reversal of colostomy July 2015  History of tonsillectomy  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  H/O eye surgery: bilateral ptosis  History of varicose vein stripping: recent vascular evaluation - all normal  History of carpal tunnel release: bilateral    Allergies    Levaquin (Diarrhea)    Intolerances    Dilaudid (Other)  tramadol (Unknown)    FAMILY HISTORY:  Diabetes mellitus (Mother)    Social history: Never smoker     Review of Systems:  CONSTITUTIONAL: No fever, chills, or fatigue  EYES: No eye pain, visual disturbances, or discharge  ENMT:  No difficulty hearing, tinnitus, vertigo; No sinus or throat pain  NECK: No pain or stiffness  RESPIRATORY: Per above  CARDIOVASCULAR: No chest pain, palpitations, dizziness, or leg swelling  GASTROINTESTINAL: No abdominal or epigastric pain. No nausea, vomiting, or hematemesis; No diarrhea or constipation. No melena or hematochezia.  GENITOURINARY: No dysuria, frequency, hematuria, or incontinence  NEUROLOGICAL: No headaches, memory loss, loss of strength, numbness, or tremors  SKIN: No itching, burning, rashes, or lesions   MUSCULOSKELETAL: No joint pain or swelling; No muscle, back, or extremity pain  PSYCHIATRIC: No depression, anxiety, mood swings, or difficulty sleeping      Medications:  MEDICATIONS  (STANDING):  anastrozole 1 milliGRAM(s) Oral daily  apixaban 2.5 milliGRAM(s) Oral every 12 hours  DULoxetine 60 milliGRAM(s) Oral daily  ferrous    sulfate 325 milliGRAM(s) Oral daily  lactobacillus acidophilus 1 Tablet(s) Oral daily  metoprolol succinate ER 25 milliGRAM(s) Oral two times a day  pantoprazole    Tablet 40 milliGRAM(s) Oral before breakfast  simvastatin 20 milliGRAM(s) Oral at bedtime    MEDICATIONS  (PRN):  acetaminophen   Tablet .. 650 milliGRAM(s) Oral every 6 hours PRN Mild Pain (1 - 3), Moderate Pain (4 - 6)  oxyCODONE    5 mG/acetaminophen 325 mG 0.5 Tablet(s) Oral every 6 hours PRN Severe Pain (7 - 10)            Vital Signs Last 24 Hrs  T(C): 36.4 (27 Sep 2018 07:14), Max: 36.7 (27 Sep 2018 05:12)  T(F): 97.6 (27 Sep 2018 07:14), Max: 98 (27 Sep 2018 05:12)  HR: 104 (27 Sep 2018 07:14) (95 - 104)  BP: 124/82 (27 Sep 2018 07:14) (115/82 - 124/82)  BP(mean): --  RR: 18 (27 Sep 2018 07:14) (18 - 22)  SpO2: 96% (27 Sep 2018 07:14) (91% - 100%) on 2L NC      VBG pH 7.34 09-26 @ 18:48  VBG pCO2 57 09-26 @ 18:48  VBG O2 sat 20 09-26 @ 18:48  VBG lactate 2.1 09-26 @ 18:48            LABS:                        12.0   7.96  )-----------( 252      ( 27 Sep 2018 08:40 )             39.3     09-27    132<L>  |  96  |  20  ----------------------------<  104<H>  3.6   |  23  |  0.79    Ca    9.3      27 Sep 2018 06:45  Phos  2.9     09-27  Mg     1.6     09-27    TPro  6.8  /  Alb  3.5  /  TBili  0.3  /  DBili  x   /  AST  12  /  ALT  13  /  AlkPhos  103  09-26          CAPILLARY BLOOD GLUCOSE        PT/INR - ( 26 Sep 2018 19:19 )   PT: 14.1 sec;   INR: 1.29 ratio         PTT - ( 26 Sep 2018 19:19 )  PTT:28.7 sec          Physical Examination:    General: No acute distress.      HEENT: Pupils equal, reactive to light.  Symmetric.    PULM: Referred BS LUANN, Absent BS L base, Clear R    CVS: S1, S2 irreg    ABD: Soft, nondistended, nontender, normoactive bowel sounds, no masses    EXT: No edema, nontender    SKIN: Warm and well perfused, no rashes noted.    NEURO: Alert, oriented, interactive, nonfocal    RADIOLOGY REVIEWED  CT chest: < from: CT Chest No Cont (09.26.18 @ 19:39) >  CHEST:     LUNGS AND LARGE AIRWAYS/PLEURA: A left-sided Pleurx catheter terminates   in the left upper anterior pleural space. Interval resolution of a left   pneumothorax. Persistent moderate left pleural effusion. Complete   collapse of the left lung with associated increased leftward mediastinal   shift. Redemonstration of a large consolidative mass within the left   upper lobe. Stable 2.7 x 2.4 cm consolidative mass within the right upper   lobe which likely represents a metastatic or synchronous focus of   malignancy. Stable 2.3 cm right lower lobe groundglass nodule is again   noted and may represent another focus of malignancy.  VESSELS: Atherosclerotic calcifications of the thoracic aorta and   coronary arteries.  HEART: Heart size is mildly enlarged. Small pericardial effusion,   unchanged.  MEDIASTINUM AND SUZI: Multiple enlarged mediastinal lymph nodes,   unchanged.  CHEST WALLAND LOWER NECK: Within normal limits.  VISUALIZED UPPER ABDOMEN: Left renal cyst with peripheral calcification.   Left adrenal thickening, unchanged.  BONES: Multilevel spinal degenerative changes. Chronic left-sided rib   deformities.    IMPRESSION:  Moderate left pleural effusion with complete collapse of the left lung   and associated increased leftward mediastinal shift.    Stable bilateral upper lobe masses and right lower lobe groundglass   nodule.    Small pericardial effusion, unchanged.    < end of copied text > PULMONARY CONSULT    HPI: 84 y/o F with PMH of recently diagnosed Stage IV Lung CA (adenocarcinoma) with L hilar mass, L malignant pleural effusion, malignant pericardial effusion (Diagnosed 8/2018) s/p L Pleurex (8/2018) without much lung re-expansion, she had a complicated hospital course including cardiac tamponade s/p drainage, Afib with RVR, history of R breast CA, discharged newly oxygen dependent (2L). She was started on Keytruda. Sent in by oncologist for possible dehydration. Patient c/o pain with pleurex drainage, worse yesterday and decreased PO intake. CT chest with complete L lung collapse (not much changed from prior), states pain is currently improved. Denies shortness of breath, palpitations, cough, fever/chills.     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  S/P hip replacement, right: 6/2016  - right hip  History of lumpectomy of right breast: July 2015  History of cataract surgery, left: and right 2016  Elective surgery: reversal of colostomy July 2015  History of tonsillectomy  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  H/O eye surgery: bilateral ptosis  History of varicose vein stripping: recent vascular evaluation - all normal  History of carpal tunnel release: bilateral    Allergies    Levaquin (Diarrhea)    Intolerances    Dilaudid (Other)  tramadol (Unknown)    FAMILY HISTORY:  Diabetes mellitus (Mother)    Social history: Never smoker     Review of Systems:  CONSTITUTIONAL: No fever, chills, or fatigue  EYES: No eye pain, visual disturbances, or discharge  ENMT:  No difficulty hearing, tinnitus, vertigo; No sinus or throat pain  NECK: No pain or stiffness  RESPIRATORY: Per above  CARDIOVASCULAR: No chest pain, palpitations, dizziness, or leg swelling  GASTROINTESTINAL: No abdominal or epigastric pain. No nausea, vomiting, or hematemesis; No diarrhea or constipation. No melena or hematochezia.  GENITOURINARY: No dysuria, frequency, hematuria, or incontinence  NEUROLOGICAL: No headaches, memory loss, loss of strength, numbness, or tremors  SKIN: No itching, burning, rashes, or lesions   MUSCULOSKELETAL: No joint pain or swelling; No muscle, back, or extremity pain  PSYCHIATRIC: No depression, anxiety, mood swings, or difficulty sleeping      Medications:  MEDICATIONS  (STANDING):  anastrozole 1 milliGRAM(s) Oral daily  apixaban 2.5 milliGRAM(s) Oral every 12 hours  DULoxetine 60 milliGRAM(s) Oral daily  ferrous    sulfate 325 milliGRAM(s) Oral daily  lactobacillus acidophilus 1 Tablet(s) Oral daily  metoprolol succinate ER 25 milliGRAM(s) Oral two times a day  pantoprazole    Tablet 40 milliGRAM(s) Oral before breakfast  simvastatin 20 milliGRAM(s) Oral at bedtime    MEDICATIONS  (PRN):  acetaminophen   Tablet .. 650 milliGRAM(s) Oral every 6 hours PRN Mild Pain (1 - 3), Moderate Pain (4 - 6)  oxyCODONE    5 mG/acetaminophen 325 mG 0.5 Tablet(s) Oral every 6 hours PRN Severe Pain (7 - 10)    Vital Signs Last 24 Hrs  T(C): 36.4 (27 Sep 2018 07:14), Max: 36.7 (27 Sep 2018 05:12)  T(F): 97.6 (27 Sep 2018 07:14), Max: 98 (27 Sep 2018 05:12)  HR: 104 (27 Sep 2018 07:14) (95 - 104)  BP: 124/82 (27 Sep 2018 07:14) (115/82 - 124/82)  BP(mean): --  RR: 18 (27 Sep 2018 07:14) (18 - 22)  SpO2: 96% (27 Sep 2018 07:14) (91% - 100%) on 2L NC      VBG pH 7.34 09-26 @ 18:48  VBG pCO2 57 09-26 @ 18:48  VBG O2 sat 20 09-26 @ 18:48  VBG lactate 2.1 09-26 @ 18:48    LABS:                        12.0   7.96  )-----------( 252      ( 27 Sep 2018 08:40 )             39.3     09-27    132<L>  |  96  |  20  ----------------------------<  104<H>  3.6   |  23  |  0.79    Ca    9.3      27 Sep 2018 06:45  Phos  2.9     09-27  Mg     1.6     09-27    TPro  6.8  /  Alb  3.5  /  TBili  0.3  /  DBili  x   /  AST  12  /  ALT  13  /  AlkPhos  103  09-26    CAPILLARY BLOOD GLUCOSE    PT/INR - ( 26 Sep 2018 19:19 )   PT: 14.1 sec;   INR: 1.29 ratio         PTT - ( 26 Sep 2018 19:19 )  PTT:28.7 sec    Physical Examination:    General: No acute distress.      HEENT: Pupils equal, reactive to light.  Symmetric.    PULM: Referred BS LUANN, Absent BS L base, Clear R    CVS: S1, S2 irreg    ABD: Soft, nondistended, nontender, normoactive bowel sounds, no masses    EXT: No edema, nontender    SKIN: Warm and well perfused, no rashes noted.    NEURO: Alert, oriented, interactive, nonfocal    RADIOLOGY REVIEWED  CT chest: < from: CT Chest No Cont (09.26.18 @ 19:39) >  CHEST:     LUNGS AND LARGE AIRWAYS/PLEURA: A left-sided Pleurx catheter terminates   in the left upper anterior pleural space. Interval resolution of a left   pneumothorax. Persistent moderate left pleural effusion. Complete   collapse of the left lung with associated increased leftward mediastinal   shift. Redemonstration of a large consolidative mass within the left   upper lobe. Stable 2.7 x 2.4 cm consolidative mass within the right upper   lobe which likely represents a metastatic or synchronous focus of   malignancy. Stable 2.3 cm right lower lobe groundglass nodule is again   noted and may represent another focus of malignancy.  VESSELS: Atherosclerotic calcifications of the thoracic aorta and   coronary arteries.  HEART: Heart size is mildly enlarged. Small pericardial effusion,   unchanged.  MEDIASTINUM AND SUZI: Multiple enlarged mediastinal lymph nodes,   unchanged.  CHEST WALLAND LOWER NECK: Within normal limits.  VISUALIZED UPPER ABDOMEN: Left renal cyst with peripheral calcification.   Left adrenal thickening, unchanged.  BONES: Multilevel spinal degenerative changes. Chronic left-sided rib   deformities.    IMPRESSION:  Moderate left pleural effusion with complete collapse of the left lung   and associated increased leftward mediastinal shift.    Stable bilateral upper lobe masses and right lower lobe groundglass   nodule.    Small pericardial effusion, unchanged.    < end of copied text >

## 2018-09-27 NOTE — CONSULT NOTE ADULT - ASSESSMENT
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     1.  Adenocarcinoma Left Lung (with right lung and right adrenal involvement)  -   on single agent keytruda with Dr Sanchez s/p 2 doses next dose due on 10-1-18  -   CT chest on 9-26-18 with moderated left pleural effusion with left lung collapse and mediastinal shift.  Stable bilateral upper lobe masses and right lower lobe groudglass nodule.  Small pericardial effusion unchanged.  -    S/P recent VATS/pleurex with Dr Avila await consult  -    Blood counts stable on keytruda  -    Recent pericardial tamponade s/p drain    2.  Breast Cancer (Right)  -   ROCÍO on anastrazole    3.  Recent A fibrillation  -   Currently on eliquis 2.5 mg po BID    Await further plans Dr Avila group to consult.    Possible keytruda on 10-1-18 will monitor closely.

## 2018-09-27 NOTE — CONSULT NOTE ADULT - ASSESSMENT
84 y/o F with PMH of recently diagnosed Stage IV Lung CA (adenocarcinoma) with L hilar mass, L malignant pleural effusion, malignant pericardial effusion (Diagnosed 8/2018) s/p L Pleurex (8/2018) without much lung re-expansion, she had a complicated hospital course including cardiac tamponade s/p drainage, Afib with RVR, history of R breast CA, discharged newly oxygen dependent (2L). She was started on Keytruda. Sent in by oncologist for possible dehydration. Patient c/o pain with pleurex drainage, worse yesterday and decreased PO intake. CT chest with complete L lung collapse (not much changed from prior),

## 2018-09-27 NOTE — PROGRESS NOTE ADULT - ASSESSMENT
Pt is a pleasant 86 yo F w/pmhx of right breast CA s/p tylectomy in the past now on anastrozole, GERD, anemia, HTN, recent diagnosis of lung cancer with admission for large left sided pleural effusion w/left lung collapse s/p VATS with decortication as well as pleurx placement-now on chemotherapy with Keytruda, pericardial effusion with previous hospital course complicated by development of tamponade requiring pericardial drain now presenting with persistent left lung collapse and moderate left sided pleural effusion as well as dehydration 2/2 poor po intake with concern for failure to thrive	.	      Problem/Plan - 1:  ·  Problem: Pleural effusion.  Plan: Patient with moderate pleural effusion on imaging despite recent drainage. Suspect that due to pain with drainage patient has only had partial drainage. Given that pain seems to begin with drainage of over 300cc's of fluid, should consider more frequent drainage of smaller amounts to allow gradual reduction that may be more tolerable.  Pulm consult noted  c/w pleurex drainage twice weekly, no more than 500cc     Problem/Plan - 2:  ·  Problem: Malignant neoplasm of lung, unspecified laterality, unspecified part of lung.  Plan: Oncology consult in AM  On Keytruda, planned for next chemotherapy monday.      Problem/Plan - 3:  ·  Problem: Failure to thrive in adult.  Plan: Pt with very poor po intake in recent weeks, very poor appetite. Some of this may be related to her ongoing illness with possible contribution from chemotherapy. Patient reports that her nausea has been well controlled in recent weeks with minimal use of PRN compazine but still having very poor appetite.  Start calorie count.  F/U Oncology consult, may possibly benefit from use of marinol?      Problem/Plan - 4:  ·  Problem: Paroxysmal atrial fibrillation.  Plan: Patient is still on baby ASA despite being started on eliquis  Given somewhat bloody output from pericardial drain on prior admission as well as lack of hx of CAD/MI/CVA likely would benefit from only being on eliquis monotherapy as this would decrease risk of bleeding.  Family is amenable to this change though would eventually like to follow with a cardiologist regarding the afib.      Problem/Plan - 5:  ·  Problem: Anxiety.  Plan: Continue duloxetine, now on 60mg daily.     Malnutrition - c/w Marinol

## 2018-09-27 NOTE — CONSULT NOTE ADULT - PROBLEM SELECTOR RECOMMENDATION 9
L pleural effusion with complete L lung collapse  -L Lung is now completely collapsed, at discharge in August she had slight aeration of L base.  -Pleurx drainage is causing her significant pain per family. Would consider decreased drainage as she isn't getting much relief from this or lung re-expansion.

## 2018-09-27 NOTE — CONSULT NOTE ADULT - SUBJECTIVE AND OBJECTIVE BOX
Chief Complaint: "I have stomach pain".     HPI:    86 y/o female with a PMH of right breast cancer ROCÍO on anastrazole, HTN, GERD, OA, glaucoma, high cholesterol, RBBB and adenocarcinoma left lung with involvement of the right lung and right adrenal gland (s/p VATS with decortication/pleurex) on keytruda, pericardial effusion s/p drain for tamponade was admitted abdominal pain.  The patient does have SOB with exertion.  No pleuritic chest pain.  No obvious bleeding.  No nausea.  No vomiting.  No diarrhea.  She does have some constipation.  No calf pain.  No leg swelling. No obvious bleeding.   Overall she is feeling weak.  She has been having trouble with the pleurex drainage recently.        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  S/P hip replacement, right: 6/2016  - right hip  History of lumpectomy of right breast: July 2015  History of cataract surgery, left: and right 2016  Elective surgery: reversal of colostomy July 2015  History of tonsillectomy  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  H/O eye surgery: bilateral ptosis  History of varicose vein stripping: recent vascular evaluation - all normal  History of carpal tunnel release: bilateral      SOCIAL HISTORY:  Smoking - Non smoker   Alcohol - Social  Drugs - No drug use    FAMILY HISTORY:  Diabetes mellitus (Mother)      MEDICATIONS  (STANDING):  anastrozole 1 milliGRAM(s) Oral daily  apixaban 2.5 milliGRAM(s) Oral every 12 hours  DULoxetine 60 milliGRAM(s) Oral daily  ferrous    sulfate 325 milliGRAM(s) Oral daily  lactobacillus acidophilus 1 Tablet(s) Oral daily  metoprolol succinate ER 25 milliGRAM(s) Oral two times a day  pantoprazole    Tablet 40 milliGRAM(s) Oral before breakfast  simvastatin 20 milliGRAM(s) Oral at bedtime    MEDICATIONS  (PRN):  acetaminophen   Tablet .. 650 milliGRAM(s) Oral every 6 hours PRN Mild Pain (1 - 3), Moderate Pain (4 - 6)  oxyCODONE    5 mG/acetaminophen 325 mG 0.5 Tablet(s) Oral every 6 hours PRN Severe Pain (7 - 10)      Allergies    Levaquin (Diarrhea)    Intolerances    Dilaudid (Other)  tramadol (Unknown)      Vital Signs Last 24 Hrs  T(C): 36.4 (27 Sep 2018 07:14), Max: 36.7 (27 Sep 2018 05:12)  T(F): 97.6 (27 Sep 2018 07:14), Max: 98 (27 Sep 2018 05:12)  HR: 104 (27 Sep 2018 07:14) (95 - 104)  BP: 124/82 (27 Sep 2018 07:14) (115/82 - 124/82)  BP(mean): --  RR: 18 (27 Sep 2018 07:14) (18 - 22)  SpO2: 96% (27 Sep 2018 07:14) (91% - 100%)    PHYSICAL EXAM  General: weak  HEENT: clear oropharynx, anicteric sclera, pink conjunctiva  Neck: supple  CV: normal S1/S2  Lungs: decreased BS in left lung  Abdomen: soft non-tender non-distended,  positive bowel sounds  Ext: no edema  Skin: no rashes and no petechiae  Lymph Nodes: No LAD in neck  Neuro: alert and oriented X 3, weak    LABS:                          13.4   7.5   )-----------( 286      ( 26 Sep 2018 18:48 )             42.8         Mean Cell Volume : 94.3 fl  Mean Cell Hemoglobin : 29.5 pg  Mean Cell Hemoglobin Concentration : 31.3 gm/dL  Auto Neutrophil # : 5.9 K/uL  Auto Lymphocyte # : 0.8 K/uL  Auto Monocyte # : 0.6 K/uL  Auto Eosinophil # : 0.1 K/uL  Auto Basophil # : 0.0 K/uL  Auto Neutrophil % : 79.5 %  Auto Lymphocyte % : 10.7 %  Auto Monocyte % : 7.9 %  Auto Eosinophil % : 1.7 %  Auto Basophil % : 0.1 %      Serial CBC's  09-26 @ 18:48  Hct-42.8 / Hgb-13.4 / Plat-286 / RBC-4.53 / WBC-7.5      09-27    132<L>  |  96  |  20  ----------------------------<  104<H>  3.6   |  23  |  0.79    Ca    9.3      27 Sep 2018 06:45  Phos  2.9     09-27  Mg     1.6     09-27    TPro  6.8  /  Alb  3.5  /  TBili  0.3  /  DBili  x   /  AST  12  /  ALT  13  /  AlkPhos  103  09-26      PT/INR - ( 26< from: CT Chest No Cont (09.26.18 @ 19:39) >  IMPRESSION:  Moderate left pleural effusion with complete collapse of the left lung   and associated increased leftward mediastinal shift.    Stable bilateral upper lobe masses and right lower lobe groundglass   nodule.    Small pericardial effusion, unchanged.    < end of copied text >   Sep 2018 19:19 )   PT: 14.1 sec;   INR: 1.29 ratio         PTT - ( 26 Sep 2018 19:19 )  PTT:28.7 sec

## 2018-09-27 NOTE — CONSULT NOTE ADULT - PROBLEM SELECTOR RECOMMENDATION 2
L hilar mass, malignant L effusion, malignant pericardial effusion, R adrenal nodule  -On Keytruda per oncology, next dose 10/1

## 2018-09-28 ENCOUNTER — TRANSCRIPTION ENCOUNTER (OUTPATIENT)
Age: 83
End: 2018-09-28

## 2018-09-28 LAB
ALBUMIN SERPL ELPH-MCNC: 3 G/DL — LOW (ref 3.3–5)
ALP SERPL-CCNC: 87 U/L — SIGNIFICANT CHANGE UP (ref 40–120)
ALT FLD-CCNC: 11 U/L — SIGNIFICANT CHANGE UP (ref 10–45)
ANION GAP SERPL CALC-SCNC: 14 MMOL/L — SIGNIFICANT CHANGE UP (ref 5–17)
AST SERPL-CCNC: 10 U/L — SIGNIFICANT CHANGE UP (ref 10–40)
BASOPHILS # BLD AUTO: 0.02 K/UL — SIGNIFICANT CHANGE UP (ref 0–0.2)
BASOPHILS NFR BLD AUTO: 0.3 % — SIGNIFICANT CHANGE UP (ref 0–2)
BILIRUB SERPL-MCNC: 0.4 MG/DL — SIGNIFICANT CHANGE UP (ref 0.2–1.2)
BUN SERPL-MCNC: 14 MG/DL — SIGNIFICANT CHANGE UP (ref 7–23)
CALCIUM SERPL-MCNC: 9.1 MG/DL — SIGNIFICANT CHANGE UP (ref 8.4–10.5)
CHLORIDE SERPL-SCNC: 97 MMOL/L — SIGNIFICANT CHANGE UP (ref 96–108)
CO2 SERPL-SCNC: 23 MMOL/L — SIGNIFICANT CHANGE UP (ref 22–31)
CREAT SERPL-MCNC: 0.69 MG/DL — SIGNIFICANT CHANGE UP (ref 0.5–1.3)
EOSINOPHIL # BLD AUTO: 0.32 K/UL — SIGNIFICANT CHANGE UP (ref 0–0.5)
EOSINOPHIL NFR BLD AUTO: 4.4 % — SIGNIFICANT CHANGE UP (ref 0–6)
GLUCOSE SERPL-MCNC: 82 MG/DL — SIGNIFICANT CHANGE UP (ref 70–99)
HCT VFR BLD CALC: 37.4 % — SIGNIFICANT CHANGE UP (ref 34.5–45)
HGB BLD-MCNC: 11.6 G/DL — SIGNIFICANT CHANGE UP (ref 11.5–15.5)
IMM GRANULOCYTES NFR BLD AUTO: 1 % — SIGNIFICANT CHANGE UP (ref 0–1.5)
LYMPHOCYTES # BLD AUTO: 0.87 K/UL — LOW (ref 1–3.3)
LYMPHOCYTES # BLD AUTO: 12 % — LOW (ref 13–44)
MCHC RBC-ENTMCNC: 28.7 PG — SIGNIFICANT CHANGE UP (ref 27–34)
MCHC RBC-ENTMCNC: 31 GM/DL — LOW (ref 32–36)
MCV RBC AUTO: 92.6 FL — SIGNIFICANT CHANGE UP (ref 80–100)
MONOCYTES # BLD AUTO: 0.91 K/UL — HIGH (ref 0–0.9)
MONOCYTES NFR BLD AUTO: 12.6 % — SIGNIFICANT CHANGE UP (ref 2–14)
NEUTROPHILS # BLD AUTO: 5.04 K/UL — SIGNIFICANT CHANGE UP (ref 1.8–7.4)
NEUTROPHILS NFR BLD AUTO: 69.7 % — SIGNIFICANT CHANGE UP (ref 43–77)
PLATELET # BLD AUTO: 263 K/UL — SIGNIFICANT CHANGE UP (ref 150–400)
POTASSIUM SERPL-MCNC: 3.8 MMOL/L — SIGNIFICANT CHANGE UP (ref 3.5–5.3)
POTASSIUM SERPL-SCNC: 3.8 MMOL/L — SIGNIFICANT CHANGE UP (ref 3.5–5.3)
PROT SERPL-MCNC: 5.5 G/DL — LOW (ref 6–8.3)
RBC # BLD: 4.04 M/UL — SIGNIFICANT CHANGE UP (ref 3.8–5.2)
RBC # FLD: 17.7 % — HIGH (ref 10.3–14.5)
SODIUM SERPL-SCNC: 134 MMOL/L — LOW (ref 135–145)
WBC # BLD: 7.23 K/UL — SIGNIFICANT CHANGE UP (ref 3.8–10.5)
WBC # FLD AUTO: 7.23 K/UL — SIGNIFICANT CHANGE UP (ref 3.8–10.5)

## 2018-09-28 PROCEDURE — 99221 1ST HOSP IP/OBS SF/LOW 40: CPT

## 2018-09-28 RX ADMIN — Medication 2.5 MILLIGRAM(S): at 06:17

## 2018-09-28 RX ADMIN — Medication 325 MILLIGRAM(S): at 11:49

## 2018-09-28 RX ADMIN — APIXABAN 2.5 MILLIGRAM(S): 2.5 TABLET, FILM COATED ORAL at 09:20

## 2018-09-28 RX ADMIN — SIMVASTATIN 20 MILLIGRAM(S): 20 TABLET, FILM COATED ORAL at 21:14

## 2018-09-28 RX ADMIN — DULOXETINE HYDROCHLORIDE 60 MILLIGRAM(S): 30 CAPSULE, DELAYED RELEASE ORAL at 11:49

## 2018-09-28 RX ADMIN — ANASTROZOLE 1 MILLIGRAM(S): 1 TABLET ORAL at 11:49

## 2018-09-28 RX ADMIN — APIXABAN 2.5 MILLIGRAM(S): 2.5 TABLET, FILM COATED ORAL at 17:57

## 2018-09-28 RX ADMIN — Medication 25 MILLIGRAM(S): at 21:14

## 2018-09-28 RX ADMIN — LIDOCAINE 1 PATCH: 4 CREAM TOPICAL at 06:07

## 2018-09-28 RX ADMIN — Medication 25 MILLIGRAM(S): at 09:20

## 2018-09-28 RX ADMIN — Medication 1 TABLET(S): at 11:49

## 2018-09-28 RX ADMIN — Medication 2.5 MILLIGRAM(S): at 17:57

## 2018-09-28 RX ADMIN — PANTOPRAZOLE SODIUM 40 MILLIGRAM(S): 20 TABLET, DELAYED RELEASE ORAL at 06:17

## 2018-09-28 NOTE — DISCHARGE NOTE ADULT - CARE PROVIDERS DIRECT ADDRESSES
[email or web address]

## 2018-09-28 NOTE — DISCHARGE NOTE ADULT - MEDICATION SUMMARY - MEDICATIONS TO CHANGE
I will SWITCH the dose or number of times a day I take the medications listed below when I get home from the hospital:    acetaminophen 650 mg oral tablet  -- 2 tab(s) by mouth , As Needed    Percocet 5/325 oral tablet  -- 0.5-1 tab(s) by mouth , As Needed

## 2018-09-28 NOTE — DISCHARGE NOTE ADULT - HOSPITAL COURSE
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     1.  Adenocarcinoma Left Lung (with right lung and right adrenal involvement)  -   on single agent keytruda with Dr Sanchez s/p 2 doses next dose due on 10-1-18  -   CT chest on 9-26-18 with moderated left pleural effusion with left lung collapse and mediastinal shift.  Stable bilateral upper lobe masses and right lower lobe groudglass nodule.  Small pericardial effusion unchanged.  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     1.  Adenocarcinoma Left Lung (with right lung and right adrenal involvement)  -   on single agent keytruda with Dr Sanchez s/p 2 doses next dose due on 10-1-18  -   CT chest on 9-26-18 with moderated left pleural effusion with left lung collapse and mediastinal shift.  Stable bilateral upper lobe masses and right lower lobe groudglass nodule.  Small pericardial effusion unchanged.  -    S/P recent VATS/pleurex -     seen by hemonc/pulmonary/CTsurgery  per pulmonary twice  a week pleurex cath drain up to 500ml at a time'  had keytruda chemo on 10/1/18;  home care follow up; 24 hour home assistance;  follow up with md/oncology as outpatient.

## 2018-09-28 NOTE — PROGRESS NOTE ADULT - ASSESSMENT
86 y/o F with PMH of recently diagnosed Stage IV Lung CA (adenocarcinoma) with L hilar mass, L malignant pleural effusion, malignant pericardial effusion (Diagnosed 8/2018) s/p L Pleurex (8/2018) without much lung re-expansion, she had a complicated hospital course including cardiac tamponade s/p drainage, Afib with RVR, history of R breast CA, discharged newly oxygen dependent (2L). She was started on Keytruda. Sent in by oncologist for possible dehydration. Patient c/o pain with pleurex drainage, worse yesterday and decreased PO intake. CT chest with complete L lung collapse (not much changed from prior),

## 2018-09-28 NOTE — DIETITIAN INITIAL EVALUATION ADULT. - NS AS NUTRI INTERV MEALS SNACK
1. Provide food preferences as requested by Pt/family within diet restrictions  2. Encourage PO intake during meal times 3. RD to provide protein/energy dense snacks

## 2018-09-28 NOTE — DISCHARGE NOTE ADULT - HOME CARE AGENCY
[facility]  [phone]  Patient known to [facility], resumption of care.  RN will call prior to visit.  services to start 1-2 days following discharge.

## 2018-09-28 NOTE — DISCHARGE NOTE ADULT - CARE PLAN
Principal Discharge DX:	Failure to thrive in adult  Goal:	improve symptoms  Assessment and plan of treatment:	assistance in ADls  fall precaution  Secondary Diagnosis:	Malignant neoplasm of lung, unspecified laterality, unspecified part of lung  Assessment and plan of treatment:	follow up by oncology to continue chemo;  resume home oxygen supplement  plurex catheter drain twice a week up to 500ml at a time  follow up by pulmonary doctor  Secondary Diagnosis:	Afib  Assessment and plan of treatment:	continue home medication Principal Discharge DX:	Failure to thrive in adult  Goal:	improve symptoms  Assessment and plan of treatment:	assistance in ADls  fall precaution  Secondary Diagnosis:	Malignant neoplasm of lung, unspecified laterality, unspecified part of lung  Assessment and plan of treatment:	follow up by oncology to continue chemo;  resume home oxygen supplement  plurex catheter drain twice a week up to 500ml at a time  follow up by pulmonary doctor  Secondary Diagnosis:	Afib  Assessment and plan of treatment:	continue home medication  Secondary Diagnosis:	Pleural effusion  Assessment and plan of treatment:	continue plerex catheter drainage twice a week on Tuesdays/Fridays maximum upto 500ml at a time as tolearted;  follow up with pulmonary doctor  home care follow up

## 2018-09-28 NOTE — PROGRESS NOTE ADULT - ASSESSMENT
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     1.  Adenocarcinoma Left Lung (with right lung and right adrenal involvement)  -   on single agent keytruda with Dr Sanchez s/p 2 doses next dose due on 10-1-18  -   CT chest on 9-26-18 with moderated left pleural effusion with left lung collapse and mediastinal shift.  Stable bilateral upper lobe masses and right lower lobe groudglass nodule.  Small pericardial effusion unchanged.  -    S/P recent VATS/pleurex will need more frequent drainages  -    Blood counts stable on keytruda  -    pericardial tamponade s/p drain    2.  Breast Cancer (Right)  -   ROCÍO on anastrazole    3.  Recent A fibrillation  -   Currently on eliquis 2.5 mg po BID    Possible keytruda on 10-1-18 will monitor closely.   More frequent drainages.

## 2018-09-28 NOTE — CONSULT NOTE ADULT - REASON FOR ADMISSION
Pleural effusion with left lung collapse

## 2018-09-28 NOTE — DIETITIAN INITIAL EVALUATION ADULT. - NS AS NUTRI INTERV COLLABORAT
Malnutrition alert placed in chart, discussed with NP. RD to follow up with results of three day calorie count.

## 2018-09-28 NOTE — DISCHARGE NOTE ADULT - PROVIDER TOKENS
TOKEN:'[id number]:'[id number]' TOKEN:'[id number]:'[id number]:'[id number]'

## 2018-09-28 NOTE — DIETITIAN INITIAL EVALUATION ADULT. - PHYSICAL APPEARANCE
Thin; Nutrition Focused Physical Assessment performed: pt noted with moderate muscle wasting of temples, clavicle, deltoid, interosseous muscle; fat depletion of orbital region, triceps/biceps./other (specify)

## 2018-09-28 NOTE — DIETITIAN INITIAL EVALUATION ADULT. - OTHER INFO
Nutrition consult received for poor PO intake. Per chart, pt with recent diagnosis of lung cancer presenting with persistent left lung collapse and moderate left sided pleural effusion as well as dehydration 2/2 poor po intake with concern for failure to thrive. Three day calorie count initiated yesterday. Pt reports poor appetite and PO  continue. Pt declined nutrition supplements. Obtained food preferences to optimize intake.  Pt denies chewing/swallowing difficulty or GI distress at this time.

## 2018-09-28 NOTE — DIETITIAN INITIAL EVALUATION ADULT. - SIGNS/SYMPTOMS
Pt with 11.5% wt loss x1 year, poor PO intake x 1 month, moderate muscle and fat wasting noted above

## 2018-09-28 NOTE — DISCHARGE NOTE ADULT - PATIENT PORTAL LINK FT
You can access the [facility] Patient Portal, offered by [facility], by registering with the following website: [email or web address]

## 2018-09-28 NOTE — CONSULT NOTE ADULT - SUBJECTIVE AND OBJECTIVE BOX
85-year-old female past medical history of stage IV lung cancer adenocarcinoma (mass of left hilum with malignant pleural effusion) status post pleurex catheter placement 8/2018.currently on home oxygen to [employer] as well as Keytruda. She was admitted for dehydration as well as pain with drainage of pleurex catheter.  CT chest: unchanged left lung collapse re-demonstrated as well as catheter tip in pleural fluid.      Physical exam  No acute distress, alert and oriented times three  Nonlabored breathing,Currently on NC 3 1/2 L of 02 however observe patient for 10 minutes with no supplementary oxygen  Absent breath sounds to left Grzegorz thorax, right lung breath sounds clear to auscultation      < from: CT Chest No Cont (09.26.18 @ 19:39) >  Moderate left pleural effusion with complete collapse of the left lung   and associated increased leftward mediastinal shift.    Stable bilateral upper lobe masses and right lower lobe groundglass   nodule.    Small pericardial effusion, unchanged.    < end of copied text >

## 2018-09-28 NOTE — DIETITIAN INITIAL EVALUATION ADULT. - ENERGY NEEDS
Ht: 60 Wt: 115 pounds BMI: 22.5 kg/m2 IBW: 100 pounds(+/-10%)   +2 rt knee edema. Stage 1 left buttocks, stage II right buttocks, stage I sacrum pressure ulcers documented.

## 2018-09-28 NOTE — PROGRESS NOTE ADULT - ASSESSMENT
Pt is a pleasant 86 yo F w/pmhx of right breast CA s/p tylectomy in the past now on anastrozole, GERD, anemia, HTN, recent diagnosis of lung cancer with admission for large left sided pleural effusion w/left lung collapse s/p VATS with decortication as well as pleurx placement-now on chemotherapy with Keytruda, pericardial effusion with previous hospital course complicated by development of tamponade requiring pericardial drain now presenting with persistent left lung collapse and moderate left sided pleural effusion as well as dehydration 2/2 poor po intake with concern for failure to thrive	.	      Problem/Plan - 1:  ·  Problem: Pleural effusion.  Plan: Patient with moderate pleural effusion on imaging despite recent drainage. Suspect that due to pain with drainage patient has only had partial drainage. Given that pain seems to begin with drainage of over 300cc's of fluid, should consider more frequent drainage of smaller amounts to allow gradual reduction that may be more tolerable.  Pulm consult noted  more frequent pleurex drainage with smaller volume per CTS     Problem/Plan - 2:  ·  Problem: Malignant neoplasm of lung, unspecified laterality, unspecified part of lung.  Plan: Oncology consult in AM  On Keytruda, next dose 10/1     Problem/Plan - 3:  ·  Problem: Failure to thrive in adult.  Plan: c/w Marinol  calorie count.  PT eval     Problem/Plan - 4:  ·  Problem: Paroxysmal atrial fibrillation.  Plan: Patient is still on baby ASA despite being started on eliquis  Given somewhat bloody output from pericardial drain on prior admission as well as lack of hx of CAD/MI/CVA likely would benefit from only being on eliquis monotherapy as this would decrease risk of bleeding.  Family is amenable to this change though would eventually like to follow with a cardiologist regarding the afib.      Problem/Plan - 5:  ·  Problem: Anxiety.  Plan: Continue duloxetine, now on 60mg daily.     Malnutrition - c/w Marinol

## 2018-09-28 NOTE — CONSULT NOTE ADULT - ASSESSMENT
85-year-old female with stage IV lung cancer with malignant pleural effusion status post Forex catheter with dehydration and pain with X drainage  Would recommend more frequent drainage of Forex catheter with smaller volumes  Will continue to follow-up for resolution of pain

## 2018-09-28 NOTE — DISCHARGE NOTE ADULT - PLAN OF CARE
improve symptoms assistance in ADls  fall precaution follow up by oncology to continue chemo;  resume home oxygen supplement  plurex catheter drain twice a week up to 500ml at a time  follow up by pulmonary doctor continue home medication continue plerex catheter drainage twice a week on Tuesdays/Fridays maximum upto 500ml at a time as tolearted;  follow up with pulmonary doctor  home care follow up

## 2018-09-28 NOTE — DIETITIAN INITIAL EVALUATION ADULT. - ORAL INTAKE PTA
Pt reports poor PO intake for the past month. Pt denies micronutrient supplementation PTA. NKFA./poor

## 2018-09-28 NOTE — DISCHARGE NOTE ADULT - CARE PROVIDER_API CALL
Brandon Avila (MD), Thoracic Surgery  [address] [place] [address]  [place] [address]  Phone: [phone]  Fax: [phone]    Ranjan Carrasquillo (DO), Critical Care Medicine; Internal Medicine; Pulmonary Disease  [address] [address]  Phone: [phone]  Fax: [phone] Brandon Avila), Thoracic Surgery  [address] [place] [address]  [place] [address]  Phone: [phone]  Fax: [phone]    Ranjan Carrasquillo (DO), Critical Care Medicine; Internal Medicine; Pulmonary Disease  [address] [facility] [address]  [place] [address]  Phone: [phone]  Fax: [phone]    Jose Sanchez), Internal Medicine; Medical Oncology  [address]  [facility] [address]  [place] [address]  Phone: [phone]  Fax: [phone]

## 2018-09-28 NOTE — DIETITIAN INITIAL EVALUATION ADULT. - NS AS NUTRI INTERV ED CONTENT
Discussed small, frequent meals high in protein and energy. Discussed lean sources of protein and protein/energy dense snacks.

## 2018-09-28 NOTE — DISCHARGE NOTE ADULT - MEDICATION SUMMARY - MEDICATIONS TO TAKE
I will START or STAY ON the medications listed below when I get home from the hospital:    aspirin 81 mg oral delayed release tablet  -- 1 tab(s) by mouth once a day  -- Indication: For Antiplatelet    acetaminophen 325 mg oral tablet  -- 2 tab(s) by mouth every 6 hours, As needed, Mild Pain (1 - 3), Moderate Pain (4 - 6)  -- Indication: For Pain    Percocet 5/325 oral tablet  -- 1 tab(s) by mouth every 6 hours, As Needed -1 tab(s) by mouth , As Needed for severe pain  -- Indication: For Pain    apixaban 2.5 mg oral tablet  -- 1 tab(s) by mouth every 12 hours  -- Indication: For Paroxysmal atrial fibrillation    DULoxetine 30 mg oral delayed release capsule  -- 2 cap(s) by mouth once a day  -- Indication: For Pain    simvastatin 20 mg oral tablet  -- 1 tab(s) by mouth once a day (at bedtime)  -- Indication: For lipids    anastrozole 1 mg oral tablet  -- 1 tab(s) by mouth once a day  -- Indication: For Antineoplastic    megestrol 40 mg/mL oral suspension  -- 20 milliliter(s) by mouth once a day  -- Indication: For For appetite    metoprolol succinate 25 mg oral tablet, extended release  -- 1 tab(s) by mouth 2 times a day  -- Indication: For cardiac    lidocaine 5% topical film  -- Apply on skin to affected area once a day  12 hours on and  12 hours off  -- Indication: For Pain    ferrous sulfate 325 mg (65 mg elemental iron) oral tablet  -- 1 tab(s) by mouth once a day  -- Indication: For iron supplement    docusate sodium 100 mg oral capsule  -- 1 cap(s) by mouth 3 times a day  -- Indication: For stool softner    lactobacillus acidophilus oral capsule  -- 1 cap(s) by mouth once a day  -- Indication: For Probiotic    omeprazole 20 mg oral delayed release capsule  -- 2 cap(s) by mouth once a day  -- Indication: For For  stomach

## 2018-09-28 NOTE — CHART NOTE - NSCHARTNOTEFT_GEN_A_CORE
Upon Nutritional Assessment by the Registered Dietitian your patient was determined to meet criteria / has evidence of the following diagnosis/diagnoses:          [ ]  Mild Protein Calorie Malnutrition        [X ]  Moderate Protein Calorie Malnutrition        [ ] Severe Protein Calorie Malnutrition        [ ] Unspecified Protein Calorie Malnutrition        [ ] Underweight / BMI <19        [ ] Morbid Obesity / BMI > 40      Findings as based on:  [ X] Comprehensive nutrition assessment   [X ] Nutrition Focused Physical Exam: : pt noted with moderate muscle wasting of temples, clavicle, deltoid, interosseous muscle; fat depletion of orbital region, triceps/biceps  [ X] Other:  Pt with 11.5% wt loss x1 year, poor PO intake x 1 month      Nutrition Plan/Recommendations:      1) Nutrition education :  Discussed small, frequent meals high in protein and energy. Discussed lean sources of protein and protein/energy dense snacks.  2) RD to provide protein/energy dense snacks    RD to follow up with results of three day calorie count.  Tere Melendez RD pager #[phone]     PROVIDER Section:     By signing this assessment you are acknowledging and agree with the diagnosis/diagnoses assigned by the Registered Dietitian    Comments:

## 2018-09-29 LAB
ANION GAP SERPL CALC-SCNC: 10 MMOL/L — SIGNIFICANT CHANGE UP (ref 5–17)
ANISOCYTOSIS BLD QL: SLIGHT — SIGNIFICANT CHANGE UP
BASOPHILS # BLD AUTO: 0 K/UL — SIGNIFICANT CHANGE UP (ref 0–0.2)
BASOPHILS NFR BLD AUTO: 0 % — SIGNIFICANT CHANGE UP (ref 0–2)
BUN SERPL-MCNC: 16 MG/DL — SIGNIFICANT CHANGE UP (ref 7–23)
CALCIUM SERPL-MCNC: 9.4 MG/DL — SIGNIFICANT CHANGE UP (ref 8.4–10.5)
CHLORIDE SERPL-SCNC: 97 MMOL/L — SIGNIFICANT CHANGE UP (ref 96–108)
CO2 SERPL-SCNC: 26 MMOL/L — SIGNIFICANT CHANGE UP (ref 22–31)
CREAT SERPL-MCNC: 0.82 MG/DL — SIGNIFICANT CHANGE UP (ref 0.5–1.3)
CULTURE RESULTS: SIGNIFICANT CHANGE UP
EOSINOPHIL # BLD AUTO: 0.3 K/UL — SIGNIFICANT CHANGE UP (ref 0–0.5)
EOSINOPHIL NFR BLD AUTO: 5 % — SIGNIFICANT CHANGE UP (ref 0–6)
GLUCOSE SERPL-MCNC: 99 MG/DL — SIGNIFICANT CHANGE UP (ref 70–99)
HCT VFR BLD CALC: 39.1 % — SIGNIFICANT CHANGE UP (ref 34.5–45)
HGB BLD-MCNC: 13 G/DL — SIGNIFICANT CHANGE UP (ref 11.5–15.5)
LYMPHOCYTES # BLD AUTO: 0.6 K/UL — LOW (ref 1–3.3)
LYMPHOCYTES # BLD AUTO: 10 % — LOW (ref 13–44)
MACROCYTES BLD QL: SLIGHT — SIGNIFICANT CHANGE UP
MANUAL SMEAR VERIFICATION: SIGNIFICANT CHANGE UP
MCHC RBC-ENTMCNC: 30.4 PG — SIGNIFICANT CHANGE UP (ref 27–34)
MCHC RBC-ENTMCNC: 33.2 GM/DL — SIGNIFICANT CHANGE UP (ref 32–36)
MCV RBC AUTO: 91.4 FL — SIGNIFICANT CHANGE UP (ref 80–100)
MONOCYTES # BLD AUTO: 0.66 K/UL — SIGNIFICANT CHANGE UP (ref 0–0.9)
MONOCYTES NFR BLD AUTO: 11 % — SIGNIFICANT CHANGE UP (ref 2–14)
MYELOCYTES NFR BLD: 1 % — HIGH (ref 0–0)
NEUTROPHILS # BLD AUTO: 4.35 K/UL — SIGNIFICANT CHANGE UP (ref 1.8–7.4)
NEUTROPHILS NFR BLD AUTO: 73 % — SIGNIFICANT CHANGE UP (ref 43–77)
NRBC # BLD: 0 /100 — SIGNIFICANT CHANGE UP (ref 0–0)
PLAT MORPH BLD: NORMAL — SIGNIFICANT CHANGE UP
PLATELET # BLD AUTO: 235 K/UL — SIGNIFICANT CHANGE UP (ref 150–400)
POTASSIUM SERPL-MCNC: 4.2 MMOL/L — SIGNIFICANT CHANGE UP (ref 3.5–5.3)
POTASSIUM SERPL-SCNC: 4.2 MMOL/L — SIGNIFICANT CHANGE UP (ref 3.5–5.3)
RBC # BLD: 4.28 M/UL — SIGNIFICANT CHANGE UP (ref 3.8–5.2)
RBC # FLD: 17.8 % — HIGH (ref 10.3–14.5)
RBC BLD AUTO: ABNORMAL
SODIUM SERPL-SCNC: 133 MMOL/L — LOW (ref 135–145)
SPECIMEN SOURCE: SIGNIFICANT CHANGE UP
WBC # BLD: 5.96 K/UL — SIGNIFICANT CHANGE UP (ref 3.8–10.5)
WBC # FLD AUTO: 5.96 K/UL — SIGNIFICANT CHANGE UP (ref 3.8–10.5)

## 2018-09-29 RX ORDER — SENNA PLUS 8.6 MG/1
2 TABLET ORAL ONCE
Qty: 0 | Refills: 0 | Status: COMPLETED | OUTPATIENT
Start: 2018-09-29 | End: 2018-09-29

## 2018-09-29 RX ORDER — DOCUSATE SODIUM 100 MG
100 CAPSULE ORAL THREE TIMES A DAY
Qty: 0 | Refills: 0 | Status: DISCONTINUED | OUTPATIENT
Start: 2018-09-29 | End: 2018-10-03

## 2018-09-29 RX ADMIN — Medication 1 TABLET(S): at 12:21

## 2018-09-29 RX ADMIN — PANTOPRAZOLE SODIUM 40 MILLIGRAM(S): 20 TABLET, DELAYED RELEASE ORAL at 06:17

## 2018-09-29 RX ADMIN — Medication 2.5 MILLIGRAM(S): at 06:54

## 2018-09-29 RX ADMIN — Medication 2.5 MILLIGRAM(S): at 18:27

## 2018-09-29 RX ADMIN — Medication 650 MILLIGRAM(S): at 13:00

## 2018-09-29 RX ADMIN — LIDOCAINE 1 PATCH: 4 CREAM TOPICAL at 06:17

## 2018-09-29 RX ADMIN — Medication 25 MILLIGRAM(S): at 08:47

## 2018-09-29 RX ADMIN — DULOXETINE HYDROCHLORIDE 60 MILLIGRAM(S): 30 CAPSULE, DELAYED RELEASE ORAL at 12:20

## 2018-09-29 RX ADMIN — SIMVASTATIN 20 MILLIGRAM(S): 20 TABLET, FILM COATED ORAL at 21:26

## 2018-09-29 RX ADMIN — Medication 650 MILLIGRAM(S): at 18:58

## 2018-09-29 RX ADMIN — Medication 650 MILLIGRAM(S): at 12:20

## 2018-09-29 RX ADMIN — ANASTROZOLE 1 MILLIGRAM(S): 1 TABLET ORAL at 12:21

## 2018-09-29 RX ADMIN — SENNA PLUS 2 TABLET(S): 8.6 TABLET ORAL at 21:26

## 2018-09-29 RX ADMIN — Medication 25 MILLIGRAM(S): at 21:28

## 2018-09-29 RX ADMIN — LIDOCAINE 1 PATCH: 4 CREAM TOPICAL at 18:29

## 2018-09-29 RX ADMIN — Medication 325 MILLIGRAM(S): at 12:21

## 2018-09-29 RX ADMIN — APIXABAN 2.5 MILLIGRAM(S): 2.5 TABLET, FILM COATED ORAL at 18:28

## 2018-09-29 RX ADMIN — APIXABAN 2.5 MILLIGRAM(S): 2.5 TABLET, FILM COATED ORAL at 06:17

## 2018-09-29 RX ADMIN — Medication 650 MILLIGRAM(S): at 19:28

## 2018-09-29 RX ADMIN — Medication 100 MILLIGRAM(S): at 21:26

## 2018-09-29 NOTE — PROGRESS NOTE ADULT - ASSESSMENT
Pt is a pleasant 84 yo F w/pmhx of right breast CA s/p tylectomy in the past now on anastrozole, GERD, anemia, HTN, recent diagnosis of lung cancer with admission for large left sided pleural effusion w/left lung collapse s/p VATS with decortication as well as pleurx placement-now on chemotherapy with Keytruda, pericardial effusion with previous hospital course complicated by development of tamponade requiring pericardial drain now presenting with persistent left lung collapse and moderate left sided pleural effusion as well as dehydration 2/2 poor po intake with concern for failure to thrive	.	       ·  Problem: Pleural effusion.    Pulm consult noted  dec pleurex drainage with smaller volume  lung collapse       ·  Problem: Malignant neoplasm of lung, unspecified laterality, unspecified part of lung.  Plan: Oncology consult noted   On Keytruda, next dose 10/1 if able       ·  Problem: Failure to thrive in adult.  Plan: c/w Marinol  calorie count.  PT eval      ·  Problem: Paroxysmal atrial fibrillation.   cont eliquis for now         ·  Problem: Anxiety.  Plan: Continue duloxetine, now on 60mg daily.     Malnutrition - c/w Marinol    prognosis overall poor

## 2018-09-30 LAB
ANION GAP SERPL CALC-SCNC: 13 MMOL/L — SIGNIFICANT CHANGE UP (ref 5–17)
BUN SERPL-MCNC: 16 MG/DL — SIGNIFICANT CHANGE UP (ref 7–23)
CALCIUM SERPL-MCNC: 8.8 MG/DL — SIGNIFICANT CHANGE UP (ref 8.4–10.5)
CHLORIDE SERPL-SCNC: 97 MMOL/L — SIGNIFICANT CHANGE UP (ref 96–108)
CO2 SERPL-SCNC: 23 MMOL/L — SIGNIFICANT CHANGE UP (ref 22–31)
CREAT SERPL-MCNC: 0.64 MG/DL — SIGNIFICANT CHANGE UP (ref 0.5–1.3)
GLUCOSE SERPL-MCNC: 103 MG/DL — HIGH (ref 70–99)
POTASSIUM SERPL-MCNC: 3.7 MMOL/L — SIGNIFICANT CHANGE UP (ref 3.5–5.3)
POTASSIUM SERPL-SCNC: 3.7 MMOL/L — SIGNIFICANT CHANGE UP (ref 3.5–5.3)
SODIUM SERPL-SCNC: 133 MMOL/L — LOW (ref 135–145)

## 2018-09-30 RX ADMIN — Medication 650 MILLIGRAM(S): at 16:13

## 2018-09-30 RX ADMIN — Medication 25 MILLIGRAM(S): at 22:28

## 2018-09-30 RX ADMIN — Medication 1 TABLET(S): at 13:17

## 2018-09-30 RX ADMIN — Medication 2.5 MILLIGRAM(S): at 05:43

## 2018-09-30 RX ADMIN — LIDOCAINE 1 PATCH: 4 CREAM TOPICAL at 17:02

## 2018-09-30 RX ADMIN — ANASTROZOLE 1 MILLIGRAM(S): 1 TABLET ORAL at 13:17

## 2018-09-30 RX ADMIN — DULOXETINE HYDROCHLORIDE 60 MILLIGRAM(S): 30 CAPSULE, DELAYED RELEASE ORAL at 13:17

## 2018-09-30 RX ADMIN — Medication 100 MILLIGRAM(S): at 05:43

## 2018-09-30 RX ADMIN — Medication 2.5 MILLIGRAM(S): at 17:03

## 2018-09-30 RX ADMIN — Medication 650 MILLIGRAM(S): at 17:01

## 2018-09-30 RX ADMIN — LIDOCAINE 1 PATCH: 4 CREAM TOPICAL at 05:43

## 2018-09-30 RX ADMIN — Medication 100 MILLIGRAM(S): at 13:17

## 2018-09-30 RX ADMIN — PANTOPRAZOLE SODIUM 40 MILLIGRAM(S): 20 TABLET, DELAYED RELEASE ORAL at 05:43

## 2018-09-30 RX ADMIN — Medication 325 MILLIGRAM(S): at 13:17

## 2018-09-30 RX ADMIN — APIXABAN 2.5 MILLIGRAM(S): 2.5 TABLET, FILM COATED ORAL at 05:43

## 2018-09-30 RX ADMIN — Medication 650 MILLIGRAM(S): at 10:26

## 2018-09-30 RX ADMIN — SIMVASTATIN 20 MILLIGRAM(S): 20 TABLET, FILM COATED ORAL at 22:28

## 2018-09-30 RX ADMIN — APIXABAN 2.5 MILLIGRAM(S): 2.5 TABLET, FILM COATED ORAL at 17:03

## 2018-09-30 RX ADMIN — Medication 100 MILLIGRAM(S): at 22:28

## 2018-09-30 RX ADMIN — Medication 650 MILLIGRAM(S): at 09:52

## 2018-09-30 NOTE — PROGRESS NOTE ADULT - ASSESSMENT
Pt is a pleasant 84 yo F w/pmhx of right breast CA s/p tylectomy in the past now on anastrozole, GERD, anemia, HTN, recent diagnosis of lung cancer with admission for large left sided pleural effusion w/left lung collapse s/p VATS with decortication as well as pleurx placement-now on chemotherapy with Keytruda, pericardial effusion with previous hospital course complicated by development of tamponade requiring pericardial drain now presenting with persistent left lung collapse and moderate left sided pleural effusion as well as dehydration 2/2 poor po intake with concern for failure to thrive	.	       ·  Problem: Pleural effusion.    Pulm consult noted  dec pleurex drainage with smaller volume  lung collapse       ·  Problem: Malignant neoplasm of lung, unspecified laterality, unspecified part of lung.  Plan: Oncology consult noted   On Keytruda, next dose 10/1       ·  Problem: Failure to thrive in adult.  Plan: c/w Marinol  calorie count.  PT eval      ·  Problem: Paroxysmal atrial fibrillation.   cont eliquis for now         ·  Problem: Anxiety.  Plan: Continue duloxetine, now on 60mg daily.     Malnutrition - c/w Marinol    prognosis overall poor

## 2018-09-30 NOTE — PHYSICAL THERAPY INITIAL EVALUATION ADULT - GAIT DEVIATIONS NOTED, PT EVAL
decreased step length/decreased linda/decreased velocity of limb motion/increased time in double stance

## 2018-09-30 NOTE — PHYSICAL THERAPY INITIAL EVALUATION ADULT - PERTINENT HX OF CURRENT PROBLEM, REHAB EVAL
85F pmhx of right breast CA s/p tylectomy on anastrozole, GERD, anemia, HTN, recent dx lung cancer w/ adm for large left sided pleural effusion w/left lung collapse s/p VATS w/ decortication as well as pleurx placement-now on chemo w/ Keytruda, pericardial effusion w/ previous hosp course c/b dev't of tamponade requiring pericardial drain now p/w persistent left lung collapse & moderate left sided pleural effusion as well as dehydration 2/2 poor po intake w/ concern for failure to thrive.

## 2018-09-30 NOTE — CHART NOTE - NSCHARTNOTEFT_GEN_A_CORE
Source: Patient [X ]    Family [ ]     other [X ] Medical record    Diet : Regular    Pt seen for completion of three day calorie count. Medical chart reviewed/events noted. Pt reports appetite is improving, consistently eating at least 50% of meals. Pt has been eating protein/energy dense snacks (peanut butter, cookies) and has been increasing protein intake (eggs, chicken, tuna). Pt continued to declined nutrition supplements. Pt denies any GI distress at this time, last BM 2 days ago. Results of three day calorie count below:  Day 1: 300kcal, 10gm protein (only dinner recorded)  Day 2: 875kcal, 44gm protein  Day 3: 850kcal, 48 gm protein  Average (omitting day 1): 862.5kcal, 46gm protein (16.5kcal/Kg, 0.9gm protein/Kg dosing weight 52.1Kg)  Based on trend of calorie count, pt meeting approximately 65% estimated energy needs, 85% estimated protein needs. Results of calorie count discussed with NP       PO intake:  < 50% [ ] 50-75% [ X]   % [ ]  other :     Source for PO intake [x ] Patient [ ] family [ x] chart [ ] staff [ ] other    Current Weight: Weight (kg): 52.3 (09-27 @ 16:50)- no new weight to assess      Pertinent Medications: MEDICATIONS  (STANDING):  anastrozole 1 milliGRAM(s) Oral daily  apixaban 2.5 milliGRAM(s) Oral every 12 hours  docusate sodium 100 milliGRAM(s) Oral three times a day  dronabinol 2.5 milliGRAM(s) Oral two times a day  DULoxetine 60 milliGRAM(s) Oral daily  ferrous    sulfate 325 milliGRAM(s) Oral daily  lactobacillus acidophilus 1 Tablet(s) Oral daily  lidocaine   Patch 1 Patch Transdermal daily  metoprolol succinate ER 25 milliGRAM(s) Oral two times a day  pantoprazole    Tablet 40 milliGRAM(s) Oral before breakfast  simvastatin 20 milliGRAM(s) Oral at bedtime    MEDICATIONS  (PRN):  acetaminophen   Tablet .. 650 milliGRAM(s) Oral every 6 hours PRN Mild Pain (1 - 3), Moderate Pain (4 - 6)  oxyCODONE    5 mG/acetaminophen 325 mG 0.5 Tablet(s) Oral every 6 hours PRN Severe Pain (7 - 10)    Pertinent Labs:  09-30 Na133 mmol/L<L> Glu 103 mg/dL<H> K+ 3.7 mmol/L Cr  0.64 mg/dL BUN 16 mg/dL 09-27 Phos 2.9 mg/dL 09-28 Alb 3.0 g/dL<L>      Skin: +2 right knee, stage I left buttocks, stage I sacrum, stage II rt buttocks pressure ulcers noted.     Estimated Needs:   [X ] no change since previous assessment  [ ] recalculated:       Previous Nutrition Diagnosis:   [ X] Malnutrition , moderate         Nutrition Diagnosis is [X ] ongoing- being addressed with PO diet and Marinol           New Nutrition Diagnosis: [x ] not applicable        Recommend  1) Continue current diet and provide food preferences as requested by Pt/family within diet restrictions    2) Continue to encourage protein intake       Monitoring and Evaluation:   1.Continue to monitor weight, po intake, labs, and GI tolerance      RD to follow up for further nutritional interventions as indicated/requested by medical team/pt.   Tere Melendez RD pager #[phone]

## 2018-09-30 NOTE — PHYSICAL THERAPY INITIAL EVALUATION ADULT - PLANNED THERAPY INTERVENTIONS, PT EVAL
transfer training/stairs: GOAL: Pt will negotiate 2 steps of stairs using appropriate assistive device and 1 handrail via step-to technique with min A in 2 weeks./balance training/gait training/bed mobility training

## 2018-09-30 NOTE — PHYSICAL THERAPY INITIAL EVALUATION ADULT - BALANCE TRAINING, PT EVAL
GOAL: Pt will demonstrate improved static/dynamic balance to fair+ in order to improve stability, decrease fall risk, and increase independence in ADLs within 2 weeks.

## 2018-09-30 NOTE — PROVIDER CONTACT NOTE (OTHER) - ACTION/TREATMENT ORDERED:
NP Darvin aware of above, no addl tx at this time. NP Darvin aware of above, no addl tx at this time. NP to speak with family  and call attending for palliative consult.

## 2018-09-30 NOTE — PHYSICAL THERAPY INITIAL EVALUATION ADULT - ADDITIONAL COMMENTS
Pt lives in a private house, 2 steps to enter with 2 wide rails; +stair lift to the ground floor and to the 2nd floor. Pt states that up until 1.5 weeks ago, she was able to ambulate short distances using RW with min A from the aide.

## 2018-10-01 LAB
ANION GAP SERPL CALC-SCNC: 11 MMOL/L — SIGNIFICANT CHANGE UP (ref 5–17)
BUN SERPL-MCNC: 15 MG/DL — SIGNIFICANT CHANGE UP (ref 7–23)
CALCIUM SERPL-MCNC: 9.2 MG/DL — SIGNIFICANT CHANGE UP (ref 8.4–10.5)
CHLORIDE SERPL-SCNC: 98 MMOL/L — SIGNIFICANT CHANGE UP (ref 96–108)
CO2 SERPL-SCNC: 28 MMOL/L — SIGNIFICANT CHANGE UP (ref 22–31)
CREAT SERPL-MCNC: 0.73 MG/DL — SIGNIFICANT CHANGE UP (ref 0.5–1.3)
GLUCOSE SERPL-MCNC: 99 MG/DL — SIGNIFICANT CHANGE UP (ref 70–99)
POTASSIUM SERPL-MCNC: 5.5 MMOL/L — HIGH (ref 3.5–5.3)
POTASSIUM SERPL-SCNC: 5.5 MMOL/L — HIGH (ref 3.5–5.3)
SODIUM SERPL-SCNC: 137 MMOL/L — SIGNIFICANT CHANGE UP (ref 135–145)

## 2018-10-01 PROCEDURE — 99232 SBSQ HOSP IP/OBS MODERATE 35: CPT

## 2018-10-01 RX ORDER — SODIUM CHLORIDE 9 MG/ML
500 INJECTION INTRAMUSCULAR; INTRAVENOUS; SUBCUTANEOUS ONCE
Qty: 0 | Refills: 0 | Status: COMPLETED | OUTPATIENT
Start: 2018-10-01 | End: 2018-10-01

## 2018-10-01 RX ORDER — MEGESTROL ACETATE 40 MG/ML
800 SUSPENSION ORAL DAILY
Qty: 0 | Refills: 0 | Status: DISCONTINUED | OUTPATIENT
Start: 2018-10-01 | End: 2018-10-03

## 2018-10-01 RX ORDER — PEMBROLIZUMAB 25 MG/ML
200 INJECTION, SOLUTION INTRAVENOUS ONCE
Qty: 0 | Refills: 0 | Status: COMPLETED | OUTPATIENT
Start: 2018-10-01 | End: 2018-10-01

## 2018-10-01 RX ORDER — METOPROLOL TARTRATE 50 MG
25 TABLET ORAL
Qty: 0 | Refills: 0 | Status: DISCONTINUED | OUTPATIENT
Start: 2018-10-01 | End: 2018-10-03

## 2018-10-01 RX ADMIN — DULOXETINE HYDROCHLORIDE 60 MILLIGRAM(S): 30 CAPSULE, DELAYED RELEASE ORAL at 12:43

## 2018-10-01 RX ADMIN — SODIUM CHLORIDE 250 MILLILITER(S): 9 INJECTION INTRAMUSCULAR; INTRAVENOUS; SUBCUTANEOUS at 11:01

## 2018-10-01 RX ADMIN — Medication 2.5 MILLIGRAM(S): at 06:46

## 2018-10-01 RX ADMIN — Medication 325 MILLIGRAM(S): at 12:43

## 2018-10-01 RX ADMIN — APIXABAN 2.5 MILLIGRAM(S): 2.5 TABLET, FILM COATED ORAL at 17:03

## 2018-10-01 RX ADMIN — Medication 650 MILLIGRAM(S): at 10:49

## 2018-10-01 RX ADMIN — Medication 650 MILLIGRAM(S): at 21:17

## 2018-10-01 RX ADMIN — Medication 100 MILLIGRAM(S): at 06:44

## 2018-10-01 RX ADMIN — PANTOPRAZOLE SODIUM 40 MILLIGRAM(S): 20 TABLET, DELAYED RELEASE ORAL at 06:44

## 2018-10-01 RX ADMIN — Medication 650 MILLIGRAM(S): at 12:42

## 2018-10-01 RX ADMIN — LIDOCAINE 1 PATCH: 4 CREAM TOPICAL at 17:03

## 2018-10-01 RX ADMIN — SIMVASTATIN 20 MILLIGRAM(S): 20 TABLET, FILM COATED ORAL at 21:04

## 2018-10-01 RX ADMIN — APIXABAN 2.5 MILLIGRAM(S): 2.5 TABLET, FILM COATED ORAL at 06:44

## 2018-10-01 RX ADMIN — Medication 100 MILLIGRAM(S): at 12:43

## 2018-10-01 RX ADMIN — Medication 100 MILLIGRAM(S): at 21:04

## 2018-10-01 RX ADMIN — PEMBROLIZUMAB 216 MILLIGRAM(S): 25 INJECTION, SOLUTION INTRAVENOUS at 14:51

## 2018-10-01 RX ADMIN — Medication 1 TABLET(S): at 12:43

## 2018-10-01 RX ADMIN — LIDOCAINE 1 PATCH: 4 CREAM TOPICAL at 06:44

## 2018-10-01 RX ADMIN — Medication 650 MILLIGRAM(S): at 20:09

## 2018-10-01 RX ADMIN — ANASTROZOLE 1 MILLIGRAM(S): 1 TABLET ORAL at 12:43

## 2018-10-01 RX ADMIN — Medication 2.5 MILLIGRAM(S): at 17:03

## 2018-10-01 NOTE — ADVANCED PRACTICE NURSE CONSULT - ASSESSMENT
Patient received in bed. alert and oriented x 3, capable of expressing all needs to staff. Dose number 1. Consent in chart. Lab results as per Md Payton drew aware of same. Vital signs stable prior to chemotherapy and  within acceptable parameters, see sunrise. Pt educated on the importance of saving urine, verbalizes good understanding. Pt education done re chemo regimen, drug effects and potential side effects, written materials provided, pt states understanding. Reports that she has tolerated infusion well without side effects. Pt with left arm peripheral IV line, site free from signs and symptoms of infection, good blood return obtained. Pt ordered for and received Pembrolizumab 200 mg IV in 108 ml NS IV over 30 minutes as ordered with the 0.2 micron filter in place. See Emar for times. Daughter at bedside. Emotional report given.

## 2018-10-01 NOTE — PROVIDER CONTACT NOTE (OTHER) - SITUATION
per 8 jey chemo  RN Janessa,  at this time she charted  pts VS in sunrise and a  MEWS warning was noted.  pts pre- chemo VS 91/58,111,18,965 2l nc. 97.3  pts post chemo vs 105/72,108,18,96% 2l nc.
pts BP 95/63, 114 HR, pts BP meds held. pt verbalized "I usually have a high  heart rate." denies chest pain at this time.
pts VS 73/51,114. repeat VS 79/53,113. pt was oob to chair. pt placed back in bed. pt denies lightheadedness/ dizziness at this time.   VS back in bed 84/53,98. pts BP meds were held
/73; HR 92. patient due for metoprolol 25 mg at this time.
Pt's daughters are asking if a CEA level can be drawn on the pt, they state she was supposed to have this blood test drawn before she was admitted.

## 2018-10-01 NOTE — PROGRESS NOTE ADULT - ASSESSMENT
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     1.  Adenocarcinoma Left Lung (with right lung and right adrenal involvement)  -   on single agent keytruda with Dr Sanchez s/p 2 doses next dose today on 10-1-18  -   CT chest on 9-26-18 with moderated left pleural effusion with left lung collapse and mediastinal shift.  Stable bilateral upper lobe masses and right lower lobe groudglass nodule.  Small pericardial effusion unchanged.  -    S/P recent VATS/pleurex will need more frequent drainages  -    Blood counts stable on keytruda  -    pericardial tamponade s/p drain    2.  Breast Cancer (Right)  -   ROCÍO on anastrazole    3.  Recent A fibrillation  -   Currently on eliquis 2.5 mg po BID    4.  Failure to Thrive  -   On marinol and calorie count  -   PT evaluation    Will get keytruda today on 10-1-18

## 2018-10-01 NOTE — CHART NOTE - NSCHARTNOTEFT_GEN_A_CORE
called by RN to report ·  pts VS 73/51,114. repeat VS 79/53,113. pt was oob to chair. pt placed back in bed. pt denies lightheadedness/ dizziness at this time.   VS back in bed 84/53,98. pts BP meds were held;  Patient is a 85y old  Female who presents with a chief complaint of Pleural effusion with left lung collapse (01 Oct 2018 08:09)    H  Vital Signs Last 24 Hrs  T(C): 36.4 (01 Oct 2018 06:40), Max: 36.4 (30 Sep 2018 16:20)  T(F): 97.6 (01 Oct 2018 06:40), Max: 97.6 (30 Sep 2018 16:20)  HR: 98 (01 Oct 2018 10:18) (92 - 114)  BP: 84/53 (01 Oct 2018 10:18) (73/51 - 125/84)  BP(mean): --  RR: 18 (01 Oct 2018 10:18) (18 - 18)  SpO2: 98% (01 Oct 2018 10:18) (95% - 99%)    10-01    137  |  98  |  15  ----------------------------<  99  5.5<H>   |  28  |  0.73    Ca    9.2      01 Oct 2018 07:24            Neurology: A&Ox3,     Respiratory:   CV: RRR, S1S2,   Abdominal: Soft, NT,   MSK: No edema, + peripheral pulses, FROM all 4 extremity    A/P called by RN to report ·  pts VS 73/51,114. repeat VS 79/53,113. pt was oob to chair. pt placed back in bed. pt denies lightheadedness/ dizziness at this time.   VS back in bed 84/53,98. pts BP meds were held;  denies any SOB/CP/dizziness  Patient is a 85y old  Female who presents with a chief complaint of Pleural effusion with left lung collapse (01 Oct 2018 08:09)    H  Vital Signs Last 24 Hrs  T(C): 36.4 (01 Oct 2018 06:40), Max: 36.4 (30 Sep 2018 16:20)  T(F): 97.6 (01 Oct 2018 06:40), Max: 97.6 (30 Sep 2018 16:20)  HR: 98 (01 Oct 2018 10:18) (92 - 114)  BP: 84/53 (01 Oct 2018 10:18) (73/51 - 125/84)  BP(mean): --  RR: 18 (01 Oct 2018 10:18) (18 - 18)  SpO2: 98% (01 Oct 2018 10:18) (95% - 99%)    10-01    137  |  98  |  15  ----------------------------<  99  5.5<H>   |  28  |  0.73    Ca    9.2      01 Oct 2018 07:24            Neurology: A&Ox3,     Respiratory: clear, poor effort  CV: RRR, S1S2,   Abdominal: Soft, NT, bowel sounds present  MSK: No edema, + peripheral pulses, FROM all 4 extremity    A/P  86 yo F w/pmhx of right breast CA s/p tylectomy in the past now on anastrozole, GERD, anemia, HTN, recent diagnosis of lung cancer with admission for large left sided pleural effusion w/left lung collapse s/p VATS with decortication as well as pleurx placement-now on chemotherapy with Keytruda, pericardial effusion with previous hospital course complicated by development of tamponade requiring pericardial drain now presenting with persistent left lung collapse and moderate left sided pleural effusion as well as dehydration 2/2 poor po intake with concern for failure to thrive	.  seen by pulm/CTS/hemonc;  patient with hypotension -asymptomatic;  IV normal saline 500ml of bolus given with improvement in /72; monitor BP before BP meds/parameters  encourage po fluids;

## 2018-10-01 NOTE — PROGRESS NOTE ADULT - ASSESSMENT
Pt is a pleasant 84 yo F w/pmhx of right breast CA s/p tylectomy in the past now on anastrozole, GERD, anemia, HTN, recent diagnosis of lung cancer with admission for large left sided pleural effusion w/left lung collapse s/p VATS with decortication as well as pleurx placement-now on chemotherapy with Keytruda, pericardial effusion with previous hospital course complicated by development of tamponade requiring pericardial drain now presenting with persistent left lung collapse and moderate left sided pleural effusion as well as dehydration 2/2 poor po intake with concern for failure to thrive	.	       ·  Problem: Pleural effusion.    Pulm consult noted  dec pleurex drainage with smaller volume  lung collapse       ·  Problem: Malignant neoplasm of lung, unspecified laterality, unspecified part of lung.  Plan: Oncology consult noted   On Keytruda, dose today per Onc       ·  Problem: Failure to thrive in adult.  Plan: c/w Marinol  calorie count.  PT eval      ·  Problem: Paroxysmal atrial fibrillation.   cont eliquis for now     Hypotension - likely 2/2 hypovolemia 2/2 not eating, improved w/IVF      ·  Problem: Anxiety.  Plan: Continue duloxetine, now on 60mg daily.     Malnutrition - c/w Marinol    prognosis overall poor

## 2018-10-02 LAB
ALBUMIN SERPL ELPH-MCNC: 2.7 G/DL — LOW (ref 3.3–5)
ALP SERPL-CCNC: 95 U/L — SIGNIFICANT CHANGE UP (ref 40–120)
ALT FLD-CCNC: 9 U/L — LOW (ref 10–45)
ANION GAP SERPL CALC-SCNC: 10 MMOL/L — SIGNIFICANT CHANGE UP (ref 5–17)
AST SERPL-CCNC: 6 U/L — LOW (ref 10–40)
BASOPHILS # BLD AUTO: 0.03 K/UL — SIGNIFICANT CHANGE UP (ref 0–0.2)
BASOPHILS NFR BLD AUTO: 0.5 % — SIGNIFICANT CHANGE UP (ref 0–2)
BILIRUB SERPL-MCNC: 0.4 MG/DL — SIGNIFICANT CHANGE UP (ref 0.2–1.2)
BUN SERPL-MCNC: 15 MG/DL — SIGNIFICANT CHANGE UP (ref 7–23)
CALCIUM SERPL-MCNC: 9.1 MG/DL — SIGNIFICANT CHANGE UP (ref 8.4–10.5)
CEA SERPL-MCNC: 32.7 NG/ML — HIGH (ref 0–3.8)
CHLORIDE SERPL-SCNC: 99 MMOL/L — SIGNIFICANT CHANGE UP (ref 96–108)
CO2 SERPL-SCNC: 28 MMOL/L — SIGNIFICANT CHANGE UP (ref 22–31)
CREAT SERPL-MCNC: 0.7 MG/DL — SIGNIFICANT CHANGE UP (ref 0.5–1.3)
EOSINOPHIL # BLD AUTO: 0.25 K/UL — SIGNIFICANT CHANGE UP (ref 0–0.5)
EOSINOPHIL NFR BLD AUTO: 4.3 % — SIGNIFICANT CHANGE UP (ref 0–6)
GLUCOSE SERPL-MCNC: 102 MG/DL — HIGH (ref 70–99)
HCT VFR BLD CALC: 37.9 % — SIGNIFICANT CHANGE UP (ref 34.5–45)
HGB BLD-MCNC: 11.9 G/DL — SIGNIFICANT CHANGE UP (ref 11.5–15.5)
IMM GRANULOCYTES NFR BLD AUTO: 0.7 % — SIGNIFICANT CHANGE UP (ref 0–1.5)
LYMPHOCYTES # BLD AUTO: 0.95 K/UL — LOW (ref 1–3.3)
LYMPHOCYTES # BLD AUTO: 16.2 % — SIGNIFICANT CHANGE UP (ref 13–44)
MCHC RBC-ENTMCNC: 29.5 PG — SIGNIFICANT CHANGE UP (ref 27–34)
MCHC RBC-ENTMCNC: 31.4 GM/DL — LOW (ref 32–36)
MCV RBC AUTO: 93.8 FL — SIGNIFICANT CHANGE UP (ref 80–100)
MONOCYTES # BLD AUTO: 0.8 K/UL — SIGNIFICANT CHANGE UP (ref 0–0.9)
MONOCYTES NFR BLD AUTO: 13.7 % — SIGNIFICANT CHANGE UP (ref 2–14)
NEUTROPHILS # BLD AUTO: 3.78 K/UL — SIGNIFICANT CHANGE UP (ref 1.8–7.4)
NEUTROPHILS NFR BLD AUTO: 64.6 % — SIGNIFICANT CHANGE UP (ref 43–77)
PLATELET # BLD AUTO: 260 K/UL — SIGNIFICANT CHANGE UP (ref 150–400)
POTASSIUM SERPL-MCNC: 3.7 MMOL/L — SIGNIFICANT CHANGE UP (ref 3.5–5.3)
POTASSIUM SERPL-SCNC: 3.7 MMOL/L — SIGNIFICANT CHANGE UP (ref 3.5–5.3)
PROT SERPL-MCNC: 5.9 G/DL — LOW (ref 6–8.3)
RBC # BLD: 4.04 M/UL — SIGNIFICANT CHANGE UP (ref 3.8–5.2)
RBC # FLD: 17.7 % — HIGH (ref 10.3–14.5)
SODIUM SERPL-SCNC: 137 MMOL/L — SIGNIFICANT CHANGE UP (ref 135–145)
WBC # BLD: 5.85 K/UL — SIGNIFICANT CHANGE UP (ref 3.8–10.5)
WBC # FLD AUTO: 5.85 K/UL — SIGNIFICANT CHANGE UP (ref 3.8–10.5)

## 2018-10-02 RX ORDER — OXYCODONE AND ACETAMINOPHEN 5; 325 MG/1; MG/1
0.5 TABLET ORAL EVERY 6 HOURS
Qty: 0 | Refills: 0 | Status: DISCONTINUED | OUTPATIENT
Start: 2018-10-02 | End: 2018-10-03

## 2018-10-02 RX ADMIN — DULOXETINE HYDROCHLORIDE 60 MILLIGRAM(S): 30 CAPSULE, DELAYED RELEASE ORAL at 13:34

## 2018-10-02 RX ADMIN — Medication 25 MILLIGRAM(S): at 17:05

## 2018-10-02 RX ADMIN — Medication 650 MILLIGRAM(S): at 12:31

## 2018-10-02 RX ADMIN — Medication 1 TABLET(S): at 13:34

## 2018-10-02 RX ADMIN — APIXABAN 2.5 MILLIGRAM(S): 2.5 TABLET, FILM COATED ORAL at 17:05

## 2018-10-02 RX ADMIN — Medication 650 MILLIGRAM(S): at 13:41

## 2018-10-02 RX ADMIN — MEGESTROL ACETATE 800 MILLIGRAM(S): 40 SUSPENSION ORAL at 13:37

## 2018-10-02 RX ADMIN — Medication 325 MILLIGRAM(S): at 13:34

## 2018-10-02 RX ADMIN — Medication 100 MILLIGRAM(S): at 22:39

## 2018-10-02 RX ADMIN — SIMVASTATIN 20 MILLIGRAM(S): 20 TABLET, FILM COATED ORAL at 22:40

## 2018-10-02 RX ADMIN — ANASTROZOLE 1 MILLIGRAM(S): 1 TABLET ORAL at 13:34

## 2018-10-02 RX ADMIN — Medication 25 MILLIGRAM(S): at 05:46

## 2018-10-02 RX ADMIN — Medication 100 MILLIGRAM(S): at 13:34

## 2018-10-02 RX ADMIN — LIDOCAINE 1 PATCH: 4 CREAM TOPICAL at 17:02

## 2018-10-02 RX ADMIN — APIXABAN 2.5 MILLIGRAM(S): 2.5 TABLET, FILM COATED ORAL at 05:46

## 2018-10-02 RX ADMIN — Medication 100 MILLIGRAM(S): at 05:46

## 2018-10-02 RX ADMIN — LIDOCAINE 1 PATCH: 4 CREAM TOPICAL at 05:48

## 2018-10-02 RX ADMIN — PANTOPRAZOLE SODIUM 40 MILLIGRAM(S): 20 TABLET, DELAYED RELEASE ORAL at 05:46

## 2018-10-02 NOTE — PROGRESS NOTE ADULT - ASSESSMENT
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     1.  Adenocarcinoma Left Lung (with right lung and right adrenal involvement)  -   on single agent keytruda with Dr Sanchez s/p 3 doses last dose on 10-1-18 (follow as outpatient for further dosing).  CEA pending from 10-2-18.  -   CT chest on 9-26-18 with moderated left pleural effusion with left lung collapse and mediastinal shift.  Stable bilateral upper lobe masses and right lower lobe groudglass nodule.  Small pericardial effusion unchanged.  -    S/P recent VATS/pleurex will need more frequent drainages  -    Blood counts stable on keytruda  -    pericardial tamponade s/p drain    2.  Breast Cancer (Right)  -   ROCÍO on anastrazole    3.  Recent A fibrillation  -   Currently on eliquis 2.5 mg po BID    4.  Failure to Thrive  -   On marinol and calorie count  -   PT evaluation    S/P keytruda on 10-1-18.  Further plans per Dr Sanchez.

## 2018-10-02 NOTE — PROGRESS NOTE ADULT - ASSESSMENT
Pt is a pleasant 84 yo F w/pmhx of right breast CA s/p tylectomy in the past now on anastrozole, GERD, anemia, HTN, recent diagnosis of lung cancer with admission for large left sided pleural effusion w/left lung collapse s/p VATS with decortication as well as pleurx placement-now on chemotherapy with Keytruda, pericardial effusion with previous hospital course complicated by development of tamponade requiring pericardial drain now presenting with persistent left lung collapse and moderate left sided pleural effusion as well as dehydration 2/2 poor po intake with concern for failure to thrive	.	       ·  Problem: Pleural effusion.    Pulm consult noted  dec pleurex drainage with smaller volume  lung collapse       ·  Problem: Malignant neoplasm of lung, unspecified laterality, unspecified part of lung.  Plan: Oncology consult noted   On Keytruda, dose today per Onc       ·  Problem: Failure to thrive in adult.  Plan: c/w Marinol  calorie count.  PT eval      ·  Problem: Paroxysmal atrial fibrillation.   cont eliquis for now     Hypotension - likely 2/2 hypovolemia 2/2 not eating, improved w/IVF, monitor      ·  Problem: Anxiety.  Plan: Continue duloxetine, now on 60mg daily.     Malnutrition - changed to Megestrol as Marinol might contribute to low BP    prognosis overall poor Pt is a pleasant 86 yo F w/pmhx of right breast CA s/p tylectomy in the past now on anastrozole, GERD, anemia, HTN, recent diagnosis of lung cancer with admission for large left sided pleural effusion w/left lung collapse s/p VATS with decortication as well as pleurx placement-now on chemotherapy with Keytruda, pericardial effusion with previous hospital course complicated by development of tamponade requiring pericardial drain now presenting with persistent left lung collapse and moderate left sided pleural effusion as well as dehydration 2/2 poor po intake with concern for failure to thrive	.	       ·  Problem: Pleural effusion.    Pulm consult noted  dec pleurex drainage with smaller volume  lung collapse       ·  Problem: Malignant neoplasm of lung, unspecified laterality, unspecified part of lung.  Plan: Oncology consult noted   On Keytruda, dose today per Onc       ·  Problem: Failure to thrive in adult.  Plan: c/w Marinol  calorie count.  PT eval      ·  Problem: Paroxysmal atrial fibrillation.   cont eliquis for now     Hypotension - likely 2/2 hypovolemia 2/2 not eating, improved w/IVF, monitor      ·  Problem: Anxiety.  Plan: Continue duloxetine, now on 60mg daily.     Malnutrition - changed to Megestrol as Marinol might contribute to low BP    prognosis overall poor   d/c planning for [facility]

## 2018-10-02 NOTE — PROGRESS NOTE ADULT - PROBLEM SELECTOR PLAN 1
Small L pleural effusion with complete atelectasis of L lung collapse  -L Lung is now completely collapsed, at discharge in August she had slight aeration of L base.  -Pleurx drainage is causing her significant pain per family.   -Most of the lung is atelectatic 2nd compression from tumor. Small volume of pleural effusion. Decrease drainage of Pleurex to twice weekly, no more than 500cc per drainage
Small L pleural effusion with complete atelectasis of L lung collapse  -L Lung is now completely collapsed, at discharge in August she had slight aeration of L base.  -Pleurx drainage is causing her significant pain per family.   -Most of the lung is atelectatic 2nd compression from tumor. Small volume of pleural effusion. Decrease drainage of Pleurex to twice weekly, no more than 500cc per drainage
Small L pleural effusion with complete atelectasis of L lung collapse  -L Lung is now completely collapsed, at discharge in August she had slight aeration of L base.  -Pleurx drainage is causing her significant pain per family.   -Most of the lung is atelectatic 2nd compression from tumor. Small volume of pleural effusion. Decrease drainage of Pleurex to twice weekly, no more than 500cc per drainage  -d/c planning per primary team

## 2018-10-02 NOTE — PROGRESS NOTE ADULT - PROBLEM SELECTOR PLAN 4
Poor PO intake per family  -Nutrition consult appreciated  -Started on Marinol this admission, no noticeable improvement in appetite per family.   -Episodes of hypotension the past 24hrs, can be side effect from Marinol   -Megace 800mg qd
Poor PO intake per family  -Nutrition consult  -Started on Marinol
Poor PO intake per family  -Nutrition consult appreciated  -Started on Marinol this admission, no noticeable improvement in appetite per family.   -Episodes of hypotension the past 24hrs, ?side effect from Marinol   -Consider trial of Megace instead, 800mg qd

## 2018-10-02 NOTE — PROGRESS NOTE ADULT - PROBLEM SELECTOR PLAN 2
L hilar mass, malignant L effusion, malignant pericardial effusion, RUL nodule, R adrenal nodule  -On Keytruda per oncology, received dose 10/1  -DNR/DNI
L hilar mass, malignant L effusion, malignant pericardial effusion, RUL nodule, R adrenal nodule  -On Keytruda per oncology, next dose 10/1.  -DNR/DNI
L hilar mass, malignant L effusion, malignant pericardial effusion, RUL nodule, R adrenal nodule  -On Keytruda per oncology, received dose today   -DNR/DNI

## 2018-10-03 VITALS
DIASTOLIC BLOOD PRESSURE: 74 MMHG | RESPIRATION RATE: 18 BRPM | TEMPERATURE: 98 F | SYSTOLIC BLOOD PRESSURE: 114 MMHG | HEART RATE: 106 BPM | OXYGEN SATURATION: 94 %

## 2018-10-03 LAB
ALBUMIN SERPL ELPH-MCNC: 2.8 G/DL — LOW (ref 3.3–5)
ALP SERPL-CCNC: 103 U/L — SIGNIFICANT CHANGE UP (ref 40–120)
ALT FLD-CCNC: 9 U/L — LOW (ref 10–45)
ANION GAP SERPL CALC-SCNC: 12 MMOL/L — SIGNIFICANT CHANGE UP (ref 5–17)
AST SERPL-CCNC: 8 U/L — LOW (ref 10–40)
BASOPHILS # BLD AUTO: 0 K/UL — SIGNIFICANT CHANGE UP (ref 0–0.2)
BASOPHILS NFR BLD AUTO: 0.4 % — SIGNIFICANT CHANGE UP (ref 0–2)
BILIRUB SERPL-MCNC: 0.5 MG/DL — SIGNIFICANT CHANGE UP (ref 0.2–1.2)
BUN SERPL-MCNC: 14 MG/DL — SIGNIFICANT CHANGE UP (ref 7–23)
CALCIUM SERPL-MCNC: 9.2 MG/DL — SIGNIFICANT CHANGE UP (ref 8.4–10.5)
CHLORIDE SERPL-SCNC: 98 MMOL/L — SIGNIFICANT CHANGE UP (ref 96–108)
CO2 SERPL-SCNC: 26 MMOL/L — SIGNIFICANT CHANGE UP (ref 22–31)
CREAT SERPL-MCNC: 0.66 MG/DL — SIGNIFICANT CHANGE UP (ref 0.5–1.3)
EOSINOPHIL # BLD AUTO: 0.1 K/UL — SIGNIFICANT CHANGE UP (ref 0–0.5)
EOSINOPHIL NFR BLD AUTO: 1.6 % — SIGNIFICANT CHANGE UP (ref 0–6)
GLUCOSE SERPL-MCNC: 99 MG/DL — SIGNIFICANT CHANGE UP (ref 70–99)
HCT VFR BLD CALC: 38.5 % — SIGNIFICANT CHANGE UP (ref 34.5–45)
HGB BLD-MCNC: 12.6 G/DL — SIGNIFICANT CHANGE UP (ref 11.5–15.5)
LYMPHOCYTES # BLD AUTO: 1.1 K/UL — SIGNIFICANT CHANGE UP (ref 1–3.3)
LYMPHOCYTES # BLD AUTO: 14.9 % — SIGNIFICANT CHANGE UP (ref 13–44)
MCHC RBC-ENTMCNC: 30.5 PG — SIGNIFICANT CHANGE UP (ref 27–34)
MCHC RBC-ENTMCNC: 32.8 GM/DL — SIGNIFICANT CHANGE UP (ref 32–36)
MCV RBC AUTO: 92.9 FL — SIGNIFICANT CHANGE UP (ref 80–100)
MONOCYTES # BLD AUTO: 0.8 K/UL — SIGNIFICANT CHANGE UP (ref 0–0.9)
MONOCYTES NFR BLD AUTO: 10.5 % — SIGNIFICANT CHANGE UP (ref 2–14)
NEUTROPHILS # BLD AUTO: 5.5 K/UL — SIGNIFICANT CHANGE UP (ref 1.8–7.4)
NEUTROPHILS NFR BLD AUTO: 72.6 % — SIGNIFICANT CHANGE UP (ref 43–77)
PLATELET # BLD AUTO: 320 K/UL — SIGNIFICANT CHANGE UP (ref 150–400)
POTASSIUM SERPL-MCNC: 4 MMOL/L — SIGNIFICANT CHANGE UP (ref 3.5–5.3)
POTASSIUM SERPL-SCNC: 4 MMOL/L — SIGNIFICANT CHANGE UP (ref 3.5–5.3)
PROT SERPL-MCNC: 5.9 G/DL — LOW (ref 6–8.3)
RBC # BLD: 4.14 M/UL — SIGNIFICANT CHANGE UP (ref 3.8–5.2)
RBC # FLD: 15.7 % — HIGH (ref 10.3–14.5)
SODIUM SERPL-SCNC: 136 MMOL/L — SIGNIFICANT CHANGE UP (ref 135–145)
WBC # BLD: 7.5 K/UL — SIGNIFICANT CHANGE UP (ref 3.8–10.5)
WBC # FLD AUTO: 7.5 K/UL — SIGNIFICANT CHANGE UP (ref 3.8–10.5)

## 2018-10-03 RX ORDER — LIDOCAINE 4 G/100G
1 CREAM TOPICAL
Qty: 0 | Refills: 0 | COMMUNITY
Start: 2018-10-03

## 2018-10-03 RX ORDER — ACETAMINOPHEN 500 MG
2 TABLET ORAL
Qty: 0 | Refills: 0 | COMMUNITY
Start: 2018-10-03

## 2018-10-03 RX ORDER — MEGESTROL ACETATE 40 MG/ML
20 SUSPENSION ORAL
Qty: 600 | Refills: 0 | OUTPATIENT
Start: 2018-10-03 | End: 2018-11-01

## 2018-10-03 RX ORDER — ACETAMINOPHEN 500 MG
2 TABLET ORAL
Qty: 0 | Refills: 0 | COMMUNITY

## 2018-10-03 RX ORDER — LIDOCAINE 4 G/100G
1 CREAM TOPICAL
Qty: 30 | Refills: 0 | OUTPATIENT
Start: 2018-10-03 | End: 2018-11-01

## 2018-10-03 RX ORDER — DOCUSATE SODIUM 100 MG
1 CAPSULE ORAL
Qty: 0 | Refills: 0 | COMMUNITY
Start: 2018-10-03

## 2018-10-03 RX ADMIN — Medication 25 MILLIGRAM(S): at 05:03

## 2018-10-03 RX ADMIN — LIDOCAINE 1 PATCH: 4 CREAM TOPICAL at 05:05

## 2018-10-03 RX ADMIN — Medication 100 MILLIGRAM(S): at 05:03

## 2018-10-03 RX ADMIN — ANASTROZOLE 1 MILLIGRAM(S): 1 TABLET ORAL at 12:48

## 2018-10-03 RX ADMIN — Medication 325 MILLIGRAM(S): at 11:40

## 2018-10-03 RX ADMIN — PANTOPRAZOLE SODIUM 40 MILLIGRAM(S): 20 TABLET, DELAYED RELEASE ORAL at 05:04

## 2018-10-03 RX ADMIN — MEGESTROL ACETATE 800 MILLIGRAM(S): 40 SUSPENSION ORAL at 11:21

## 2018-10-03 RX ADMIN — APIXABAN 2.5 MILLIGRAM(S): 2.5 TABLET, FILM COATED ORAL at 05:03

## 2018-10-03 RX ADMIN — Medication 1 TABLET(S): at 11:40

## 2018-10-03 RX ADMIN — DULOXETINE HYDROCHLORIDE 60 MILLIGRAM(S): 30 CAPSULE, DELAYED RELEASE ORAL at 11:40

## 2018-10-03 NOTE — PROGRESS NOTE ADULT - REASON FOR ADMISSION
Pleural effusion with left lung collapse

## 2018-10-03 NOTE — PROGRESS NOTE ADULT - PROVIDER SPECIALTY LIST ADULT
Heme/Onc
Internal Medicine
Pulmonology
Pulmonology
Heme/Onc
Pulmonology

## 2018-10-03 NOTE — CHART NOTE - NSCHARTNOTEFT_GEN_A_CORE
follow up-As per d/w attending MD, patient is cleared for discharge home with home care/24 hour home assistance;  discussed discharge medication and follow up.  Kasey Lazaro(NP)  [facility], [phone]

## 2018-10-03 NOTE — PROGRESS NOTE ADULT - ASSESSMENT
Pt is a pleasant 84 yo F w/pmhx of right breast CA s/p tylectomy in the past now on anastrozole, GERD, anemia, HTN, recent diagnosis of lung cancer with admission for large left sided pleural effusion w/left lung collapse s/p VATS with decortication as well as pleurx placement-now on chemotherapy with Keytruda, pericardial effusion with previous hospital course complicated by development of tamponade requiring pericardial drain now presenting with persistent left lung collapse and moderate left sided pleural effusion as well as dehydration 2/2 poor po intake with concern for failure to thrive	.	       ·  Problem: Pleural effusion.    Pulm consult noted  dec pleurex drainage with smaller volume  lung collapse       ·  Problem: Malignant neoplasm of lung, unspecified laterality, unspecified part of lung.  Plan: Oncology consult noted   On Keytruda, dose today per Onc       ·  Problem: Failure to thrive in adult.  Plan: c/w Marinol  calorie count.  PT eval      ·  Problem: Paroxysmal atrial fibrillation.   cont eliquis for now     Hypotension - likely 2/2 hypovolemia 2/2 not eating, improved w/IVF, monitor      ·  Problem: Anxiety.  Plan: Continue duloxetine, now on 60mg daily.     Malnutrition - c/w Megestrol    prognosis overall poor   d/c planning today

## 2018-10-03 NOTE — PROGRESS NOTE ADULT - SUBJECTIVE AND OBJECTIVE BOX
CHIEF COMPLAINT:Patient is a 85y old  Female who presents with a chief complaint of Pleural effusion with left lung collapse (27 Sep 2018 12:54)      Allergies:  Dilaudid (Other)  Levaquin (Diarrhea)  tramadol (Unknown)      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  S/P hip replacement, right: 6/2016  - right hip  History of lumpectomy of right breast: July 2015  History of cataract surgery, left: and right 2016  Elective surgery: reversal of colostomy July 2015  History of tonsillectomy  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 No nausea, vomiting, diarrhea or constipation  GENITOURINARY: No dysuria, urinary frequency or urgency, no hematuria  NEUROLOGICAL: No headaches, memory loss, loss of strength, numbness, or tremors  SKIN: No itching, burning, rashes, or lesions   MUSCULOSKELETAL: No joint pain or swelling; No muscle, back, or extremity pain    Medications:  MEDICATIONS  (STANDING):  anastrozole 1 milliGRAM(s) Oral daily  apixaban 2.5 milliGRAM(s) Oral every 12 hours  dronabinol 2.5 milliGRAM(s) Oral two times a day  DULoxetine 60 milliGRAM(s) Oral daily  ferrous    sulfate 325 milliGRAM(s) Oral daily  lactobacillus acidophilus 1 Tablet(s) Oral daily  lidocaine   Patch 1 Patch Transdermal daily  metoprolol succinate ER 25 milliGRAM(s) Oral two times a day  pantoprazole    Tablet 40 milliGRAM(s) Oral before breakfast  simvastatin 20 milliGRAM(s) Oral at bedtime    MEDICATIONS  (PRN):  acetaminophen   Tablet .. 650 milliGRAM(s) Oral every 6 hours PRN Mild Pain (1 - 3), Moderate Pain (4 - 6)  oxyCODONE    5 mG/acetaminophen 325 mG 0.5 Tablet(s) Oral every 6 hours PRN Severe Pain (7 - 10)    	    PHYSICAL EXAM:  T(C): 36.9 (09-27-18 @ 16:50), Max: 36.9 (09-27-18 @ 16:50)  HR: 102 (09-27-18 @ 16:50) (95 - 104)  BP: 130/85 (09-27-18 @ 16:50) (115/82 - 130/85)  RR: 18 (09-27-18 @ 16:50) (18 - 21)  SpO2: 93% (09-27-18 @ 16:50) (93% - 100%)  Wt(kg): --  I&O's Summary      Appearance: Frail	  HEENT:   NCAT, PERRL, EOMI	  Lymphatic: No lymphadenopathy  Cardiovascular: Normal S1 S2, RRR  Respiratory: dec BS L side, + Pleurex  Psychiatry: A & O x 3, Mood & affect appropriate  Gastrointestinal:  Soft, Non-tender, + BS  Skin: No rashes, No ecchymoses, No cyanosis	  Neurologic: Non-focal  Extremities: Normal range of motion, No clubbing, cyanosis or edema    	  LABS:	 	    CARDIAC MARKERS:                                12.0   7.96  )-----------( 252      ( 27 Sep 2018 08:40 )             39.3     09-27    132<L>  |  96  |  20  ----------------------------<  104<H>  3.6   |  23  |  0.79    Ca    9.3      27 Sep 2018 06:45  Phos  2.9     09-27  Mg     1.6     09-27    TPro  6.8  /  Alb  3.5  /  TBili  0.3  /  DBili  x   /  AST  12  /  ALT  13  /  AlkPhos  103  09-26    proBNP:   Lipid Profile:   HgA1c:   TSH:
CHIEF COMPLAINT:Patient is a 85y old  Female who presents with a chief complaint of Pleural effusion with left lung collapse (27 Sep 2018 12:54)  no acute events, pain somewhat better    Allergies:  Dilaudid (Other)  Levaquin (Diarrhea)  tramadol (Unknown)      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  S/P hip replacement, right: 6/2016  - right hip  History of lumpectomy of right breast: July 2015  History of cataract surgery, left: and right 2016  Elective surgery: reversal of colostomy July 2015  History of tonsillectomy  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 No nausea, vomiting, diarrhea or constipation  GENITOURINARY: No dysuria, urinary frequency or urgency, no hematuria  NEUROLOGICAL: No headaches, memory loss, loss of strength, numbness, or tremors  SKIN: No itching, burning, rashes, or lesions   MUSCULOSKELETAL: No joint pain or swelling; No muscle, back, or extremity pain      Medications:  MEDICATIONS  (STANDING):  anastrozole 1 milliGRAM(s) Oral daily  apixaban 2.5 milliGRAM(s) Oral every 12 hours  dronabinol 2.5 milliGRAM(s) Oral two times a day  DULoxetine 60 milliGRAM(s) Oral daily  ferrous    sulfate 325 milliGRAM(s) Oral daily  lactobacillus acidophilus 1 Tablet(s) Oral daily  lidocaine   Patch 1 Patch Transdermal daily  metoprolol succinate ER 25 milliGRAM(s) Oral two times a day  pantoprazole    Tablet 40 milliGRAM(s) Oral before breakfast  simvastatin 20 milliGRAM(s) Oral at bedtime    MEDICATIONS  (PRN):  acetaminophen   Tablet .. 650 milliGRAM(s) Oral every 6 hours PRN Mild Pain (1 - 3), Moderate Pain (4 - 6)  oxyCODONE    5 mG/acetaminophen 325 mG 0.5 Tablet(s) Oral every 6 hours PRN Severe Pain (7 - 10)    	    PHYSICAL EXAM:  T(C): 36.6 (09-28-18 @ 06:13), Max: 36.9 (09-27-18 @ 16:50)  HR: 99 (09-28-18 @ 06:13) (92 - 102)  BP: 114/76 (09-28-18 @ 06:13) (107/73 - 130/85)  RR: 18 (09-28-18 @ 06:13) (18 - 18)  SpO2: 95% (09-28-18 @ 06:13) (93% - 95%)  Wt(kg): --  I&O's Summary    27 Sep 2018 07:01  -  28 Sep 2018 07:00  --------------------------------------------------------  IN: 440 mL / OUT: 450 mL / NET: -10 mL      Appearance: Frail	  HEENT:   NCAT, PERRL, EOMI	  Lymphatic: No lymphadenopathy  Cardiovascular: Normal S1 S2, RRR  Respiratory: dec BS L side, + Pleurex  Psychiatry: A & O x 3, Mood & affect appropriate  Gastrointestinal:  Soft, Non-tender, + BS  Skin: No rashes, No ecchymoses, No cyanosis	  Neurologic: Non-focal  Extremities: Normal range of motion, No clubbing, cyanosis or edema    LABS:	 	    CARDIAC MARKERS:                                11.6   7.23  )-----------( 263      ( 28 Sep 2018 08:29 )             37.4     09-28    134<L>  |  97  |  14  ----------------------------<  82  3.8   |  23  |  0.69    Ca    9.1      28 Sep 2018 06:50  Phos  2.9     09-27  Mg     1.6     09-27    TPro  5.5<L>  /  Alb  3.0<L>  /  TBili  0.4  /  DBili  x   /  AST  10  /  ALT  11  /  AlkPhos  87  09-28    proBNP:   Lipid Profile:   HgA1c:   TSH:
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  S/P hip replacement, right: 6/2016  - right hip  History of lumpectomy of right breast: July 2015  History of cataract surgery, left: and right 2016  Elective surgery: reversal of colostomy July 2015  History of tonsillectomy  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 No nausea, vomiting, or hematemesis; No diarrhea or constipation. No melena or hematochezia.  GENITOURINARY: No dysuria, frequency, hematuria, or incontinence  NEUROLOGICAL: No headaches,    MUSCULOSKELETAL: No joint pain or swelling; No muscle, back, or extremity pain    Medications:  MEDICATIONS  (STANDING):  anastrozole 1 milliGRAM(s) Oral daily  apixaban 2.5 milliGRAM(s) Oral every 12 hours  dronabinol 2.5 milliGRAM(s) Oral two times a day  DULoxetine 60 milliGRAM(s) Oral daily  ferrous    sulfate 325 milliGRAM(s) Oral daily  lactobacillus acidophilus 1 Tablet(s) Oral daily  lidocaine   Patch 1 Patch Transdermal daily  metoprolol succinate ER 25 milliGRAM(s) Oral two times a day  pantoprazole    Tablet 40 milliGRAM(s) Oral before breakfast  simvastatin 20 milliGRAM(s) Oral at bedtime    MEDICATIONS  (PRN):  acetaminophen   Tablet .. 650 milliGRAM(s) Oral every 6 hours PRN Mild Pain (1 - 3), Moderate Pain (4 - 6)  oxyCODONE    5 mG/acetaminophen 325 mG 0.5 Tablet(s) Oral every 6 hours PRN Severe Pain (7 - 10)    	    PHYSICAL EXAM:  T(C): 36.4 (09-29-18 @ 06:11), Max: 36.9 (09-28-18 @ 21:11)  HR: 94 (09-29-18 @ 06:11) (94 - 104)  BP: 130/82 (09-29-18 @ 06:11) (102/64 - 130/82)  RR: 18 (09-29-18 @ 06:11) (18 - 18)  SpO2: 100% (09-29-18 @ 06:11) (94% - 100%)  Wt(kg): --  I&O's Summary    28 Sep 2018 07:01  -  29 Sep 2018 07:00  --------------------------------------------------------  IN: 460 mL / OUT: 850 mL / NET: -390 mL        Appearance: Normal	  HEENT:   Normal oral mucosa, PERRL, EOMI	  Lymphatic: No lymphadenopathy  Cardiovascular: Normal S1 S2, No JVD, No murmurs, No edema  Respiratory: dec bs   Psychiatry: A & O x 3,   Gastrointestinal:  Soft, Non-tender, + BS	  Skin: No rashes, No ecchymoses, No cyanosis	  Neurologic: Non-focal  Extremities: Normal range of motion, No clubbing, cyanosis or edema  Vascular: Peripheral pulses palpable 2+ bilaterally    TELEMETRY: 	    ECG:  	  RADIOLOGY:  OTHER: 	  	  LABS:	 	    CARDIAC MARKERS:                                11.6   7.23  )-----------( 263      ( 28 Sep 2018 08:29 )             37.4     09-29    133<L>  |  97  |  16  ----------------------------<  99  4.2   |  26  |  0.82    Ca    9.4      29 Sep 2018 07:19    TPro  5.5<L>  /  Alb  3.0<L>  /  TBili  0.4  /  DBili  x   /  AST  10  /  ALT  11  /  AlkPhos  87  09-28    proBNP:   Lipid Profile:   HgA1c:   TSH:
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  S/P hip replacement, right: 6/2016  - right hip  History of lumpectomy of right breast: July 2015  History of cataract surgery, left: and right 2016  Elective surgery: reversal of colostomy July 2015  History of tonsillectomy  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 No nausea, vomiting, or hematemesis; No diarrhea or constipation. No melena or hematochezia.  GENITOURINARY: No dysuria, frequency, hematuria, or incontinence  NEUROLOGICAL: No headaches,     MUSCULOSKELETAL: No joint pain or swelling; No muscle, back, or extremity pain    Medications:  MEDICATIONS  (STANDING):  anastrozole 1 milliGRAM(s) Oral daily  apixaban 2.5 milliGRAM(s) Oral every 12 hours  docusate sodium 100 milliGRAM(s) Oral three times a day  dronabinol 2.5 milliGRAM(s) Oral two times a day  DULoxetine 60 milliGRAM(s) Oral daily  ferrous    sulfate 325 milliGRAM(s) Oral daily  lactobacillus acidophilus 1 Tablet(s) Oral daily  lidocaine   Patch 1 Patch Transdermal daily  metoprolol succinate ER 25 milliGRAM(s) Oral two times a day  pantoprazole    Tablet 40 milliGRAM(s) Oral before breakfast  simvastatin 20 milliGRAM(s) Oral at bedtime    MEDICATIONS  (PRN):  acetaminophen   Tablet .. 650 milliGRAM(s) Oral every 6 hours PRN Mild Pain (1 - 3), Moderate Pain (4 - 6)  oxyCODONE    5 mG/acetaminophen 325 mG 0.5 Tablet(s) Oral every 6 hours PRN Severe Pain (7 - 10)    	    PHYSICAL EXAM:  T(C): 37.1 (09-30-18 @ 10:38), Max: 37.1 (09-30-18 @ 10:38)  HR: 114 (09-30-18 @ 11:55) (84 - 114)  BP: 95/63 (09-30-18 @ 11:55) (95/61 - 128/84)  RR: 18 (09-30-18 @ 11:55) (17 - 18)  SpO2: 97% (09-30-18 @ 11:55) (93% - 100%)  Wt(kg): --  I&O's Summary    29 Sep 2018 07:01  -  30 Sep 2018 07:00  --------------------------------------------------------  IN: 680 mL / OUT: 600 mL / NET: 80 mL    30 Sep 2018 07:01  -  30 Sep 2018 12:17  --------------------------------------------------------  IN: 240 mL / OUT: 150 mL / NET: 90 mL        Appearance: Normal	  HEENT:   Normal oral mucosa, PERRL, EOMI	  Lymphatic: No lymphadenopathy  Cardiovascular: Normal S1 S2, No JVD, No murmurs, No edema  Respiratory: dec bs   Psychiatry: A & O  Gastrointestinal:  Soft, Non-tender, + BS	  Skin: No rashes, No ecchymoses, No cyanosis	  Neurologic: Non-focal  Extremities: Normal range of motion, No clubbing, cyanosis or edema  Vascular: Peripheral pulses palpable     TELEMETRY: 	    ECG:  	  RADIOLOGY:  OTHER: 	  	  LABS:	 	    CARDIAC MARKERS:                                13.0   5.96  )-----------( 235      ( 29 Sep 2018 08:17 )             39.1     09-30    133<L>  |  97  |  16  ----------------------------<  103<H>  3.7   |  23  |  0.64    Ca    8.8      30 Sep 2018 06:18      proBNP:   Lipid Profile:   HgA1c:   TSH:
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  S/P hip replacement, right: 6/2016  - right hip  History of lumpectomy of right breast: July 2015  History of cataract surgery, left: and right 2016  Elective surgery: reversal of colostomy July 2015  History of tonsillectomy  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 No nausea, vomiting, or hematemesis; No diarrhea or constipation. No melena or hematochezia.  GENITOURINARY: No dysuria, frequency, hematuria, or incontinence  NEUROLOGICAL: No headaches,   MUSCULOSKELETAL: No joint pain or swelling; No muscle, back, or extremity pain      Medications:  MEDICATIONS  (STANDING):  anastrozole 1 milliGRAM(s) Oral daily  apixaban 2.5 milliGRAM(s) Oral every 12 hours  docusate sodium 100 milliGRAM(s) Oral three times a day  dronabinol 2.5 milliGRAM(s) Oral two times a day  DULoxetine 60 milliGRAM(s) Oral daily  ferrous    sulfate 325 milliGRAM(s) Oral daily  lactobacillus acidophilus 1 Tablet(s) Oral daily  lidocaine   Patch 1 Patch Transdermal daily  metoprolol succinate ER 25 milliGRAM(s) Oral two times a day  pantoprazole    Tablet 40 milliGRAM(s) Oral before breakfast  simvastatin 20 milliGRAM(s) Oral at bedtime    MEDICATIONS  (PRN):  acetaminophen   Tablet .. 650 milliGRAM(s) Oral every 6 hours PRN Mild Pain (1 - 3), Moderate Pain (4 - 6)  oxyCODONE    5 mG/acetaminophen 325 mG 0.5 Tablet(s) Oral every 6 hours PRN Severe Pain (7 - 10)    	    PHYSICAL EXAM:  T(C): 36.3 (10-01-18 @ 13:39), Max: 36.4 (09-30-18 @ 16:20)  HR: 102 (10-01-18 @ 13:39) (92 - 114)  BP: 106/70 (10-01-18 @ 13:39) (73/51 - 125/84)  RR: 18 (10-01-18 @ 13:39) (18 - 18)  SpO2: 94% (10-01-18 @ 13:39) (94% - 99%)  Wt(kg): --  I&O's Summary    30 Sep 2018 07:01  -  01 Oct 2018 07:00  --------------------------------------------------------  IN: 660 mL / OUT: 400 mL / NET: 260 mL    01 Oct 2018 07:01  -  01 Oct 2018 14:59  --------------------------------------------------------  IN: 240 mL / OUT: 150 mL / NET: 90 mL      Appearance: Normal	  HEENT:   Normal oral mucosa, PERRL, EOMI	  Lymphatic: No lymphadenopathy  Cardiovascular: Normal S1 S2, No JVD, No murmurs, No edema  Respiratory: dec bs   Psychiatry: A & O  Gastrointestinal:  Soft, Non-tender, + BS	  Skin: No rashes, No ecchymoses, No cyanosis	  Neurologic: Non-focal  Extremities: Normal range of motion, No clubbing, cyanosis or edema  Vascular: Peripheral pulses palpable     LABS:	 	    CARDIAC MARKERS:            10-01    137  |  98  |  15  ----------------------------<  99  5.5<H>   |  28  |  0.73    Ca    9.2      01 Oct 2018 07:24      proBNP:   Lipid Profile:   HgA1c:   TSH:
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  S/P hip replacement, right: 6/2016  - right hip  History of lumpectomy of right breast: July 2015  History of cataract surgery, left: and right 2016  Elective surgery: reversal of colostomy July 2015  History of tonsillectomy  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 No nausea, vomiting, or hematemesis; No diarrhea or constipation. No melena or hematochezia.  GENITOURINARY: No dysuria, frequency, hematuria, or incontinence  NEUROLOGICAL: No headaches,   MUSCULOSKELETAL: No joint pain or swelling; No muscle, back, or extremity pain      Medications:  MEDICATIONS  (STANDING):  anastrozole 1 milliGRAM(s) Oral daily  apixaban 2.5 milliGRAM(s) Oral every 12 hours  docusate sodium 100 milliGRAM(s) Oral three times a day  DULoxetine 60 milliGRAM(s) Oral daily  ferrous    sulfate 325 milliGRAM(s) Oral daily  lactobacillus acidophilus 1 Tablet(s) Oral daily  lidocaine   Patch 1 Patch Transdermal daily  megestrol Suspension 800 milliGRAM(s) Oral daily  metoprolol succinate ER 25 milliGRAM(s) Oral two times a day  pantoprazole    Tablet 40 milliGRAM(s) Oral before breakfast  simvastatin 20 milliGRAM(s) Oral at bedtime    MEDICATIONS  (PRN):  acetaminophen   Tablet .. 650 milliGRAM(s) Oral every 6 hours PRN Mild Pain (1 - 3), Moderate Pain (4 - 6)  oxyCODONE    5 mG/acetaminophen 325 mG 0.5 Tablet(s) Oral every 6 hours PRN Severe Pain (7 - 10)    	    PHYSICAL EXAM:  T(C): 36.6 (10-02-18 @ 08:41), Max: 36.6 (10-01-18 @ 19:59)  HR: 102 (10-02-18 @ 08:41) (90 - 111)  BP: 132/84 (10-02-18 @ 08:41) (91/58 - 132/84)  RR: 18 (10-02-18 @ 08:41) (17 - 18)  SpO2: 99% (10-02-18 @ 08:41) (94% - 99%)  Wt(kg): --  I&O's Summary    01 Oct 2018 07:01  -  02 Oct 2018 07:00  --------------------------------------------------------  IN: 1263 mL / OUT: 150 mL / NET: 1113 mL      Appearance: Normal	  HEENT:   Normal oral mucosa, PERRL, EOMI	  Lymphatic: No lymphadenopathy  Cardiovascular: Normal S1 S2, No JVD, No murmurs, No edema  Respiratory: dec bs   Psychiatry: A & O x3  Gastrointestinal:  Soft, Non-tender, + BS	  Skin: No rashes, No ecchymoses, No cyanosis	  Neurologic: Non-focal  Extremities: Normal range of motion, No clubbing, cyanosis or edema  Vascular: Peripheral pulses palpable     LABS:	 	    CARDIAC MARKERS:                                11.9   5.85  )-----------( 260      ( 02 Oct 2018 07:49 )             37.9     10-02    137  |  99  |  15  ----------------------------<  102<H>  3.7   |  28  |  0.70    Ca    9.1      02 Oct 2018 06:32    TPro  5.9<L>  /  Alb  2.7<L>  /  TBili  0.4  /  DBili  x   /  AST  6<L>  /  ALT  9<L>  /  AlkPhos  95  10-02    proBNP:   Lipid Profile:   HgA1c:   TSH:
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  S/P hip replacement, right: 6/2016  - right hip  History of lumpectomy of right breast: July 2015  History of cataract surgery, left: and right 2016  Elective surgery: reversal of colostomy July 2015  History of tonsillectomy  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 No nausea, vomiting, or hematemesis; No diarrhea or constipation. No melena or hematochezia.  GENITOURINARY: No dysuria, frequency, hematuria, or incontinence  NEUROLOGICAL: No headaches,   MUSCULOSKELETAL: No joint pain or swelling; No muscle, back, or extremity pain      Medications:  MEDICATIONS  (STANDING):  anastrozole 1 milliGRAM(s) Oral daily  apixaban 2.5 milliGRAM(s) Oral every 12 hours  docusate sodium 100 milliGRAM(s) Oral three times a day  DULoxetine 60 milliGRAM(s) Oral daily  ferrous    sulfate 325 milliGRAM(s) Oral daily  lactobacillus acidophilus 1 Tablet(s) Oral daily  lidocaine   Patch 1 Patch Transdermal daily  megestrol Suspension 800 milliGRAM(s) Oral daily  metoprolol succinate ER 25 milliGRAM(s) Oral two times a day  pantoprazole    Tablet 40 milliGRAM(s) Oral before breakfast  simvastatin 20 milliGRAM(s) Oral at bedtime    MEDICATIONS  (PRN):  acetaminophen   Tablet .. 650 milliGRAM(s) Oral every 6 hours PRN Mild Pain (1 - 3), Moderate Pain (4 - 6)  oxyCODONE    5 mG/acetaminophen 325 mG 0.5 Tablet(s) Oral every 6 hours PRN Severe Pain (7 - 10)    	    PHYSICAL EXAM:  T(C): 36.6 (10-03-18 @ 04:59), Max: 37.1 (10-02-18 @ 19:30)  HR: 109 (10-03-18 @ 04:59) (98 - 109)  BP: 122/82 (10-03-18 @ 04:59) (95/60 - 122/82)  RR: 16 (10-03-18 @ 04:59) (16 - 18)  SpO2: 94% (10-03-18 @ 04:59) (93% - 99%)  Wt(kg): --  I&O's Summary    02 Oct 2018 07:01  -  03 Oct 2018 07:00  --------------------------------------------------------  IN: 1320 mL / OUT: 100 mL / NET: 1220 mL      Appearance: Normal	  HEENT:   Normal oral mucosa, PERRL, EOMI	  Lymphatic: No lymphadenopathy  Cardiovascular: Normal S1 S2, No JVD, No murmurs, No edema  Respiratory: dec bs   Psychiatry: A & O x3  Gastrointestinal:  Soft, Non-tender, + BS	  Skin: No rashes, No ecchymoses, No cyanosis	  Neurologic: Non-focal  Extremities: Normal range of motion, No clubbing, cyanosis or edema  Vascular: Peripheral pulses palpable     LABS:	 	    CARDIAC MARKERS:                                12.6   7.5   )-----------( 320      ( 03 Oct 2018 09:57 )             38.5     10-03    136  |  98  |  14  ----------------------------<  99  4.0   |  26  |  0.66    Ca    9.2      03 Oct 2018 09:53    TPro  5.9<L>  /  Alb  2.8<L>  /  TBili  0.5  /  DBili  x   /  AST  8<L>  /  ALT  9<L>  /  AlkPhos  103  10-03    proBNP:   Lipid Profile:   HgA1c:   TSH:
Chief Complaint: "I am weak".     History of Present Illness:    The patient overall is feeling weak.  She does have SOB with any exertion.  No obvious active bleeding.  No calf pain.  No abdominal pain.  No nausea.  No vomiting.  No diarrhea.  No fevers.  No chills.  Remainder ROS is stable.     MEDICATIONS  (STANDING):  anastrozole 1 milliGRAM(s) Oral daily  apixaban 2.5 milliGRAM(s) Oral every 12 hours  docusate sodium 100 milliGRAM(s) Oral three times a day  DULoxetine 60 milliGRAM(s) Oral daily  ferrous    sulfate 325 milliGRAM(s) Oral daily  lactobacillus acidophilus 1 Tablet(s) Oral daily  lidocaine   Patch 1 Patch Transdermal daily  megestrol Suspension 800 milliGRAM(s) Oral daily  metoprolol succinate ER 25 milliGRAM(s) Oral two times a day  pantoprazole    Tablet 40 milliGRAM(s) Oral before breakfast  simvastatin 20 milliGRAM(s) Oral at bedtime    MEDICATIONS  (PRN):  acetaminophen   Tablet .. 650 milliGRAM(s) Oral every 6 hours PRN Mild Pain (1 - 3), Moderate Pain (4 - 6)  oxyCODONE    5 mG/acetaminophen 325 mG 0.5 Tablet(s) Oral every 6 hours PRN Severe Pain (7 - 10)      Allergies    Levaquin (Diarrhea)    Intolerances    Dilaudid (Other)  tramadol (Unknown)      Vital Signs Last 24 Hrs  T(C): 36.5 (02 Oct 2018 05:12), Max: 36.6 (01 Oct 2018 19:59)  T(F): 97.7 (02 Oct 2018 05:12), Max: 97.9 (01 Oct 2018 19:59)  HR: 107 (02 Oct 2018 05:12) (90 - 114)  BP: 124/73 (02 Oct 2018 05:12) (73/51 - 124/73)  BP(mean): --  RR: 17 (02 Oct 2018 05:12) (17 - 18)  SpO2: 99% (02 Oct 2018 05:12) (94% - 99%)    PHYSICAL EXAM  General: weak  HEENT: clear oropharynx, anicteric sclera, pink conjunctiva  Neck: supple  CV: normal S1/S2 tachy  Lungs: decreased breath sounds in the left lung  Abdomen: soft non-tender non-distended, positive bowel sounds  Ext: no edema  Skin: no rashes   Lymph Nodes: No LAD in neck  Neuro: alert and oriented X 3, no focal deficits    LABS:            Serial CBC's  09-29 @ 08:17  Hct-39.1 / Hgb-13.0 / Plat-235 / RBC-4.28 / WBC-5.96  Serial CBC's  09-28 @ 08:29  Hct-37.4 / Hgb-11.6 / Plat-263 / RBC-4.04 / WBC-7.23      10-02    137  |  99  |  15  ----------------------------<  102<H>  3.7   |  28  |  0.70    Ca    9.1      02 Oct 2018 06:32    TPro  5.9<L>  /  Alb  2.7<L>  /  TBili  0.4  /  DBili  x   /  AST  6<L>  /  ALT  9<L>  /  AlkPhos  95  10-02
Chief Complaint: "My pain is a little better".    History of Present Illness:    The patient claims her pain is a little better.  She does have SOB with minimal exertion.  No fevers.  No chills.  No calf pain.  No nausea.  No vomiting.  No diarrhea.  No dysuria.  She has a mild cough.  Remainder ROS is stable.     MEDICATIONS  (STANDING):  anastrozole 1 milliGRAM(s) Oral daily  apixaban 2.5 milliGRAM(s) Oral every 12 hours  dronabinol 2.5 milliGRAM(s) Oral two times a day  DULoxetine 60 milliGRAM(s) Oral daily  ferrous    sulfate 325 milliGRAM(s) Oral daily  lactobacillus acidophilus 1 Tablet(s) Oral daily  lidocaine   Patch 1 Patch Transdermal daily  metoprolol succinate ER 25 milliGRAM(s) Oral two times a day  pantoprazole    Tablet 40 milliGRAM(s) Oral before breakfast  simvastatin 20 milliGRAM(s) Oral at bedtime    MEDICATIONS  (PRN):  acetaminophen   Tablet .. 650 milliGRAM(s) Oral every 6 hours PRN Mild Pain (1 - 3), Moderate Pain (4 - 6)  oxyCODONE    5 mG/acetaminophen 325 mG 0.5 Tablet(s) Oral every 6 hours PRN Severe Pain (7 - 10)      Allergies    Levaquin (Diarrhea)    Intolerances    Dilaudid (Other)  tramadol (Unknown)      Vital Signs Last 24 Hrs  T(C): 36.6 (28 Sep 2018 06:13), Max: 36.9 (27 Sep 2018 16:50)  T(F): 97.9 (28 Sep 2018 06:13), Max: 98.4 (27 Sep 2018 16:50)  HR: 99 (28 Sep 2018 06:13) (92 - 102)  BP: 114/76 (28 Sep 2018 06:13) (107/73 - 130/85)  BP(mean): --  RR: 18 (28 Sep 2018 06:13) (18 - 18)  SpO2: 95% (28 Sep 2018 06:13) (93% - 95%)    PHYSICAL EXAM  General: weak  HEENT: clear oropharynx, anicteric sclera, pink conjunctiva  Neck: supple  CV: normal S1/S2   Lungs: decreased breath sounds in the left lung  Abdomen: soft non-tender non-distended, positive bowel sounds  Ext: no edema  Skin: no rashes   Neuro: alert and oriented X 3, no focal deficits    LABS:                          12.0   7.96  )-----------( 252      ( 27 Sep 2018 08:40 )             39.3         Mean Cell Volume : 93.8 fl  Mean Cell Hemoglobin : 28.6 pg  Mean Cell Hemoglobin Concentration : 30.5 gm/dL  Auto Neutrophil # : 5.49 K/uL  Auto Lymphocyte # : 1.05 K/uL  Auto Monocyte # : 1.03 K/uL  Auto Eosinophil # : 0.33 K/uL  Auto Basophil # : 0.02 K/uL  Auto Neutrophil % : 69.0 %  Auto Lymphocyte % : 13.2 %  Auto Monocyte % : 12.9 %  Auto Eosinophil % : 4.1 %  Auto Basophil % : 0.3 %      Serial CBC's  09-27 @ 08:40  Hct-39.3 / Hgb-12.0 / Plat-252 / RBC-4.19 / WBC-7.96  Serial CBC's  09-26 @ 18:48  Hct-42.8 / Hgb-13.4 / Plat-286 / RBC-4.53 / WBC-7.5      09-28    134<L>  |  97  |  14  ----------------------------<  82  3.8   |  23  |  0.69    Ca    9.1      28 Sep 2018 06:50  Phos  2.9     09-27  Mg     1.6     09-27    TPro  5.5<L>  /  Alb  3.0<L>  /  TBili  0.4  /  DBili  x   /  AST  10  /  ALT  11  /  AlkPhos  87  09-28      PT/INR - ( 26 Sep 2018 19:19 )   PT: 14.1 sec;   INR: 1.29 ratio         PTT - ( 26 Sep 2018 19:19 )  PTT:28.7 sec
Follow-up Pulm Progress Note    Episode of hypotension earlier 70s/50s, improved with IVF  Currently awake and alert, no complaints  96% on RA    Medications:  MEDICATIONS  (STANDING):  anastrozole 1 milliGRAM(s) Oral daily  apixaban 2.5 milliGRAM(s) Oral every 12 hours  docusate sodium 100 milliGRAM(s) Oral three times a day  dronabinol 2.5 milliGRAM(s) Oral two times a day  DULoxetine 60 milliGRAM(s) Oral daily  ferrous    sulfate 325 milliGRAM(s) Oral daily  lactobacillus acidophilus 1 Tablet(s) Oral daily  lidocaine   Patch 1 Patch Transdermal daily  metoprolol succinate ER 25 milliGRAM(s) Oral two times a day  pantoprazole    Tablet 40 milliGRAM(s) Oral before breakfast  simvastatin 20 milliGRAM(s) Oral at bedtime    MEDICATIONS  (PRN):  acetaminophen   Tablet .. 650 milliGRAM(s) Oral every 6 hours PRN Mild Pain (1 - 3), Moderate Pain (4 - 6)  oxyCODONE    5 mG/acetaminophen 325 mG 0.5 Tablet(s) Oral every 6 hours PRN Severe Pain (7 - 10)          Vital Signs Last 24 Hrs  T(C): 36.3 (01 Oct 2018 14:56), Max: 36.4 (30 Sep 2018 19:27)  T(F): 97.3 (01 Oct 2018 14:56), Max: 97.6 (01 Oct 2018 06:40)  HR: 108 (01 Oct 2018 15:56) (92 - 114)  BP: 105/72 (01 Oct 2018 15:56) (73/51 - 125/84)  BP(mean): --  RR: 18 (01 Oct 2018 15:56) (18 - 18)  SpO2: 96% (01 Oct 2018 15:56) (94% - 99%) on RA          09-30 @ 07:01  -  10-01 @ 07:00  --------------------------------------------------------  IN: 660 mL / OUT: 400 mL / NET: 260 mL          LABS:    10-01    137  |  98  |  15  ----------------------------<  99  5.5<H>   |  28  |  0.73    Ca    9.2      01 Oct 2018 07:24          Physical Examination:  PULM: Referred BS L  CVS: S1, S2 heard    RADIOLOGY REVIEWED  CT chest: < from: CT Chest No Cont (09.26.18 @ 19:39) >  CHEST:     LUNGS AND LARGE AIRWAYS/PLEURA: A left-sided Pleurx catheter terminates   in the left upper anterior pleural space. Interval resolution of a left   pneumothorax. Persistent moderate left pleural effusion. Complete   collapse of the left lung with associated increased leftward mediastinal   shift. Redemonstration of a large consolidative mass within the left   upper lobe. Stable 2.7 x 2.4 cm consolidative mass within the right upper   lobe which likely represents a metastatic or synchronous focus of   malignancy. Stable 2.3 cm right lower lobe groundglass nodule is again   noted and may represent another focus of malignancy.  VESSELS: Atherosclerotic calcifications of the thoracic aorta and   coronary arteries.  HEART: Heart size is mildly enlarged. Small pericardial effusion,   unchanged.  MEDIASTINUM AND SUZI: Multiple enlarged mediastinal lymph nodes,   unchanged.  CHEST WALLAND LOWER NECK: Within normal limits.  VISUALIZED UPPER ABDOMEN: Left renal cyst with peripheral calcification.   Left adrenal thickening, unchanged.  BONES: Multilevel spinal degenerative changes. Chronic left-sided rib   deformities.    IMPRESSION:  Moderate left pleural effusion with complete collapse of the left lung   and associated increased leftward mediastinal shift.    Stable bilateral upper lobe masses and right lower lobe groundglass   nodule.    Small pericardial effusion, unchanged.    < end of copied text >
Follow-up Pulm Progress Note    No new respiratory events overnight.  Denies SOB/CP.   Denies dyspnea, denies pain  93% on RA    Medications:  MEDICATIONS  (STANDING):  anastrozole 1 milliGRAM(s) Oral daily  apixaban 2.5 milliGRAM(s) Oral every 12 hours  dronabinol 2.5 milliGRAM(s) Oral two times a day  DULoxetine 60 milliGRAM(s) Oral daily  ferrous    sulfate 325 milliGRAM(s) Oral daily  lactobacillus acidophilus 1 Tablet(s) Oral daily  lidocaine   Patch 1 Patch Transdermal daily  metoprolol succinate ER 25 milliGRAM(s) Oral two times a day  pantoprazole    Tablet 40 milliGRAM(s) Oral before breakfast  simvastatin 20 milliGRAM(s) Oral at bedtime    MEDICATIONS  (PRN):  acetaminophen   Tablet .. 650 milliGRAM(s) Oral every 6 hours PRN Mild Pain (1 - 3), Moderate Pain (4 - 6)  oxyCODONE    5 mG/acetaminophen 325 mG 0.5 Tablet(s) Oral every 6 hours PRN Severe Pain (7 - 10)          Vital Signs Last 24 Hrs  T(C): 36.6 (28 Sep 2018 06:13), Max: 36.9 (27 Sep 2018 16:50)  T(F): 97.9 (28 Sep 2018 06:13), Max: 98.4 (27 Sep 2018 16:50)  HR: 99 (28 Sep 2018 06:13) (92 - 102)  BP: 114/76 (28 Sep 2018 06:13) (107/73 - 130/85)  BP(mean): --  RR: 18 (28 Sep 2018 06:13) (18 - 18)  SpO2: 95% (28 Sep 2018 06:13) (93% - 95%) on RA      VBG pH 7.34 09-26 @ 18:48    VBG pCO2 57 09-26 @ 18:48    VBG O2 sat 20 09-26 @ 18:48    VBG lactate 2.1 09-26 @ 18:48      09-27 @ 07:01  -  09-28 @ 07:00  --------------------------------------------------------  IN: 440 mL / OUT: 450 mL / NET: -10 mL          LABS:                        11.6   7.23  )-----------( 263      ( 28 Sep 2018 08:29 )             37.4     09-28    134<L>  |  97  |  14  ----------------------------<  82  3.8   |  23  |  0.69    Ca    9.1      28 Sep 2018 06:50  Phos  2.9     09-27  Mg     1.6     09-27    TPro  5.5<L>  /  Alb  3.0<L>  /  TBili  0.4  /  DBili  x   /  AST  10  /  ALT  11  /  AlkPhos  87  09-28          CAPILLARY BLOOD GLUCOSE        PT/INR - ( 26 Sep 2018 19:19 )   PT: 14.1 sec;   INR: 1.29 ratio         PTT - ( 26 Sep 2018 19:19 )  PTT:28.7 sec                    CULTURES: (if applicable)          Physical Examination:  PULM: Referred BS LUANN, clear R  CVS: S1, S2 heard    RADIOLOGY REVIEWED  CT chest: < from: CT Chest No Cont (09.26.18 @ 19:39) >  CHEST:     LUNGS AND LARGE AIRWAYS/PLEURA: A left-sided Pleurx catheter terminates   in the left upper anterior pleural space. Interval resolution of a left   pneumothorax. Persistent moderate left pleural effusion. Complete   collapse of the left lung with associated increased leftward mediastinal   shift. Redemonstration of a large consolidative mass within the left   upper lobe. Stable 2.7 x 2.4 cm consolidative mass within the right upper   lobe which likely represents a metastatic or synchronous focus of   malignancy. Stable 2.3 cm right lower lobe groundglass nodule is again   noted and may represent another focus of malignancy.  VESSELS: Atherosclerotic calcifications of the thoracic aorta and   coronary arteries.  HEART: Heart size is mildly enlarged. Small pericardial effusion,   unchanged.  MEDIASTINUM AND SUZI: Multiple enlarged mediastinal lymph nodes,   unchanged.  CHEST WALLAND LOWER NECK: Within normal limits.  VISUALIZED UPPER ABDOMEN: Left renal cyst with peripheral calcification.   Left adrenal thickening, unchanged.  BONES: Multilevel spinal degenerative changes. Chronic left-sided rib   deformities.    IMPRESSION:  Moderate left pleural effusion with complete collapse of the left lung   and associated increased leftward mediastinal shift.    Stable bilateral upper lobe masses and right lower lobe groundglass   nodule.    Small pericardial effusion, unchanged.    < end of copied text >
no issues over night  nurse providing care    plan keytruda tomorrow for met adenocarcinoma of lung         breast cancer-arimidex         pleurex per CTS
patient eating breakfast in bed  denies nausea/vomiting/chills  mild bilateral rib pain  denies fever/pruritus    VSS afebrile  sclera anicteric/conj-pale  lungs-decreased bs B//no wheezes  pleurex site-clean  card-RRR//abd=nontender  no calf tenderness    labs  9/29  Na-133/cr-0.82          9/29  wbc-7.2/hgb-11.6/plt-263    chest ct  mod. left effusion//LLL collapse    imp  met adenoca lung--on keytruda   next dose as inpatient 10/1            malignant pleural/pericardial effusions          CTS-Dr. Avila//continue pleurex            met. breast cancer--arimidex          A. fib--eliquis            anemia--feso4/protonix          pain--oxycodone/lidoderm/marinol
Chief Complaint: "weak".    History of Present Illness:    The patient overall is weak.  She still has SOB with minimal exertion.  No nausea.  No vomiting.  No diarrhea.  No constipation.  No fevers.  No chills.  No calf pain.  No abdominal pain.  No obvious active bleeding.  Remainder ROS is stable.       MEDICATIONS  (STANDING):  anastrozole 1 milliGRAM(s) Oral daily  apixaban 2.5 milliGRAM(s) Oral every 12 hours  docusate sodium 100 milliGRAM(s) Oral three times a day  dronabinol 2.5 milliGRAM(s) Oral two times a day  DULoxetine 60 milliGRAM(s) Oral daily  ferrous    sulfate 325 milliGRAM(s) Oral daily  lactobacillus acidophilus 1 Tablet(s) Oral daily  lidocaine   Patch 1 Patch Transdermal daily  metoprolol succinate ER 25 milliGRAM(s) Oral two times a day  pantoprazole    Tablet 40 milliGRAM(s) Oral before breakfast  simvastatin 20 milliGRAM(s) Oral at bedtime    MEDICATIONS  (PRN):  acetaminophen   Tablet .. 650 milliGRAM(s) Oral every 6 hours PRN Mild Pain (1 - 3), Moderate Pain (4 - 6)  oxyCODONE    5 mG/acetaminophen 325 mG 0.5 Tablet(s) Oral every 6 hours PRN Severe Pain (7 - 10)      Allergies    Levaquin (Diarrhea)    Intolerances    Dilaudid (Other)  tramadol (Unknown)      Vital Signs Last 24 Hrs  T(C): 36.4 (01 Oct 2018 06:40), Max: 37.1 (30 Sep 2018 10:38)  T(F): 97.6 (01 Oct 2018 06:40), Max: 98.7 (30 Sep 2018 10:38)  HR: 106 (01 Oct 2018 06:40) (84 - 114)  BP: 125/84 (01 Oct 2018 06:40) (92/58 - 125/84)  BP(mean): --  RR: 18 (01 Oct 2018 06:40) (18 - 18)  SpO2: 99% (01 Oct 2018 06:40) (95% - 99%)    PHYSICAL EXAM  General: weak  HEENT: clear oropharynx, anicteric sclera, pink conjunctiva  Neck: supple  CV: normal S1/S2   Lungs: decreased BS left lung  Abdomen: soft non-tender non-distended, positive bowel sounds  Ext: trace LE edema  Lymph Nodes: No LAD in neck  Neuro: alert and oriented X 3    LABS:                          13.0   5.96  )-----------( 235      ( 29 Sep 2018 08:17 )             39.1         Mean Cell Volume : 91.4 fl  Mean Cell Hemoglobin : 30.4 pg  Mean Cell Hemoglobin Concentration : 33.2 gm/dL  Auto Neutrophil # : 4.35 K/uL  Auto Lymphocyte # : 0.60 K/uL  Auto Monocyte # : 0.66 K/uL  Auto Eosinophil # : 0.30 K/uL  Auto Basophil # : 0.00 K/uL  Auto Neutrophil % : 73.0 %  Auto Lymphocyte % : 10.0 %  Auto Monocyte % : 11.0 %  Auto Eosinophil % : 5.0 %  Auto Basophil % : 0.0 %      Serial CBC's  09-29 @ 08:17  Hct-39.1 / Hgb-13.0 / Plat-235 / RBC-4.28 / WBC-5.96  Serial CBC's  09-28 @ 08:29  Hct-37.4 / Hgb-11.6 / Plat-263 / RBC-4.04 / WBC-7.23  Serial CBC's  09-27 @ 08:40  Hct-39.3 / Hgb-12.0 / Plat-252 / RBC-4.19 / WBC-7.96      10-01    137  |  98  |  15  ----------------------------<  99  5.5<H>   |  28  |  0.73    Ca    9.2      01 Oct 2018 07:24
Follow-up Pulm Progress Note    No new respiratory events overnight.  Denies SOB/CP.   94% on RA    Medications:  MEDICATIONS  (STANDING):  anastrozole 1 milliGRAM(s) Oral daily  apixaban 2.5 milliGRAM(s) Oral every 12 hours  docusate sodium 100 milliGRAM(s) Oral three times a day  DULoxetine 60 milliGRAM(s) Oral daily  ferrous    sulfate 325 milliGRAM(s) Oral daily  lactobacillus acidophilus 1 Tablet(s) Oral daily  lidocaine   Patch 1 Patch Transdermal daily  megestrol Suspension 800 milliGRAM(s) Oral daily  metoprolol succinate ER 25 milliGRAM(s) Oral two times a day  pantoprazole    Tablet 40 milliGRAM(s) Oral before breakfast  simvastatin 20 milliGRAM(s) Oral at bedtime    MEDICATIONS  (PRN):  acetaminophen   Tablet .. 650 milliGRAM(s) Oral every 6 hours PRN Mild Pain (1 - 3), Moderate Pain (4 - 6)  oxyCODONE    5 mG/acetaminophen 325 mG 0.5 Tablet(s) Oral every 6 hours PRN Severe Pain (7 - 10)          Vital Signs Last 24 Hrs  T(C): 36.3 (02 Oct 2018 13:16), Max: 36.8 (02 Oct 2018 13:05)  T(F): 97.4 (02 Oct 2018 13:16), Max: 98.3 (02 Oct 2018 13:05)  HR: 100 (02 Oct 2018 13:16) (90 - 108)  BP: 109/73 (02 Oct 2018 13:05) (96/62 - 132/84)  BP(mean): --  RR: 18 (02 Oct 2018 13:05) (17 - 18)  SpO2: 99% (02 Oct 2018 13:05) (95% - 99%) on RA          10-01 @ 07:01  -  10-02 @ 07:00  --------------------------------------------------------  IN: 1263 mL / OUT: 150 mL / NET: 1113 mL          LABS:                        11.9   5.85  )-----------( 260      ( 02 Oct 2018 07:49 )             37.9     10-02    137  |  99  |  15  ----------------------------<  102<H>  3.7   |  28  |  0.70    Ca    9.1      02 Oct 2018 06:32    TPro  5.9<L>  /  Alb  2.7<L>  /  TBili  0.4  /  DBili  x   /  AST  6<L>  /  ALT  9<L>  /  AlkPhos  95  10-02            Physical Examination:  PULM: Referred BS L   CVS: S1, S2 heard    RADIOLOGY REVIEWED

## 2018-10-15 ENCOUNTER — APPOINTMENT (OUTPATIENT)
Dept: CARDIOLOGY | Facility: CLINIC | Age: 83
End: 2018-10-15
Payer: MEDICARE

## 2018-10-15 ENCOUNTER — NON-APPOINTMENT (OUTPATIENT)
Age: 83
End: 2018-10-15

## 2018-10-15 VITALS
SYSTOLIC BLOOD PRESSURE: 103 MMHG | HEART RATE: 93 BPM | BODY MASS INDEX: 19.24 KG/M2 | DIASTOLIC BLOOD PRESSURE: 72 MMHG | OXYGEN SATURATION: 97 % | HEIGHT: 60 IN | WEIGHT: 98 LBS

## 2018-10-15 DIAGNOSIS — Z86.39 PERSONAL HISTORY OF OTHER ENDOCRINE, NUTRITIONAL AND METABOLIC DISEASE: ICD-10-CM

## 2018-10-15 DIAGNOSIS — Z87.42 PERSONAL HISTORY OF OTHER DISEASES OF THE FEMALE GENITAL TRACT: ICD-10-CM

## 2018-10-15 DIAGNOSIS — E86.0 DEHYDRATION: ICD-10-CM

## 2018-10-15 DIAGNOSIS — C34.90 MALIGNANT NEOPLASM OF UNSPECIFIED PART OF UNSPECIFIED BRONCHUS OR LUNG: ICD-10-CM

## 2018-10-15 DIAGNOSIS — Z86.79 PERSONAL HISTORY OF OTHER DISEASES OF THE CIRCULATORY SYSTEM: ICD-10-CM

## 2018-10-15 DIAGNOSIS — I48.0 PAROXYSMAL ATRIAL FIBRILLATION: ICD-10-CM

## 2018-10-15 DIAGNOSIS — R00.0 TACHYCARDIA, UNSPECIFIED: ICD-10-CM

## 2018-10-15 DIAGNOSIS — C50.919 MALIGNANT NEOPLASM OF UNSPECIFIED SITE OF UNSPECIFIED FEMALE BREAST: ICD-10-CM

## 2018-10-15 DIAGNOSIS — D64.9 ANEMIA, UNSPECIFIED: ICD-10-CM

## 2018-10-15 PROCEDURE — 93000 ELECTROCARDIOGRAM COMPLETE: CPT

## 2018-10-15 PROCEDURE — 99214 OFFICE O/P EST MOD 30 MIN: CPT

## 2018-10-15 RX ORDER — MEGESTROL ACETATE 40 MG/1
40 TABLET ORAL 3 TIMES DAILY
Refills: 0 | Status: ACTIVE | COMMUNITY
Start: 2018-10-15

## 2018-10-15 RX ORDER — OXYCODONE AND ACETAMINOPHEN 5; 325 MG/1; MG/1
5-325 TABLET ORAL
Refills: 0 | Status: ACTIVE | COMMUNITY
Start: 2018-10-15

## 2018-10-15 RX ORDER — TRAMADOL HYDROCHLORIDE 50 MG/1
50 TABLET, COATED ORAL
Qty: 7 | Refills: 0 | Status: DISCONTINUED | COMMUNITY
Start: 2018-09-07 | End: 2018-10-15

## 2018-10-15 RX ORDER — METOPROLOL SUCCINATE 25 MG/1
25 TABLET, EXTENDED RELEASE ORAL
Qty: 90 | Refills: 3 | Status: ACTIVE | COMMUNITY
Start: 2018-10-15 | End: 1900-01-01

## 2018-10-17 PROBLEM — Z86.79 HISTORY OF ATRIAL FIBRILLATION: Status: RESOLVED | Noted: 2018-09-07 | Resolved: 2018-10-17

## 2018-10-17 PROBLEM — I48.0 PAROXYSMAL ATRIAL FIBRILLATION: Status: ACTIVE | Noted: 2018-10-17

## 2018-10-17 PROBLEM — R00.0 SINUS TACHYCARDIA: Status: ACTIVE | Noted: 2018-10-17

## 2018-10-17 PROBLEM — E86.0 DEHYDRATION: Status: ACTIVE | Noted: 2018-10-17

## 2018-10-17 PROBLEM — C50.919 BREAST CANCER: Status: ACTIVE | Noted: 2018-10-17

## 2018-10-23 ENCOUNTER — MEDICATION RENEWAL (OUTPATIENT)
Age: 83
End: 2018-10-23

## 2018-10-23 RX ORDER — APIXABAN 2.5 MG/1
2.5 TABLET, FILM COATED ORAL
Qty: 180 | Refills: 1 | Status: ACTIVE | COMMUNITY
Start: 2018-09-07 | End: 1900-01-01

## 2018-11-01 ENCOUNTER — APPOINTMENT (OUTPATIENT)
Dept: FAMILY MEDICINE | Facility: CLINIC | Age: 83
End: 2018-11-01

## 2018-11-01 ENCOUNTER — INPATIENT (INPATIENT)
Facility: HOSPITAL | Age: 83
LOS: 5 days | Discharge: HOSPICE | DRG: 64 | End: 2018-11-07
Attending: INTERNAL MEDICINE | Admitting: INTERNAL MEDICINE
Payer: MEDICARE

## 2018-11-01 VITALS
HEART RATE: 109 BPM | DIASTOLIC BLOOD PRESSURE: 89 MMHG | SYSTOLIC BLOOD PRESSURE: 129 MMHG | OXYGEN SATURATION: 99 % | WEIGHT: 100.09 LBS

## 2018-11-01 DIAGNOSIS — Z41.9 ENCOUNTER FOR PROCEDURE FOR PURPOSES OTHER THAN REMEDYING HEALTH STATE, UNSPECIFIED: Chronic | ICD-10-CM

## 2018-11-01 DIAGNOSIS — Z98.89 OTHER SPECIFIED POSTPROCEDURAL STATES: Chronic | ICD-10-CM

## 2018-11-01 DIAGNOSIS — Z96.641 PRESENCE OF RIGHT ARTIFICIAL HIP JOINT: Chronic | ICD-10-CM

## 2018-11-01 DIAGNOSIS — Z98.42 CATARACT EXTRACTION STATUS, LEFT EYE: Chronic | ICD-10-CM

## 2018-11-01 DIAGNOSIS — R55 SYNCOPE AND COLLAPSE: ICD-10-CM

## 2018-11-01 DIAGNOSIS — Z96.652 PRESENCE OF LEFT ARTIFICIAL KNEE JOINT: Chronic | ICD-10-CM

## 2018-11-01 LAB
ALBUMIN SERPL ELPH-MCNC: 3.3 G/DL — SIGNIFICANT CHANGE UP (ref 3.3–5)
ALP SERPL-CCNC: 91 U/L — SIGNIFICANT CHANGE UP (ref 40–120)
ALT FLD-CCNC: 11 U/L — SIGNIFICANT CHANGE UP (ref 10–45)
ANION GAP SERPL CALC-SCNC: 16 MMOL/L — SIGNIFICANT CHANGE UP (ref 5–17)
APPEARANCE UR: ABNORMAL
AST SERPL-CCNC: 9 U/L — LOW (ref 10–40)
BASOPHILS # BLD AUTO: 0 K/UL — SIGNIFICANT CHANGE UP (ref 0–0.2)
BILIRUB SERPL-MCNC: 0.5 MG/DL — SIGNIFICANT CHANGE UP (ref 0.2–1.2)
BILIRUB UR-MCNC: ABNORMAL
BUN SERPL-MCNC: 16 MG/DL — SIGNIFICANT CHANGE UP (ref 7–23)
CALCIUM SERPL-MCNC: 9.2 MG/DL — SIGNIFICANT CHANGE UP (ref 8.4–10.5)
CHLORIDE SERPL-SCNC: 95 MMOL/L — LOW (ref 96–108)
CO2 SERPL-SCNC: 24 MMOL/L — SIGNIFICANT CHANGE UP (ref 22–31)
COLOR SPEC: YELLOW — SIGNIFICANT CHANGE UP
CREAT SERPL-MCNC: 0.69 MG/DL — SIGNIFICANT CHANGE UP (ref 0.5–1.3)
DIFF PNL FLD: ABNORMAL
EOSINOPHIL # BLD AUTO: 0.4 K/UL — SIGNIFICANT CHANGE UP (ref 0–0.5)
EOSINOPHIL NFR BLD AUTO: 3 % — SIGNIFICANT CHANGE UP (ref 0–6)
GAS PNL BLDV: SIGNIFICANT CHANGE UP
GLUCOSE SERPL-MCNC: 87 MG/DL — SIGNIFICANT CHANGE UP (ref 70–99)
GLUCOSE UR QL: NEGATIVE — SIGNIFICANT CHANGE UP
HCT VFR BLD CALC: 38.4 % — SIGNIFICANT CHANGE UP (ref 34.5–45)
HGB BLD-MCNC: 12.4 G/DL — SIGNIFICANT CHANGE UP (ref 11.5–15.5)
KETONES UR-MCNC: SIGNIFICANT CHANGE UP
LEUKOCYTE ESTERASE UR-ACNC: ABNORMAL
LYMPHOCYTES # BLD AUTO: 1.1 K/UL — SIGNIFICANT CHANGE UP (ref 1–3.3)
LYMPHOCYTES # BLD AUTO: 8 % — LOW (ref 13–44)
MAGNESIUM SERPL-MCNC: 1.4 MG/DL — LOW (ref 1.6–2.6)
MCHC RBC-ENTMCNC: 31.8 PG — SIGNIFICANT CHANGE UP (ref 27–34)
MCHC RBC-ENTMCNC: 32.3 GM/DL — SIGNIFICANT CHANGE UP (ref 32–36)
MCV RBC AUTO: 98.3 FL — SIGNIFICANT CHANGE UP (ref 80–100)
MONOCYTES # BLD AUTO: 1.6 K/UL — HIGH (ref 0–0.9)
MONOCYTES NFR BLD AUTO: 7 % — SIGNIFICANT CHANGE UP (ref 2–14)
NEUTROPHILS # BLD AUTO: 13.5 K/UL — HIGH (ref 1.8–7.4)
NEUTROPHILS NFR BLD AUTO: 75 % — SIGNIFICANT CHANGE UP (ref 43–77)
NITRITE UR-MCNC: NEGATIVE — SIGNIFICANT CHANGE UP
PH UR: 6 — SIGNIFICANT CHANGE UP (ref 5–8)
PLATELET # BLD AUTO: 282 K/UL — SIGNIFICANT CHANGE UP (ref 150–400)
POTASSIUM SERPL-MCNC: 3.8 MMOL/L — SIGNIFICANT CHANGE UP (ref 3.5–5.3)
POTASSIUM SERPL-SCNC: 3.8 MMOL/L — SIGNIFICANT CHANGE UP (ref 3.5–5.3)
PROT SERPL-MCNC: 6.4 G/DL — SIGNIFICANT CHANGE UP (ref 6–8.3)
PROT UR-MCNC: ABNORMAL
RBC # BLD: 3.9 M/UL — SIGNIFICANT CHANGE UP (ref 3.8–5.2)
RBC # FLD: 18.4 % — HIGH (ref 10.3–14.5)
SODIUM SERPL-SCNC: 135 MMOL/L — SIGNIFICANT CHANGE UP (ref 135–145)
SP GR SPEC: 1.03 — HIGH (ref 1.01–1.02)
TROPONIN T, HIGH SENSITIVITY RESULT: 36 NG/L — SIGNIFICANT CHANGE UP (ref 0–51)
UROBILINOGEN FLD QL: ABNORMAL
WBC # BLD: 16.5 K/UL — HIGH (ref 3.8–10.5)
WBC # FLD AUTO: 16.5 K/UL — HIGH (ref 3.8–10.5)

## 2018-11-01 PROCEDURE — 71045 X-RAY EXAM CHEST 1 VIEW: CPT | Mod: 26

## 2018-11-01 PROCEDURE — 99222 1ST HOSP IP/OBS MODERATE 55: CPT

## 2018-11-01 PROCEDURE — 70450 CT HEAD/BRAIN W/O DYE: CPT | Mod: 26,77

## 2018-11-01 PROCEDURE — 76937 US GUIDE VASCULAR ACCESS: CPT | Mod: 26

## 2018-11-01 PROCEDURE — 99285 EMERGENCY DEPT VISIT HI MDM: CPT | Mod: GC

## 2018-11-01 PROCEDURE — 70450 CT HEAD/BRAIN W/O DYE: CPT | Mod: 26

## 2018-11-01 RX ORDER — MAGNESIUM SULFATE 500 MG/ML
1 VIAL (ML) INJECTION ONCE
Qty: 0 | Refills: 0 | Status: COMPLETED | OUTPATIENT
Start: 2018-11-01 | End: 2018-11-01

## 2018-11-01 RX ORDER — MEGESTROL ACETATE 40 MG/ML
800 SUSPENSION ORAL DAILY
Qty: 0 | Refills: 0 | Status: DISCONTINUED | OUTPATIENT
Start: 2018-11-01 | End: 2018-11-02

## 2018-11-01 RX ORDER — METOPROLOL TARTRATE 50 MG
25 TABLET ORAL DAILY
Qty: 0 | Refills: 0 | Status: DISCONTINUED | OUTPATIENT
Start: 2018-11-01 | End: 2018-11-07

## 2018-11-01 RX ORDER — ACETAMINOPHEN 500 MG
2 TABLET ORAL
Qty: 0 | Refills: 0 | COMMUNITY

## 2018-11-01 RX ORDER — FERROUS SULFATE 325(65) MG
325 TABLET ORAL DAILY
Qty: 0 | Refills: 0 | Status: DISCONTINUED | OUTPATIENT
Start: 2018-11-01 | End: 2018-11-07

## 2018-11-01 RX ORDER — ANASTROZOLE 1 MG/1
1 TABLET ORAL DAILY
Qty: 0 | Refills: 0 | Status: DISCONTINUED | OUTPATIENT
Start: 2018-11-01 | End: 2018-11-06

## 2018-11-01 RX ORDER — OMEPRAZOLE 10 MG/1
2 CAPSULE, DELAYED RELEASE ORAL
Qty: 0 | Refills: 0 | COMMUNITY

## 2018-11-01 RX ORDER — DULOXETINE HYDROCHLORIDE 30 MG/1
60 CAPSULE, DELAYED RELEASE ORAL DAILY
Qty: 0 | Refills: 0 | Status: DISCONTINUED | OUTPATIENT
Start: 2018-11-01 | End: 2018-11-07

## 2018-11-01 RX ORDER — SODIUM CHLORIDE 9 MG/ML
1000 INJECTION INTRAMUSCULAR; INTRAVENOUS; SUBCUTANEOUS ONCE
Qty: 0 | Refills: 0 | Status: COMPLETED | OUTPATIENT
Start: 2018-11-01 | End: 2018-11-01

## 2018-11-01 RX ORDER — SIMVASTATIN 20 MG/1
20 TABLET, FILM COATED ORAL AT BEDTIME
Qty: 0 | Refills: 0 | Status: DISCONTINUED | OUTPATIENT
Start: 2018-11-01 | End: 2018-11-06

## 2018-11-01 RX ADMIN — DULOXETINE HYDROCHLORIDE 60 MILLIGRAM(S): 30 CAPSULE, DELAYED RELEASE ORAL at 22:56

## 2018-11-01 RX ADMIN — SODIUM CHLORIDE 1000 MILLILITER(S): 9 INJECTION INTRAMUSCULAR; INTRAVENOUS; SUBCUTANEOUS at 16:17

## 2018-11-01 RX ADMIN — Medication 100 GRAM(S): at 23:38

## 2018-11-01 RX ADMIN — SIMVASTATIN 20 MILLIGRAM(S): 20 TABLET, FILM COATED ORAL at 22:56

## 2018-11-01 NOTE — H&P ADULT - ASSESSMENT
86 y/o female  w/ pmhx end stage lung cancer, afib on Eliquis presents today s/p syncopal episode during bowel movement found to have small R IVHemmorage   - hold Eliquis  - Repeat CTH in 4h from original scan and again in AM  tle  cards/neuro eval  neuro checks  heme eval

## 2018-11-01 NOTE — CONSULT NOTE ADULT - ATTENDING COMMENTS
Pt seen and examined with resident admitted after syncope, on Eliquis.  Agree with resident evaluation and assessment.  Pt alert, ox1, MARTINEZ with no drift, some limitations in right shoulder.  CT with small amt of blood layering in right occipital horn.   Recommended [facility] for Eliquis and repeat imaging.  Discussed with family

## 2018-11-01 NOTE — ED ADULT NURSE REASSESSMENT NOTE - NS ED NURSE REASSESS COMMENT FT1
3 RNs attempted IV access without success. MD Peralta aware and called US team to place IV. Pt aware of plan of care. Pt being taken to CT; stable, being transported on 3L NC

## 2018-11-01 NOTE — PHYSICAL THERAPY INITIAL EVALUATION ADULT - IMPAIRMENTS CONTRIBUTING TO GAIT DEVIATIONS, PT EVAL
Subjective:      Patient ID: Alek Zamorano is a 76 y.o. female. Chief Complaint   Patient presents with    Wound Check     One week f/u on bilateral LE ulcers/leg swelling and to have unna boot changed    Leg Swelling     Pain Assessment  Alek Zamorano has a pain level on 0/10 scale:  2  Location:  Right leg  Description:  throbbing  Radiation:   Yes  Duration:  2 day(s)  Time:  intermittent      Wound Check     The patient reports superficial ulcers on her bilateral shins from scratching. The patient reports that she has a habit of scratching her skin to the point of creating ulcers, some of them deep. She currently has pain that she rates a 2 on a scale of 1-10. The patient reports associated symptoms of drainage of blood-tinged fluid and swelling. Relevant past surgeries include none. Current treatment includes:  Silvadene Cream to ulcer beds, Betamethasone Cream to periwound, gauze dressing, abd pad and unna boot (CoFlex). The patient has not undergone any diagnostic testing as yet. Edema  The edema is located in the bilateral LE's. The patient reports partial improvement. The patient has not experienced any new symptoms since last visit. The patient states the edema is recurrent. The patient has not undergone any new diagnostic testing since last visit. Treatment so far includes: bilateral Unna boots. Review of Systems   Constitutional: Negative for chills and fever. Cardiovascular: Positive for leg swelling. Skin: Positive for color change and wound. All other systems reviewed and are negative. Allergies   Allergen Reactions    Adhesive Tape      Other reaction(s): Ulcers    Chlorhexidine     Lisinopril      Med causes lethargy- takes in lower doses     Prior to Visit Medications    Medication Sig Taking?  Authorizing Provider   XARELTO 15 MG TABS tablet TAKE ONE TABLET BY MOUTH DAILY Yes Roseann Myers MD   Liraglutide (VICTOZA) 18 MG/3ML SOPN SC injection INJECT
impaired balance/decreased strength

## 2018-11-01 NOTE — ED PROVIDER NOTE - PHYSICAL EXAMINATION
General: well appearing female General: well appearing female, no acute distress   HEENT: normocephalic, atraumatic  Respiratory: normal work of breathing, lungs clear to auscultation bilaterally   Cardiac: regular rate and rhythm   Abdomen: soft, non-tender   MSK: no swelling or tenderness of lower extremities   skin: no rashes   Neuro: A&Ox3

## 2018-11-01 NOTE — H&P ADULT - HISTORY OF PRESENT ILLNESS
86 y/o female , PMH of HTN, anemia, OA, paroxysmal Afib (on eliquis), lung ca (on 3L home O2 as needed) presenting after syncopal episode  . Patient reports she was having a bowel movement when she lost consciousness for a few seconds.   Denies falling or hitting her head. No dizziness, headache, chest pain, difficulty breathing, nausea, vomiting, abdominal pain, pain or burning with urination, pain or swelling in lower extremities. Patient was alone during episode. Lives home alone has an aide. No previous history of syncope

## 2018-11-01 NOTE — ED ADULT NURSE NOTE - NSIMPLEMENTINTERV_GEN_ALL_ED
Implemented All Fall with Harm Risk Interventions:  [place] to call system. Call bell, personal items and telephone within reach. Instruct patient to call for assistance. Room bathroom lighting operational. Non-slip footwear when patient is off stretcher. Physically safe environment: no spills, clutter or unnecessary equipment. Stretcher in lowest position, wheels locked, appropriate side rails in place. Provide visual cue, wrist band, yellow gown, etc. Monitor gait and stability. Monitor for mental status changes and reorient to person, place, and time. Review medications for side effects contributing to fall risk. Reinforce activity limits and safety measures with patient and family. Provide visual clues: red socks.

## 2018-11-01 NOTE — H&P ADULT - NSTOBACCOSCREENHP_GEN_A_NCS
[facility])     section.   No Follow-up on file.     SUBJECTIVE:   Patient presents to the office in follow-up today. Is been issues were she's feeling more short of breath especially when she is laying flat at night. She's now sleeping on 2 pillows. Limited physical activity due to her chronic back issues. She's not had any palpitations. No fevers chills. No headaches. No blurry vision double vision. Denies chest pains chest discomfort. She does have known aortic stenosis. Last echocardiogram done was approximately 2 years prior. Denies nausea vomiting. She continues with her current pain regimen. The pain has been fairly well controlled at this time.    Allergies, Medications, Problem List, Past Medical and Surgical Histories:  As reviewed in EPIC.     PHYSICAL EXAMINATION:   GENERAL:  Pleasant 84 year old female    VITAL SIGNS:  As noted.   HEENT:  Oropharynx with no lesions.   NECK:  Supple.  Carotids 2+.  No bruits, lymphadenopathy or thyromegaly.   CARDIAC:  Regular rate and rhythm.  Normal S1, S2. 2/6 holosystolic murmur  LUNGS:  Clear to auscultation and percussion.   ABDOMEN:  Bowel sounds normoactive.  Soft, nontender, nondistended.   EXTREMITIES:  No clubbing, cyanosis or edema.     ASSESSMENT AND PLAN:   1. Hypertension her blood pressures not controlled at this time. Increase hydralazine to take 75 mg daily. Maintain on other regimen for now  2. Aortic stenosis concern with some suggestive increase in heart failure symptoms. We'll check an echocardiogram to evaluate aortic valve size. If evidence for any deep decline in valve area we'll need to evaluated by cardiology  3. Chronic lumbar DJD maintain current pain regimen. Return in 6 weeks     No

## 2018-11-01 NOTE — PATIENT PROFILE ADULT - FALL HARM RISK
bones(Osteoporosis,prev fx,steroid use,metastatic bone ca)/coagulation(Bleeding disorder R/T clinical cond/anti-coags)/age(85 years old or older)

## 2018-11-01 NOTE — ED PROVIDER NOTE - ATTENDING CONTRIBUTION TO CARE
Attending MD De La Garza:  I personally have seen and examined this patient.  Resident note reviewed and agree on plan of care and except where noted.  See MDM for details.

## 2018-11-01 NOTE — PATIENT PROFILE ADULT - NSPROMEDSBROUGHTTOHOSP_GEN_A_NUR
"Anesthesia Release from PACU Note    Patient: Beulah Guillaume    Procedure(s) Performed: Procedure(s) (LRB):  LAPAROTOMY-EXPLORATORY  Omental biopsy (N/A)    Anesthesia type: general    Post pain: Adequate analgesia    Post assessment: no apparent anesthetic complications, tolerated procedure well and no evidence of recall    Last Vitals:   Visit Vitals    /60    Pulse 82    Temp 36.7 °C (98.1 °F) (Oral)    Resp 11    Ht 5' 8" (1.727 m)    Wt 103 kg (227 lb)    LMP 06/20/2014    SpO2 (!) 94%    Breastfeeding No    BMI 34.52 kg/m2       Post vital signs: stable    Level of consciousness: awake, oriented and confused    Nausea/Vomiting: no nausea/no vomiting    Complications: none    Airway Patency: patent    Respiratory: unassisted, spontaneous ventilation    Cardiovascular: stable and blood pressure at baseline    Hydration: euvolemic  " no

## 2018-11-01 NOTE — ED PROVIDER NOTE - OBJECTIVE STATEMENT
85F, PMH of HTN, anemia, OA, paroxysmal Afib (on eliquis), lung ca (on 3L home O2 as needed) presenting after syncopal episode. Patient reports she was having a bowel movement when she lost consciousness for a few seconds. Denies falling or hitting her head. No dizziness, headache, chest pain, difficulty breathing, nausea, vomiting, abdominal pain, pain or burning with urination, pain or swelling in lower extremities. Patient was alone during episode. Lives home alone has an aide. No previous history of syncope.

## 2018-11-01 NOTE — ED PROVIDER NOTE - PROGRESS NOTE DETAILS
Attending MD De La Garza: patient's DNR confirmed with daughter.  See document alpha during August 2108 admission.  DNR obtained from patient on 8/22/18.

## 2018-11-01 NOTE — ED ADULT NURSE REASSESSMENT NOTE - NS ED NURSE REASSESS COMMENT FT1
IV placed via US, blood tubes sent to lab, IV fluids hung and running. Pt educated on how to obtain clean catch urine sample; states she does not need to urinate at this time. safety maintained.

## 2018-11-01 NOTE — CONSULT NOTE ADULT - SUBJECTIVE AND OBJECTIVE BOX
p ([id number])     HPI:  Christel Kumari  85F DNR/DNI pmhx end stage lung cancer, afib on Eliquis presents today s/p syncopal episode during bowel movement found to have small R IVH. At baseline per family, AOx2, RUE 4/5 otherwise intact.  - hold Eliquis  - Repeat CTH in 4h from original scan and again in AM  - Medicine eval for syncopal workup      AOx2, FC, PERRL, EOMI, no facial   4/5 RUE otherwise, 5/5 throughout, no drift  SILT  no clonus      =====================  PAST MEDICAL HISTORY   Atrial fibrillation  Lung mass  Primary osteoarthritis of left hip  Glaucoma  History of breast cancer  Iron deficiency anemia  Retinal tear  Osteoarthritis  Glaucoma  Fistula of vagina  RBBB  Ptosis  Anxiety  Hyperlipidemia  Osteopenia  Hypertension  Ureterovaginal prolapse    PAST SURGICAL HISTORY   Status post left knee replacement  S/P hip replacement, right  History of lumpectomy of right breast  History of cataract surgery, left  Elective surgery  History of tonsillectomy  Recto-vaginal fistula  H/O eye surgery  History of varicose vein stripping  History of carpal tunnel release    Dilaudid (Other)  Levaquin (Diarrhea)  tramadol (Unknown)      MEDICATIONS:  Antibiotics:    Neuro:    Anticoagulation:    Other:      SOCIAL HISTORY:   Occupation:   Marital Status:     FAMILY HISTORY:  Diabetes mellitus (Mother)      ROS: Negative except per HPI    LABS:

## 2018-11-01 NOTE — ED ADULT NURSE REASSESSMENT NOTE - NS ED NURSE REASSESS COMMENT FT1
IV placed by US team infiltrated. 100cc of NS went in. IV taken out and dressing/pressure applied to site. Family aware. MD Calvillo aware. MD calvillo states she will go in to place IV via US. Pt tachycardic at 109, MD calvillo aware.

## 2018-11-01 NOTE — ED ADULT NURSE NOTE - OBJECTIVE STATEMENT
85y Female PMH hld, htn, GERD, R. breast CA with lymph node removal, Pulmonary hypertension, pericardial effusion on 3L NC at home, adenocarcinoma presents to the ED via EMS c/o syncope. Pt states that she has a home health aide at home and lives alone. Pt states she had two syncopal episodes while on the toilet having a BM. Pt and aide at bedside deny any fall/head trauma. Pt states she is normally on home O2 and had taken it off for a few minutes prior to syncopal episode. Pt denies CP, palpitations, diaphoresis prior to syncope. Pt presents a&oX3, airway intact, breathing spontaneously and unlabored, 85y Female PMH hld, htn, GERD, R. breast CA with lymph node removal, Pulmonary hypertension, pericardial effusion on 3L NC at home, adenocarcinoma, Afib on Eliquis presents to the ED via EMS c/o syncope. Pt states that she has a home health aide at home and lives alone. Pt states she had two syncopal episodes while on the toilet having a BM, states episodes were only a few seconds long. Pt and aide at bedside deny any fall/head trauma. Pt states she is normally on home O2 and had taken it off for a few minutes prior to syncopal episode. Pt denies CP, palpitations, diaphoresis prior to syncope. Pt presents a&oX3, airway intact, breathing spontaneously and unlabored, SpO2 of 94% noted RA, pt placed on 3L NC with SpO2 of 100%, skin warm dry and intact, non-diaphoretic, cap refill <3 seconds, +peripheral pulses, Follows commands, face symmetrical, speech clear, pupils symmetrical and reactive, no arm drift, equal hand grasp, full ROM x4 extremities, no numbness, no headache, no weakness, no dizziness. Pt denies CP, chills/fever, dysuria. MD at bedside for eval. safety maintained.

## 2018-11-01 NOTE — ED PROVIDER NOTE - PMH
Anxiety    Atrial fibrillation    Fistula of vagina  rectovaginal fistula  Glaucoma    History of breast cancer  2015, no chemo/radiation  s/p lumpectomy  Hyperlipidemia    Hypertension    Iron deficiency anemia    Lung mass    Osteoarthritis    Osteopenia    Primary osteoarthritis of left hip    Ptosis  left eye - eye lid surgery 2002  RBBB    Retinal tear    Ureterovaginal prolapse

## 2018-11-01 NOTE — ED PROVIDER NOTE - MEDICAL DECISION MAKING DETAILS
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  Did not fall, did not hit head.  Patient denies chest pain, palpitations, SOB, or diaphoresis. Patient denies fever, chills, nausea, vomiting, or diarrhea. Attending MD De La Garza: A & O x 3, NAD, HEENT WNL and no facial asymmetry; lungs CTAB, heart with reg rhythm without murmur; abdomen soft NTND; extremities with no edema; skin with no rashes, neuro exam non focal with no motor or sensory deficits.  DDX could be vasovagal as occurred during BM, will check cardiac enzymes, control rapid atrial fib, scan head to rule out trauma, ekg, cardiac monitor, chest xray, consider admission for observation.

## 2018-11-01 NOTE — H&P ADULT - NSHPLABSRESULTS_GEN_ALL_CORE
12.4   16.5  )-----------( 282      ( 01 Nov 2018 16:21 )             38.4       11-01    135  |  95<L>  |  16  ----------------------------<  87  3.8   |  24  |  0.69    Ca    9.2      01 Nov 2018 16:21  Mg     1.4     11-01    TPro  6.4  /  Alb  3.3  /  TBili  0.5  /  DBili  x   /  AST  9<L>  /  ALT  11  /  AlkPhos  91  11-01                      Lactate Trend            CAPILLARY BLOOD GLUCOSE      POCT Blood Glucose.: 88 mg/dL (01 Nov 2018 12:49)

## 2018-11-02 LAB
ANION GAP SERPL CALC-SCNC: 15 MMOL/L — SIGNIFICANT CHANGE UP (ref 5–17)
APTT BLD: 27.4 SEC — LOW (ref 27.5–36.3)
BUN SERPL-MCNC: 15 MG/DL — SIGNIFICANT CHANGE UP (ref 7–23)
CALCIUM SERPL-MCNC: 8.8 MG/DL — SIGNIFICANT CHANGE UP (ref 8.4–10.5)
CHLORIDE SERPL-SCNC: 99 MMOL/L — SIGNIFICANT CHANGE UP (ref 96–108)
CO2 SERPL-SCNC: 22 MMOL/L — SIGNIFICANT CHANGE UP (ref 22–31)
CREAT SERPL-MCNC: 0.6 MG/DL — SIGNIFICANT CHANGE UP (ref 0.5–1.3)
CULTURE RESULTS: NO GROWTH — SIGNIFICANT CHANGE UP
GLUCOSE SERPL-MCNC: 139 MG/DL — HIGH (ref 70–99)
HCT VFR BLD CALC: 36.8 % — SIGNIFICANT CHANGE UP (ref 34.5–45)
HCT VFR BLD CALC: 41.3 % — SIGNIFICANT CHANGE UP (ref 34.5–45)
HGB BLD-MCNC: 12.1 G/DL — SIGNIFICANT CHANGE UP (ref 11.5–15.5)
HGB BLD-MCNC: 13.3 G/DL — SIGNIFICANT CHANGE UP (ref 11.5–15.5)
INR BLD: 1.2 RATIO — HIGH (ref 0.88–1.16)
MAGNESIUM SERPL-MCNC: 1.8 MG/DL — SIGNIFICANT CHANGE UP (ref 1.6–2.6)
MCHC RBC-ENTMCNC: 31.7 PG — SIGNIFICANT CHANGE UP (ref 27–34)
MCHC RBC-ENTMCNC: 32.3 GM/DL — SIGNIFICANT CHANGE UP (ref 32–36)
MCHC RBC-ENTMCNC: 32.3 PG — SIGNIFICANT CHANGE UP (ref 27–34)
MCHC RBC-ENTMCNC: 33 GM/DL — SIGNIFICANT CHANGE UP (ref 32–36)
MCV RBC AUTO: 97.9 FL — SIGNIFICANT CHANGE UP (ref 80–100)
MCV RBC AUTO: 98.2 FL — SIGNIFICANT CHANGE UP (ref 80–100)
PLATELET # BLD AUTO: 244 K/UL — SIGNIFICANT CHANGE UP (ref 150–400)
PLATELET # BLD AUTO: 287 K/UL — SIGNIFICANT CHANGE UP (ref 150–400)
POTASSIUM SERPL-MCNC: 3.6 MMOL/L — SIGNIFICANT CHANGE UP (ref 3.5–5.3)
POTASSIUM SERPL-SCNC: 3.6 MMOL/L — SIGNIFICANT CHANGE UP (ref 3.5–5.3)
PROTHROM AB SERPL-ACNC: 13.8 SEC — HIGH (ref 10–12.9)
RBC # BLD: 3.76 M/UL — LOW (ref 3.8–5.2)
RBC # BLD: 4.2 M/UL — SIGNIFICANT CHANGE UP (ref 3.8–5.2)
RBC # FLD: 18.3 % — HIGH (ref 10.3–14.5)
RBC # FLD: 18.7 % — HIGH (ref 10.3–14.5)
SODIUM SERPL-SCNC: 136 MMOL/L — SIGNIFICANT CHANGE UP (ref 135–145)
SPECIMEN SOURCE: SIGNIFICANT CHANGE UP
WBC # BLD: 13.1 K/UL — HIGH (ref 3.8–10.5)
WBC # BLD: 15.2 K/UL — HIGH (ref 3.8–10.5)
WBC # FLD AUTO: 13.1 K/UL — HIGH (ref 3.8–10.5)
WBC # FLD AUTO: 15.2 K/UL — HIGH (ref 3.8–10.5)

## 2018-11-02 PROCEDURE — 70450 CT HEAD/BRAIN W/O DYE: CPT | Mod: 26

## 2018-11-02 PROCEDURE — 99222 1ST HOSP IP/OBS MODERATE 55: CPT

## 2018-11-02 PROCEDURE — 99231 SBSQ HOSP IP/OBS SF/LOW 25: CPT

## 2018-11-02 RX ORDER — CEFEPIME 1 G/1
1000 INJECTION, POWDER, FOR SOLUTION INTRAMUSCULAR; INTRAVENOUS ONCE
Qty: 0 | Refills: 0 | Status: COMPLETED | OUTPATIENT
Start: 2018-11-02 | End: 2018-11-02

## 2018-11-02 RX ORDER — CEFEPIME 1 G/1
INJECTION, POWDER, FOR SOLUTION INTRAMUSCULAR; INTRAVENOUS
Qty: 0 | Refills: 0 | Status: DISCONTINUED | OUTPATIENT
Start: 2018-11-02 | End: 2018-11-05

## 2018-11-02 RX ORDER — CEFEPIME 1 G/1
1000 INJECTION, POWDER, FOR SOLUTION INTRAMUSCULAR; INTRAVENOUS EVERY 12 HOURS
Qty: 0 | Refills: 0 | Status: DISCONTINUED | OUTPATIENT
Start: 2018-11-02 | End: 2018-11-05

## 2018-11-02 RX ADMIN — ANASTROZOLE 1 MILLIGRAM(S): 1 TABLET ORAL at 13:16

## 2018-11-02 RX ADMIN — CEFEPIME 100 MILLIGRAM(S): 1 INJECTION, POWDER, FOR SOLUTION INTRAMUSCULAR; INTRAVENOUS at 17:41

## 2018-11-02 RX ADMIN — DULOXETINE HYDROCHLORIDE 60 MILLIGRAM(S): 30 CAPSULE, DELAYED RELEASE ORAL at 13:16

## 2018-11-02 RX ADMIN — SIMVASTATIN 20 MILLIGRAM(S): 20 TABLET, FILM COATED ORAL at 21:01

## 2018-11-02 RX ADMIN — CEFEPIME 100 MILLIGRAM(S): 1 INJECTION, POWDER, FOR SOLUTION INTRAMUSCULAR; INTRAVENOUS at 09:46

## 2018-11-02 RX ADMIN — Medication 25 MILLIGRAM(S): at 06:36

## 2018-11-02 RX ADMIN — Medication 325 MILLIGRAM(S): at 13:16

## 2018-11-02 NOTE — CONSULT NOTE ADULT - SUBJECTIVE AND OBJECTIVE BOX
Admitting Diagnosis:  Syncope and collapse (R55): SYNCOPE AND COLLAPSE      HPI:  This is a 85y year old Female with the below past medical history significant for HTN, OA, PAF on eliquis and lung cancer presenting with syncopal episode on toilet lasting a few seconds witnessed by aid who is presently at bedside. Reports no head trauma, no fall. Did lean forward suddenly at time of event. Found to have small ICH on CT head and seen by neurosurgery. No report of headache, vision changes, dysarthria, dysphagia or new appendicular weakness or numbness.       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  RBBB (426.4)  Ptosis (374.30): left eye - eye lid surgery   Anxiety (300.00)  Hyperlipidemia (272.4)  Osteopenia (733.90)  Hypertension (401.9)  Ureterovaginal prolapse (618.4)      Past Surgical History:  Status post left knee replacement (Z96.652): 2016  S/P hip replacement, right (Z96.641): 2016  - right hip  History of lumpectomy of right breast (Z98.89): 2015  History of cataract surgery, left (Z98.42): and right 2016  Elective surgery (Z41.9): reversal of colostomy 2015  History of tonsillectomy (V45.89)  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   Ears, Nose, Mouth and Throat: The patient presents with no abnormalities of the head, ears, eyes, nose or throat  Skin: Patient offers no concerns of new rashes or lesions  Respiratory: The patient presents with no abnormalities of the respiratory tract  Cardiovascular: The patient presents with no cardiac abnormalities  Gastrointestinal: The patient presents with no abnormalities of the GI system  Genitourinary: The patient presents with no dysuria, hematuria or frequent urination  Neurological: See HPI  Endocrine: Patient offers no complaints of excessive thirst, urination, or heat/cold intolerance    Advanced care planning reviewed and noted in the chart.    Medications:  anastrozole 1 milliGRAM(s) Oral daily  cefepime   IVPB 1000 milliGRAM(s) IV Intermittent once  cefepime   IVPB 1000 milliGRAM(s) IV Intermittent every 12 hours  cefepime   IVPB      DULoxetine 60 milliGRAM(s) Oral daily  ferrous    sulfate 325 milliGRAM(s) Oral daily  megestrol Suspension 800 milliGRAM(s) Oral daily  metoprolol succinate ER 25 milliGRAM(s) Oral daily  simvastatin 20 milliGRAM(s) Oral at bedtime      Labs:  CBC Full  -  ( 2018 06:20 )  WBC Count : 13.1 K/uL  Hemoglobin : 12.1 g/dL  Hematocrit : 36.8 %  Platelet Count - Automated : 244 K/uL  Mean Cell Volume : 97.9 fl  Mean Cell Hemoglobin : 32.3 pg  Mean Cell Hemoglobin Concentration : 33.0 gm/dL  Auto Neutrophil # : x  Auto Lymphocyte # : x  Auto Monocyte # : x  Auto Eosinophil # : x  Auto Basophil # : x  Auto Neutrophil % : x  Auto Lymphocyte % : x  Auto Monocyte % : x  Auto Eosinophil % : x  Auto Basophil % : x        136  |  99  |  15  ----------------------------<  139<H>  3.6   |  22  |  0.60    Ca    8.8      2018 06:20  Mg     1.8         TPro  6.4  /  Alb  3.3  /  TBili  0.5  /  DBili  x   /  AST  9<L>  /  ALT  11  /  AlkPhos  91      CAPILLARY BLOOD GLUCOSE      POCT Blood Glucose.: 88 mg/dL (2018 12:49)    LIVER FUNCTIONS - ( 2018 16:21 )  Alb: 3.3 g/dL / Pro: 6.4 g/dL / ALK PHOS: 91 U/L / ALT: 11 U/L / AST: 9 U/L / GGT: x           PT/INR - ( 2018 23:39 )   PT: 13.8 sec;   INR: 1.20 ratio         PTT - ( 2018 23:39 )  PTT:27.4 sec  Urinalysis Basic - ( 2018 20:05 )    Color: Yellow / Appearance: Slightly Turbid / S.032 / pH: x  Gluc: x / Ketone: Trace  / Bili: Small / Urobili: 2 mg/dL   Blood: x / Protein: 30 mg/dL / Nitrite: Negative   Leuk Esterase: Large / RBC: 31 /hpf / WBC 79 /hpf   Sq Epi: x / Non Sq Epi: 10 /hpf / Bacteria: Negative      Female    Vitals:  Vital Signs Last 24 Hrs  T(C): 36.9 (2018 04:38), Max: 36.9 (2018 04:38)  T(F): 98.4 (2018 04:38), Max: 98.4 (2018 04:38)  HR: 114 (2018 04:38) (108 - 125)  BP: 125/71 (2018 04:38) (109/71 - 141/91)  BP(mean): --  RR: 18 (2018 04:38) (18 - 22)  SpO2: 99% (2018 04:38) (94% - 100%)    NEUROLOGICAL EXAM:    Mental status: Lethargic but easily arouses, follows all commands, language fluent. Oriented to hospital and reason for admission    Cranial Nerves: Pupils were equal, round, reactive to light. Extraocular movements were intact. Visual field were full. Fundoscopic exam was deferred. Facial sensation was intact to light touch. There was no facial asymmetry. The palate was upgoing symmetrically and tongue was midline. Hearing acuity was intact to finger rub AU. Shoulder shrug was full bilaterally    Motor exam: Bulk was decreased throughout. Tone was normal. Strength was 5/5 in all four extremities. Fine finger movements were symmetric and normal. There was no pronator drift    Reflexes: Areflexic in the bilateral upper extremities. Areflexic in the bilateral lower extremities. Toes were downgoing bilaterally.     Sensation: Intact to light touch    Coordination: Finger-nose-finger and heel-to-shin was without dysmetria.     Gait: Unable to assess

## 2018-11-02 NOTE — CONSULT NOTE ADULT - SUBJECTIVE AND OBJECTIVE BOX
CHIEF COMPLAINT:Patient is a 85y old  Female who presents with a chief complaint of     HISTORY OF PRESENT ILLNESS:  85 female with history as below namely stage IV lung adenocarcinoma with malignant pleural effusion   with left lung collapse, pericardial effusio, PAF on a/c admitted after syncopal episode whilte having bowel movement   also found to have R IVH s/p kcentra   now feels ok    PAST MEDICAL & SURGICAL HISTORY:  Atrial fibrillation  Lung mass  Primary osteoarthritis of left hip  History of breast cancer: 2015, no chemo/radiation  s/p lumpectomy  Iron deficiency anemia  Retinal tear  Osteoarthritis  Glaucoma  Fistula of vagina: rectovaginal fistula  RBBB  Ptosis: left eye - eye lid surgery 2002  Anxiety  Hyperlipidemia  Osteopenia  Hypertension  Ureterovaginal prolapse  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  S/P hip replacement, right: 6/2016  - right hip  History of lumpectomy of right breast: July 2015  History of cataract surgery, left: and right 2016  Elective surgery: reversal of colostomy July 2015  History of tonsillectomy  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  H/O eye surgery: bilateral ptosis  History of varicose vein stripping: recent vascular evaluation - all normal  History of carpal tunnel release: bilateral          MEDICATIONS:  metoprolol succinate ER 25 milliGRAM(s) Oral daily        DULoxetine 60 milliGRAM(s) Oral daily      megestrol Suspension 800 milliGRAM(s) Oral daily  simvastatin 20 milliGRAM(s) Oral at bedtime    anastrozole 1 milliGRAM(s) Oral daily  ferrous    sulfate 325 milliGRAM(s) Oral daily      FAMILY HISTORY:  Diabetes mellitus (Mother)      Non-contributory    SOCIAL HISTORY:    No tobacco, drugs or etoh    Allergies    Levaquin (Diarrhea)    Intolerances    Dilaudid (Other)  tramadol (Unknown)  	    REVIEW OF SYSTEMS:  as above  The rest of the 14 points ROS reviewed and except above they are unremarkable.        PHYSICAL EXAM:  T(C): 36.9 (11-02-18 @ 04:38), Max: 36.9 (11-02-18 @ 04:38)  HR: 114 (11-02-18 @ 04:38) (108 - 125)  BP: 125/71 (11-02-18 @ 04:38) (109/71 - 141/91)  RR: 18 (11-02-18 @ 04:38) (18 - 22)  SpO2: 99% (11-02-18 @ 04:38) (94% - 100%)  Wt(kg): --  I&O's Summary    01 Nov 2018 07:01  -  02 Nov 2018 07:00  --------------------------------------------------------  IN: 220 mL / OUT: 100 mL / NET: 120 mL      JVP: Normal  Neck: supple  Lung: no bs on left side   CV: S1 S2 , Murmur:  Abd: soft  Ext: No edema  neuro: Awake / alert  Psych: flat affect  Skin: normal    LABS/DATA:    TELEMETRY: 	    ECG:  	   	  CARDIAC MARKERS:                        36 <<== 11-01-18 @ 16:21                              12.1   13.1  )-----------( 244      ( 02 Nov 2018 06:20 )             36.8     11-02    136  |  99  |  15  ----------------------------<  139<H>  3.6   |  22  |  0.60    Ca    8.8      02 Nov 2018 06:20  Mg     1.8     11-02    TPro  6.4  /  Alb  3.3  /  TBili  0.5  /  DBili  x   /  AST  9<L>  /  ALT  11  /  AlkPhos  91  11-01    proBNP:   Lipid Profile:   HgA1c:   TSH:   < from: Limited Transthoracic Echo (08.21.18 @ 07:51) >  Patient name: NE GRIDER  YOB: 1933   Age: 84 (F)   MR#: [id number]  Study Date: 8/21/2018  Location: [address] [place], [place] [id number]: [facility] Sonographer: Tejas Varela M.D.  Study quality: Technically fair  Referring Physician: Julius Lovett MD  Blood Pressure: 107/73 mmHg  Height: 165 cm  Weight: 54 kg  BSA: 1.6 m2  ------------------------------------------------------------------------  PROCEDURE: Limited transthoracic echocardiogram with 2-D.  M-Mode and spectral andcolor flow Doppler.  INDICATION: Pericardial effusion (noninflammatory) (I31.3)  ------------------------------------------------------------------------  Observations:  Pericardium/Pleura: Normal pericardium with minimal  pericardial effusion.  ------------------------------------------------------------------------  Conclusions:  1. Normal pericardium with minimal pericardial effusion.    < end of copied text >

## 2018-11-02 NOTE — CONSULT NOTE ADULT - SUBJECTIVE AND OBJECTIVE BOX
Patient is a 85y old  Female who presents with a chief complaint of Syncope (02 Nov 2018 09:26)    HPI: 85F with hx of HTN, anemia, OA, paroxysmal Afib (on Eliquis), lung ca (on 3L home O2 as needed)- no further treatment planned, presenting after syncopal episode. pt lives with 24 hr aide who is present at bedside, she reports pt lost consciousness while walking to bathroom, she slumped against Aide, no fall or trauma.  no febrile illness, no cough/change in dyspnea, no n/v, did report abdominal pain yesterday and Aide reports urine has had an increased foul odor, some loose BM but not frequent, no melena/BRBPR. pt has been more lethargic in general over past few weeks but no sudden change; poor appetite, +weakness    PAST MEDICAL & SURGICAL HISTORY:  Atrial fibrillation  Lung mass  Primary osteoarthritis of left hip  History of breast cancer: 2015, no chemo/radiation  s/p lumpectomy  Iron deficiency anemia  Retinal tear  Osteoarthritis  Glaucoma  Fistula of vagina: rectovaginal fistula  RBBB  Ptosis: left eye - eye lid surgery 2002  Anxiety  Hyperlipidemia  Osteopenia  Hypertension  Ureterovaginal prolapse  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  S/P hip replacement, right: 6/2016  - right hip  History of lumpectomy of right breast: July 2015  History of cataract surgery, left: and right 2016  Elective surgery: reversal of colostomy July 2015  History of tonsillectomy  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  H/O eye surgery: bilateral ptosis  History of varicose vein stripping: recent vascular evaluation - all normal  History of carpal tunnel release: bilateral      SOCIAL HISTORY:  Smoking - Non smoker   Alcohol - Social  Drugs - No drug use  lives with Aide    FAMILY HISTORY:  Diabetes mellitus (Mother)      MEDICATIONS  (STANDING):  anastrozole 1 milliGRAM(s) Oral daily  cefepime   IVPB 1000 milliGRAM(s) IV Intermittent every 12 hours  cefepime   IVPB      DULoxetine 60 milliGRAM(s) Oral daily  ferrous    sulfate 325 milliGRAM(s) Oral daily  metoprolol succinate ER 25 milliGRAM(s) Oral daily  simvastatin 20 milliGRAM(s) Oral at bedtime    MEDICATIONS  (PRN):      Allergies    Levaquin (Diarrhea)    Intolerances    Dilaudid (Other)  tramadol (Unknown)      Vital Signs Last 24 Hrs  T(C): 36.6 (02 Nov 2018 12:25), Max: 36.9 (02 Nov 2018 04:38)  T(F): 97.8 (02 Nov 2018 12:25), Max: 98.4 (02 Nov 2018 04:38)  HR: 113 (02 Nov 2018 12:25) (108 - 114)  BP: 131/83 (02 Nov 2018 12:25) (109/71 - 141/91)  BP(mean): --  RR: 18 (02 Nov 2018 12:25) (18 - 20)  SpO2: 100% (02 Nov 2018 12:25) (97% - 100%)    PHYSICAL EXAM  General: adult in NAD  HEENT: clear oropharynx, anicteric sclera, pink conjunctiva  Neck: supple  CV: normal S1/S2   Lungs: decreased BS, poor effort  Abdomen: soft non-tender non-distended, positive bowel sounds  Ext: no calf tenderness, no edema    LABS:                          12.1   13.1  )-----------( 244      ( 02 Nov 2018 06:20 )             36.8         Mean Cell Volume : 97.9 fl  Mean Cell Hemoglobin : 32.3 pg  Mean Cell Hemoglobin Concentration : 33.0 gm/dL  Auto Neutrophil # : x  Auto Lymphocyte # : x  Auto Monocyte # : x  Auto Eosinophil # : x  Auto Basophil # : x  Auto Neutrophil % : x  Auto Lymphocyte % : x  Auto Monocyte % : x  Auto Eosinophil % : x  Auto Basophil % : x      Serial CBC's  11-02 @ 06:20  Hct-36.8 / Hgb-12.1 / Plat-244 / RBC-3.76 / WBC-13.1  Serial CBC's  11-01 @ 23:49  Hct-41.3 / Hgb-13.3 / Plat-287 / RBC-4.20 / WBC-15.2  Serial CBC's  11-01 @ 16:21  Hct-38.4 / Hgb-12.4 / Plat-282 / RBC-3.90 / WBC-16.5      11-02    136  |  99  |  15  ----------------------------<  139<H>  3.6   |  22  |  0.60    Ca    8.8      02 Nov 2018 06:20  Mg     1.8     11-02    TPro  6.4  /  Alb  3.3  /  TBili  0.5  /  DBili  x   /  AST  9<L>  /  ALT  11  /  AlkPhos  91  11-01      PT/INR - ( 01 Nov 2018 23:39 )   PT: 13.8 sec;   INR: 1.20 ratio         PTT - ( 01 Nov 2018 23:39 )  PTT:27.4 sec                  Radiology:  < from: CT Head No Cont (11.02.18 @ 08:53) >  IMPRESSION:    Similar minimal layering intraventricular hemorrhage. Stable ventricular   size, no large hydrocephalus.

## 2018-11-02 NOTE — CONSULT NOTE ADULT - SUBJECTIVE AND OBJECTIVE BOX
Patient is a 85y old  Female who presents with a chief complaint of   HPI:  86 y/o female , PMH of HTN, anemia, OA, paroxysmal Afib (on eliquis), lung ca (on 3L home O2 as needed) presenting after syncopal episode  . Patient reports she was having a bowel movement when she lost consciousness for a few seconds.   Denies falling or hitting her head. No dizziness, headache, chest pain, difficulty breathing, nausea, vomiting, abdominal pain, pain or burning with urination, pain or swelling in lower extremities. Patient was alone during episode. Lives home alone has an aide. No previous history of syncope (01 Nov 2018 19:24)      PAST MEDICAL & SURGICAL HISTORY:  Atrial fibrillation  Lung mass  Primary osteoarthritis of left hip  History of breast cancer: 2015, no chemo/radiation  s/p lumpectomy  Iron deficiency anemia  Retinal tear  Osteoarthritis  Glaucoma  Fistula of vagina: rectovaginal fistula  RBBB  Ptosis: left eye - eye lid surgery 2002  Anxiety  Hyperlipidemia  Osteopenia  Hypertension  Ureterovaginal prolapse  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  S/P hip replacement, right: 6/2016  - right hip  History of lumpectomy of right breast: July 2015  History of cataract surgery, left: and right 2016  Elective surgery: reversal of colostomy July 2015  History of tonsillectomy  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 Fevers absent    Skin:No rash  	  Ophthalmologic:Denies any visual complaints,discharge redness or photophobia  	  ENMT:No nasal discharge,headache,sinus congestion or throat pain.No dental complaints    Respiratory and Thorax:No cough,sputum or chest pain.Denies shortness of breath  	  Cardiovascular:	No chest pain,palpitaions or dizziness    Gastrointestinal:	NO nausea,abdominal pain or diarrhea.    Genitourinary:	No dysuria,frequency. No flank pain    Musculoskeletal:	No joint swelling or pain.No weakness    Neurological:No confusion,diziness.No extremity weakness.No bladder or bowel incontinence	    Psychiatric:No delusions or hallucinations	    Hematology/Lymphatics:	No LN swelling.No gum bleeding     Endocrine:	No recent weight gain or loss.No abnormal heat/cold intolerance    Allergic/Immunologic:	No hives or rash   Allergies    Levaquin (Diarrhea)    Intolerances    Dilaudid (Other)  tramadol (Unknown)      Antimicrobials:          Vital Signs Last 24 Hrs  T(C): 36.9 (02 Nov 2018 04:38), Max: 36.9 (02 Nov 2018 04:38)  T(F): 98.4 (02 Nov 2018 04:38), Max: 98.4 (02 Nov 2018 04:38)  HR: 114 (02 Nov 2018 04:38) (108 - 125)  BP: 125/71 (02 Nov 2018 04:38) (109/71 - 141/91)  BP(mean): --  RR: 18 (02 Nov 2018 04:38) (18 - 22)  SpO2: 99% (02 Nov 2018 04:38) (94% - 100%)               cachexia     Eyes:PERRL EOMI.NO discharge or conjunctival injection    ENMT:No sinus tenderness.No thrush.No pharyngeal exudate or erythema.Fair dental hygiene    Neck:Supple,No LN,no JVD      Respiratory:G decreased bs    Cardiovascular:S1 S2 wnl, No murmurs,rub or gallops    Gastrointestinal:Soft BS(+) no tenderness no masses ,No rebound or guarding    Genitourinary:No CVA tendereness               Vascular:peripheral pulses felt    Neurological:AAO X 3,No grossly focal deficits    Skin:No rash     Lymph Nodes:No palpable LNs    Musculoskeletal:No joint swelling or LOM    Psychiatric:Affect normal.                                12.1   13.1  )-----------( 244      ( 02 Nov 2018 06:20 )             36.8         11-02    136  |  99  |  15  ----------------------------<  139<H>  3.6   |  22  |  0.60    Ca    8.8      02 Nov 2018 06:20  Mg     1.8     11-02    TPro  6.4  /  Alb  3.3  /  TBili  0.5  /  DBili  x   /  AST  9<L>  /  ALT  11  /  AlkPhos  91  11-01      RECENT CULTURES:      MICROBIOLOGY:          Radiology:      Assessment:        Recommendations and Plan:    Pager [phone]  After 5 pm/weekends or if no response :[phone]

## 2018-11-03 LAB
ANION GAP SERPL CALC-SCNC: 11 MMOL/L — SIGNIFICANT CHANGE UP (ref 5–17)
BUN SERPL-MCNC: 8 MG/DL — SIGNIFICANT CHANGE UP (ref 7–23)
CALCIUM SERPL-MCNC: 8.6 MG/DL — SIGNIFICANT CHANGE UP (ref 8.4–10.5)
CHLORIDE SERPL-SCNC: 95 MMOL/L — LOW (ref 96–108)
CO2 SERPL-SCNC: 28 MMOL/L — SIGNIFICANT CHANGE UP (ref 22–31)
CREAT SERPL-MCNC: 0.53 MG/DL — SIGNIFICANT CHANGE UP (ref 0.5–1.3)
GLUCOSE SERPL-MCNC: 75 MG/DL — SIGNIFICANT CHANGE UP (ref 70–99)
HCT VFR BLD CALC: 36.8 % — SIGNIFICANT CHANGE UP (ref 34.5–45)
HGB BLD-MCNC: 11.9 G/DL — SIGNIFICANT CHANGE UP (ref 11.5–15.5)
MCHC RBC-ENTMCNC: 31.2 PG — SIGNIFICANT CHANGE UP (ref 27–34)
MCHC RBC-ENTMCNC: 32.3 GM/DL — SIGNIFICANT CHANGE UP (ref 32–36)
MCV RBC AUTO: 96.6 FL — SIGNIFICANT CHANGE UP (ref 80–100)
PLATELET # BLD AUTO: 237 K/UL — SIGNIFICANT CHANGE UP (ref 150–400)
POTASSIUM SERPL-MCNC: 3.4 MMOL/L — LOW (ref 3.5–5.3)
POTASSIUM SERPL-SCNC: 3.4 MMOL/L — LOW (ref 3.5–5.3)
RBC # BLD: 3.81 M/UL — SIGNIFICANT CHANGE UP (ref 3.8–5.2)
RBC # FLD: 18.1 % — HIGH (ref 10.3–14.5)
SODIUM SERPL-SCNC: 134 MMOL/L — LOW (ref 135–145)
WBC # BLD: 11.7 K/UL — HIGH (ref 3.8–10.5)
WBC # FLD AUTO: 11.7 K/UL — HIGH (ref 3.8–10.5)

## 2018-11-03 PROCEDURE — 71045 X-RAY EXAM CHEST 1 VIEW: CPT | Mod: 26

## 2018-11-03 PROCEDURE — 71250 CT THORAX DX C-: CPT | Mod: 26

## 2018-11-03 RX ORDER — POTASSIUM CHLORIDE 20 MEQ
20 PACKET (EA) ORAL ONCE
Qty: 0 | Refills: 0 | Status: COMPLETED | OUTPATIENT
Start: 2018-11-03 | End: 2018-11-03

## 2018-11-03 RX ADMIN — DULOXETINE HYDROCHLORIDE 60 MILLIGRAM(S): 30 CAPSULE, DELAYED RELEASE ORAL at 12:08

## 2018-11-03 RX ADMIN — Medication 25 MILLIGRAM(S): at 05:24

## 2018-11-03 RX ADMIN — Medication 20 MILLIEQUIVALENT(S): at 19:02

## 2018-11-03 RX ADMIN — SIMVASTATIN 20 MILLIGRAM(S): 20 TABLET, FILM COATED ORAL at 21:34

## 2018-11-03 RX ADMIN — CEFEPIME 100 MILLIGRAM(S): 1 INJECTION, POWDER, FOR SOLUTION INTRAMUSCULAR; INTRAVENOUS at 18:57

## 2018-11-03 RX ADMIN — CEFEPIME 100 MILLIGRAM(S): 1 INJECTION, POWDER, FOR SOLUTION INTRAMUSCULAR; INTRAVENOUS at 05:24

## 2018-11-03 RX ADMIN — ANASTROZOLE 1 MILLIGRAM(S): 1 TABLET ORAL at 12:08

## 2018-11-03 RX ADMIN — Medication 325 MILLIGRAM(S): at 12:08

## 2018-11-03 NOTE — PHYSICAL THERAPY INITIAL EVALUATION ADULT - ADDITIONAL COMMENTS
Pt A&Ox1. Care manager notes indicate that pt lives alone in private house with live in home health aide. 1 step to enter. Pt has a walker for ambulation, but has been using a wheelchair for mobility recently.

## 2018-11-03 NOTE — CHART NOTE - NSCHARTNOTEFT_GEN_A_CORE
Spoke with patient's daughter Amber who informs that patient last took Eliquis on 11/1/2018 in the morning.  Daughter also confirms that patient was not on Plavix.    Hailey Anderson NP  [phone] Spoke with patient's daughter Amber who informs that patient last took Eliquis on 11/1/2018 in the morning.  Daughter also confirms that patient was not on Plavix.  d/w Thoracic NP [place].    Hailey Anderson NP  [phone]

## 2018-11-03 NOTE — CONSULT NOTE ADULT - SUBJECTIVE AND OBJECTIVE BOX
HPI:  86 y/o female , PMH of HTN, anemia, OA, paroxysmal Afib (on eliquis), lung ca (on 3L home O2 as needed) presenting after syncopal episode  . Patient reports she was having a bowel movement when she lost consciousness for a few seconds.   Denies falling or hitting her head. No dizziness, headache, chest pain, difficulty breathing, nausea, vomiting, abdominal pain, pain or burning with urination, pain or swelling in lower extremities. Patient was alone during episode. Lives home alone has an aide. No previous history of syncope (2018 19:24)      PAST MEDICAL & SURGICAL HISTORY:  Atrial fibrillation  Lung mass  Primary osteoarthritis of left hip  History of breast cancer: , no chemo/radiation  s/p lumpectomy  Iron deficiency anemia  Retinal tear  Osteoarthritis  Glaucoma  Fistula of vagina: rectovaginal fistula  RBBB  Ptosis: left eye - eye lid surgery   Anxiety  Hyperlipidemia  Osteopenia  Hypertension  Ureterovaginal prolapse  Status post left knee replacement: 2016  S/P hip replacement, right: 2016  - right hip  History of lumpectomy of right breast: 2015  History of cataract surgery, left: and right   Elective surgery: reversal of colostomy 2015  History of tonsillectomy  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  H/O eye surgery: bilateral ptosis  History of varicose vein stripping: recent vascular evaluation - all normal  History of carpal tunnel release: bilateral      REVIEW OF SYSTEMS      General:No Weight change/ Fatigue/ HA/Dizzy	    Skin/Breast: No Rashes/ Lesions/ Masses  	  Ophthalmologic: No Blurry vision/ Glaucoma/ Blindness  	  ENMT: No Hearing loss/ Drainage/ Lesions	    Respiratory and Thorax: No Cough/ Wheezing/ SOB/ Hemoptysis/ Sputum production  	  Cardiovascular: No Chest pain/ Palpitations/ Diaphoresis	    Gastrointestinal: No Nausea/ Vomiting/ Constipation/ Appetite Change	    Genitourinary: No Heamturia/ Dysuria/ Frequency change/ Impotence	    Musculoskeletal: No Pain/ Weakness/ Claudication	    Neurological: No Seizures/ TIA/CVA/ Parastesias	    Psychiatric: No Dementia/ Depression/ SI/HI	    Hematology/Lymphatics: No hx of bleeding/ Edema	    Endocrine:	No Hyperglycemia/ Hypoglycemia    Allergic/Immunologic:	 No Anaphylaxis/ Intolerance/ Recent illnesses    MEDICATIONS  (STANDING):  anastrozole 1 milliGRAM(s) Oral daily  cefepime   IVPB 1000 milliGRAM(s) IV Intermittent every 12 hours  cefepime   IVPB      DULoxetine 60 milliGRAM(s) Oral daily  ferrous    sulfate 325 milliGRAM(s) Oral daily  metoprolol succinate ER 25 milliGRAM(s) Oral daily  potassium chloride    Tablet ER 20 milliEquivalent(s) Oral once  simvastatin 20 milliGRAM(s) Oral at bedtime    MEDICATIONS  (PRN):      Allergies    Levaquin (Diarrhea)    Intolerances    Dilaudid (Other)  tramadol (Unknown)      SOCIAL HISTORY:    FAMILY HISTORY:  Diabetes mellitus (Mother)      Vital Signs Last 24 Hrs  T(C): 36.8 (2018 12:17), Max: 36.8 (2018 12:17)  T(F): 98.2 (2018 12:17), Max: 98.2 (2018 12:17)  HR: 116 (2018 12:17) (105 - 118)  BP: 141/92 (2018 12:17) (117/71 - 141/92)  BP(mean): --  RR: 18 (2018 12:17) (18 - 18)  SpO2: 100% (2018 12:17) (97% - 100%)    General: WN/WD NAD  Neurology: Awake, nonfocal, MARTINEZ x 4  Eyes: Scleras clear, PERRLA/ EOMI, Gross vision intact  ENT:Gross hearing intact, grossly patent pharynx, no stridor  Neck: Neck supple, trachea midline, No JVD,   Respiratory: CTA B/L, No wheezing, rales, rhonchi  CV: RRR, S1S2, no murmurs, rubs or gallops  Abdominal: Soft, NT, ND +BS,   Extremities: No edema, + peripheral pulses  Skin: No Rashes, Hematoma, Ecchymosis  Lymphatic: No Neck, axilla, groin LAD  Psych: Oriented x 3, normal affect  Incisions:   Tubes:    LABS:                        11.9   11.7  )-----------( 237      ( 2018 10:00 )             36.8     11-03    134<L>  |  95<L>  |  8   ----------------------------<  75  3.4<L>   |  28  |  0.53    Ca    8.6      2018 10:00  Mg     1.8     11-02      PT/INR - ( 2018 23:39 )   PT: 13.8 sec;   INR: 1.20 ratio         PTT - ( 2018 23:39 )  PTT:27.4 sec  Urinalysis Basic - ( 2018 20:05 )    Color: Yellow / Appearance: Slightly Turbid / S.032 / pH: x  Gluc: x / Ketone: Trace  / Bili: Small / Urobili: 2 mg/dL   Blood: x / Protein: 30 mg/dL / Nitrite: Negative   Leuk Esterase: Large / RBC: 31 /hpf / WBC 79 /hpf   Sq Epi: x / Non Sq Epi: 10 /hpf / Bacteria: Negative        RADIOLOGY & ADDITIONAL STUDIES:    ASSESSMENT:   85yFemalePAST MEDICAL & SURGICAL HISTORY:  Atrial fibrillation  Lung mass  Primary osteoarthritis of left hip  History of breast cancer: , no chemo/radiation  s/p lumpectomy  Iron deficiency anemia  Retinal tear  Osteoarthritis  Glaucoma  Fistula of vagina: rectovaginal fistula  RBBB  Ptosis: left eye - eye lid surgery   Anxiety  Hyperlipidemia  Osteopenia  Hypertension  Ureterovaginal prolapse  Status post left knee replacement: 2016  S/P hip replacement, right: 2016  - right hip  History of lumpectomy of right breast: 2015  History of cataract surgery, left: and right 2016  Elective surgery: reversal of colostomy 2015  History of tonsillectomy  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

## 2018-11-03 NOTE — PHYSICAL THERAPY INITIAL EVALUATION ADULT - LEVEL OF INDEPENDENCE: SIT/SUPINE, REHAB EVAL
[facility] [id number]  [phone]        6/12/2017        Errol Brand  [place] [id number] [facility] [id number]      Dear Errol Brand,      To help us provide th
moderate assist (50% patients effort)

## 2018-11-03 NOTE — PHYSICAL THERAPY INITIAL EVALUATION ADULT - PERTINENT HX OF CURRENT PROBLEM, REHAB EVAL
Pt is an 85 F with PMH of HTN, Anemia, OA, paroxysmal Afib, Lung CA(3L of home O2) that presented with syncopal episide during bowel movement. CT Head: new small acute hemorrhage layering in the occipital horns. No hydrocephalus. CXR: unchanged complete opacification of the left hemithorax with leftward mediastinal shift consisten with large pleural effusion and completed left lung collapse (same as prior CT), left sided chest tube.

## 2018-11-04 LAB
ANION GAP SERPL CALC-SCNC: 13 MMOL/L — SIGNIFICANT CHANGE UP (ref 5–17)
APTT BLD: 26.3 SEC — LOW (ref 27.5–36.3)
BUN SERPL-MCNC: 11 MG/DL — SIGNIFICANT CHANGE UP (ref 7–23)
CALCIUM SERPL-MCNC: 8.7 MG/DL — SIGNIFICANT CHANGE UP (ref 8.4–10.5)
CHLORIDE SERPL-SCNC: 95 MMOL/L — LOW (ref 96–108)
CO2 SERPL-SCNC: 26 MMOL/L — SIGNIFICANT CHANGE UP (ref 22–31)
CREAT SERPL-MCNC: 0.54 MG/DL — SIGNIFICANT CHANGE UP (ref 0.5–1.3)
GLUCOSE SERPL-MCNC: 78 MG/DL — SIGNIFICANT CHANGE UP (ref 70–99)
HCT VFR BLD CALC: 33.5 % — LOW (ref 34.5–45)
HGB BLD-MCNC: 10.8 G/DL — LOW (ref 11.5–15.5)
INR BLD: 1.2 RATIO — HIGH (ref 0.88–1.16)
MCHC RBC-ENTMCNC: 31 PG — SIGNIFICANT CHANGE UP (ref 27–34)
MCHC RBC-ENTMCNC: 32.1 GM/DL — SIGNIFICANT CHANGE UP (ref 32–36)
MCV RBC AUTO: 96.6 FL — SIGNIFICANT CHANGE UP (ref 80–100)
PLATELET # BLD AUTO: 217 K/UL — SIGNIFICANT CHANGE UP (ref 150–400)
POTASSIUM SERPL-MCNC: 3.5 MMOL/L — SIGNIFICANT CHANGE UP (ref 3.5–5.3)
POTASSIUM SERPL-SCNC: 3.5 MMOL/L — SIGNIFICANT CHANGE UP (ref 3.5–5.3)
PROTHROM AB SERPL-ACNC: 13.7 SEC — HIGH (ref 10–12.9)
RBC # BLD: 3.47 M/UL — LOW (ref 3.8–5.2)
RBC # FLD: 17.8 % — HIGH (ref 10.3–14.5)
SODIUM SERPL-SCNC: 134 MMOL/L — LOW (ref 135–145)
WBC # BLD: 12.3 K/UL — HIGH (ref 3.8–10.5)
WBC # FLD AUTO: 12.3 K/UL — HIGH (ref 3.8–10.5)

## 2018-11-04 RX ORDER — OXYCODONE AND ACETAMINOPHEN 5; 325 MG/1; MG/1
1 TABLET ORAL EVERY 6 HOURS
Qty: 0 | Refills: 0 | Status: DISCONTINUED | OUTPATIENT
Start: 2018-11-04 | End: 2018-11-07

## 2018-11-04 RX ADMIN — Medication 325 MILLIGRAM(S): at 13:05

## 2018-11-04 RX ADMIN — CEFEPIME 100 MILLIGRAM(S): 1 INJECTION, POWDER, FOR SOLUTION INTRAMUSCULAR; INTRAVENOUS at 05:03

## 2018-11-04 RX ADMIN — CEFEPIME 100 MILLIGRAM(S): 1 INJECTION, POWDER, FOR SOLUTION INTRAMUSCULAR; INTRAVENOUS at 16:44

## 2018-11-04 RX ADMIN — DULOXETINE HYDROCHLORIDE 60 MILLIGRAM(S): 30 CAPSULE, DELAYED RELEASE ORAL at 13:05

## 2018-11-04 RX ADMIN — OXYCODONE AND ACETAMINOPHEN 1 TABLET(S): 5; 325 TABLET ORAL at 12:35

## 2018-11-04 RX ADMIN — ANASTROZOLE 1 MILLIGRAM(S): 1 TABLET ORAL at 13:05

## 2018-11-04 RX ADMIN — Medication 25 MILLIGRAM(S): at 05:01

## 2018-11-04 RX ADMIN — OXYCODONE AND ACETAMINOPHEN 1 TABLET(S): 5; 325 TABLET ORAL at 11:04

## 2018-11-04 NOTE — CHART NOTE - NSCHARTNOTEFT_GEN_A_CORE
Risk/benefit note: Patient may require ues of tPA through pleurix catheter per CT surgery. Given trace amount of IVH that has been stable on repeat CTH, and patient's unchanged neurologic exam, there is currently no acute neurosurgical contraindication for intrapleural injection of tPA. With use of tPA, however, there is an increased risk of intracranial hemorrhage. Please weigh risks and benefits of tPA administration prior to use. Repeat CTH if patient has neurologic exam change.

## 2018-11-05 ENCOUNTER — APPOINTMENT (OUTPATIENT)
Dept: THORACIC SURGERY | Facility: CLINIC | Age: 83
End: 2018-11-05

## 2018-11-05 DIAGNOSIS — I61.8 OTHER NONTRAUMATIC INTRACEREBRAL HEMORRHAGE: ICD-10-CM

## 2018-11-05 DIAGNOSIS — C34.90 MALIGNANT NEOPLASM OF UNSPECIFIED PART OF UNSPECIFIED BRONCHUS OR LUNG: ICD-10-CM

## 2018-11-05 DIAGNOSIS — Z71.89 OTHER SPECIFIED COUNSELING: ICD-10-CM

## 2018-11-05 DIAGNOSIS — I48.0 PAROXYSMAL ATRIAL FIBRILLATION: ICD-10-CM

## 2018-11-05 DIAGNOSIS — Z51.5 ENCOUNTER FOR PALLIATIVE CARE: ICD-10-CM

## 2018-11-05 DIAGNOSIS — R91.8 OTHER NONSPECIFIC ABNORMAL FINDING OF LUNG FIELD: ICD-10-CM

## 2018-11-05 PROCEDURE — 99498 ADVNCD CARE PLAN ADDL 30 MIN: CPT

## 2018-11-05 PROCEDURE — 99497 ADVNCD CARE PLAN 30 MIN: CPT | Mod: 25

## 2018-11-05 PROCEDURE — 99221 1ST HOSP IP/OBS SF/LOW 40: CPT

## 2018-11-05 PROCEDURE — 99232 SBSQ HOSP IP/OBS MODERATE 35: CPT

## 2018-11-05 RX ADMIN — SIMVASTATIN 20 MILLIGRAM(S): 20 TABLET, FILM COATED ORAL at 22:13

## 2018-11-05 RX ADMIN — SIMVASTATIN 20 MILLIGRAM(S): 20 TABLET, FILM COATED ORAL at 00:23

## 2018-11-05 RX ADMIN — CEFEPIME 100 MILLIGRAM(S): 1 INJECTION, POWDER, FOR SOLUTION INTRAMUSCULAR; INTRAVENOUS at 05:00

## 2018-11-05 RX ADMIN — ANASTROZOLE 1 MILLIGRAM(S): 1 TABLET ORAL at 12:10

## 2018-11-05 RX ADMIN — Medication 25 MILLIGRAM(S): at 05:01

## 2018-11-05 RX ADMIN — OXYCODONE AND ACETAMINOPHEN 1 TABLET(S): 5; 325 TABLET ORAL at 03:59

## 2018-11-05 RX ADMIN — DULOXETINE HYDROCHLORIDE 60 MILLIGRAM(S): 30 CAPSULE, DELAYED RELEASE ORAL at 12:10

## 2018-11-05 RX ADMIN — OXYCODONE AND ACETAMINOPHEN 1 TABLET(S): 5; 325 TABLET ORAL at 03:23

## 2018-11-05 RX ADMIN — Medication 325 MILLIGRAM(S): at 12:10

## 2018-11-05 NOTE — CONSULT NOTE ADULT - SUBJECTIVE AND OBJECTIVE BOX
HPI:  84 y/o female , PMH of HTN, anemia, OA, paroxysmal Afib (on eliquis), lung ca (on 3L home O2 as needed) presenting after syncopal episode  . Patient reports she was having a bowel movement when she lost consciousness for a few seconds.   Denies falling or hitting her head. No dizziness, headache, chest pain, difficulty breathing, nausea, vomiting, abdominal pain, pain or burning with urination, pain or swelling in lower extremities. Patient was alone during episode. Lives home alone has an aide. No previous history of syncope (01 Nov 2018 19:24)    PERTINENT PM/SXH:   Atrial fibrillation  Lung mass  Primary osteoarthritis of left hip  Glaucoma  History of breast cancer  Iron deficiency anemia  Retinal tear  Osteoarthritis  Glaucoma  Fistula of vagina  RBBB  Ptosis  Anxiety  Hyperlipidemia  Osteopenia  Hypertension  Ureterovaginal prolapse    Status post left knee replacement  S/P hip replacement, right  History of lumpectomy of right breast  History of cataract surgery, left  Elective surgery  History of tonsillectomy  Recto-vaginal fistula  H/O eye surgery  History of varicose vein stripping  History of carpal tunnel release    FAMILY HISTORY:  Diabetes mellitus (Mother)    ITEMS NOT CHECKED ARE NOT PRESENT    SOCIAL HISTORY:   Significant other/partner:  [ ]  Children:  [ ]  Taoism/Spirituality:  Substance hx:  [ ]   Tobacco hx:  [ ]   Alcohol hx: [ ]   Home Opioid hx:  [ ] I-Stop Reference No:  Living Situation: [ ]Home  [ ]Long term care  [ ]Rehab [ ]Other    ADVANCE DIRECTIVES:    DNR  Yes  MOLST  [ ]  Living Will  [ ]   DECISION MAKER(s):  [ ] Health Care Proxy(s)  [ ] Surrogate(s)  [ ] Guardian           Name(s): Phone Number(s):    BASELINE (I)ADL(s) (prior to admission):  [place]: [ ]Total  [ ] Moderate [ ]Dependent    Allergies    Levaquin (Diarrhea)    Intolerances    Dilaudid (Other)  tramadol (Unknown)  MEDICATIONS  (STANDING):  anastrozole 1 milliGRAM(s) Oral daily  DULoxetine 60 milliGRAM(s) Oral daily  ferrous    sulfate 325 milliGRAM(s) Oral daily  metoprolol succinate ER 25 milliGRAM(s) Oral daily  simvastatin 20 milliGRAM(s) Oral at bedtime    MEDICATIONS  (PRN):  oxyCODONE    5 mG/acetaminophen 325 mG 1 Tablet(s) Oral every 6 hours PRN Moderate Pain (4 - 6)    PRESENT SYMPTOMS: [ ]Unable to obtain due to poor mentation   Source if other than patient:  [ ]Family   [ ]Team     Pain (Impact on QOL):    Location -         Minimal acceptable level (0-10 scale):                    Aggrevating factors -  Quality -  Radiation -  Severity (0-10 scale) -    Timing -    PAIN AD Score:     [email or web address] (press ctrl +  left click to view)    Dyspnea:                           [ ]Mild [ ]Moderate [ ]Severe  Anxiety:                             [ ]Mild [ ]Moderate [ ]Severe  Fatigue:                             [ ]Mild [ ]Moderate [ ]Severe  Nausea:                             [ ]Mild [ ]Moderate [ ]Severe  Loss of appetite:              [ ]Mild [ ]Moderate [ ]Severe  Constipation:                    [ ]Mild [ ]Moderate [ ]Severe    Other Symptoms:  [ ]All other review of systems negative     Karnofsky Performance Score/Palliative Performance Status Version 2:         %  PHYSICAL EXAM:  Vital Signs Last 24 Hrs  T(C): 36.6 (05 Nov 2018 04:11), Max: 36.6 (05 Nov 2018 04:11)  T(F): 97.8 (05 Nov 2018 04:11), Max: 97.8 (05 Nov 2018 04:11)  HR: 108 (05 Nov 2018 04:11) (105 - 112)  BP: 142/90 (05 Nov 2018 04:11) (142/90 - 160/98)  BP(mean): --  RR: 18 (05 Nov 2018 04:11) (18 - 20)  SpO2: 98% (05 Nov 2018 04:11) (98% - 100%) I&O's Summary    04 Nov 2018 07:01  -  05 Nov 2018 07:00  --------------------------------------------------------  IN: 270 mL / OUT: 0 mL / NET: 270 mL    05 Nov 2018 07:01  -  05 Nov 2018 13:22  --------------------------------------------------------  IN: 120 mL / OUT: 0 mL / NET: 120 mL    GENERAL:  [ ]Alert  [ ]Oriented x   [ ]Lethargic  [ ]Cachexia  [ ]Unarousable  [ ]Verbal  [ ]Non-Verbal  Behavioral:   [ ] Anxiety  [ ] Delirium [ ] Agitation [ ] Other  HEENT:  [ ]Normal   [ ]Dry mouth   [ ]ET Tube/Trach  [ ]Oral lesions  PULMONARY:   [ ]Clear [ ]Tachypnea  [ ]Audible excessive secretions   [ ]Rhonchi        [ ]Right [ ]Left [ ]Bilateral  [ ]Crackles        [ ]Right [ ]Left [ ]Bilateral  [ ]Wheezing     [ ]Right [ ]Left [ ]Bilateral  CARDIOVASCULAR:    [ ]Regular [ ]Irregular [ ]Tachy  [ ]Travis [ ]Murmur [ ]Other  GASTROINTESTINAL:  [ ]Soft  [ ]Distended   [ ]+BS  [ ]Non tender [ ]Tender  [ ]PEG [ ]OGT/ NGT  Last BM:   GENITOURINARY:  [ ]Normal [ ] Incontinent   [ ]Oliguria/Anuria   [ ]Yuan  MUSCULOSKELETAL:   [ ]Normal   [ ]Weakness  [ ]Bed/Wheelchair bound [ ]Edema  NEUROLOGIC:   [ ]No focal deficits  [ ] Cognitive impairment  [ ] Dysphagia [ ]Dysarthria [ ] Paresis [ ]Other   SKIN:   [ ]Normal   [ ]Pressure ulcer(s)  [ ]Rash    CRITICAL CARE:  [ ] Shock Present  [ ]Septic [ ]Cardiogenic [ ]Neurologic [ ]Hypovolemic  [ ]  Vasopressors [ ]  Inotropes   [ ] Respiratory failure present  [ ] Acute  [ ] Chronic [ ] Hypoxic  [ ] Hypercarbic [ ] Other  [ ] Other organ failure     LABS:                        10.8   12.3  )-----------( 217      ( 04 Nov 2018 06:35 )             33.5   11-04    134<L>  |  95<L>  |  11  ----------------------------<  78  3.5   |  26  |  0.54    Ca    8.7      04 Nov 2018 06:27    PT/INR - ( 04 Nov 2018 06:35 )   PT: 13.7 sec;   INR: 1.20 ratio         PTT - ( 04 Nov 2018 06:35 )  PTT:26.3 sec      RADIOLOGY & ADDITIONAL STUDIES:    PROTEIN CALORIE MALNUTRITION:   [ ] PPSV2 < or = to 30% [ ] significant weight loss  [ ] poor nutritional intake [ ] catabolic state [ ] anasarca     Albumin, Serum: 3.3 g/dL (11-01-18 @ 16:21)  Artificial Nutrition [ ]     REFERRALS:   [ ]Chaplaincy  [ ] Hospice  [ ]Child Life  [ ]Social Work  [ ]Case management [ ]Holistic Therapy   Goals of Care Discussion Document: HPI:  84 y/o female , PMH of HTN, anemia, OA, paroxysmal Afib (on eliquis), lung ca (on 3L home O2 as needed) presenting after syncopal episode. Patient reports she was having a bowel movement when she lost consciousness for a few seconds.   Denies falling or hitting her head. No dizziness, headache, chest pain, difficulty breathing, nausea, vomiting, abdominal pain, pain or burning with urination, pain or swelling in lower extremities. Patient was alone during episode. Lives home alone has an aide. No previous history of syncope (01 Nov 2018 19:24)    Palliative care consulted for GOC; Hospital course reviewed- c/b R ICH (with no surgical intervention planned), was being empirically treated with antibiotics, and mental status waxes and wanes. Family had requested PCU as they are familiar with the unit and feel as though the philosophy of care is in line with patient and family goals.    Met with patients daughter Hannah Dyer today- went over MOLST form that was previously filled out, reviewed CODE STATUS and spoke about overall care. Hannah states that she wants her mother to stop receiving aggressive care such as daily blood draws, antibiotics unless it is discussed with family about expectations and infectious source, and really wants to have her mother go home with hospice services. Discussed with Hannah about making referral to [facility]. Currently no beds in PCU, but explained that she can be put on the list in the meantime, however, if disposition is being set up for her to be discharged home with hospice then she may never transfer. MOLST form was re-filled out, and was discussed with patients son Bryn via phone as well, as Hannah and Angelique Adhikari had been helping with majority of decision making, however, all 3 children are surrogate decision makers. Per daughter, patient has 24 hour aide services at home.    PERTINENT PM/SXH:   Atrial fibrillation  Lung mass  Primary osteoarthritis of left hip  Glaucoma  History of breast cancer  Iron deficiency anemia  Retinal tear  Osteoarthritis  Glaucoma  Fistula of vagina  RBBB  Ptosis  Anxiety  Hyperlipidemia  Osteopenia  Hypertension  Ureterovaginal prolapse    Status post left knee replacement  S/P hip replacement, right  History of lumpectomy of right breast  History of cataract surgery, left  Elective surgery  History of tonsillectomy  Recto-vaginal fistula  H/O eye surgery  History of varicose vein stripping  History of carpal tunnel release    FAMILY HISTORY:  Diabetes mellitus (Mother)    ITEMS NOT CHECKED ARE NOT PRESENT    SOCIAL HISTORY:   Significant other/partner:  [ ]  Children:  [ x]  Holiness/Spirituality:  Substance hx:  [ ]   Tobacco hx:  [ ]   Alcohol hx: [ ]   Home Opioid hx:  [ ] I-Stop Reference No:  Living Situation: [ x]Home  [ ]Long term care  [ ]Rehab [ ]Other    ADVANCE DIRECTIVES:    DNR  Yes  MOLST  [x ]  Living Will  [ ]   DECISION MAKER(s):  [ ] Health Care Proxy(s)  [ x] Surrogate(s)  [ ] Guardian           Name(s): Phone Number(s):  Daughter Hannah Dyer [phone]  Daughter Angelique Adhikari Ken[place] [phone]  Son Bryn Kenyetta [phone]    BASELINE (I)ADL(s) (prior to admission):  [place]: [ ]Total  [ ] Moderate [ x]Dependent    Allergies    Levaquin (Diarrhea)    Intolerances    Dilaudid (Other)  tramadol (Unknown)  MEDICATIONS  (STANDING):  anastrozole 1 milliGRAM(s) Oral daily  DULoxetine 60 milliGRAM(s) Oral daily  ferrous    sulfate 325 milliGRAM(s) Oral daily  metoprolol succinate ER 25 milliGRAM(s) Oral daily  simvastatin 20 milliGRAM(s) Oral at bedtime    MEDICATIONS  (PRN):  oxyCODONE    5 mG/acetaminophen 325 mG 1 Tablet(s) Oral every 6 hours PRN Moderate Pain (4 - 6)    PRESENT SYMPTOMS: [ x]Unable to obtain due to poor mentation   Source if other than patient:  [ ]Family   [ ]Team     Pain (Impact on QOL):    Location -         Minimal acceptable level (0-10 scale):                    Aggrevating factors -  Quality -  Radiation -  Severity (0-10 scale) -    Timing -    PAIN AD Score:     [email or web address].[email or web address] (press ctrl +  left click to view)    Dyspnea:                           [ ]Mild [ ]Moderate [ ]Severe  Anxiety:                             [ ]Mild [ ]Moderate [ ]Severe  Fatigue:                             [ ]Mild [ ]Moderate [ ]Severe  Nausea:                             [ ]Mild [ ]Moderate [ ]Severe  Loss of appetite:              [ ]Mild [ ]Moderate [ ]Severe  Constipation:                    [ ]Mild [ ]Moderate [ ]Severe    Other Symptoms:  [ ]All other review of systems negative     Karnofsky Performance Score/Palliative Performance Status Version 2:     30 %  PHYSICAL EXAM:  Vital Signs Last 24 Hrs  T(C): 36.6 (05 Nov 2018 04:11), Max: 36.6 (05 Nov 2018 04:11)  T(F): 97.8 (05 Nov 2018 04:11), Max: 97.8 (05 Nov 2018 04:11)  HR: 108 (05 Nov 2018 04:11) (105 - 112)  BP: 142/90 (05 Nov 2018 04:11) (142/90 - 160/98)  BP(mean): --  RR: 18 (05 Nov 2018 04:11) (18 - 20)  SpO2: 98% (05 Nov 2018 04:11) (98% - 100%) I&O's Summary    04 Nov 2018 07:01  -  05 Nov 2018 07:00  --------------------------------------------------------  IN: 270 mL / OUT: 0 mL / NET: 270 mL    05 Nov 2018 07:01  -  05 Nov 2018 13:22  --------------------------------------------------------  IN: 120 mL / OUT: 0 mL / NET: 120 mL    GENERAL:  [ ]Alert  [x ]Oriented x1   [ x]Lethargic  [x ]Cachexia  [ ]Unarousable  [ ]Verbal  [ ]Non-Verbal  Behavioral:   [ ] Anxiety  [ ] Delirium [ ] Agitation [ ] Other  HEENT:  [ ]Normal   [x ]Dry mouth   [ ]ET Tube/Trach  [ ]Oral lesions  PULMONARY: pleurex in place  [x ]Clear [ ]Tachypnea  [ ]Audible excessive secretions   [ ]Rhonchi        [ ]Right [ ]Left [ ]Bilateral  [ ]Crackles        [ ]Right [ ]Left [ ]Bilateral  [ ]Wheezing     [ ]Right [ ]Left [ ]Bilateral  CARDIOVASCULAR:    [ x]Regular [ ]Irregular [ ]Tachy  [ ]Travis [ ]Murmur [ ]Other  GASTROINTESTINAL:  [ x]Soft  [ ]Distended   [x ]+BS  [x ]Non tender [ ]Tender  [ ]PEG [ ]OGT/ NGT  Last BM: RN documentation reviewed  GENITOURINARY:  [ ]Normal [x ] Incontinent   [ ]Oliguria/Anuria   [ ]Yuan  MUSCULOSKELETAL:   [ ]Normal   [ x]Weakness  [ ]Bed/Wheelchair bound [ ]Edema  NEUROLOGIC:   [ ]No focal deficits  [x ] Cognitive impairment  [ ] Dysphagia [ ]Dysarthria [ ] Paresis [ ]Other   SKIN: ecchymoses  [ ]Normal   [ ]Pressure ulcer(s)  [ ]Rash    CRITICAL CARE:   [ ] Shock Present  [ ]Septic [ ]Cardiogenic [ ]Neurologic [ ]Hypovolemic  [ ]  Vasopressors [ ]  Inotropes   [x ] Respiratory failure present  [x ] Acute  [ ] Chronic [ ] Hypoxic  [ ] Hypercarbic [ ] Other  [ ] Other organ failure     LABS:                        10.8   12.3  )-----------( 217      ( 04 Nov 2018 06:35 )             33.5   11-04    134<L>  |  95<L>  |  11  ----------------------------<  78  3.5   |  26  |  0.54    Ca    8.7      04 Nov 2018 06:27    PT/INR - ( 04 Nov 2018 06:35 )   PT: 13.7 sec;   INR: 1.20 ratio    PTT - ( 04 Nov 2018 06:35 )  PTT:26.3 sec    RADIOLOGY & ADDITIONAL STUDIES:  < from: Xray Chest 1 View- PORTABLE-Urgent (11.03.18 @ 18:36) >    EXAM:  XR CHEST PORTABLE URGENT 1V                            PROCEDURE DATE:  11/03/2018            INTERPRETATION:  CLINICAL INDICATION: Line placement.    Frontal view of the chest is obtained.    Comparison is made with November 1, 2018.    IMPRESSION: There is a left PICC line with the tip in the superior vena   cava. A left pleural catheter is noted as on the prior study. There is   complete opacification of the left hemithorax with associated volume loss   related to left lung atelectasis unchanged. There is a small right   pleural effusion with interval increase in size since the prior study   associated with right lung base atelectasis. There is no pneumothorax.    ULISSES GIRALDO M.D. ATTENDING RADIOLOGIST  This document has been electronically signed. Nov 4 2018 11:24AM  < end of copied text >    PROTEIN CALORIE MALNUTRITION:   [x ] PPSV2 < or = to 30% [ x] significant weight loss  [ x] poor nutritional intake [ ] catabolic state [ ] anasarca     Albumin, Serum: 3.3 g/dL (11-01-18 @ 16:21)  Artificial Nutrition [ ]     REFERRALS:   [ ]Chaplaincy  [x ] Hospice  [ ]Child Life  [ ]Social Work  [ ]Case management [ ]Holistic Therapy   Goals of Care Discussion Document: see GOC document in chart

## 2018-11-05 NOTE — PROGRESS NOTE ADULT - PROBLEM SELECTOR PLAN 1
Will plan on removing Pleurx on Tuesday at bedside by Dr. Avila as per family request as pt is transitioning to inpatient hospice care   Palliative Care following, transfer to PCU when bed available  Continue management per primary team

## 2018-11-05 NOTE — CONSULT NOTE ADULT - PROBLEM SELECTOR RECOMMENDATION 5
transfer to PCU when bed available as is in line with goals of care of patient and family. [facility] following for possible discharge under home hospice  Please call with questions. Contact information provided to family

## 2018-11-05 NOTE — CONSULT NOTE ADULT - ASSESSMENT
84 y/o female  w/ pmhx end stage lung cancer, afib on Eliquis with Left malignant effusion s/p Lvats with pleurx placement in august of this year. Thoracic Surgery consulted for Pleurx eval.     RECCs  -CT chest non contrast  -Please confirm with NSX if administering TPA via pleurix in the setting of small ICH is appropriate  -Pt will need to be off eliquis for 3-5 days prior to administration of TPA.   Above d/w Dr. Avila and Primary team NP Justin
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  Stable bilateral upper lobe masses and right lower lobe groudglass nodule.  Small pericardial effusion unchanged.  - S/P recent VATS/pleurex, with minimal drainage recently; Consult CTS regarding removal  - s/p 4 doses Keytruda with progression of symptoms; required IV Hydration as outpatient 10/31, Dr. Sanchez discussed with pt/family on 10/31- no further treatment planned and they were considering Hospice care  - d/w lottie Merino on phone today; they are interested in speaking further with Hospice, they were hoping for PICC line to continue hydration at home    # Breast Cancer (Right)  -  ROCÍO on anastrazole    # Recent A fibrillation  - was on eliquis 2.5 mg po BID- now on hold with IVH    #intraventricular hemorrhage, small- now off AC  - Neurology following  - for repeat head CT in AM    # ID- on Abx for possible PNA    pt DNR; d/w daughter Angelique ([phone]) on phone, will place consult for Hospice; will hold off on Palliative care consult as symptoms are stable  d/w NP
85y year old Female with the below past medical history significant for HTN, OA, PAF on eliquis and lung cancer presenting with syncopal episode on toilet lasting a few seconds witnessed by aid who is presently at bedside. Reports no head trauma, no fall. Did lean forward suddenly at time of event. Found to have small ICH on CT head and seen by neurosurgery.     1. CT head with very small layer of IVH in the right posterior horn of the lateral ventricle.     2. Either from increased pressure while having bowel movement or sudden acceleration/deceleration at time of syncope. No report of head trauma at time of witnessed syncope on toilet.     3. Hold eliquis - likely indefinitely    4. Possible pneumonia on CXR - started on abx by ID    5. Understand poor overall prognosis due to lung ca history    6. No AEDs required for this IVH    7. Repeat CT head tomorrow am    8. Avoid delirium provoking agents. Will monitor mental status on cefepime.     9. Will follow
Christel Kumari  85F DNR/DNI pmhx end stage lung cancer, afib on Eliquis presents today s/p syncopal episode during bowel movement found to have small R IVH. At baseline per family, AOx2, RUE 4/5 otherwise intact.  - recommend Kcentra for reversal  - hold Eliquis  - Repeat CTH in 4h from original scan and again in AM  - Medicine eval for syncopal workup
Syncope  due to vagal phenomenon of valsalva with decreased venous return in this patient with  significant tachycardia ( diminished diastolic filling ) and likely very high pulm arterial pressure from her collapsed lungs   keep in well hydrated state    PAF  in sinus  cont BB  off a/c due to IVH    PAT, tachycardia  due to collapsed lungs  consider pulm/ thoracic consult     consider DC Megace to decrease risk of thrombotic effects given now off a/c
86 y/o female , PMH of HTN, anemia, OA, paroxysmal Afib (on eliquis), lung ca (on 3L home O2 as needed) presenting after syncopal episode. Patient found to have small ICH and patient with waxing/waning mental status. Family requesting to transition focus of care to symptom based. Palliative care consulted for GOC and discussing hospice.
86 yr old admitted with syncope  denies fever She is very cachetic and has advanced lung ca  Previously had plerex tube for hydropneumothoraxe and still has diffuse disease on chest xray  no dysuria despite the pyuria.  It is very difficult to tell if she has pna  and the entire chest is opacified on the left.  will start empiric ab for the possibility of secondary post obs pna  prognosis poor

## 2018-11-05 NOTE — CONSULT NOTE ADULT - CONSULT REQUESTED DATE/TIME
01-Nov-2018 15:22
02-Nov-2018
02-Nov-2018 07:49
02-Nov-2018 09:26
03-Nov-2018
05-Nov-2018
02-Nov-2018

## 2018-11-05 NOTE — CONSULT NOTE ADULT - PROBLEM SELECTOR RECOMMENDATION 4
>46 minutes spent on advance care planning, 12-12:50pm  filling out MOLST, discussing with various family members  discussing disease trajectory, coordination of care with primary team, hospice referral and planning transfer to PCU

## 2018-11-06 DIAGNOSIS — Z85.3 PERSONAL HISTORY OF MALIGNANT NEOPLASM OF BREAST: ICD-10-CM

## 2018-11-06 DIAGNOSIS — D50.8 OTHER IRON DEFICIENCY ANEMIAS: ICD-10-CM

## 2018-11-06 PROCEDURE — 99233 SBSQ HOSP IP/OBS HIGH 50: CPT | Mod: GC

## 2018-11-06 PROCEDURE — ZZZZZ: CPT

## 2018-11-06 RX ADMIN — Medication 325 MILLIGRAM(S): at 13:24

## 2018-11-06 RX ADMIN — Medication 0.5 MILLIGRAM(S): at 22:22

## 2018-11-06 RX ADMIN — DULOXETINE HYDROCHLORIDE 60 MILLIGRAM(S): 30 CAPSULE, DELAYED RELEASE ORAL at 13:24

## 2018-11-06 RX ADMIN — Medication 25 MILLIGRAM(S): at 05:28

## 2018-11-06 NOTE — PROCEDURE NOTE - ADDITIONAL PROCEDURE DETAILS
PleurX Catheter removed at bedside.  Area prepped with chloraprep, 10cc 1% lidocaine w/ epinephrine infiltrated into skin and subcutaneous tissue surrounding pleurx catheter.  Cuff around pleurX catheter dissected out from surrounding tissue with a mosquito clamp.  Gentle traction applied and pleurX removed.  2x2 and tagaderm applied.  Pt tolerated procedure well.

## 2018-11-06 NOTE — PROGRESS NOTE ADULT - PROBLEM SELECTOR PLAN 1
moderated left pleural effusion with left lung collapse and mediastinal shift.  Stable bilateral upper lobe masses and right lower lobe groudglass nodule.  Small pericardial effusion unchanged.  - S/P recent VATS/pleurex, with minimal drainage recently; to be removed bedside by thoracic surgery.  As per GOC conversation, patient is not a candidate for further chemotherapy. Family wants to proceed with comfort care.    Hospice referral was done. moderated left pleural effusion with left lung collapse and mediastinal shift.  Stable bilateral upper lobe masses and right lower lobe ground glass nodule.  Small pericardial effusion unchanged.  - S/P recent VATS/pleurex, with minimal drainage recently; to be removed bedside by thoracic surgery.  As per GOC conversation, patient is not a candidate for further chemotherapy. Family wants to proceed with comfort care.    Hospice referral was done.

## 2018-11-06 NOTE — PROCEDURE NOTE - PROCEDURE
<<-----Click on this checkbox to enter Procedure Removal of PleurX catheter from left pleural cavity  11/06/2018    Active  [id number]

## 2018-11-06 NOTE — PROGRESS NOTE ADULT - PROBLEM SELECTOR PLAN 5
No more chemotherapy or invasive therapeutic measures   Hospice referral was done  Patient is stable, No more chemotherapy or invasive therapeutic measures   Hospice referral was done

## 2018-11-06 NOTE — PROCEDURE NOTE - NSINFORMCONSENT_GEN_A_CORE
Patient's family members/Benefits, risks, and possible complications of procedure explained to patient/caregiver who verbalized understanding and gave verbal consent.

## 2018-11-06 NOTE — GOALS OF CARE CONVERSATION - PERSONAL ADVANCE DIRECTIVE - AGENT'S NAME
Amanda Kumari-   and Hannah Dyer (daughter) [phone]
Amanda Kumari-   and Hannah Dyer (daughter) [phone]

## 2018-11-06 NOTE — GOALS OF CARE CONVERSATION - PERSONAL ADVANCE DIRECTIVE - CONVERSATION DETAILS
Met with pt's dtrs and son outside of pt's room.  Reviewed hospice services and Benefit Election form.  Dtr/HCP Angelique Adhikari signed consents on pt's behalf.  Arranged for DME delivery (bed, MARIA ESTHER) tonight at family's request.  Family expect to have pt return to her home tomorrow.  Joan Corrales MSW aware of the above.
Met with Daughter Hannah ; Reviewed Hospice POC/Benefits; Daughter verbalized understanding and agreement but stated that her brother Gabriel Kumari (atty) would prefer to sign consents tomorrow when he is able to come to hospital.   Consents left with daughter Hannah; who stated she will set up meeting tomorrow for consents to be signed tomorrow.  Contact information left with Hannah. Awaiting confirmation of afternoon time for tomorrow meeting.

## 2018-11-07 ENCOUNTER — TRANSCRIPTION ENCOUNTER (OUTPATIENT)
Age: 83
End: 2018-11-07

## 2018-11-07 VITALS
RESPIRATION RATE: 16 BRPM | OXYGEN SATURATION: 96 % | TEMPERATURE: 98 F | DIASTOLIC BLOOD PRESSURE: 92 MMHG | SYSTOLIC BLOOD PRESSURE: 137 MMHG | HEART RATE: 110 BPM

## 2018-11-07 PROCEDURE — 99285 EMERGENCY DEPT VISIT HI MDM: CPT

## 2018-11-07 PROCEDURE — C1751: CPT

## 2018-11-07 PROCEDURE — 84295 ASSAY OF SERUM SODIUM: CPT

## 2018-11-07 PROCEDURE — 82803 BLOOD GASES ANY COMBINATION: CPT

## 2018-11-07 PROCEDURE — 83605 ASSAY OF LACTIC ACID: CPT

## 2018-11-07 PROCEDURE — 85014 HEMATOCRIT: CPT

## 2018-11-07 PROCEDURE — 85027 COMPLETE CBC AUTOMATED: CPT

## 2018-11-07 PROCEDURE — 36415 COLL VENOUS BLD VENIPUNCTURE: CPT

## 2018-11-07 PROCEDURE — 76937 US GUIDE VASCULAR ACCESS: CPT

## 2018-11-07 PROCEDURE — 81001 URINALYSIS AUTO W/SCOPE: CPT

## 2018-11-07 PROCEDURE — 80053 COMPREHEN METABOLIC PANEL: CPT

## 2018-11-07 PROCEDURE — 82947 ASSAY GLUCOSE BLOOD QUANT: CPT

## 2018-11-07 PROCEDURE — 36569 INSJ PICC 5 YR+ W/O IMAGING: CPT

## 2018-11-07 PROCEDURE — 80048 BASIC METABOLIC PNL TOTAL CA: CPT

## 2018-11-07 PROCEDURE — 84132 ASSAY OF SERUM POTASSIUM: CPT

## 2018-11-07 PROCEDURE — 82330 ASSAY OF CALCIUM: CPT

## 2018-11-07 PROCEDURE — 85730 THROMBOPLASTIN TIME PARTIAL: CPT

## 2018-11-07 PROCEDURE — 99239 HOSP IP/OBS DSCHRG MGMT >30: CPT

## 2018-11-07 PROCEDURE — 85610 PROTHROMBIN TIME: CPT

## 2018-11-07 PROCEDURE — 97162 PT EVAL MOD COMPLEX 30 MIN: CPT

## 2018-11-07 PROCEDURE — 71045 X-RAY EXAM CHEST 1 VIEW: CPT

## 2018-11-07 PROCEDURE — 84484 ASSAY OF TROPONIN QUANT: CPT

## 2018-11-07 PROCEDURE — 83735 ASSAY OF MAGNESIUM: CPT

## 2018-11-07 PROCEDURE — 82962 GLUCOSE BLOOD TEST: CPT

## 2018-11-07 PROCEDURE — 70450 CT HEAD/BRAIN W/O DYE: CPT

## 2018-11-07 PROCEDURE — 71250 CT THORAX DX C-: CPT

## 2018-11-07 PROCEDURE — 87086 URINE CULTURE/COLONY COUNT: CPT

## 2018-11-07 PROCEDURE — 82435 ASSAY OF BLOOD CHLORIDE: CPT

## 2018-11-07 RX ORDER — MORPHINE SULFATE 50 MG/1
2.5 CAPSULE, EXTENDED RELEASE ORAL
Qty: 0 | Refills: 0 | Status: DISCONTINUED | OUTPATIENT
Start: 2018-11-07 | End: 2018-11-07

## 2018-11-07 RX ORDER — METOPROLOL TARTRATE 50 MG
1 TABLET ORAL
Qty: 0 | Refills: 0 | COMMUNITY

## 2018-11-07 RX ORDER — DULOXETINE HYDROCHLORIDE 30 MG/1
1 CAPSULE, DELAYED RELEASE ORAL
Qty: 0 | Refills: 0 | COMMUNITY
Start: 2018-11-07

## 2018-11-07 RX ORDER — OMEPRAZOLE 10 MG/1
1 CAPSULE, DELAYED RELEASE ORAL
Qty: 0 | Refills: 0 | COMMUNITY

## 2018-11-07 RX ORDER — APIXABAN 2.5 MG/1
1 TABLET, FILM COATED ORAL
Qty: 60 | Refills: 0 | OUTPATIENT

## 2018-11-07 RX ORDER — METOPROLOL TARTRATE 50 MG
1 TABLET ORAL
Qty: 0 | Refills: 0 | COMMUNITY
Start: 2018-11-07

## 2018-11-07 RX ORDER — DULOXETINE HYDROCHLORIDE 30 MG/1
1 CAPSULE, DELAYED RELEASE ORAL
Qty: 30 | Refills: 0 | OUTPATIENT
Start: 2018-11-07 | End: 2018-12-06

## 2018-11-07 RX ADMIN — Medication 25 MILLIGRAM(S): at 05:57

## 2018-11-07 RX ADMIN — OXYCODONE AND ACETAMINOPHEN 1 TABLET(S): 5; 325 TABLET ORAL at 15:16

## 2018-11-07 RX ADMIN — Medication 325 MILLIGRAM(S): at 11:21

## 2018-11-07 RX ADMIN — MORPHINE SULFATE 2.5 MILLIGRAM(S): 50 CAPSULE, EXTENDED RELEASE ORAL at 11:35

## 2018-11-07 RX ADMIN — DULOXETINE HYDROCHLORIDE 60 MILLIGRAM(S): 30 CAPSULE, DELAYED RELEASE ORAL at 11:21

## 2018-11-07 RX ADMIN — MORPHINE SULFATE 2.5 MILLIGRAM(S): 50 CAPSULE, EXTENDED RELEASE ORAL at 11:19

## 2018-11-07 NOTE — PROGRESS NOTE ADULT - PROBLEM SELECTOR PLAN 2
Family decided to hold anti-coagulation.  C/W Metoprolol Family decided to forgo anti-coagulation.  C/W Metoprolol

## 2018-11-07 NOTE — PROGRESS NOTE ADULT - ASSESSMENT
84 y/o female  w/ pmhx end stage lung cancer, afib on Eliquis presents today s/p syncopal episode during bowel movement found to have small ICH  - hold Eliquis  - Repeat CTH stable ICH, Neuro, Neurosurg appreciated, c/w neurochecks,  neuro eval f/u  cards eval noted  neuro checks  heme eval noted  ?hospice  pulm/ctx to see   ID appreciated, ? PNA, c/w empiric abx  prognosis overall poor
84 y/o female  w/ pmhx end stage lung cancer, afib on Eliquis presents today s/p syncopal episode during bowel movement found to have small ICH  - hold Eliquis  - Repeat CTH stable ICH, Neuro, Neurosurg appreciated, c/w neurochecks,  neuro eval f/u  cards eval noted  neuro checks  heme eval noted  hospice eval  CTS appreciated, Neurosg appreciated, TPA via pleurex per CTS  ID appreciated, ? PNA, c/w empiric abx  prognosis overall poor
84 y/o female  w/ pmhx end stage lung cancer, afib on Eliquis presents today s/p syncopal episode during bowel movement found to have small ICH  - hold Eliquis  - Repeat CTH stable ICH, Neuro, Neurosurg appreciated, c/w neurochecks,  neuro eval f/u  cards eval noted  neuro checks  heme eval noted  hospice eval  CTS appreciated, Neurosg appreciated, pleurex removal per CTS  ID appreciated, off abx  prognosis overall poor   d/c planning home hospice vs PCU
84 y/o female  w/ pmhx end stage lung cancer, afib on Eliquis presents today s/p syncopal episode during bowel movement found to have small ICH  - hold Eliquis  - Repeat CTH stable ICH, Neuro, Neurosurg appreciated, c/w neurochecks,  neuro eval f/u  cards eval noted  neuro checks  heme eval noted  hospice eval  CTS appreciated, Neurosg appreciated, pleurex removal per CTS  ID appreciated, off abx  prognosis overall poor   transferred to PCU  will sign off
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
85y year old Female with the below past medical history significant for HTN, OA, PAF on eliquis and lung cancer presenting with syncopal episode on toilet lasting a few seconds witnessed by aid who is presently at bedside. Reports no head trauma, no fall. Did lean forward suddenly at time of event. Found to have small ICH on CT head and seen by neurosurgery.     1. CT head with very small layer of IVH in the right posterior horn of the lateral ventricle.     2. Either from increased pressure while having bowel movement or sudden acceleration/deceleration at time of syncope. No report of head trauma at time of witnessed syncope on toilet.     3. Hold eliquis - likely indefinitely as this may be a recurrent risk    4. Possible pneumonia on CXR - started on abx by ID    5. Understand poor overall prognosis due to lung ca history    6. No AEDs required for this IVH    7. Repeat CT head as per neurosurgery - they are recommending outpatient follow up    8. Avoid delirium provoking agents. Monitor mental status on cefepime.     9. Otherwise no further inpatient neurologic intervention at this time.
85y year old Female with the below past medical history significant for HTN, OA, PAF on eliquis and lung cancer presenting with syncopal episode on toilet lasting a few seconds witnessed by aid who is presently at bedside. Reports no head trauma, no fall. Did lean forward suddenly at time of event. Found to have small ICH on CT head and seen by neurosurgery.     1. CT head with very small layer of IVH in the right posterior horn of the lateral ventricle.     2. Either from increased pressure while having bowel movement or sudden acceleration/deceleration at time of syncope. No report of head trauma at time of witnessed syncope on toilet.     3. Hold eliquis - likely indefinitely as this may be a recurrent risk    4. Possible pneumonia on CXR - started on abx by ID    5. Understand poor overall prognosis due to lung ca history    6. No AEDs required for this IVH    7. Repeat CT head as per neurosurgery - they are recommending outpatient follow up    8. Avoid delirium provoking agents. Monitor mental status on cefepime.     9. Otherwise no further inpatient neurologic intervention at this time. Please call back with any quesitons
86 y/o female  w/ pmhx end stage lung cancer, afib on Eliquis presents today s/p syncopal episode during bowel movement found to have small ICH  - hold Eliquis  - Repeat CTH stable ICH, Neuro, Neurosurg appreciated, c/w neurochecks, repeat CTH again tomorrow AM  cards eval noted  neuro checks  heme eval pending  ID appreciated, ? PNA, c/w empiric abx  Pulm eval
86 y/o female  w/ pmhx end stage lung cancer, afib on Eliquis with Left malignant effusion s/p Left VATS with pleurx placement 8/2018. Thoracic Surgery consulted for Pleurx removal.  D/w patient's family at bedside with Dr. Avila: CT Chest essentially unchanged from prior, plan to transition pt to palliative care and would like pleurx removed.
86 y/o female  w/ pmhx end stage lung cancer, afib on Eliquis with Left malignant effusion s/p Lvats with pleurx placement in august of this year. Thoracic Surgery consulted for Pleurx eval.     D/w patient's family at bedside with Dr. Avila:  Ct scan essentially unchanged from prior,   Will plan on removing Pleurx on Monday or Tuesday as per family request as pt is transitioning to palliative/hospice care.  This will be done at bedside
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  Stable bilateral upper lobe masses and right lower lobe groudglass nodule.  Small pericardial effusion unchanged.  - S/P recent VATS/pleurex, with minimal drainage recently; to be removed bedside by thoracic surgery  - s/p 4 doses Keytruda with progression of symptoms; required IV Hydration as outpatient 10/31, Dr. Sanchez discussed with pt/family on 10/31- no further treatment planned   - now in PCU and awaiting Home Hospice    # Breast Cancer (Right)  -  ROCÍO, family requested to discontinue anastrazole    # Recent A fibrillation  - was on eliquis 2.5 mg po BID- now on hold with IVH    #intraventricular hemorrhage, small- now off AC  - supportive care    pt DNR  appreciate PCU care; awaiting Home Hospice  discussed with daughters at bedside
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  Stable bilateral upper lobe masses and right lower lobe groudglass nodule.  Small pericardial effusion unchanged.  - S/P recent VATS/pleurex, with minimal drainage recently; to be removed bedside by thoracic surgery  - s/p 4 doses Keytruda with progression of symptoms; required IV Hydration as outpatient 10/31, Dr. Sanchez discussed with pt/family on 10/31- no further treatment planned and they were considering Hospice care  - d/w daughter Angelique on phone 11/2 and with daughter Hannah at bedside today; discussions with Palliative Care and Hospice are ongoing    # Breast Cancer (Right)  -  ROCÍO on anastrazole    # Recent A fibrillation  - was on eliquis 2.5 mg po BID- now on hold with IVH    #intraventricular hemorrhage, small- now off AC  - Neurology following; supportive care    # ID- cefepime for possible PNA dc'ed per ID    pt DNR  Palliative care/Hospice following
Christel Kumari  85F DNR/DNI pmhx end stage lung cancer, afib on Eliquis presents today s/p syncopal episode during bowel movement found to have small R IVH. At baseline per family, AOx2, RUE 4/5 otherwise intact.  - interval CTH grossly stable, f/u final read, repeat CTH again in AM  - HOLDING eqliquis and antiplatelets  - If morning scan in stable, recommend f/u outpatient in 1-2 weeks from discharge with Dr. Frazier
Syncope  due to vagal phenomenon of valsalva with decreased venous return in this patient with  significant tachycardia ( diminished diastolic filling ) and likely very high pulm arterial pressure from her collapsed lungs   keep in well hydrated state    PAF  in sinus  cont BB  off a/c due to IVH    PAT, tachycardia  due to collapsed lungs  as per thoracic , plx catheter to be removed and plan for palliation
Syncope  due to vagal phenomenon of valsalva with decreased venous return in this patient with  significant tachycardia ( diminished diastolic filling ) and likely very high pulm arterial pressure from her collapsed lungs   keep in well hydrated state    PAF  in sinus  cont BB  off a/c due to IVH    PAT, tachycardia  due to collapsed lungs  consider pulm/ thoracic consult   off Megace to decrease risk of thrombotic effects given now off a/c
Syncope  due to vagal phenomenon of valsalva with decreased venous return in this patient with  significant tachycardia ( diminished diastolic filling ) and likely very high pulm arterial pressure from her collapsed lungs   keep in well hydrated state    PAF  in sinus  cont BB  off a/c due to IVH    PAT, tachycardia  due to collapsed lungs  consider pulm/ thoracic consult   off Megace to decrease risk of thrombotic effects given now off a/c   cont BB
pt to be transitioned to hospice  I doubt the ab are helping regardless/  will dc cefepime
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

## 2018-11-07 NOTE — PROGRESS NOTE ADULT - SUBJECTIVE AND OBJECTIVE BOX
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  S/P hip replacement, right: 6/2016  - right hip  History of lumpectomy of right breast: July 2015  History of cataract surgery, left: and right 2016  Elective surgery: reversal of colostomy July 2015  History of tonsillectomy  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 No nausea, vomiting, or hematemesis; No diarrhea or constipation. No melena or hematochezia.  GENITOURINARY: No dysuria, frequency, hematuria, or incontinence  NEUROLOGICAL: No headaches,   MUSCULOSKELETAL: No joint pain or swelling; No muscle, back, or extremity pain    Medications:  MEDICATIONS  (STANDING):  anastrozole 1 milliGRAM(s) Oral daily  cefepime   IVPB 1000 milliGRAM(s) IV Intermittent every 12 hours  cefepime   IVPB      DULoxetine 60 milliGRAM(s) Oral daily  ferrous    sulfate 325 milliGRAM(s) Oral daily  metoprolol succinate ER 25 milliGRAM(s) Oral daily  potassium chloride    Tablet ER 20 milliEquivalent(s) Oral once  simvastatin 20 milliGRAM(s) Oral at bedtime    MEDICATIONS  (PRN):    	    PHYSICAL EXAM:  T(C): 36.8 (11-03-18 @ 12:17), Max: 36.8 (11-03-18 @ 12:17)  HR: 116 (11-03-18 @ 12:17) (105 - 118)  BP: 141/92 (11-03-18 @ 12:17) (117/71 - 148/92)  RR: 18 (11-03-18 @ 12:17) (18 - 18)  SpO2: 100% (11-03-18 @ 12:17) (97% - 100%)  Wt(kg): --  I&O's Summary    02 Nov 2018 07:01  -  03 Nov 2018 07:00  --------------------------------------------------------  IN: 560 mL / OUT: 0 mL / NET: 560 mL    03 Nov 2018 08:01  -  03 Nov 2018 14:40  --------------------------------------------------------  IN: 120 mL / OUT: 0 mL / NET: 120 mL        Appearance: Normal	  HEENT:   Normal oral mucosa, PERRL, EOMI	  Lymphatic: No lymphadenopathy  Cardiovascular: Normal S1 S2, No JVD, No murmurs, No edema  Respiratory:dec bs  Psychiatry: A & O x   Gastrointestinal:  Soft, Non-tender, + BS	  Skin: No rashes, No ecchymoses, No cyanosis	  Neurologic: Non-focal  Extremities: Normal range of motion, No clubbing, cyanosis or edema  Vascular: Peripheral pulses palpable    TELEMETRY: 	    ECG:  	  RADIOLOGY:  OTHER: 	  	  LABS:	 	    CARDIAC MARKERS:                                11.9   11.7  )-----------( 237      ( 03 Nov 2018 10:00 )             36.8     11-03    134<L>  |  95<L>  |  8   ----------------------------<  75  3.4<L>   |  28  |  0.53    Ca    8.6      03 Nov 2018 10:00  Mg     1.8     11-02    TPro  6.4  /  Alb  3.3  /  TBili  0.5  /  DBili  x   /  AST  9<L>  /  ALT  11  /  AlkPhos  91  11-01    proBNP:   Lipid Profile:   HgA1c:   TSH:
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  S/P hip replacement, right: 6/2016  - right hip  History of lumpectomy of right breast: July 2015  History of cataract surgery, left: and right 2016  Elective surgery: reversal of colostomy July 2015  History of tonsillectomy  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 No nausea, vomiting, or hematemesis; No diarrhea or constipation. No melena or hematochezia.  GENITOURINARY: No dysuria, frequency, hematuria, or incontinence  NEUROLOGICAL: No headaches,   MUSCULOSKELETAL: No joint pain or swelling; No muscle, back, or extremity pain        Medications:  MEDICATIONS  (STANDING):  DULoxetine 60 milliGRAM(s) Oral daily  ferrous    sulfate 325 milliGRAM(s) Oral daily  metoprolol succinate ER 25 milliGRAM(s) Oral daily    MEDICATIONS  (PRN):  oxyCODONE    5 mG/acetaminophen 325 mG 1 Tablet(s) Oral every 6 hours PRN Moderate Pain (4 - 6)    	    PHYSICAL EXAM:  T(C): 36.9 (11-06-18 @ 10:21), Max: 36.9 (11-06-18 @ 10:21)  HR: 111 (11-06-18 @ 10:21) (111 - 117)  BP: 159/86 (11-06-18 @ 10:21) (108/70 - 159/86)  RR: 16 (11-06-18 @ 10:21) (16 - 19)  SpO2: 96% (11-06-18 @ 10:21) (92% - 99%)  Wt(kg): --  I&O's Summary    05 Nov 2018 07:01  -  06 Nov 2018 07:00  --------------------------------------------------------  IN: 240 mL / OUT: 0 mL / NET: 240 mL      Appearance: Frail	  HEENT:   Normal oral mucosa, PERRL, EOMI	  Lymphatic: No lymphadenopathy  Cardiovascular: Normal S1 S2, No JVD, No murmurs, No edema  Respiratory:dec bs  Psychiatry: A & O x 2-3  Gastrointestinal:  Soft, Non-tender, + BS	  Skin: No rashes, No ecchymoses, No cyanosis	  Neurologic: Non-focal  Extremities: Normal range of motion, No clubbing, cyanosis or edema  Vascular: Peripheral pulses palpable    LABS:	 	    CARDIAC MARKERS:                  proBNP:   Lipid Profile:   HgA1c:   TSH:
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  S/P hip replacement, right: 6/2016  - right hip  History of lumpectomy of right breast: July 2015  History of cataract surgery, left: and right 2016  Elective surgery: reversal of colostomy July 2015  History of tonsillectomy  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 No nausea, vomiting, or hematemesis; No diarrhea or constipation. No melena or hematochezia.  GENITOURINARY: No dysuria, frequency, hematuria, or incontinence  NEUROLOGICAL: No headaches,   MUSCULOSKELETAL: No joint pain or swelling; No muscle, back, or extremity pain      Medications:  MEDICATIONS  (STANDING):  anastrozole 1 milliGRAM(s) Oral daily  DULoxetine 60 milliGRAM(s) Oral daily  ferrous    sulfate 325 milliGRAM(s) Oral daily  metoprolol succinate ER 25 milliGRAM(s) Oral daily  simvastatin 20 milliGRAM(s) Oral at bedtime    MEDICATIONS  (PRN):  oxyCODONE    5 mG/acetaminophen 325 mG 1 Tablet(s) Oral every 6 hours PRN Moderate Pain (4 - 6)    	    PHYSICAL EXAM:  T(C): 36.6 (11-05-18 @ 14:05), Max: 36.6 (11-05-18 @ 04:11)  HR: 112 (11-05-18 @ 14:05) (105 - 112)  BP: 146/77 (11-05-18 @ 14:05) (142/90 - 160/98)  RR: 19 (11-05-18 @ 14:05) (18 - 20)  SpO2: 99% (11-05-18 @ 14:05) (98% - 100%)  Wt(kg): --  I&O's Summary    04 Nov 2018 07:01  -  05 Nov 2018 07:00  --------------------------------------------------------  IN: 270 mL / OUT: 0 mL / NET: 270 mL    05 Nov 2018 07:01  -  05 Nov 2018 20:20  --------------------------------------------------------  IN: 240 mL / OUT: 0 mL / NET: 240 mL      Appearance: Frail	  HEENT:   Normal oral mucosa, PERRL, EOMI	  Lymphatic: No lymphadenopathy  Cardiovascular: Normal S1 S2, No JVD, No murmurs, No edema  Respiratory:dec bs  Psychiatry: A & O x 2-3  Gastrointestinal:  Soft, Non-tender, + BS	  Skin: No rashes, No ecchymoses, No cyanosis	  Neurologic: Non-focal  Extremities: Normal range of motion, No clubbing, cyanosis or edema  Vascular: Peripheral pulses palpable    LABS:	 	    CARDIAC MARKERS:                                10.8   12.3  )-----------( 217      ( 04 Nov 2018 06:35 )             33.5     11-04    134<L>  |  95<L>  |  11  ----------------------------<  78  3.5   |  26  |  0.54    Ca    8.7      04 Nov 2018 06:27      proBNP:   Lipid Profile:   HgA1c:   TSH:
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  S/P hip replacement, right: 6/2016  - right hip  History of lumpectomy of right breast: July 2015  History of cataract surgery, left: and right 2016  Elective surgery: reversal of colostomy July 2015  History of tonsillectomy  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 No nausea, vomiting, or hematemesis; No diarrhea or constipation. No melena or hematochezia.  GENITOURINARY: No dysuria, frequency, hematuria, or incontinence  NEUROLOGICAL: No headaches,   MUSCULOSKELETAL: No joint pain or swelling; No muscle, back, or extremity pain      Medications:  MEDICATIONS  (STANDING):  anastrozole 1 milliGRAM(s) Oral daily  cefepime   IVPB 1000 milliGRAM(s) IV Intermittent every 12 hours  cefepime   IVPB      DULoxetine 60 milliGRAM(s) Oral daily  ferrous    sulfate 325 milliGRAM(s) Oral daily  metoprolol succinate ER 25 milliGRAM(s) Oral daily  simvastatin 20 milliGRAM(s) Oral at bedtime    MEDICATIONS  (PRN):    	    PHYSICAL EXAM:  T(C): 36.7 (11-04-18 @ 04:21), Max: 36.8 (11-03-18 @ 12:17)  HR: 113 (11-04-18 @ 04:21) (111 - 118)  BP: 122/71 (11-04-18 @ 04:21) (111/57 - 141/92)  RR: 18 (11-04-18 @ 04:21) (18 - 18)  SpO2: 92% (11-04-18 @ 04:21) (92% - 100%)  Wt(kg): --  I&O's Summary    03 Nov 2018 08:01  -  04 Nov 2018 07:00  --------------------------------------------------------  IN: 810 mL / OUT: 0 mL / NET: 810 mL      Appearance: Normal	  HEENT:   Normal oral mucosa, PERRL, EOMI	  Lymphatic: No lymphadenopathy  Cardiovascular: Normal S1 S2, No JVD, No murmurs, No edema  Respiratory:dec bs  Psychiatry: A & O x 2-3  Gastrointestinal:  Soft, Non-tender, + BS	  Skin: No rashes, No ecchymoses, No cyanosis	  Neurologic: Non-focal  Extremities: Normal range of motion, No clubbing, cyanosis or edema  Vascular: Peripheral pulses palpable    LABS:	 	    CARDIAC MARKERS:                                10.8   12.3  )-----------( 217      ( 04 Nov 2018 06:35 )             33.5     11-04    134<L>  |  95<L>  |  11  ----------------------------<  78  3.5   |  26  |  0.54    Ca    8.7      04 Nov 2018 06:27      proBNP:   Lipid Profile:   HgA1c:   TSH:
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  S/P hip replacement, right: 6/2016  - right hip  History of lumpectomy of right breast: July 2015  History of cataract surgery, left: and right 2016  Elective surgery: reversal of colostomy July 2015  History of tonsillectomy  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  H/O eye surgery: bilateral ptosis  History of varicose vein stripping: recent vascular evaluation - all normal  History of carpal tunnel release: bilateral      FAMILY HISTORY:  Diabetes mellitus (Mother)      REVIEW OF SYSTEMS:  drowsy, weak, decreased appetite  no headache, no CP, SOB, N/V/D/C    Medications:  MEDICATIONS  (STANDING):  anastrozole 1 milliGRAM(s) Oral daily  cefepime   IVPB 1000 milliGRAM(s) IV Intermittent every 12 hours  cefepime   IVPB      DULoxetine 60 milliGRAM(s) Oral daily  ferrous    sulfate 325 milliGRAM(s) Oral daily  metoprolol succinate ER 25 milliGRAM(s) Oral daily  simvastatin 20 milliGRAM(s) Oral at bedtime    MEDICATIONS  (PRN):    	    PHYSICAL EXAM:  T(C): 36.6 (11-02-18 @ 12:25), Max: 36.9 (11-02-18 @ 04:38)  HR: 113 (11-02-18 @ 12:25) (108 - 125)  BP: 131/83 (11-02-18 @ 12:25) (109/71 - 141/91)  RR: 18 (11-02-18 @ 12:25) (18 - 22)  SpO2: 100% (11-02-18 @ 12:25) (94% - 100%)  Wt(kg): --  I&O's Summary    01 Nov 2018 07:01  -  02 Nov 2018 07:00  --------------------------------------------------------  IN: 220 mL / OUT: 100 mL / NET: 120 mL        Appearance: Frail	  HEENT:   NCAT, PERRL, EOMI	  Lymphatic: No lymphadenopathy  Cardiovascular: Normal S1 S2, RRR  Respiratory: dec BS  Psychiatry: A & O x 3, drowsy  Gastrointestinal:  Soft, Non-tender, + BS  Skin: No rashes, No ecchymoses, No cyanosis	  Neurologic: Non-focal  Extremities: Normal range of motion, No clubbing, cyanosis or edema    	  LABS:	 	    CARDIAC MARKERS:                                12.1   13.1  )-----------( 244      ( 02 Nov 2018 06:20 )             36.8     11-02    136  |  99  |  15  ----------------------------<  139<H>  3.6   |  22  |  0.60    Ca    8.8      02 Nov 2018 06:20  Mg     1.8     11-02    TPro  6.4  /  Alb  3.3  /  TBili  0.5  /  DBili  x   /  AST  9<L>  /  ALT  11  /  AlkPhos  91  11-01    proBNP:   Lipid Profile:   HgA1c:   TSH:
Chief Complaint: fu    History of Present Illness: feels a little improved today; more alert today per daughter; no f/c, no cp/increased dyspnea, no n/v/abd pain, no bleeding, appetite improved today      MEDICATIONS  (STANDING):  anastrozole 1 milliGRAM(s) Oral daily  DULoxetine 60 milliGRAM(s) Oral daily  ferrous    sulfate 325 milliGRAM(s) Oral daily  metoprolol succinate ER 25 milliGRAM(s) Oral daily  simvastatin 20 milliGRAM(s) Oral at bedtime    MEDICATIONS  (PRN):  oxyCODONE    5 mG/acetaminophen 325 mG 1 Tablet(s) Oral every 6 hours PRN Moderate Pain (4 - 6)      Allergies    Levaquin (Diarrhea)    Intolerances    Dilaudid (Other)  tramadol (Unknown)      Vital Signs Last 24 Hrs  T(C): 36.6 (05 Nov 2018 14:05), Max: 36.6 (05 Nov 2018 04:11)  T(F): 97.9 (05 Nov 2018 14:05), Max: 97.9 (05 Nov 2018 14:05)  HR: 112 (05 Nov 2018 14:05) (105 - 112)  BP: 146/77 (05 Nov 2018 14:05) (142/90 - 160/98)  BP(mean): --  RR: 19 (05 Nov 2018 14:05) (18 - 20)  SpO2: 99% (05 Nov 2018 14:05) (98% - 100%)    PHYSICAL EXAM  General: adult in NAD  HEENT: clear oropharynx, anicteric sclera, pink conjunctiva  Neck: supple  CV: normal S1/S2  Lungs: decreased BS  Abdomen: soft non-tender non-distended, positive bowel sounds  Ext: no calf tenderness    LABS:                          10.8   12.3  )-----------( 217      ( 04 Nov 2018 06:35 )             33.5         Mean Cell Volume : 96.6 fl  Mean Cell Hemoglobin : 31.0 pg  Mean Cell Hemoglobin Concentration : 32.1 gm/dL  Auto Neutrophil # : x  Auto Lymphocyte # : x  Auto Monocyte # : x  Auto Eosinophil # : x  Auto Basophil # : x  Auto Neutrophil % : x  Auto Lymphocyte % : x  Auto Monocyte % : x  Auto Eosinophil % : x  Auto Basophil % : x      Serial CBC's  11-04 @ 06:35  Hct-33.5 / Hgb-10.8 / Plat-217 / RBC-3.47 / WBC-12.3  Serial CBC's  11-03 @ 10:00  Hct-36.8 / Hgb-11.9 / Plat-237 / RBC-3.81 / WBC-11.7  Serial CBC's  11-02 @ 06:20  Hct-36.8 / Hgb-12.1 / Plat-244 / RBC-3.76 / WBC-13.1  Serial CBC's  11-01 @ 23:49  Hct-41.3 / Hgb-13.3 / Plat-287 / RBC-4.20 / WBC-15.2  Serial CBC's  11-01 @ 16:21  Hct-38.4 / Hgb-12.4 / Plat-282 / RBC-3.90 / WBC-16.5      11-04    134<L>  |  95<L>  |  11  ----------------------------<  78  3.5   |  26  |  0.54    Ca    8.7      04 Nov 2018 06:27        PT/INR - ( 04 Nov 2018 06:35 )   PT: 13.7 sec;   INR: 1.20 ratio         PTT - ( 04 Nov 2018 06:35 )  PTT:26.3 sec                Radiology:
Chief Complaint: fu    History of Present Illness: pt now in PCU; more restless per daughters; no dyspnea off O2; no f/c, no cp, no cough, no n/v/abd pain, very poor oral intake      MEDICATIONS  (STANDING):  DULoxetine 60 milliGRAM(s) Oral daily  ferrous    sulfate 325 milliGRAM(s) Oral daily  metoprolol succinate ER 25 milliGRAM(s) Oral daily    MEDICATIONS  (PRN):  oxyCODONE    5 mG/acetaminophen 325 mG 1 Tablet(s) Oral every 6 hours PRN Moderate Pain (4 - 6)      Allergies    Levaquin (Diarrhea)    Intolerances    Dilaudid (Other)  tramadol (Unknown)      Vital Signs Last 24 Hrs  T(C): 36.9 (06 Nov 2018 10:21), Max: 36.9 (06 Nov 2018 10:21)  T(F): 98.4 (06 Nov 2018 10:21), Max: 98.4 (06 Nov 2018 10:21)  HR: 111 (06 Nov 2018 10:21) (111 - 117)  BP: 159/86 (06 Nov 2018 10:21) (108/70 - 159/86)  BP(mean): --  RR: 16 (06 Nov 2018 10:21) (16 - 19)  SpO2: 96% (06 Nov 2018 10:21) (92% - 96%)    PHYSICAL EXAM  General: adult in NAD  HEENT: clear oropharynx, anicteric sclera, pink conjunctiva  Neck: supple  CV: normal S1/S2   Lungs: decreased BS  Abdomen: soft non-tender non-distended, positive bowel sounds  Ext: no edema    LABS:            Serial CBC's  11-04 @ 06:35  Hct-33.5 / Hgb-10.8 / Plat-217 / RBC-3.47 / WBC-12.3  Serial CBC's  11-03 @ 10:00  Hct-36.8 / Hgb-11.9 / Plat-237 / RBC-3.81 / WBC-11.7                              Radiology:
[facility] TEAM PALLIATIVE CARE UNIT PROGRESS NOTE:      [  ] Patient on hospice program.    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     Patient is PCU . In bed and comfortable. No active symptom.     DNR on chart: Yes  Daughter Hannah Dyer [phone]  Daughter Angelique Adhikari Kenyetta [phone]  Son Bryn Kumari [phone]    Allergies    Levaquin (Diarrhea)    Intolerances    Dilaudid (Other)  tramadol (Unknown)    MEDICATIONS  (STANDING):  DULoxetine 60 milliGRAM(s) Oral daily  ferrous    sulfate 325 milliGRAM(s) Oral daily  metoprolol succinate ER 25 milliGRAM(s) Oral daily    MEDICATIONS  (PRN):  LORazepam    Concentrate 0.5 milliGRAM(s) Oral every 1 hour PRN agitation or anxiety  morphine Concentrate 2.5 milliGRAM(s) Oral every 2 hours PRN Moderate Pain (4 - 6)  oxyCODONE    5 mG/acetaminophen 325 mG 1 Tablet(s) Oral every 6 hours PRN Moderate Pain (4 - 6)    ITEMS UNCHECKED ARE NOT PRESENT    PRESENT SYMPTOMS: [ ]Unable to obtain due to poor mentation   Source if other than patient:  [ ]Family   [ ]Team     Pain (Impact on QOL):    Location:       Minimal acceptable level (0-10 scale):                    Aggrevating factors:  Quality:  Radiation:  Severity (0-10 scale):     Dyspnea:                           [ ]Mild [ ]Moderate [ ]Severe  Anxiety:                             [ ]Mild [ ]Moderate [ ]Severe  Fatigue:                             [ ]Mild [ ]Moderate [ ]Severe  Nausea:                             [ ]Mild [ ]Moderate [ ]Severe  Loss of appetite:              [ ]Mild [ ]Moderate [ ]Severe  Constipation:                    [ ]Mild [ ]Moderate [ ]Severe    PAINAD Score:    [email or web address] (Ctrl +  left click to view)  		  Other Symptoms:  [ ]All other review of systems negative     Karnofsky Performance Score/Palliative Performance Status Version 2:       30  %  PHYSICAL EXAM:  Vital Signs Last 24 Hrs  T(C): 36.7 (07 Nov 2018 05:53), Max: 36.7 (07 Nov 2018 05:53)  T(F): 98 (07 Nov 2018 05:53), Max: 98 (07 Nov 2018 05:53)  HR: 110 (07 Nov 2018 05:53) (110 - 110)  BP: 137/92 (07 Nov 2018 05:53) (137/92 - 137/92)  BP(mean): --  RR: 16 (07 Nov 2018 05:53) (16 - 16)  SpO2: 96% (07 Nov 2018 05:53) (96% - 96%)    GENERAL:  [ ]Alert  [x ]Oriented x1   [ x]Lethargic  [x ]Cachexia  [ ]Unarousable  [X ]Verbal  [ ]Non-Verbal  Behavioral:   [ ] Anxiety  [X ] Delirium [ ] Agitation [ ] Other possibly hypoactive delirium   HEENT:  [ ]Normal   [x ]Dry mouth   [ ]ET Tube/Trach  [ ]Oral lesions  PULMONARY: Pleurex was removed yesterday   [x ]Clear [ ]Tachypnea  [ ]Audible excessive secretions   [ ]Rhonchi        [ ]Right [ ]Left [ ]Bilateral  [ ]Crackles        [ ]Right [ ]Left [ ]Bilateral  [ ]Wheezing     [ ]Right [ ]Left [ ]Bilateral    CARDIOVASCULAR:    [ x]Regular [ ]Irregular [ ]Tachy  [ ]Travis [ ]Murmur [ ]Other  GASTROINTESTINAL:  [ x]Soft  [ ]Distended   [x ]+BS  [x ]Non tender [ ]Tender  [ ]PEG [ ]OGT/ NGT  Last BM: RN documentation reviewed  GENITOURINARY:  [ ]Normal [x ] Incontinent   [ ]Oliguria/Anuria   [ ]Yuan  MUSCULOSKELETAL:   [ ]Normal   [ x]Weakness  [ ]Bed/Wheelchair bound [ ]Edema  NEUROLOGIC:   [ ]No focal deficits  [x ] Cognitive impairment  [ ] Dysphagia [ ]Dysarthria [ ] Paresis [ ]Other   SKIN: ecchymoses  [ ]Normal   [ ]Pressure ulcer(s)  [ ]Rash    CRITICAL CARE:  [ ] Shock Present  [ ]Septic [ ]Cardiogenic [ ]Neurologic [ ]Hypovolemic  [ ]  Vasopressors [ ]  Inotropes   [ ] Respiratory failure present  [ ] Acute  [ ] Chronic [ ] Hypoxic  [ ] Hypercarbic [ ] Other  [ ] Other organ failure     LABS:    No new labs        RADIOLOGY & ADDITIONAL STUDIES:    PROTEIN CALORIE MALNUTRITION:   [ ] PPSV2 < or = 30% [ ] significant weight loss [ ] poor nutritional intake [ ] anasarca [ ] catabolic state   Albumin, Serum: 3.3 g/dL (11-01-18 @ 16:21)   Artificial Nutrition [ ]     REFERRALS:   [ ]Chaplaincy  [X ] Hospice  [ ]Child Life  [ ]Social Work  [ ]Case management [ ]Holistic Therapy [ ] Physical Therapy [ ] Dietary   Goals of Care Document: Goals of Care Conversation - Personal Advance Directive [C. Selg] (11-05-18 @ 15:32)
NEUROLOGY    Subjective:  No new neurologic events    Medications:  anastrozole 1 milliGRAM(s) Oral daily  cefepime   IVPB 1000 milliGRAM(s) IV Intermittent every 12 hours  cefepime   IVPB      DULoxetine 60 milliGRAM(s) Oral daily  ferrous    sulfate 325 milliGRAM(s) Oral daily  metoprolol succinate ER 25 milliGRAM(s) Oral daily  potassium chloride    Tablet ER 20 milliEquivalent(s) Oral once  simvastatin 20 milliGRAM(s) Oral at bedtime      Labs:  CBC Full  -  ( 2018 10:00 )  WBC Count : 11.7 K/uL  Hemoglobin : 11.9 g/dL  Hematocrit : 36.8 %  Platelet Count - Automated : 237 K/uL  Mean Cell Volume : 96.6 fl  Mean Cell Hemoglobin : 31.2 pg  Mean Cell Hemoglobin Concentration : 32.3 gm/dL  Auto Neutrophil # : x  Auto Lymphocyte # : x  Auto Monocyte # : x  Auto Eosinophil # : x  Auto Basophil # : x  Auto Neutrophil % : x  Auto Lymphocyte % : x  Auto Monocyte % : x  Auto Eosinophil % : x  Auto Basophil % : x    11-03    134<L>  |  95<L>  |  8   ----------------------------<  75  3.4<L>   |  28  |  0.53    Ca    8.6      2018 10:00  Mg     1.8     11-02    TPro  6.4  /  Alb  3.3  /  TBili  0.5  /  DBili  x   /  AST  9<L>  /  ALT  11  /  AlkPhos  91  11-01    CAPILLARY BLOOD GLUCOSE        LIVER FUNCTIONS - ( 2018 16:21 )  Alb: 3.3 g/dL / Pro: 6.4 g/dL / ALK PHOS: 91 U/L / ALT: 11 U/L / AST: 9 U/L / GGT: x           PT/INR - ( 2018 23:39 )   PT: 13.8 sec;   INR: 1.20 ratio         PTT - ( 2018 23:39 )  PTT:27.4 sec  Urinalysis Basic - ( 2018 20:05 )    Color: Yellow / Appearance: Slightly Turbid / S.032 / pH: x  Gluc: x / Ketone: Trace  / Bili: Small / Urobili: 2 mg/dL   Blood: x / Protein: 30 mg/dL / Nitrite: Negative   Leuk Esterase: Large / RBC: 31 /hpf / WBC 79 /hpf   Sq Epi: x / Non Sq Epi: 10 /hpf / Bacteria: Negative          Vitals:  Vital Signs Last 24 Hrs  T(C): 36.8 (2018 12:17), Max: 36.8 (2018 12:17)  T(F): 98.2 (2018 12:17), Max: 98.2 (2018 12:17)  HR: 116 (2018 12:17) (105 - 116)  BP: 141/92 (2018 12:17) (117/71 - 148/92)  BP(mean): --  RR: 18 (2018 12:17) (18 - 18)  SpO2: 100% (2018 12:17) (97% - 100%)    NEUROLOGICAL EXAM:    Mental status: Lethargic but easily arouses, follows all commands, language fluent. Oriented to hospital and reason for admission    Cranial Nerves: Pupils were equal, round, reactive to light. Extraocular movements were intact. Visual field were full. Fundoscopic exam was deferred. Facial sensation was intact to light touch. There was no facial asymmetry. The palate was upgoing symmetrically and tongue was midline. Hearing acuity was intact to finger rub AU. Shoulder shrug was full bilaterally    Motor exam: Bulk was decreased throughout. Tone was normal. Strength was 5/5 in all four extremities. Fine finger movements were symmetric and normal. There was no pronator drift    Reflexes: Areflexic in the bilateral upper extremities. Areflexic in the bilateral lower extremities. Toes were downgoing bilaterally.     Sensation: Intact to light touch    Coordination: Finger-nose-finger and heel-to-shin was without dysmetria.     Gait: Unable to assess
Patient is a 85y old  Female who presents with a chief complaint of syncope (2018 15:00)      Vital Signs Last 24 Hrs  T(C): 36.6 (18 @ 14:05), Max: 36.6 (18 @ 04:11)  T(F): 97.9 (18 @ 14:05), Max: 97.9 (18 @ 14:05)  HR: 112 (18 14:05) (105 - 112)  BP: 146/77 (18 @ 14:05) (142/90 - 160/98)  RR: 19 (18 @ 14:05) (18 - 20)  SpO2: 99% (18 @ 14:05) (98% - 100%)           18 @ 07:01  -  18 @ 17:00  --------------------------------------------------------  IN: 120 mL / OUT: 0 mL / NET: 120 mL      Daily Weight in k.4 (2018 00:15)                          10.8   12.3  )-----------( 217      ( 2018 06:35 )             33.5     134<L>  |  95<L>  |  11  ----------------------------<  78  3.5   |  26  |  0.54      PHYSICAL EXAM  Neurology: A&Ox3, NAD, drowsy  CV : RRR+S1S2  Lungs: Respirations non-labored, B/L BS diminished  Abdomen: Soft, NT/ND, +BSx4Q  Extremities: B/L LE no edema, negative calf tenderness, +PP           MEDICATIONS  anastrozole 1 milliGRAM(s) Oral daily  DULoxetine 60 milliGRAM(s) Oral daily  ferrous    sulfate 325 milliGRAM(s) Oral daily  metoprolol succinate ER 25 milliGRAM(s) Oral daily  oxyCODONE    5 mG/acetaminophen 325 mG 1 Tablet(s) Oral every 6 hours PRN  simvastatin 20 milliGRAM(s) Oral at bedtime
Patient seen and examined at bedside.    T(C): 36.9 (11-02-18 @ 04:38), Max: 36.9 (11-02-18 @ 04:38)  HR: 114 (11-02-18 @ 04:38) (108 - 125)  BP: 125/71 (11-02-18 @ 04:38) (109/71 - 141/91)  RR: 18 (11-02-18 @ 04:38) (18 - 22)  SpO2: 99% (11-02-18 @ 04:38) (94% - 100%)  Wt(kg): --    Exam:  AOx2, FC, PERRL, EOMI, no facial   4/5 RUE otherwise, 5/5 throughout, no drift  SILT  no clonus
Pt seen and examined with resident.  Pt is alert, awake, with NC O2 mildly dyspneic when speaking.  MARTINEZ with no drift. CT with no change in right occipital horn blood.  Management per primary team.  Can follow up as outpatient for further eval and reimaging.  Would continue to hold anticoagulation.	Karin:  [phone]
SUBJECTIVE: No new neurologic events overnight.  No new neurologic complaints.  did not sleep well last night    Medications:  MEDICATIONS  (STANDING):  anastrozole 1 milliGRAM(s) Oral daily  cefepime   IVPB 1000 milliGRAM(s) IV Intermittent every 12 hours  cefepime   IVPB      DULoxetine 60 milliGRAM(s) Oral daily  ferrous    sulfate 325 milliGRAM(s) Oral daily  metoprolol succinate ER 25 milliGRAM(s) Oral daily  simvastatin 20 milliGRAM(s) Oral at bedtime    MEDICATIONS  (PRN):  oxyCODONE    5 mG/acetaminophen 325 mG 1 Tablet(s) Oral every 6 hours PRN Moderate Pain (4 - 6)      Labs:  CBC Full  -  ( 04 Nov 2018 06:35 )  WBC Count : 12.3 K/uL  Hemoglobin : 10.8 g/dL  Hematocrit : 33.5 %  Platelet Count - Automated : 217 K/uL  Mean Cell Volume : 96.6 fl  Mean Cell Hemoglobin : 31.0 pg  Mean Cell Hemoglobin Concentration : 32.1 gm/dL  Auto Neutrophil # : x  Auto Lymphocyte # : x  Auto Monocyte # : x  Auto Eosinophil # : x  Auto Basophil # : x  Auto Neutrophil % : x  Auto Lymphocyte % : x  Auto Monocyte % : x  Auto Eosinophil % : x  Auto Basophil % : x    11-04    134<L>  |  95<L>  |  11  ----------------------------<  78  3.5   |  26  |  0.54    Ca    8.7      04 Nov 2018 06:27      CAPILLARY BLOOD GLUCOSE          PT/INR - ( 04 Nov 2018 06:35 )   PT: 13.7 sec;   INR: 1.20 ratio         PTT - ( 04 Nov 2018 06:35 )  PTT:26.3 sec      Vitals:  Vital Signs Last 24 Hrs  T(C): 36.6 (05 Nov 2018 04:11), Max: 36.7 (04 Nov 2018 13:19)  T(F): 97.8 (05 Nov 2018 04:11), Max: 98 (04 Nov 2018 13:19)  HR: 108 (05 Nov 2018 04:11) (105 - 117)  BP: 142/90 (05 Nov 2018 04:11) (119/82 - 160/98)  BP(mean): --  RR: 18 (05 Nov 2018 04:11) (18 - 20)  SpO2: 98% (05 Nov 2018 04:11) (97% - 100%)        NEUROLOGICAL EXAM:    Mental status: Lethargic but easily arouses, follows all commands, language fluent. Oriented to hospital and reason for admission    Cranial Nerves: Pupils were equal, round, reactive to light. Extraocular movements were intact. Visual field were full. Fundoscopic exam was deferred. Facial sensation was intact to light touch. There was no facial asymmetry. The palate was upgoing symmetrically and tongue was midline. Hearing acuity was intact to finger rub AU. Shoulder shrug was full bilaterally    Motor exam: Bulk was decreased throughout. Tone was normal. Strength was 5/5 in all four extremities. Fine finger movements were symmetric and normal. There was no pronator drift    Reflexes: Areflexic in the bilateral upper extremities. Areflexic in the bilateral lower extremities. Toes were downgoing bilaterally.     Sensation: Intact to light touch    Coordination: Finger-nose-finger and heel-to-shin was without dysmetria.     Gait: Unable to assess
Subjective: Patient seen and examined. No new events except as noted.     SUBJECTIVE/ROS:        MEDICATIONS:  MEDICATIONS  (STANDING):  anastrozole 1 milliGRAM(s) Oral daily  cefepime   IVPB 1000 milliGRAM(s) IV Intermittent every 12 hours  cefepime   IVPB      DULoxetine 60 milliGRAM(s) Oral daily  ferrous    sulfate 325 milliGRAM(s) Oral daily  metoprolol succinate ER 25 milliGRAM(s) Oral daily  potassium chloride    Tablet ER 20 milliEquivalent(s) Oral once  simvastatin 20 milliGRAM(s) Oral at bedtime      PHYSICAL EXAM:  T(C): 36.8 (11-03-18 @ 12:17), Max: 36.8 (11-03-18 @ 12:17)  HR: 116 (11-03-18 @ 12:17) (105 - 118)  BP: 141/92 (11-03-18 @ 12:17) (117/71 - 148/92)  RR: 18 (11-03-18 @ 12:17) (18 - 18)  SpO2: 100% (11-03-18 @ 12:17) (97% - 100%)  Wt(kg): --  I&O's Summary    02 Nov 2018 07:01  -  03 Nov 2018 07:00  --------------------------------------------------------  IN: 560 mL / OUT: 0 mL / NET: 560 mL    03 Nov 2018 08:01  -  03 Nov 2018 13:37  --------------------------------------------------------  IN: 120 mL / OUT: 0 mL / NET: 120 mL    JVP: Normal  Neck: supple  Lung: dec bs on left   CV: S1 S2 , Murmur:  Abd: soft  Ext: No edema  neuro: Awake / alert  Psych: flat affect  Skin: normal      LABS/DATA:    CARDIAC MARKERS:                                11.9   11.7  )-----------( 237      ( 03 Nov 2018 10:00 )             36.8     11-03    134<L>  |  95<L>  |  8   ----------------------------<  75  3.4<L>   |  28  |  0.53    Ca    8.6      03 Nov 2018 10:00  Mg     1.8     11-02    TPro  6.4  /  Alb  3.3  /  TBili  0.5  /  DBili  x   /  AST  9<L>  /  ALT  11  /  AlkPhos  91  11-01    proBNP:   Lipid Profile:   HgA1c:   TSH:     TELE:  EKG:
Subjective: Patient seen and examined. No new events except as noted.     SUBJECTIVE/ROS:  resting       MEDICATIONS:  MEDICATIONS  (STANDING):  anastrozole 1 milliGRAM(s) Oral daily  cefepime   IVPB 1000 milliGRAM(s) IV Intermittent every 12 hours  cefepime   IVPB      DULoxetine 60 milliGRAM(s) Oral daily  ferrous    sulfate 325 milliGRAM(s) Oral daily  metoprolol succinate ER 25 milliGRAM(s) Oral daily  simvastatin 20 milliGRAM(s) Oral at bedtime      PHYSICAL EXAM:  T(C): 36.6 (11-05-18 @ 04:11), Max: 36.7 (11-04-18 @ 13:19)  HR: 108 (11-05-18 @ 04:11) (105 - 117)  BP: 142/90 (11-05-18 @ 04:11) (119/82 - 160/98)  RR: 18 (11-05-18 @ 04:11) (18 - 20)  SpO2: 98% (11-05-18 @ 04:11) (97% - 100%)  Wt(kg): --  I&O's Summary    04 Nov 2018 07:01  -  05 Nov 2018 07:00  --------------------------------------------------------  IN: 270 mL / OUT: 0 mL / NET: 270 mL        JVP: Normal  Neck: supple  Lung: dec BS on left side   CV: S1 S2 , Murmur:  Abd: soft  Ext: No edema  neuro: Awake / alert  Psych: flat affect  Skin: normal        LABS/DATA:    CARDIAC MARKERS:                                10.8   12.3  )-----------( 217      ( 04 Nov 2018 06:35 )             33.5     11-04    134<L>  |  95<L>  |  11  ----------------------------<  78  3.5   |  26  |  0.54    Ca    8.7      04 Nov 2018 06:27      proBNP:   Lipid Profile:   HgA1c:   TSH:     TELE:  EKG:
Subjective: Patient seen and examined. No new events except as noted.     SUBJECTIVE/ROS:  resting now      MEDICATIONS:  MEDICATIONS  (STANDING):  anastrozole 1 milliGRAM(s) Oral daily  cefepime   IVPB 1000 milliGRAM(s) IV Intermittent every 12 hours  cefepime   IVPB      DULoxetine 60 milliGRAM(s) Oral daily  ferrous    sulfate 325 milliGRAM(s) Oral daily  metoprolol succinate ER 25 milliGRAM(s) Oral daily  simvastatin 20 milliGRAM(s) Oral at bedtime      PHYSICAL EXAM:  T(C): 36.7 (11-04-18 @ 04:21), Max: 36.8 (11-03-18 @ 12:17)  HR: 113 (11-04-18 @ 04:21) (111 - 118)  BP: 122/71 (11-04-18 @ 04:21) (111/57 - 141/92)  RR: 18 (11-04-18 @ 04:21) (18 - 18)  SpO2: 92% (11-04-18 @ 04:21) (92% - 100%)  Wt(kg): --  I&O's Summary    03 Nov 2018 08:01  -  04 Nov 2018 07:00  --------------------------------------------------------  IN: 810 mL / OUT: 0 mL / NET: 810 mL        JVP: Normal  Neck: supple  Lung: dec bs left side   CV: S1 S2 , Murmur:  Abd: soft  Ext: No edema  neuro: Awake / alert  Psych: flat affect  Skin: normal        LABS/DATA:    CARDIAC MARKERS:                                10.8   12.3  )-----------( 217      ( 04 Nov 2018 06:35 )             33.5     11-04    134<L>  |  95<L>  |  11  ----------------------------<  78  3.5   |  26  |  0.54    Ca    8.7      04 Nov 2018 06:27      proBNP:   Lipid Profile:   HgA1c:   TSH:     TELE:  EKG:
Subjective: Pt states" " Denies any CP, SOB, palpitations. No acute events overnight.    Vital Signs:  Vital Signs Last 24 Hrs  T(C): 36.7 (11-04-18 @ 13:19), Max: 36.7 (11-03-18 @ 16:20)  T(F): 98 (11-04-18 @ 13:19), Max: 98.1 (11-04-18 @ 00:23)  HR: 117 (11-04-18 @ 13:19) (111 - 117)  BP: 119/82 (11-04-18 @ 13:19) (111/57 - 135/86)  RR: 20 (11-04-18 @ 13:19) (18 - 20)  SpO2: 97% (11-04-18 @ 13:19) (92% - 100%) on (O2)        Relevant labs, radiology and Medications reviewed                        10.8   12.3  )-----------( 217      ( 04 Nov 2018 06:35 )             33.5     11-04    134<L>  |  95<L>  |  11  ----------------------------<  78  3.5   |  26  |  0.54    Ca    8.7      04 Nov 2018 06:27      PT/INR - ( 04 Nov 2018 06:35 )   PT: 13.7 sec;   INR: 1.20 ratio         PTT - ( 04 Nov 2018 06:35 )  PTT:26.3 sec  MEDICATIONS  (STANDING):  anastrozole 1 milliGRAM(s) Oral daily  cefepime   IVPB 1000 milliGRAM(s) IV Intermittent every 12 hours  cefepime   IVPB      DULoxetine 60 milliGRAM(s) Oral daily  ferrous    sulfate 325 milliGRAM(s) Oral daily  metoprolol succinate ER 25 milliGRAM(s) Oral daily  simvastatin 20 milliGRAM(s) Oral at bedtime    MEDICATIONS  (PRN):  oxyCODONE    5 mG/acetaminophen 325 mG 1 Tablet(s) Oral every 6 hours PRN Moderate Pain (4 - 6)      I&O's Summary    03 Nov 2018 08:01  -  04 Nov 2018 07:00  --------------------------------------------------------  IN: 810 mL / OUT: 0 mL / NET: 810 mL    04 Nov 2018 07:01  -  04 Nov 2018 15:38  --------------------------------------------------------  IN: 120 mL / OUT: 0 mL / NET: 120 mL        IMAGING    CXR: reviewed    CT Chest:    PAST MEDICAL & SURGICAL HISTORY:  Atrial fibrillation  Lung mass  Primary osteoarthritis of left hip  History of breast cancer: 2015, no chemo/radiation  s/p lumpectomy  Iron deficiency anemia  Retinal tear  Osteoarthritis  Glaucoma  Fistula of vagina: rectovaginal fistula  RBBB  Ptosis: left eye - eye lid surgery 2002  Anxiety  Hyperlipidemia  Osteopenia  Hypertension  Ureterovaginal prolapse  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  S/P hip replacement, right: 6/2016  - right hip  History of lumpectomy of right breast: July 2015  History of cataract surgery, left: and right 2016  Elective surgery: reversal of colostomy July 2015  History of tonsillectomy  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
infectious diseases progress note:    Patient is a 85y old  Female who presents with a chief complaint of IVH/Syncope (03 Nov 2018 12:25)        Syncope and collapse             Allergies    Levaquin (Diarrhea)    Intolerances    Dilaudid (Other)  tramadol (Unknown)      ANTIBIOTICS/RELEVANT:  antimicrobials  cefepime   IVPB 1000 milliGRAM(s) IV Intermittent every 12 hours  cefepime   IVPB        immunologic:    OTHER:  anastrozole 1 milliGRAM(s) Oral daily  DULoxetine 60 milliGRAM(s) Oral daily  ferrous    sulfate 325 milliGRAM(s) Oral daily  metoprolol succinate ER 25 milliGRAM(s) Oral daily  oxyCODONE    5 mG/acetaminophen 325 mG 1 Tablet(s) Oral every 6 hours PRN  simvastatin 20 milliGRAM(s) Oral at bedtime      Objective:  Vital Signs Last 24 Hrs  T(C): 36.6 (05 Nov 2018 04:11), Max: 36.7 (04 Nov 2018 13:19)  T(F): 97.8 (05 Nov 2018 04:11), Max: 98 (04 Nov 2018 13:19)  HR: 108 (05 Nov 2018 04:11) (105 - 117)  BP: 142/90 (05 Nov 2018 04:11) (119/82 - 160/98)  BP(mean): --  RR: 18 (05 Nov 2018 04:11) (18 - 20)  SpO2: 98% (05 Nov 2018 04:11) (97% - 100%)    PHYSICAL EXAM:     Eyes:ELI, EOMI  Ear/Nose/Throat: no oral lesion, no sinus tenderness on percussion	  Neck:no JVD, no lymphadenopathy, supple  Respiratory: CTA lor  Cardiovascular: S1S2 RRR, no murmurs  Gastrointestinal:soft, (+) BS, no HSM  Extremities:no e/e/c        LABS:                        10.8   12.3  )-----------( 217      ( 04 Nov 2018 06:35 )             33.5     11-04    134<L>  |  95<L>  |  11  ----------------------------<  78  3.5   |  26  |  0.54    Ca    8.7      04 Nov 2018 06:27      PT/INR - ( 04 Nov 2018 06:35 )   PT: 13.7 sec;   INR: 1.20 ratio         PTT - ( 04 Nov 2018 06:35 )  PTT:26.3 sec        MICROBIOLOGY:    RECENT CULTURES:  11-01 @ 23:19 .Urine Clean Catch (Midstream)                No growth          RESPIRATORY CULTURES:              RADIOLOGY & ADDITIONAL STUDIES:        Pager [phone]  After 5 pm/weekends or if no response :[phone]
GAP TEAM PALLIATIVE CARE UNIT PROGRESS NOTE:      [  ] Patient on hospice program.    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     Patient was transferred to PCU today. In bed and comfortable. Pt's two daughters are by the bed. No active symptom. Patient is communicating     DNR on chart: Yes  Daughter Hannah Dyer [phone]  Daughter Angelique Kumari [phone]  Son Bryn Kumari [phone]    Allergies    Levaquin (Diarrhea)    Intolerances    Dilaudid (Other)  tramadol (Unknown)    MEDICATIONS  (STANDING):  DULoxetine 60 milliGRAM(s) Oral daily  ferrous    sulfate 325 milliGRAM(s) Oral daily  lidocaine 1%/EPINEPHrine 1:200,000 Inj 30 milliLiter(s) Local Injection once  metoprolol succinate ER 25 milliGRAM(s) Oral daily    MEDICATIONS  (PRN):  oxyCODONE    5 mG/acetaminophen 325 mG 1 Tablet(s) Oral every 6 hours PRN Moderate Pain (4 - 6)    ITEMS UNCHECKED ARE NOT PRESENT    PRESENT SYMPTOMS: [ ]Unable to obtain due to poor mentation   Source if other than patient:  [ ]Family   [ ]Team     Pain (Impact on QOL):    Location:       Minimal acceptable level (0-10 scale):                    Aggrevating factors:  Quality:  Radiation:  Severity (0-10 scale):     Dyspnea:                           [ ]Mild [ ]Moderate [ ]Severe  Anxiety:                             [ ]Mild [ ]Moderate [ ]Severe  Fatigue:                             [ ]Mild [ ]Moderate [ ]Severe  Nausea:                             [ ]Mild [ ]Moderate [ ]Severe  Loss of appetite:              [ ]Mild [ ]Moderate [ ]Severe  Constipation:                    [ ]Mild [ ]Moderate [ ]Severe    PAINAD Score:    [email or web address] (Ctrl +  left click to view)  		  Other Symptoms:  [ ]All other review of systems negative     Karnofsky Performance Score/Palliative Performance Status Version 2:       30  %  PHYSICAL EXAM:  Vital Signs Last 24 Hrs  T(C): 36.9 (06 Nov 2018 10:21), Max: 36.9 (06 Nov 2018 10:21)  T(F): 98.4 (06 Nov 2018 10:21), Max: 98.4 (06 Nov 2018 10:21)  HR: 111 (06 Nov 2018 10:21) (111 - 117)  BP: 159/86 (06 Nov 2018 10:21) (108/70 - 159/86)  BP(mean): --  RR: 16 (06 Nov 2018 10:21) (16 - 19)  SpO2: 96% (06 Nov 2018 10:21) (92% - 96%) I&O's Summary    05 Nov 2018 07:01  -  06 Nov 2018 07:00  --------------------------------------------------------  IN: 240 mL / OUT: 0 mL / NET: 240 mL    GENERAL:  [ ]Alert  [x ]Oriented x1   [ x]Lethargic  [x ]Cachexia  [ ]Unarousable  [X ]Verbal  [ ]Non-Verbal  Behavioral:   [ ] Anxiety  [X ] Delirium [ ] Agitation [ ] Other possibly hypoactive delirium   HEENT:  [ ]Normal   [x ]Dry mouth   [ ]ET Tube/Trach  [ ]Oral lesions  PULMONARY: pleurex in place  [x ]Clear [ ]Tachypnea  [ ]Audible excessive secretions   [ ]Rhonchi        [ ]Right [ ]Left [ ]Bilateral  [ ]Crackles        [ ]Right [ ]Left [ ]Bilateral  [ ]Wheezing     [ ]Right [ ]Left [ ]Bilateral  CARDIOVASCULAR:    [ x]Regular [ ]Irregular [ ]Tachy  [ ]Travis [ ]Murmur [ ]Other  GASTROINTESTINAL:  [ x]Soft  [ ]Distended   [x ]+BS  [x ]Non tender [ ]Tender  [ ]PEG [ ]OGT/ NGT  Last BM: RN documentation reviewed  GENITOURINARY:  [ ]Normal [x ] Incontinent   [ ]Oliguria/Anuria   [ ]Yuan  MUSCULOSKELETAL:   [ ]Normal   [ x]Weakness  [ ]Bed/Wheelchair bound [ ]Edema  NEUROLOGIC:   [ ]No focal deficits  [x ] Cognitive impairment  [ ] Dysphagia [ ]Dysarthria [ ] Paresis [ ]Other   SKIN: ecchymoses  [ ]Normal   [ ]Pressure ulcer(s)  [ ]Rash    CRITICAL CARE:  [ ] Shock Present  [ ]Septic [ ]Cardiogenic [ ]Neurologic [ ]Hypovolemic  [ ]  Vasopressors [ ]  Inotropes   [ ] Respiratory failure present  [ ] Acute  [ ] Chronic [ ] Hypoxic  [ ] Hypercarbic [ ] Other  [ ] Other organ failure     LABS:            RADIOLOGY & ADDITIONAL STUDIES:    PROTEIN CALORIE MALNUTRITION:   [ ] PPSV2 < or = 30% [ ] significant weight loss [ ] poor nutritional intake [ ] anasarca [ ] catabolic state   Albumin, Serum: 3.3 g/dL (11-01-18 @ 16:21)   Artificial Nutrition [ ]     REFERRALS:   [ ]Chaplaincy  [X ] Hospice  [ ]Child Life  [ ]Social Work  [ ]Case management [ ]Holistic Therapy [ ] Physical Therapy [ ] Dietary   Goals of Care Document: Goals of Care Conversation - Personal Advance Directive [C. Selg] (11-05-18 @ 15:32)

## 2018-11-07 NOTE — DISCHARGE NOTE ADULT - MEDICATION SUMMARY - MEDICATIONS TO TAKE
I will START or STAY ON the medications listed below when I get home from the hospital:    Tylenol 500 mg oral tablet  -- 2 tab(s) by mouth , As Needed  -- Indication: For fever or mild pain     oxyCODONE-acetaminophen 5 mg-325 mg oral tablet  -- 1 tab(s) by mouth every 6 hours, As needed, Moderate Pain (4 - 6)  -- Indication: For moderate pain     DULoxetine 60 mg oral delayed release capsule  -- 1 cap(s) by mouth once a day  -- Indication: For depression     metoprolol succinate 25 mg oral tablet, extended release  -- 1 tab(s) by mouth once a day  -- Indication: For High blood pressure or fast heart rate     ferrous sulfate 325 mg (65 mg elemental iron) oral tablet  -- 1 tab(s) by mouth once a day  -- Indication: For Anemia     docusate sodium 100 mg oral capsule  -- 1 cap(s) by mouth , As Needed  -- Indication: For constipation

## 2018-11-07 NOTE — DISCHARGE NOTE ADULT - COMMUNITY RESOURCES
[facility] ([address] [place] [id number], ph: [phone])

## 2018-11-07 NOTE — PROGRESS NOTE ADULT - PROVIDER SPECIALTY LIST ADULT
Cardiology
Heme/Onc
Heme/Onc
Infectious Disease
Internal Medicine
Neurology
Neurology
Neurosurgery
Neurosurgery
Palliative Care
Thoracic Surgery
Thoracic Surgery
Palliative Care

## 2018-11-07 NOTE — PROGRESS NOTE ADULT - PROBLEM SELECTOR PLAN 3
Patient had Hx of early breat CA (2015), s/p surgery, but no chemo/RT.  Off Anastrazole.
Patient had Hx of early breat CA (2015), s/p surgery, but no chemo/RT.  Upon arriving to PCU she was on Anastrazole. According to our conversation with family and after discussing with oncology team, Anastrazole was stopped.

## 2018-11-07 NOTE — DISCHARGE NOTE ADULT - PLAN OF CARE
Comfort care, symptom management S/P therapy. No more chemotherapy or immunotherapy. Only comfort care and symptom management. Comfort care Comfort care. No more Anastrazole Continue with ferrous sulfate oral No more anti-coagulation Continue with metoprolol. No anti-coagulation.

## 2018-11-07 NOTE — DISCHARGE NOTE ADULT - PATIENT PORTAL LINK FT
You can access the [facility] Patient Portal, offered by [facility], by registering with the following website: [email or web address]

## 2018-11-07 NOTE — PROGRESS NOTE ADULT - PROBLEM SELECTOR PLAN 1
moderated left pleural effusion with left lung collapse and mediastinal shift.  Stable bilateral upper lobe masses and right lower lobe ground glass nodule.  Small pericardial effusion unchanged.  - S/P recent VATS/pleurex, with minimal drainage recently; Pleurex was removed yesterday at the bed side.   not a candidate for further chemotherapy.  comfort care only.   In preoperation to discharge with home hospice today.

## 2018-11-07 NOTE — DISCHARGE NOTE ADULT - CARE PLAN
Principal Discharge DX:	Stage 4 malignant neoplasm of lung, unspecified laterality  Goal:	Comfort care, symptom management  Assessment and plan of treatment:	S/P therapy. No more chemotherapy or immunotherapy. Only comfort care and symptom management.  Secondary Diagnosis:	History of breast cancer  Goal:	Comfort care  Assessment and plan of treatment:	Comfort care. No more Anastrazole  Secondary Diagnosis:	Other iron deficiency anemia  Assessment and plan of treatment:	Continue with ferrous sulfate oral  Secondary Diagnosis:	Atrial fibrillation, unspecified type  Goal:	No more anti-coagulation  Assessment and plan of treatment:	Continue with metoprolol. No anti-coagulation.

## 2018-11-07 NOTE — DISCHARGE NOTE ADULT - HOSPITAL COURSE
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     She is comfortable in bed. Hospice referral was done and she will be on hospice at home. Patient's discharge was done at 12:40 PM on 11/7/2018.

## 2018-11-07 NOTE — PROGRESS NOTE ADULT - ATTENDING COMMENTS
I have personally seen and examined this patient and agree with the above assessment and plan.
I have personally seen and examined this patient and agree with the above assessment and plan.    DC planning for home hospice.  Pt lives in own home with privately paid 24 hour aids and LTC insurance.

## 2018-11-07 NOTE — PROGRESS NOTE ADULT - PROBLEM SELECTOR PLAN 5
No more chemotherapy or invasive therapeutic measures   Patient will be discharged home with hospice today.

## 2018-11-07 NOTE — PROGRESS NOTE ADULT - PROBLEM SELECTOR PROBLEM 1
Lung mass
Stage 4 malignant neoplasm of lung, unspecified laterality
Stage 4 malignant neoplasm of lung, unspecified laterality

## 2018-11-09 NOTE — PROVIDER CONTACT NOTE (OTHER) - BACKGROUND
adm with pleural effusion  pt currently with pericardial drain and chest tube to LWS  pt had episodes of tachycardia yesterday during day with HR up to 180s n/a

## 2018-11-11 PROCEDURE — 82565 ASSAY OF CREATININE: CPT

## 2018-11-11 PROCEDURE — 82330 ASSAY OF CALCIUM: CPT

## 2018-11-11 PROCEDURE — 96365 THER/PROPH/DIAG IV INF INIT: CPT

## 2018-11-11 PROCEDURE — 85610 PROTHROMBIN TIME: CPT

## 2018-11-11 PROCEDURE — 80048 BASIC METABOLIC PNL TOTAL CA: CPT

## 2018-11-11 PROCEDURE — 84295 ASSAY OF SERUM SODIUM: CPT

## 2018-11-11 PROCEDURE — 85014 HEMATOCRIT: CPT

## 2018-11-11 PROCEDURE — 84132 ASSAY OF SERUM POTASSIUM: CPT

## 2018-11-11 PROCEDURE — 99285 EMERGENCY DEPT VISIT HI MDM: CPT | Mod: 25

## 2018-11-11 PROCEDURE — 82435 ASSAY OF BLOOD CHLORIDE: CPT

## 2018-11-11 PROCEDURE — 83735 ASSAY OF MAGNESIUM: CPT

## 2018-11-11 PROCEDURE — 93005 ELECTROCARDIOGRAM TRACING: CPT

## 2018-11-11 PROCEDURE — 82803 BLOOD GASES ANY COMBINATION: CPT

## 2018-11-11 PROCEDURE — 82947 ASSAY GLUCOSE BLOOD QUANT: CPT

## 2018-11-11 PROCEDURE — 97161 PT EVAL LOW COMPLEX 20 MIN: CPT

## 2018-11-11 PROCEDURE — 82378 CARCINOEMBRYONIC ANTIGEN: CPT

## 2018-11-11 PROCEDURE — 84100 ASSAY OF PHOSPHORUS: CPT

## 2018-11-11 PROCEDURE — 71045 X-RAY EXAM CHEST 1 VIEW: CPT

## 2018-11-11 PROCEDURE — 71250 CT THORAX DX C-: CPT

## 2018-11-11 PROCEDURE — 80053 COMPREHEN METABOLIC PANEL: CPT

## 2018-11-11 PROCEDURE — 85027 COMPLETE CBC AUTOMATED: CPT

## 2018-11-11 PROCEDURE — 83605 ASSAY OF LACTIC ACID: CPT

## 2018-11-11 PROCEDURE — 85730 THROMBOPLASTIN TIME PARTIAL: CPT

## 2019-02-26 ENCOUNTER — APPOINTMENT (OUTPATIENT)
Dept: ORTHOPEDIC SURGERY | Facility: CLINIC | Age: 84
End: 2019-02-26

## 2019-09-16 NOTE — PROGRESS NOTE ADULT - ASSESSMENT
83 y/o female with a PMH of OA, osteoporosis, RBBB, hyperlipidemia, early stage breast cancer and now with advanced lung cancer admitted with SOB secondary to pleural/pericardial effusion.     1. Adenocarcinoma Lung  / SOB  - CTA negative for PE, shows combination of pleural effusion and large left hilar mass causing compression of left upper lobe  - outside molecular studies from 7-27-18 on peripheral blood with possible ZAC mutation no other options.  Left pleural fluid cytology at  on 8-2-18 c/w adenoCA.   - was scheduled for staging PETCT as outpatient.  MRI head on 8-11-18 was negative for mets.   - S/P left pigtail placement on 8-9-18 as well as pericardiocentesis for moderate pericardial effusion on 8-10-18.  Cytology on pericardial effusion c/w adenoCA. Molecular study from previous thoracentesis at [facility] showed high PDL-1 expression and she is therefore a candidate for first line immunotherapy with Keytruda.  D/W patient and she is agreeable to proceed with treatment, written consent obtained.  - Rad Onc evaluation appreciated - will hold off on palliative radiation for now as she feels better after drainage of pleural and pericardial fluid   - d/w Dr. Sanchez would like to start systemic treatment with Keytruda before discharge  -  S/P left sided VATS on 8-15-18  -  Family does not want pericardial window, drain removed    2. Breast cancer - early stage ER/MD+ Her2- s/p lumpectomy on arimidex  - continue arimidex    3. Dysphagia -  - possibly related to compression or due to the SOB - better    4. Rapid Afib - RRT this AM. Now back in NSR.  - no objection from heme/onc standpoint to anticoagulate    Molecular studies show high PDL-1 expression.  S/P Left VATS on 8-15-18  Will start treatment with immunotherapy. Orders written by Dr. Sanchez and faxed to [address] [place] [address]. Written consent obtained tonight. I d/w patient scarsergio and Dr. Sanchez discussed with daughter.  Would start treatment tomorrow or Wednesday if drug can be obtained. negative

## 2019-11-01 NOTE — ED ADULT TRIAGE NOTE - CHIEF COMPLAINT QUOTE
syncope during BM Alert-The patient is alert, awake and responds to voice. The patient is oriented to time, place, and person. The triage nurse is able to obtain subjective information.

## 2020-02-25 NOTE — SWALLOW BEDSIDE ASSESSMENT ADULT - SLP PERTINENT HISTORY OF CURRENT PROBLEM
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 Found to have large L pleural effusion causing complete atelectasis of L lung. She had 1L thoracentesis 1 week ago as outpt. Modifier Description: The following is a description of the modifiers listed below. GA - is added to claims with a properly executed Advanced Beneficiary Notice (ABN) in the file. GY - is added to claims in which the item or service is statutorily excluded, does not meet the definition of any Medicare benefit, or -for non-Medicare Insurers- is not a contract benefit. GZ - - Item or service expected to be denied as not reasonable and necessary. Detail Level: Detailed Reason?: Additional Information Payment Option: Option 1: Bill Medicare, await for decision on payment.

## 2020-03-12 NOTE — PATIENT PROFILE ADULT - FUNCTIONAL SCREEN CURRENT LEVEL: DRESSING, MLM
Pt states feeling much better. Pt rates pain less than 4/10. The patient was discharged home by provider in stable condition. The patient is alert and oriented, in no respiratory distress and discharge vital signs obtained. The patient's diagnosis, condition and treatment were explained. The patient expressed understanding. Two prescriptions given. No work/school note given. A discharge plan has been developed. A  was not involved in the process. Aftercare instructions were given. Pt ambulatory out of the ED  with family. 2 = assistive person

## 2020-06-10 NOTE — CONSULT NOTE ADULT - CONSULT REQUESTED BY NAME
Base
Dr Julius Lovett
Dr. Lovett
Concussion Program  Concussion  .  [place]   Phone: [phone]  Fax:   Follow Up Time:     [facility] Concussion [facility]  Concussion  [facility] [place] [address]  Phone: [phone]  Fax:   Follow Up Time:

## 2021-05-11 NOTE — INPATIENT CERTIFICATION FOR MEDICARE PATIENTS - CURRENT MEDICAL NEEDS AND CARE PLANS
Quality 130: Documentation Of Current Medications In The Medical Record: Current Medications Documented Quality 226: Preventive Care And Screening: Tobacco Use: Screening And Cessation Intervention: Patient screened for tobacco use and is an ex/non-smoker Detail Level: Detailed Possible Home Quality 431: Preventive Care And Screening: Unhealthy Alcohol Use - Screening: Patient screened for unhealthy alcohol use using a single question and scores less than 2 times per year

## 2021-11-17 NOTE — PHYSICAL THERAPY INITIAL EVALUATION ADULT - DISCHARGE PLANNER MADE AWARE
If she is going to a lower elevation or sea level, most likely her oxygen levels will be improved but I could not give her an accurate measure of this without testing at that location. Definitely bring BIPAP to use.  I reviewed her notes and believe she is using 2LPM bleed in.   yes

## 2022-02-24 NOTE — PROVIDER CONTACT NOTE (OTHER) - ACTION/TREATMENT ORDERED:
[Normal Growth] : growth [Normal Development] : development [None] : No known medical problems [No Elimination Concerns] : elimination [No Feeding Concerns] : feeding [No Skin Concerns] : skin No new orders at this time, will continue to monito. [Normal Sleep Pattern] : sleep [Add Food/Vitamin] : Add Food/Vitamin: ~M [School] : school [Development and Mental Health] : development and mental health [Nutrition and Physical Activity] : nutrition and physical activity [Oral Health] : oral health [Safety] : safety [No Medications] : ~He/She~ is not on any medications [Patient] : patient [Full Activity without restrictions including Physical Education & Athletics] : Full Activity without restrictions including Physical Education & Athletics [I have examined the above-named student and completed the preparticipation physical evaluation. The athlete does not present apparent clinical contraindications to practice and participate in sport(s) as outlined above. A copy of the physical exam is on r] : I have examined the above-named student and completed the preparticipation physical evaluation. The athlete does not present apparent clinical contraindications to practice and participate in sport(s) as outlined above. A copy of the physical exam is on record in my office and can be made available to the school at the request of the parents. If conditions arise after the athlete has been cleared for participation, the physician may rescind the clearance until the problem is resolved and the potential consequences are completely explained to the athlete (and parents/guardians). [de-identified] : MVIF [FreeTextEntry1] : Continue balanced diet with all food groups. Brush teeth twice a day with toothbrush. Recommend visit to dentist twice per year. Help child to maintain consistent daily routines and sleep schedule. School discussed. Ensure home is safe. Teach child about personal safety. Use consistent, positive discipline. Limit screen time to no more than 2 hours per day. Encourage physical activity. Child needs to ride in a belt-positioning booster seat until  4 feet 9 inches has been reached and are between 8 and 12 years of age. Use of SPF 30 or more with reapplication and tick checks every 12 hours when playing outside. Poison control discussed. Water safety discussed. Use of a [facility] approved life jacket with designated water watcher. \par \par Return 1 year for routine well child check.\par Failed eye exam- to see optometry\par \par Discussed with family that current strep testing is NEGATIVE . A regular throat culture will be sent to the lab for further testing, with results obtained in 24-48 hours. If the throat culture is positive, a prescription will be sent to the designated  pharmacy. If the throat culture is negative after 48 hours and the patient is not better, the child should be rechecked. Discussed with family the etiology, natural course and treatment options for sore throat-pharyngitis. Recommended OTC therapy with pain/fever control products, topical products (lozenges, sprays, gargles) as needed per manufacturers recommendation. \par If throat culture is positive give- Amoxicillin 250mg/5ml, 10ml BID x 10 days (per mom, OK with amoxicillin despite augmentin allergy). \par

## 2022-09-16 NOTE — CONSULT NOTE ADULT - I WAS PHYSICALLY PRESENT FOR THE KEY PORTIONS OF THE EVALUATION AND MANAGEMENT (E/M) SERVICE PROVIDED.  I AGREE WITH THE ABOVE HISTORY, PHYSICAL, AND PLAN WHICH I HAVE REVIEWED AND EDITED WHERE APPROPRIATE
Problem: Nutrition/Hydration-ADULT  Goal: Nutrient/Hydration intake appropriate for improving, restoring or maintaining nutritional needs  Description: Monitor and assess patient's nutrition/hydration status for malnutrition  Collaborate with interdisciplinary team and initiate plan and interventions as ordered  Monitor patient's weight and dietary intake as ordered or per policy  Utilize nutrition screening tool and intervene as necessary  Determine patient's food preferences and provide high-protein, high-caloric foods as appropriate       INTERVENTIONS:  - Monitor oral intake, urinary output, labs, and treatment plans  - Assess nutrition and hydration status and recommend course of action  - Evaluate amount of meals eaten  - Assist patient with eating if necessary   - Allow adequate time for meals  - Recommend/ encourage appropriate diets, oral nutritional supplements, and vitamin/mineral supplements  - Order, calculate, and assess calorie counts as needed  - Recommend, monitor, and adjust tube feedings and TPN/PPN based on assessed needs  - Assess need for intravenous fluids  - Provide specific nutrition/hydration education as appropriate  - Include patient/family/caregiver in decisions related to nutrition  Outcome: Progressing     Problem: Potential for Falls  Goal: Patient will remain free of falls  Description: INTERVENTIONS:  - Educate patient/family on patient safety including physical limitations  - Instruct patient to call for assistance with activity   - Consult OT/PT to assist with strengthening/mobility   - Keep Call bell within reach  - Keep bed low and locked with side rails adjusted as appropriate  - Keep care items and personal belongings within reach  - Initiate and maintain comfort rounds  - Make Fall Risk Sign visible to staff  - Offer Toileting every 2 Hours, in advance of need  - Apply yellow socks and bracelet for high fall risk patients  - Consider moving patient to room near nurses station  Outcome: Progressing     Problem: PAIN - ADULT  Goal: Verbalizes/displays adequate comfort level or baseline comfort level  Description: Interventions:  - Encourage patient to monitor pain and request assistance  - Assess pain using appropriate pain scale  - Administer analgesics based on type and severity of pain and evaluate response  - Implement non-pharmacological measures as appropriate and evaluate response  - Consider cultural and social influences on pain and pain management  - Notify physician/advanced practitioner if interventions unsuccessful or patient reports new pain  Outcome: Progressing     Problem: INFECTION - ADULT  Goal: Absence or prevention of progression during hospitalization  Description: INTERVENTIONS:  - Assess and monitor for signs and symptoms of infection  - Monitor lab/diagnostic results  - Monitor all insertion sites, i e  indwelling lines, tubes, and drains  - Monitor endotracheal if appropriate and nasal secretions for changes in amount and color  - [place] appropriate cooling/warming therapies per order  - Administer medications as ordered  - Instruct and encourage patient and family to use good hand hygiene technique  - Identify and instruct in appropriate isolation precautions for identified infection/condition  Outcome: Progressing  Goal: Absence of fever/infection during neutropenic period  Description: INTERVENTIONS:  - Monitor WBC    Outcome: Progressing Statement Selected

## 2023-01-10 NOTE — CONSULT NOTE ADULT - PROVIDER SPECIALTY LIST ADULT
[FreeTextEntry1] : IRB   MR/TRUS FUSION GUIDED PROSTATE BIOPSY- AN IMPROVED WAY TO DETECT AND QUANTIFY PROSTATE CANCER\par \par Inclusion criteria have been reviewed and it has been determined that the subject has met all inclusion criteria.\par Exclusion criteria have been reviewed and it has been determined that the subject does not meet any exclusion criteria.\par \par The following was discussed during the consent process:\par ·	The fact that the study involves research\par ·	The study schedule and procedures involved\par ·	The main risks of the study, and the fact that all risks may not be known at this time\par ·	New information that may affect the subject’s willingness to continue on the study will be presented as soon as it is available\par ·	Benefits of participating\par ·	Alternatives to participating\par ·	Confidentiality \par ·	Compensation for research-related injury\par ·	Contacts for questions about the study or their rights while on the study\par ·	The fact that the subject’s participation is voluntary - they can refuse or withdraw \par at any time without penalty or loss of benefits.\par \par The subject was given ample time to ask questions prior to signing - all questions were answered to the subject’s satisfaction.\par \par Contact information for research staff was given to the subject.	\par The subject has expressed his/her willingness to participate.	\par \par Opportunity to sign the consent electronically was offered to the subject. Study team to contact the subject to assist with e-consenting though the [employer] platform. \par \par OR  \par \par Subject will sign printed consent on day of procedure.  \par \par  Infectious Disease

## 2023-02-25 NOTE — OCCUPATIONAL THERAPY INITIAL EVALUATION ADULT - LIGHT TOUCH SENSATION, LUE, REHAB EVAL
within normal limits Mustarde Flap Text: The defect edges were debeveled with a #15 scalpel blade.  Given the size, depth and location of the defect and the proximity to free margins a Mustarde flap was deemed most appropriate.  Using a sterile surgical marker, an appropriate flap was drawn incorporating the defect. The area thus outlined was incised with a #15 scalpel blade.  The skin margins were undermined to an appropriate distance in all directions utilizing iris scissors.

## 2023-12-04 NOTE — PRE-OP CHECKLIST - LATEX ALLERGY
"Retubed earmolds and hearing aids are available for patient to  at the [facility] . Listening check of both devices yielded clear signals, free from distortion in each.     Patient asked when she is \"due\" for new hearing aids. Suggested she contact the customer service department for her health insurance carrier to inquire about her coverage. Patient expressed verbal understanding of the above information.    Carter Peng, [facility]  [place] Licensed Audiologist [id number]      Patient dropped off both hearing aids to be repaired.     Please call patient with questions or when ready to be picked up at [phone]. Thank you!   "
no
no

## 2024-06-05 NOTE — ED ADULT NURSE NOTE - BREATHING, MLM
Patient received tdap vaccine in Left deltoid. Immunization/s administered by 2/16/2017 by  Dusty Worley LPN with guardian's consent. Patient tolerated procedure well. No reactions noted. Strips scanned into media. Reviewed with Dr. Henry. Rates controlled. Pt on Eliquis. Continue present management    Megan Bravo BSN, RN     Labored

## 2024-08-14 NOTE — DISCHARGE NOTE ADULT - NSFTFSERV1RD_GEN_ALL_CORE
[TextEntry] : IEve, am scribing for and the presence of Dr. Aidee Javed the following sections HISTORY OF PRESENT ILLNESS, PAST MEDICAL/FAMILY/SOCIAL HISTORY; REVIEW OF SYSTEMS; PHYSICAL EXAM; DISPOSITION.
[TextEntry] : IEve, am scribing for and the presence of Dr. Aidee Javed the following sections HISTORY OF PRESENT ILLNESS, PAST MEDICAL/FAMILY/SOCIAL HISTORY; REVIEW OF SYSTEMS; PHYSICAL EXAM; DISPOSITION.
observation and assessment

## 2025-01-25 NOTE — PATIENT PROFILE ADULT - PRIMARY ROLES/RESPONSIBILITIES
You were seen today for a lip injury.  Your injury appears to be minor without needing any stitches.  Keep area dry and clean. Use ice for symptomatic relief.       
none

## 2025-07-08 NOTE — CONSULT NOTE ADULT - ATTENDING COMMENTS
reviewed CT chest - minimal fluid there, pleurx in good position  suggest moving to twice a week drainage, no more than 500, next drainage next week  may continue Keytruda (this was a concern for onc)  pt DNR/I  trial of marinol No